# Patient Record
Sex: MALE | Race: WHITE | NOT HISPANIC OR LATINO | Employment: FULL TIME | URBAN - METROPOLITAN AREA
[De-identification: names, ages, dates, MRNs, and addresses within clinical notes are randomized per-mention and may not be internally consistent; named-entity substitution may affect disease eponyms.]

---

## 2017-04-04 ENCOUNTER — OFFICE VISIT (OUTPATIENT)
Dept: RETAIL CLINIC | Facility: CLINIC | Age: 32
End: 2017-04-04

## 2017-04-04 VITALS
HEART RATE: 72 BPM | TEMPERATURE: 100.4 F | DIASTOLIC BLOOD PRESSURE: 76 MMHG | SYSTOLIC BLOOD PRESSURE: 120 MMHG | OXYGEN SATURATION: 97 % | RESPIRATION RATE: 16 BRPM

## 2017-04-04 DIAGNOSIS — J45.901 ASTHMA EXACERBATION: ICD-10-CM

## 2017-04-04 DIAGNOSIS — J01.00 ACUTE MAXILLARY SINUSITIS, RECURRENCE NOT SPECIFIED: Primary | ICD-10-CM

## 2017-04-04 PROBLEM — R05.9 COUGH: Status: ACTIVE | Noted: 2017-04-04

## 2017-04-04 PROBLEM — R06.2 WHEEZING: Status: ACTIVE | Noted: 2017-04-04

## 2017-04-04 PROBLEM — J02.9 SORE THROAT: Status: ACTIVE | Noted: 2017-04-04

## 2017-04-04 PROCEDURE — 99213 OFFICE O/P EST LOW 20 MIN: CPT | Performed by: NURSE PRACTITIONER

## 2017-04-04 RX ORDER — PREDNISONE 20 MG/1
20 TABLET ORAL 2 TIMES DAILY
Qty: 14 TABLET | Refills: 0 | Status: SHIPPED | OUTPATIENT
Start: 2017-04-04 | End: 2017-04-11

## 2017-04-04 RX ORDER — BROMPHENIRAMINE MALEATE, PSEUDOEPHEDRINE HYDROCHLORIDE, AND DEXTROMETHORPHAN HYDROBROMIDE 2; 30; 10 MG/5ML; MG/5ML; MG/5ML
5 SYRUP ORAL 4 TIMES DAILY PRN
Qty: 200 ML | Refills: 0 | Status: SHIPPED | OUTPATIENT
Start: 2017-04-04 | End: 2017-04-11

## 2017-04-04 RX ORDER — CEPHALEXIN 500 MG/1
500 CAPSULE ORAL 2 TIMES DAILY
Qty: 20 CAPSULE | Refills: 0 | Status: SHIPPED | OUTPATIENT
Start: 2017-04-04 | End: 2017-04-14

## 2017-04-04 NOTE — PROGRESS NOTES
Subjective   Ravinder Robles is a 31 y.o. male.     Sinus Problem   This is a new problem. The current episode started in the past 7 days. The problem has been gradually worsening since onset. The maximum temperature recorded prior to his arrival was 100.4 - 100.9 F. The fever has been present for 1 to 2 days. His pain is at a severity of 6/10. The pain is moderate. Associated symptoms include congestion, coughing, ear pain, headaches, sinus pressure, a sore throat and swollen glands. Pertinent negatives include no chills, hoarse voice, neck pain or shortness of breath. Past treatments include acetaminophen. The treatment provided mild relief.   Cough   This is a new problem. The current episode started in the past 7 days. The problem has been gradually worsening. The problem occurs every few minutes. The cough is non-productive. Associated symptoms include ear congestion, ear pain, a fever, headaches, nasal congestion, rhinorrhea, a sore throat and wheezing. Pertinent negatives include no chest pain, chills, postnasal drip, rash, shortness of breath, sweats or weight loss. The symptoms are aggravated by lying down and pollens. He has tried a beta-agonist inhaler, OTC cough suppressant and rest for the symptoms. The treatment provided moderate relief. His past medical history is significant for asthma, bronchitis and environmental allergies.       The following portions of the patient's history were reviewed and updated as appropriate: allergies, current medications, past family history, past medical history, past social history, past surgical history and problem list.    Review of Systems   Constitutional: Positive for activity change, appetite change, fatigue and fever. Negative for chills and weight loss.   HENT: Positive for congestion, ear pain, rhinorrhea, sinus pressure and sore throat. Negative for ear discharge, hoarse voice, postnasal drip, tinnitus and trouble swallowing.    Eyes: Negative for pain,  discharge, itching and visual disturbance.   Respiratory: Positive for cough and wheezing. Negative for shortness of breath. Chest tightness: relates to asthma     Cardiovascular: Negative for chest pain and palpitations.   Gastrointestinal: Negative for abdominal distention, abdominal pain, constipation, diarrhea, nausea and vomiting.   Endocrine: Negative for cold intolerance.   Genitourinary: Negative for difficulty urinating.   Musculoskeletal: Negative for gait problem, neck pain and neck stiffness.   Skin: Negative for color change, pallor and rash.   Allergic/Immunologic: Positive for environmental allergies.   Neurological: Positive for headaches. Negative for dizziness.   Psychiatric/Behavioral: Negative for agitation, behavioral problems and confusion.   All other systems reviewed and are negative.      Objective   Physical Exam   Constitutional: He appears well-developed and well-nourished.   HENT:   Head: Normocephalic.   Right Ear: Hearing, external ear and ear canal normal. There is tenderness. Tympanic membrane is erythematous and retracted.   Left Ear: Hearing, external ear and ear canal normal. There is tenderness. A middle ear effusion is present.   Nose: Rhinorrhea and sinus tenderness present. Right sinus exhibits maxillary sinus tenderness. Left sinus exhibits maxillary sinus tenderness.   Mouth/Throat: Uvula is midline and mucous membranes are normal. Posterior oropharyngeal erythema present. Tonsils are 2+ on the right. Tonsils are 2+ on the left.   Purulent nasal drainage noted   Eyes: Conjunctivae and lids are normal. Pupils are equal, round, and reactive to light.   Neck: Trachea normal and full passive range of motion without pain. Neck supple.   Cardiovascular: Normal rate, regular rhythm and normal heart sounds.    Pulmonary/Chest: Accessory muscle usage present. He has no decreased breath sounds. He has wheezes in the right upper field and the left upper field. He has no rhonchi. He has  no rales.   Mild asthma exacerbation related to illness today.  Patient has asthma plan and is knowledgeable regarding the use of inhaler.  Does not need a refill on the albuterol inhaler.  Patient is pink and perfused on room air, mild accessory muscle use noted, mild scattered wheezing in upper fields.  No respiratory distress.  Will order prednisone and encourage use of inhaler every 4 hours today and then to wean as tolerated (ex. Try every 6 hours, 8 hours, 12 hours, and then once per day).   Abdominal: Soft. Normal appearance and bowel sounds are normal. There is no tenderness.   Musculoskeletal: Normal range of motion.   Lymphadenopathy:        Head (right side): Submental and submandibular adenopathy present.        Head (left side): Submental and submandibular adenopathy present.     He has cervical adenopathy.   Neurological: He is alert. He has normal strength.   Skin: Skin is warm, dry and intact. No rash noted.   Psychiatric: He has a normal mood and affect. His speech is normal and behavior is normal. Judgment and thought content normal. Cognition and memory are normal.       Assessment/Plan   Ravinder was seen today for sinus problem and cough.    Diagnoses and all orders for this visit:    Acute maxillary sinusitis, recurrence not specified  -     cephalexin (KEFLEX) 500 MG capsule; Take 1 capsule by mouth 2 (Two) Times a Day for 10 days.    Asthma exacerbation  -     predniSONE (DELTASONE) 20 MG tablet; Take 1 tablet by mouth 2 (Two) Times a Day for 7 days.  -     brompheniramine-pseudoephedrine-DM 30-2-10 MG/5ML syrup; Take 5 mL by mouth 4 (Four) Times a Day As Needed for Congestion, Cough or Allergies for up to 7 days.       Patient agrees with treatment plan and understands when to return to PCP or seek ER care.

## 2018-10-19 ENCOUNTER — HOSPITAL ENCOUNTER (EMERGENCY)
Facility: HOSPITAL | Age: 33
Discharge: HOME | End: 2018-10-19
Attending: EMERGENCY MEDICINE | Admitting: EMERGENCY MEDICINE

## 2018-10-19 ENCOUNTER — APPOINTMENT (OUTPATIENT)
Dept: GENERAL RADIOLOGY | Facility: HOSPITAL | Age: 33
End: 2018-10-19

## 2018-10-19 VITALS
RESPIRATION RATE: 18 BRPM | HEIGHT: 71 IN | HEART RATE: 99 BPM | SYSTOLIC BLOOD PRESSURE: 144 MMHG | WEIGHT: 291.56 LBS | BODY MASS INDEX: 40.82 KG/M2 | DIASTOLIC BLOOD PRESSURE: 92 MMHG | OXYGEN SATURATION: 92 % | TEMPERATURE: 99.1 F

## 2018-10-19 DIAGNOSIS — J98.01 BRONCHOSPASM: ICD-10-CM

## 2018-10-19 DIAGNOSIS — R55 SYNCOPE, TUSSIVE: Primary | ICD-10-CM

## 2018-10-19 DIAGNOSIS — R05.4 SYNCOPE, TUSSIVE: Primary | ICD-10-CM

## 2018-10-19 LAB
ALBUMIN SERPL-MCNC: 4.1 G/DL (ref 3.5–5.2)
ALBUMIN/GLOB SERPL: 1.2 G/DL
ALP SERPL-CCNC: 70 U/L (ref 40–129)
ALT SERPL W P-5'-P-CCNC: 31 U/L (ref 5–41)
ANION GAP SERPL CALCULATED.3IONS-SCNC: 10.6 MMOL/L
AST SERPL-CCNC: 24 U/L (ref 5–40)
BASOPHILS # BLD AUTO: 0.07 10*3/MM3 (ref 0–0.2)
BASOPHILS NFR BLD AUTO: 0.8 % (ref 0–2)
BILIRUB SERPL-MCNC: 0.4 MG/DL (ref 0.2–1.2)
BUN BLD-MCNC: 14 MG/DL (ref 6–20)
BUN/CREAT SERPL: 15.6 (ref 7–25)
CALCIUM SPEC-SCNC: 9 MG/DL (ref 8.6–10.5)
CHLORIDE SERPL-SCNC: 98 MMOL/L (ref 98–107)
CO2 SERPL-SCNC: 26.4 MMOL/L (ref 22–29)
CREAT BLD-MCNC: 0.9 MG/DL (ref 0.76–1.27)
DEPRECATED RDW RBC AUTO: 43.9 FL (ref 37–54)
EOSINOPHIL # BLD AUTO: 0.41 10*3/MM3 (ref 0.1–0.3)
EOSINOPHIL NFR BLD AUTO: 4.6 % (ref 0–4)
ERYTHROCYTE [DISTWIDTH] IN BLOOD BY AUTOMATED COUNT: 13.4 % (ref 11.5–14.5)
GFR SERPL CREATININE-BSD FRML MDRD: 98 ML/MIN/1.73
GLOBULIN UR ELPH-MCNC: 3.4 GM/DL
GLUCOSE BLD-MCNC: 100 MG/DL (ref 65–99)
GLUCOSE BLDC GLUCOMTR-MCNC: 124 MG/DL (ref 70–130)
HCT VFR BLD AUTO: 46.9 % (ref 42–52)
HGB BLD-MCNC: 15.5 G/DL (ref 14–18)
HOLD SPECIMEN: NORMAL
HOLD SPECIMEN: NORMAL
IMM GRANULOCYTES # BLD: 0.03 10*3/MM3 (ref 0–0.03)
IMM GRANULOCYTES NFR BLD: 0.3 % (ref 0–0.5)
LYMPHOCYTES # BLD AUTO: 2.89 10*3/MM3 (ref 0.6–4.8)
LYMPHOCYTES NFR BLD AUTO: 32.1 % (ref 20–45)
MAGNESIUM SERPL-MCNC: 1.7 MG/DL (ref 1.7–2.5)
MCH RBC QN AUTO: 29.5 PG (ref 27–31)
MCHC RBC AUTO-ENTMCNC: 33 G/DL (ref 31–37)
MCV RBC AUTO: 89.2 FL (ref 80–94)
MONOCYTES # BLD AUTO: 0.63 10*3/MM3 (ref 0–1)
MONOCYTES NFR BLD AUTO: 7 % (ref 3–8)
NEUTROPHILS # BLD AUTO: 4.98 10*3/MM3 (ref 1.5–8.3)
NEUTROPHILS NFR BLD AUTO: 55.2 % (ref 45–70)
NRBC BLD MANUAL-RTO: 0 /100 WBC (ref 0–0)
PLATELET # BLD AUTO: 239 10*3/MM3 (ref 140–500)
PMV BLD AUTO: 10.5 FL (ref 7.4–10.4)
POTASSIUM BLD-SCNC: 3.7 MMOL/L (ref 3.5–5.2)
PROT SERPL-MCNC: 7.5 G/DL (ref 6–8.5)
RBC # BLD AUTO: 5.26 10*6/MM3 (ref 4.7–6.1)
SODIUM BLD-SCNC: 135 MMOL/L (ref 136–145)
TROPONIN T SERPL-MCNC: <0.01 NG/ML (ref 0–0.03)
WBC NRBC COR # BLD: 9.01 10*3/MM3 (ref 4.8–10.8)
WHOLE BLOOD HOLD SPECIMEN: NORMAL
WHOLE BLOOD HOLD SPECIMEN: NORMAL

## 2018-10-19 PROCEDURE — 94640 AIRWAY INHALATION TREATMENT: CPT

## 2018-10-19 PROCEDURE — 99284 EMERGENCY DEPT VISIT MOD MDM: CPT | Performed by: EMERGENCY MEDICINE

## 2018-10-19 PROCEDURE — 93010 ELECTROCARDIOGRAM REPORT: CPT | Performed by: INTERNAL MEDICINE

## 2018-10-19 PROCEDURE — 99285 EMERGENCY DEPT VISIT HI MDM: CPT

## 2018-10-19 PROCEDURE — 85025 COMPLETE CBC W/AUTO DIFF WBC: CPT

## 2018-10-19 PROCEDURE — 80053 COMPREHEN METABOLIC PANEL: CPT | Performed by: EMERGENCY MEDICINE

## 2018-10-19 PROCEDURE — 84484 ASSAY OF TROPONIN QUANT: CPT | Performed by: EMERGENCY MEDICINE

## 2018-10-19 PROCEDURE — 93005 ELECTROCARDIOGRAM TRACING: CPT

## 2018-10-19 PROCEDURE — 83735 ASSAY OF MAGNESIUM: CPT | Performed by: EMERGENCY MEDICINE

## 2018-10-19 PROCEDURE — 93005 ELECTROCARDIOGRAM TRACING: CPT | Performed by: EMERGENCY MEDICINE

## 2018-10-19 PROCEDURE — 82962 GLUCOSE BLOOD TEST: CPT

## 2018-10-19 PROCEDURE — 71046 X-RAY EXAM CHEST 2 VIEWS: CPT

## 2018-10-19 RX ORDER — ESCITALOPRAM OXALATE 10 MG/1
10 TABLET ORAL DAILY
COMMUNITY
End: 2019-07-01 | Stop reason: SDUPTHER

## 2018-10-19 RX ORDER — SODIUM CHLORIDE 0.9 % (FLUSH) 0.9 %
10 SYRINGE (ML) INJECTION AS NEEDED
Status: DISCONTINUED | OUTPATIENT
Start: 2018-10-19 | End: 2018-10-19 | Stop reason: HOSPADM

## 2018-10-19 RX ORDER — LISINOPRIL 20 MG/1
20 TABLET ORAL DAILY
COMMUNITY
End: 2018-10-19

## 2018-10-19 RX ORDER — BUDESONIDE AND FORMOTEROL FUMARATE DIHYDRATE 160; 4.5 UG/1; UG/1
2 AEROSOL RESPIRATORY (INHALATION)
COMMUNITY
End: 2019-10-16 | Stop reason: ALTCHOICE

## 2018-10-19 RX ORDER — AZITHROMYCIN 250 MG/1
250 TABLET, FILM COATED ORAL DAILY
COMMUNITY
End: 2018-10-19

## 2018-10-19 RX ORDER — PREDNISONE 20 MG/1
20 TABLET ORAL 3 TIMES DAILY
Qty: 15 TABLET | Refills: 0 | Status: SHIPPED | OUTPATIENT
Start: 2018-10-19 | End: 2018-10-25

## 2018-10-19 RX ORDER — IPRATROPIUM BROMIDE AND ALBUTEROL SULFATE 2.5; .5 MG/3ML; MG/3ML
3 SOLUTION RESPIRATORY (INHALATION) ONCE
Status: COMPLETED | OUTPATIENT
Start: 2018-10-19 | End: 2018-10-19

## 2018-10-19 RX ORDER — MONTELUKAST SODIUM 10 MG/1
10 TABLET ORAL NIGHTLY
COMMUNITY
End: 2019-10-16 | Stop reason: ALTCHOICE

## 2018-10-19 RX ADMIN — IPRATROPIUM BROMIDE AND ALBUTEROL SULFATE 3 ML: .5; 3 SOLUTION RESPIRATORY (INHALATION) at 15:39

## 2018-10-19 NOTE — ED PROVIDER NOTES
Subjective   History of Present Illness  History of Present Illness    Chief complaint: Syncope    Location: Episode occurred at a local restaurant    Quality/Severity:  Moderate    Timing/Duration: Incident occurred shortly prior to arrival and only lasted a few seconds    Modifying Factors: Patient was in the midst of a coughing spell when the syncope occurred.    Associated Symptoms: Right temporal pain    Narrative: The patient is a 33-year-old white male who presents as noted above.  Again the patient was having a coughing spell when he had a brief episode of syncope.  The patient also relates for at least the past 1 month he has been having shortness of breath, wheezing and a dry cough.  The patient has completed 2 courses of azithromycin and also one course of Augmentin.  The patient was also on prednisone twice.  The patient continues to wheeze and he also continues to smoke.    Review of Systems   Constitutional: Negative for fatigue and fever.   HENT:        Scalp contusion   Respiratory: Positive for cough, shortness of breath and wheezing.    Cardiovascular: Negative for chest pain, palpitations and leg swelling.   Neurological: Positive for syncope. Negative for dizziness, weakness and headaches.   All other systems reviewed and are negative.      Past Medical History:   Diagnosis Date   • Acid reflux    • Allergic    • Anxiety    • Asthma    • Hypertension        Allergies   Allergen Reactions   • Cat Hair Extract    • Penicillins        Past Surgical History:   Procedure Laterality Date   • RESECTION BONE TUMOR KNEE         Family History   Problem Relation Age of Onset   • Diabetes Mother    • Lung disease Mother    • Lung disease Maternal Grandmother    • Cancer Maternal Grandmother        Social History     Social History   • Marital status: Unknown     Social History Main Topics   • Smoking status: Current Every Day Smoker     Packs/day: 1.50     Years: 8.00      Comment: Enrolled in program   •  Alcohol use 0.6 oz/week     1 Standard drinks or equivalent per week   • Drug use: No   • Sexual activity: Yes     Partners: Female     Other Topics Concern   • Not on file           Objective   Physical Exam   Constitutional: He is oriented to person, place, and time. He appears well-developed and well-nourished.   The patient is an obese, 33-year-old, white male in no acute distress.   HENT:   Head: Normocephalic.   Mouth/Throat: Oropharynx is clear and moist.   Minor contusion to the right temporal area   Eyes: Pupils are equal, round, and reactive to light. Conjunctivae and EOM are normal.   Neck: Normal range of motion. Neck supple.   Cardiovascular: Normal rate, regular rhythm and normal heart sounds.    No murmur heard.  Pulmonary/Chest: Effort normal and breath sounds normal. No respiratory distress. He has no wheezes. He has no rales.   Abdominal: Soft. Bowel sounds are normal. He exhibits no distension. There is no tenderness.   Musculoskeletal: Normal range of motion. He exhibits no edema or tenderness.   Neurological: He is alert and oriented to person, place, and time. No cranial nerve deficit.   Skin: Skin is warm and dry. No rash noted.   Psychiatric: He has a normal mood and affect. His behavior is normal.   Nursing note and vitals reviewed.      Final diagnoses:   Syncope, tussive   Bronchospasm       Procedures  Xr Chest 2 View    Result Date: 10/19/2018  Narrative: INDICATION: Syncopal episode this afternoon. Right lateral chest pain.  COMPARISON: 11/5/2011.  FINDINGS: PA and lateral views of the chest. Lungs are clear. Cardiac and mediastinal contours are normal. There is no pneumothorax. There are no displaced rib fractures.      Impression: Normal chest.  This report was finalized on 10/19/2018 3:43 PM by Dr. Milton Sanchez MD.             ED Course  ED Course as of Oct 20 0018   Fri Oct 19, 2018   1513 EKG was reviewed at 1444 hrs. by Dr. Paula and showed a normal sinus rhythm with a rate of  94 bpm.  P waves, QRS complexes, ST segments and T waves all unremarkable.  No ectopy.  Normal ECG.  [ML]   1550 Cough induced syncope discussed with the patient and he was reassured that this is a benign diagnosis.  The patient was informed that he was suffering from either a reactive airway disease problem or possibly asthma.  Treatment plan, expectations and warnings discussed.  Patient strongly encouraged to cease smoking.  A review of the patient's medications showed that he was on lisinopril and this may be the root of his cough and bronchospasm.  This medication will be discontinued.  [ML]      ED Course User Index  [ML] Milton Ferrera MD                  MDM  Number of Diagnoses or Management Options  Bronchospasm: new and requires workup  Syncope, tussive: new and requires workup     Amount and/or Complexity of Data Reviewed  Clinical lab tests: ordered and reviewed  Tests in the radiology section of CPT®: ordered and reviewed  Independent visualization of images, tracings, or specimens: yes    Risk of Complications, Morbidity, and/or Mortality  Presenting problems: high  Diagnostic procedures: high  Management options: moderate    Patient Progress  Patient progress: improved    Labs this visit  Lab Results (last 24 hours)     Procedure Component Value Units Date/Time    CBC & Differential [[id number]] Collected:  10/19/18 1446    Specimen:  Blood Updated:  10/19/18 1452    Narrative:       The following orders were created for panel order CBC & Differential.  Procedure                               Abnormality         Status                     ---------                               -----------         ------                     CBC Auto Differential[[id number]]        Abnormal            Final result                 Please view results for these tests on the individual orders.    Comprehensive Metabolic Panel [[id number]]  (Abnormal) Collected:  10/19/18 1446    Specimen:  Blood Updated:  10/19/18  1509     Glucose 100 (H) mg/dL      BUN 14 mg/dL      Creatinine 0.90 mg/dL      Sodium 135 (L) mmol/L      Potassium 3.7 mmol/L      Chloride 98 mmol/L      CO2 26.4 mmol/L      Calcium 9.0 mg/dL      Total Protein 7.5 g/dL      Albumin 4.10 g/dL      ALT (SGPT) 31 U/L      AST (SGOT) 24 U/L      Alkaline Phosphatase 70 U/L      Total Bilirubin 0.4 mg/dL      eGFR Non African Amer 98 mL/min/1.73      Globulin 3.4 gm/dL      A/G Ratio 1.2 g/dL      BUN/Creatinine Ratio 15.6     Anion Gap 10.6 mmol/L     Magnesium [[id number]]  (Normal) Collected:  10/19/18 1446    Specimen:  Blood Updated:  10/19/18 1509     Magnesium 1.7 mg/dL     Troponin [[id number]]  (Normal) Collected:  10/19/18 1446    Specimen:  Blood Updated:  10/19/18 1514     Troponin T <0.010 ng/mL     Narrative:       Troponin T Reference Ranges:  Less than 0.03 ng/mL:    Negative for AMI  0.03 to 0.09 ng/mL:      Indeterminant for AMI  Greater than 0.09 ng/mL: Positive for AMI    CBC Auto Differential [[id number]]  (Abnormal) Collected:  10/19/18 1446    Specimen:  Blood Updated:  10/19/18 1452     WBC 9.01 10*3/mm3      RBC 5.26 10*6/mm3      Hemoglobin 15.5 g/dL      Hematocrit 46.9 %      MCV 89.2 fL      MCH 29.5 pg      MCHC 33.0 g/dL      RDW 13.4 %      RDW-SD 43.9 fl      MPV 10.5 (H) fL      Platelets 239 10*3/mm3      Neutrophil % 55.2 %      Lymphocyte % 32.1 %      Monocyte % 7.0 %      Eosinophil % 4.6 (H) %      Basophil % 0.8 %      Immature Grans % 0.3 %      Neutrophils, Absolute 4.98 10*3/mm3      Lymphocytes, Absolute 2.89 10*3/mm3      Monocytes, Absolute 0.63 10*3/mm3      Eosinophils, Absolute 0.41 (H) 10*3/mm3      Basophils, Absolute 0.07 10*3/mm3      Immature Grans, Absolute 0.03 10*3/mm3      nRBC 0.0 /100 WBC     POC Glucose Once [[id number]]  (Normal) Collected:  10/19/18 1449    Specimen:  Blood Updated:  10/19/18 1456     Glucose 124 mg/dL     Narrative:       Meter: [id number] : [id number] Monica Moss         Prescribed on discharge             Medication List      New Prescriptions    predniSONE 20 MG tablet  Commonly known as:  DELTASONE  Take 1 tablet by mouth 3 (Three) Times a Day.        Stop    beclomethasone 80 MCG/ACT inhaler  Commonly known as:  QVAR     fluticasone 50 MCG/ACT nasal spray  Commonly known as:  FLONASE     lisinopril 20 MG tablet  Commonly known as:  PRINIVIL,ZESTRIL     ZITHROMAX Z-SARBJIT 250 MG tablet  Generic drug:  azithromycin          All lab results, imaging results and other tests were reviewed by Milton Ferrera MD and unless otherwise specified were found to be unremarkable.        Final diagnoses:   Syncope, tussive   Bronchospasm            Milton Ferrera MD  10/20/18 0019

## 2018-10-25 ENCOUNTER — APPOINTMENT (OUTPATIENT)
Dept: GENERAL RADIOLOGY | Facility: HOSPITAL | Age: 33
End: 2018-10-25

## 2018-10-25 ENCOUNTER — HOSPITAL ENCOUNTER (EMERGENCY)
Facility: HOSPITAL | Age: 33
Discharge: HOME | End: 2018-10-25
Attending: EMERGENCY MEDICINE | Admitting: EMERGENCY MEDICINE

## 2018-10-25 VITALS
TEMPERATURE: 98.1 F | WEIGHT: 290 LBS | BODY MASS INDEX: 40.6 KG/M2 | OXYGEN SATURATION: 93 % | DIASTOLIC BLOOD PRESSURE: 81 MMHG | HEART RATE: 73 BPM | HEIGHT: 71 IN | RESPIRATION RATE: 16 BRPM | SYSTOLIC BLOOD PRESSURE: 129 MMHG

## 2018-10-25 DIAGNOSIS — R09.1 PLEURISY: Primary | ICD-10-CM

## 2018-10-25 DIAGNOSIS — J98.01 BRONCHOSPASM: ICD-10-CM

## 2018-10-25 LAB
ALBUMIN SERPL-MCNC: 3.8 G/DL (ref 3.5–5.2)
ALBUMIN/GLOB SERPL: 1.2 G/DL
ALP SERPL-CCNC: 70 U/L (ref 40–129)
ALT SERPL W P-5'-P-CCNC: 41 U/L (ref 5–41)
ANION GAP SERPL CALCULATED.3IONS-SCNC: 11.5 MMOL/L
AST SERPL-CCNC: 19 U/L (ref 5–40)
BASOPHILS # BLD AUTO: 0.06 10*3/MM3 (ref 0–0.2)
BASOPHILS NFR BLD AUTO: 0.4 % (ref 0–2)
BILIRUB SERPL-MCNC: 0.3 MG/DL (ref 0.2–1.2)
BUN BLD-MCNC: 22 MG/DL (ref 6–20)
BUN/CREAT SERPL: 27.2 (ref 7–25)
CALCIUM SPEC-SCNC: 9 MG/DL (ref 8.6–10.5)
CHLORIDE SERPL-SCNC: 100 MMOL/L (ref 98–107)
CO2 SERPL-SCNC: 25.5 MMOL/L (ref 22–29)
CREAT BLD-MCNC: 0.81 MG/DL (ref 0.76–1.27)
DEPRECATED RDW RBC AUTO: 45.1 FL (ref 37–54)
EOSINOPHIL # BLD AUTO: 0.41 10*3/MM3 (ref 0.1–0.3)
EOSINOPHIL NFR BLD AUTO: 3 % (ref 0–4)
ERYTHROCYTE [DISTWIDTH] IN BLOOD BY AUTOMATED COUNT: 14 % (ref 11.5–14.5)
GFR SERPL CREATININE-BSD FRML MDRD: 110 ML/MIN/1.73
GLOBULIN UR ELPH-MCNC: 3.2 GM/DL
GLUCOSE BLD-MCNC: 103 MG/DL (ref 65–99)
HCT VFR BLD AUTO: 47.1 % (ref 42–52)
HGB BLD-MCNC: 15.7 G/DL (ref 14–18)
IMM GRANULOCYTES # BLD: 0.08 10*3/MM3 (ref 0–0.03)
IMM GRANULOCYTES NFR BLD: 0.6 % (ref 0–0.5)
LYMPHOCYTES # BLD AUTO: 5.78 10*3/MM3 (ref 0.6–4.8)
LYMPHOCYTES NFR BLD AUTO: 41.6 % (ref 20–45)
MCH RBC QN AUTO: 29.5 PG (ref 27–31)
MCHC RBC AUTO-ENTMCNC: 33.3 G/DL (ref 31–37)
MCV RBC AUTO: 88.4 FL (ref 80–94)
MONOCYTES # BLD AUTO: 0.92 10*3/MM3 (ref 0–1)
MONOCYTES NFR BLD AUTO: 6.6 % (ref 3–8)
NEUTROPHILS # BLD AUTO: 6.64 10*3/MM3 (ref 1.5–8.3)
NEUTROPHILS NFR BLD AUTO: 47.8 % (ref 45–70)
NRBC BLD MANUAL-RTO: 0 /100 WBC (ref 0–0)
PLATELET # BLD AUTO: 281 10*3/MM3 (ref 140–500)
PMV BLD AUTO: 10.1 FL (ref 7.4–10.4)
POTASSIUM BLD-SCNC: 4 MMOL/L (ref 3.5–5.2)
PROT SERPL-MCNC: 7 G/DL (ref 6–8.5)
RBC # BLD AUTO: 5.33 10*6/MM3 (ref 4.7–6.1)
SODIUM BLD-SCNC: 137 MMOL/L (ref 136–145)
WBC NRBC COR # BLD: 13.89 10*3/MM3 (ref 4.8–10.8)

## 2018-10-25 PROCEDURE — 80053 COMPREHEN METABOLIC PANEL: CPT | Performed by: EMERGENCY MEDICINE

## 2018-10-25 PROCEDURE — 96374 THER/PROPH/DIAG INJ IV PUSH: CPT

## 2018-10-25 PROCEDURE — 85025 COMPLETE CBC W/AUTO DIFF WBC: CPT | Performed by: EMERGENCY MEDICINE

## 2018-10-25 PROCEDURE — 94640 AIRWAY INHALATION TREATMENT: CPT

## 2018-10-25 PROCEDURE — 25010000002 METHYLPREDNISOLONE PER 125 MG: Performed by: EMERGENCY MEDICINE

## 2018-10-25 PROCEDURE — 96375 TX/PRO/DX INJ NEW DRUG ADDON: CPT

## 2018-10-25 PROCEDURE — 99284 EMERGENCY DEPT VISIT MOD MDM: CPT

## 2018-10-25 PROCEDURE — 71046 X-RAY EXAM CHEST 2 VIEWS: CPT

## 2018-10-25 PROCEDURE — 25010000002 HYDROMORPHONE PER 4 MG: Performed by: EMERGENCY MEDICINE

## 2018-10-25 PROCEDURE — 99284 EMERGENCY DEPT VISIT MOD MDM: CPT | Performed by: EMERGENCY MEDICINE

## 2018-10-25 RX ORDER — HYDROMORPHONE HCL 110MG/55ML
1 PATIENT CONTROLLED ANALGESIA SYRINGE INTRAVENOUS ONCE
Status: COMPLETED | OUTPATIENT
Start: 2018-10-25 | End: 2018-10-25

## 2018-10-25 RX ORDER — PREDNISONE 20 MG/1
TABLET ORAL
Qty: 24 TABLET | Refills: 0 | Status: SHIPPED | OUTPATIENT
Start: 2018-10-25 | End: 2018-12-09

## 2018-10-25 RX ORDER — IPRATROPIUM BROMIDE AND ALBUTEROL SULFATE 2.5; .5 MG/3ML; MG/3ML
3 SOLUTION RESPIRATORY (INHALATION) ONCE
Status: COMPLETED | OUTPATIENT
Start: 2018-10-25 | End: 2018-10-25

## 2018-10-25 RX ORDER — SODIUM CHLORIDE 0.9 % (FLUSH) 0.9 %
10 SYRINGE (ML) INJECTION AS NEEDED
Status: DISCONTINUED | OUTPATIENT
Start: 2018-10-25 | End: 2018-10-25 | Stop reason: HOSPADM

## 2018-10-25 RX ORDER — HYDROCODONE BITARTRATE AND ACETAMINOPHEN 7.5; 325 MG/1; MG/1
1 TABLET ORAL EVERY 4 HOURS PRN
Qty: 15 TABLET | Refills: 0 | Status: SHIPPED | OUTPATIENT
Start: 2018-10-25 | End: 2018-12-09

## 2018-10-25 RX ORDER — METHYLPREDNISOLONE SODIUM SUCCINATE 125 MG/2ML
125 INJECTION, POWDER, LYOPHILIZED, FOR SOLUTION INTRAMUSCULAR; INTRAVENOUS ONCE
Status: COMPLETED | OUTPATIENT
Start: 2018-10-25 | End: 2018-10-25

## 2018-10-25 RX ORDER — HYDROCODONE BITARTRATE AND ACETAMINOPHEN 5; 325 MG/1; MG/1
2 TABLET ORAL ONCE
Status: COMPLETED | OUTPATIENT
Start: 2018-10-25 | End: 2018-10-25

## 2018-10-25 RX ADMIN — METHYLPREDNISOLONE SODIUM SUCCINATE 125 MG: 125 INJECTION, POWDER, FOR SOLUTION INTRAMUSCULAR; INTRAVENOUS at 05:27

## 2018-10-25 RX ADMIN — HYDROMORPHONE HYDROCHLORIDE 1 MG: 2 INJECTION, SOLUTION INTRAMUSCULAR; INTRAVENOUS; SUBCUTANEOUS at 05:26

## 2018-10-25 RX ADMIN — HYDROCODONE BITARTRATE AND ACETAMINOPHEN 2 TABLET: 5; 325 TABLET ORAL at 07:12

## 2018-10-25 RX ADMIN — IPRATROPIUM BROMIDE AND ALBUTEROL SULFATE 3 ML: .5; 3 SOLUTION RESPIRATORY (INHALATION) at 05:34

## 2018-10-25 NOTE — ED PROVIDER NOTES
Subjective   History of Present Illness  History of Present Illness    Chief complaint: Cough productive of scant hemoptysis, right-sided chest pain, shortness of breath and wheezing    Location: Entire right chest    Quality/Severity:  Right-sided chest pain is sharp and severe pain with cough    Timing/Duration: Patient was seen by myself 6 days ago after cough induced syncope and bronchospasm.  Patient was discharged on oral steroids and was doing relatively well until he finished this medication.    Modifying Factors: Cough intensifies pain    Associated Symptoms: Malaise    Narrative: The patient is a 33-year-old white male who presents as noted above.  Please see October 19 chart for further details.  During that visit it was thought that possibly lisinopril was contributing to his symptoms.  Patient's cough was doing better until yesterday when he finished his 5 day course of prednisone.  The patient is now having severe, stabbing, right-sided chest pain with cough.    Review of Systems   Constitutional: Positive for fatigue. Negative for fever.   HENT: Negative for congestion.    Respiratory: Positive for cough, chest tightness, shortness of breath and wheezing.         Scant hemoptysis   Cardiovascular: Positive for chest pain (scribed as sharp and stabbing). Negative for palpitations.   Gastrointestinal: Negative for abdominal pain, diarrhea, nausea and vomiting.   Genitourinary: Negative for dysuria, flank pain, hematuria and urgency.   Musculoskeletal: Negative for back pain.   Skin: Negative for rash.   Neurological: Positive for syncope and light-headedness. Negative for dizziness, weakness and headaches.   Psychiatric/Behavioral: Negative for confusion.   All other systems reviewed and are negative.      Past Medical History:   Diagnosis Date   • Acid reflux    • Allergic    • Anxiety    • Asthma    • Hypertension        Allergies   Allergen Reactions   • Cat Hair Extract    • Penicillins        Past  Surgical History:   Procedure Laterality Date   • RESECTION BONE TUMOR KNEE         Family History   Problem Relation Age of Onset   • Diabetes Mother    • Lung disease Mother    • Lung disease Maternal Grandmother    • Cancer Maternal Grandmother        Social History     Social History   • Marital status: Unknown     Social History Main Topics   • Smoking status: Current Every Day Smoker     Packs/day: 1.50     Years: 8.00      Comment: Enrolled in program   • Alcohol use 0.6 oz/week     1 Standard drinks or equivalent per week   • Drug use: No   • Sexual activity: Yes     Partners: Female     Other Topics Concern   • Not on file           Objective   Physical Exam   Constitutional: He is oriented to person, place, and time.   The patient is an obese, 33-year-old, white male who is noted be sitting uncomfortably on the side of the bed.   HENT:   Head: Normocephalic and atraumatic.   Eyes: Conjunctivae and EOM are normal.   Neck: Normal range of motion. Neck supple. No thyromegaly present.   Cardiovascular: Normal rate, regular rhythm and normal heart sounds.    No murmur heard.  Pulmonary/Chest:   Auscultation of the lungs does reveal marginal exchange of air any prolonged expiratory phase.  Marked wheezes in all lung fields.   Abdominal: Soft. Bowel sounds are normal. He exhibits no distension. There is no tenderness.   Musculoskeletal: Normal range of motion. He exhibits no edema or tenderness.   Lymphadenopathy:     He has no cervical adenopathy.   Neurological: He is alert and oriented to person, place, and time.   Skin: Skin is warm and dry. No rash noted.   Psychiatric: He has a normal mood and affect. His behavior is normal.   Nursing note and vitals reviewed.      Final diagnoses:   Pleurisy   Bronchospasm       Procedures           ED Course  ED Course as of Oct 25 0701   u Oct 25, 2018   0701 All pertinent findings discussed with  and wife.  Patient does relate that he does have a current  appointment soon to see an allergist.  Steroids will be restarted and pain medicine will be provided for discomfort.  Warnings discussed.  [ML]      ED Course User Index  [ML] Milton Ferrera MD                  MDM  Number of Diagnoses or Management Options  Bronchospasm: new and requires workup  Pleurisy: new and requires workup     Amount and/or Complexity of Data Reviewed  Clinical lab tests: ordered and reviewed  Tests in the radiology section of CPT®: ordered and reviewed  Independent visualization of images, tracings, or specimens: yes    Risk of Complications, Morbidity, and/or Mortality  Presenting problems: high  Diagnostic procedures: high  Management options: high    Patient Progress  Patient progress: improved   My differential diagnosis for dyspnea includes but is not limited to:  Asthma, COPD, pneumonia, pulmonary embolus, acute respiratory distress syndrome, pneumothorax, pleural effusion, pulmonary fibrosis, congestive heart failure, myocardial infarction, DKA, uremia, acidosis, sepsis, anemia, drug related, hyperventilation, CNS disease    Labs this visit  Lab Results (last 24 hours)     Procedure Component Value Units Date/Time    CBC & Differential [[id number]] Collected:  10/25/18 0526    Specimen:  Blood Updated:  10/25/18 0533    Narrative:       The following orders were created for panel order CBC & Differential.  Procedure                               Abnormality         Status                     ---------                               -----------         ------                     CBC Auto Differential[[id number]]        Abnormal            Final result                 Please view results for these tests on the individual orders.    Comprehensive Metabolic Panel [[id number]]  (Abnormal) Collected:  10/25/18 0526    Specimen:  Blood Updated:  10/25/18 0553     Glucose 103 (H) mg/dL      BUN 22 (H) mg/dL      Creatinine 0.81 mg/dL      Sodium 137 mmol/L      Potassium 4.0 mmol/L       Chloride 100 mmol/L      CO2 25.5 mmol/L      Calcium 9.0 mg/dL      Total Protein 7.0 g/dL      Albumin 3.80 g/dL      ALT (SGPT) 41 U/L      AST (SGOT) 19 U/L      Alkaline Phosphatase 70 U/L      Total Bilirubin 0.3 mg/dL      eGFR Non African Amer 110 mL/min/1.73      Globulin 3.2 gm/dL      A/G Ratio 1.2 g/dL      BUN/Creatinine Ratio 27.2 (H)     Anion Gap 11.5 mmol/L     CBC Auto Differential [[id number]]  (Abnormal) Collected:  10/25/18 0526    Specimen:  Blood Updated:  10/25/18 0533     WBC 13.89 (H) 10*3/mm3      RBC 5.33 10*6/mm3      Hemoglobin 15.7 g/dL      Hematocrit 47.1 %      MCV 88.4 fL      MCH 29.5 pg      MCHC 33.3 g/dL      RDW 14.0 %      RDW-SD 45.1 fl      MPV 10.1 fL      Platelets 281 10*3/mm3      Neutrophil % 47.8 %      Lymphocyte % 41.6 %      Monocyte % 6.6 %      Eosinophil % 3.0 %      Basophil % 0.4 %      Immature Grans % 0.6 (H) %      Neutrophils, Absolute 6.64 10*3/mm3      Lymphocytes, Absolute 5.78 (H) 10*3/mm3      Monocytes, Absolute 0.92 10*3/mm3      Eosinophils, Absolute 0.41 (H) 10*3/mm3      Basophils, Absolute 0.06 10*3/mm3      Immature Grans, Absolute 0.08 (H) 10*3/mm3      nRBC 0.0 /100 WBC         Prescribed on discharge             Medication List      New Prescriptions    HYDROcodone-acetaminophen 7.5-325 MG per tablet  Commonly known as:  NORCO  Take 1 tablet by mouth Every 4 (Four) Hours As Needed for Moderate Pain .        Changed    predniSONE 20 MG tablet  Commonly known as:  DELTASONE  1 tablet by mouth 3 times a day for 4 days, then 1 tablet twice a day for   4 days and finally 1 tablet every day for 4 days.  What changed:  how much to take  how to take this  when to take this  additional instructions        Stop    methylPREDNISolone 2 MG tablet  Commonly known as:  MEDROL          All lab results, imaging results and other tests were reviewed by Milton Ferrera MD and unless otherwise specified were found to be unremarkable.        Final  diagnoses:   Pleurisy   Bronchospasm            Milton Ferrera MD  10/25/18 0702

## 2018-11-08 ENCOUNTER — HOSPITAL ENCOUNTER (OUTPATIENT)
Dept: CT IMAGING | Facility: HOSPITAL | Age: 33
Discharge: HOME | End: 2018-11-08
Attending: INTERNAL MEDICINE | Admitting: INTERNAL MEDICINE

## 2018-11-08 ENCOUNTER — TRANSCRIBE ORDERS (OUTPATIENT)
Dept: ADMINISTRATIVE | Facility: HOSPITAL | Age: 33
End: 2018-11-08

## 2018-11-08 ENCOUNTER — HOSPITAL ENCOUNTER (OUTPATIENT)
Dept: GENERAL RADIOLOGY | Facility: HOSPITAL | Age: 33
Discharge: HOME | End: 2018-11-08
Attending: INTERNAL MEDICINE

## 2018-11-08 DIAGNOSIS — R07.89 OTHER CHEST PAIN: Primary | ICD-10-CM

## 2018-11-08 DIAGNOSIS — R07.89 OTHER CHEST PAIN: ICD-10-CM

## 2018-11-08 DIAGNOSIS — R52 PAIN: Primary | ICD-10-CM

## 2018-11-08 DIAGNOSIS — R52 PAIN: ICD-10-CM

## 2018-11-08 PROCEDURE — 0 IOPAMIDOL PER 1 ML: Performed by: INTERNAL MEDICINE

## 2018-11-08 PROCEDURE — 71275 CT ANGIOGRAPHY CHEST: CPT

## 2018-11-08 PROCEDURE — 72070 X-RAY EXAM THORAC SPINE 2VWS: CPT

## 2018-11-08 RX ADMIN — IOPAMIDOL 100 ML: 755 INJECTION, SOLUTION INTRAVENOUS at 11:54

## 2018-12-09 ENCOUNTER — OFFICE VISIT (OUTPATIENT)
Dept: RETAIL CLINIC | Facility: CLINIC | Age: 33
End: 2018-12-09

## 2018-12-09 VITALS
RESPIRATION RATE: 18 BRPM | SYSTOLIC BLOOD PRESSURE: 138 MMHG | TEMPERATURE: 98.1 F | HEART RATE: 111 BPM | DIASTOLIC BLOOD PRESSURE: 90 MMHG | OXYGEN SATURATION: 97 %

## 2018-12-09 DIAGNOSIS — J40 BRONCHITIS: Primary | ICD-10-CM

## 2018-12-09 PROCEDURE — 94640 AIRWAY INHALATION TREATMENT: CPT | Performed by: NURSE PRACTITIONER

## 2018-12-09 PROCEDURE — 99213 OFFICE O/P EST LOW 20 MIN: CPT | Performed by: NURSE PRACTITIONER

## 2018-12-09 RX ORDER — IPRATROPIUM BROMIDE AND ALBUTEROL SULFATE 2.5; .5 MG/3ML; MG/3ML
3 SOLUTION RESPIRATORY (INHALATION) EVERY 4 HOURS PRN
Qty: 360 ML | Refills: 0 | Status: SHIPPED | OUTPATIENT
Start: 2018-12-09 | End: 2019-01-08

## 2018-12-09 RX ORDER — PREDNISONE 20 MG/1
20 TABLET ORAL 2 TIMES DAILY
Qty: 10 TABLET | Refills: 0 | Status: SHIPPED | OUTPATIENT
Start: 2018-12-09 | End: 2018-12-14

## 2018-12-09 RX ORDER — AMLODIPINE BESYLATE 10 MG/1
TABLET ORAL
COMMUNITY
Start: 2018-11-25 | End: 2019-10-16 | Stop reason: ALTCHOICE

## 2018-12-09 RX ORDER — IPRATROPIUM BROMIDE AND ALBUTEROL SULFATE 2.5; .5 MG/3ML; MG/3ML
3 SOLUTION RESPIRATORY (INHALATION) ONCE
Status: COMPLETED | OUTPATIENT
Start: 2018-12-09 | End: 2018-12-09

## 2018-12-09 RX ADMIN — IPRATROPIUM BROMIDE AND ALBUTEROL SULFATE 3 ML: 2.5; .5 SOLUTION RESPIRATORY (INHALATION) at 13:30

## 2018-12-09 NOTE — PROGRESS NOTES
Subjective   Ravinder Robles is a 33 y.o. male.     Cough   This is a new problem. Episode onset: 3 days ago. The problem has been gradually worsening. The problem occurs every few minutes. The cough is productive of sputum. Associated symptoms include postnasal drip, rhinorrhea, a sore throat, shortness of breath and wheezing. Pertinent negatives include no chest pain, chills, ear congestion, ear pain, eye redness, fever, headaches, heartburn, hemoptysis, myalgias, nasal congestion or rash. The symptoms are aggravated by exercise and lying down. Risk factors for lung disease include smoking/tobacco exposure. He has tried a beta-agonist inhaler (albuterol nebs Q4 x 2-3 days ) for the symptoms. The treatment provided mild (temporary ) relief. His past medical history is significant for asthma, bronchitis and environmental allergies. There is no history of pneumonia.       The following portions of the patient's history were reviewed and updated as appropriate: allergies, current medications, past medical history, past social history, past surgical history and problem list.    Review of Systems   Constitutional: Positive for fatigue. Negative for appetite change, chills, diaphoresis and fever.   HENT: Positive for postnasal drip, rhinorrhea and sore throat. Negative for congestion, ear discharge, ear pain, mouth sores, nosebleeds, sinus pressure, sneezing, trouble swallowing and voice change.    Eyes: Negative for redness and itching.   Respiratory: Positive for cough, chest tightness, shortness of breath and wheezing. Negative for hemoptysis, choking and stridor.    Cardiovascular: Negative for chest pain and palpitations.   Gastrointestinal: Negative for diarrhea, heartburn, nausea and vomiting.   Musculoskeletal: Negative for myalgias.   Skin: Negative for rash.   Allergic/Immunologic: Positive for environmental allergies. Negative for immunocompromised state.   Neurological: Negative for dizziness, syncope and  "headaches.       Objective   Physical Exam   Constitutional: He is oriented to person, place, and time. He appears well-developed and well-nourished. He is cooperative. He does not appear ill. No distress.   HENT:   Right Ear: Tympanic membrane, external ear and ear canal normal.   Left Ear: Tympanic membrane, external ear and ear canal normal.   Nose: Mucosal edema present. No rhinorrhea. Right sinus exhibits no maxillary sinus tenderness and no frontal sinus tenderness. Left sinus exhibits no maxillary sinus tenderness and no frontal sinus tenderness.   Mouth/Throat: Uvula is midline and mucous membranes are normal. No oral lesions. No uvula swelling. Posterior oropharyngeal erythema present. No oropharyngeal exudate or posterior oropharyngeal edema. Tonsils are 2+ on the right. Tonsils are 2+ on the left. No tonsillar exudate.   Eyes: Conjunctivae and lids are normal.   Cardiovascular: Normal rate, regular rhythm, S1 normal and S2 normal.   Pulmonary/Chest: Effort normal. No stridor. He has no decreased breath sounds. He has wheezes in the right upper field, the right middle field, the right lower field, the left upper field, the left middle field and the left lower field. He has rhonchi in the right upper field, the right middle field, the left upper field and the left middle field. He has no rales.   Coughing with deep breathing exercises     Post neb lung sounds:  Rhonchi in RUL, wheezing slightly improved in upper lobes, unchanged in lower lobes, pt reports \"breathing a little easier\"     Lymphadenopathy:     He has no cervical adenopathy.   Neurological: He is alert and oriented to person, place, and time.   Skin: Skin is warm and dry. He is not diaphoretic.   Vitals reviewed.      Assessment/Plan   Ravinder was seen today for cough.    Diagnoses and all orders for this visit:    Bronchitis  -     predniSONE (DELTASONE) 20 MG tablet; Take 1 tablet by mouth 2 (Two) Times a Day for 5 days.  -     " ipratropium-albuterol (DUO-NEB) 0.5-2.5 mg/3 ml nebulizer; Take 3 mL by nebulization Every 4 (Four) Hours As Needed for Wheezing or Shortness of Air for up to 30 days.  -     ipratropium-albuterol (DUO-NEB) nebulizer solution 3 mL; Take 3 mL by nebulization 1 (One) Time.          -     Duo neb Q4 hours while awake x 3 days then PRN basis-pt aware duoneb contains albuterol and no need for him to continue using his previously prescribed albuterol neb in conjunction with duonebs        -     Smoking cessation- pt will continue with cessation program        -     Follow up with PCP for persistent symptoms or UC/ER for worsening symptoms

## 2019-02-04 ENCOUNTER — OFFICE VISIT (OUTPATIENT)
Dept: RETAIL CLINIC | Facility: CLINIC | Age: 34
End: 2019-02-04

## 2019-02-04 VITALS
RESPIRATION RATE: 18 BRPM | TEMPERATURE: 98.2 F | DIASTOLIC BLOOD PRESSURE: 74 MMHG | HEART RATE: 101 BPM | SYSTOLIC BLOOD PRESSURE: 136 MMHG | OXYGEN SATURATION: 95 %

## 2019-02-04 DIAGNOSIS — H66.001 ACUTE SUPPURATIVE OTITIS MEDIA OF RIGHT EAR WITHOUT SPONTANEOUS RUPTURE OF TYMPANIC MEMBRANE, RECURRENCE NOT SPECIFIED: Primary | ICD-10-CM

## 2019-02-04 DIAGNOSIS — J45.901 EXACERBATION OF ASTHMA, UNSPECIFIED ASTHMA SEVERITY, UNSPECIFIED WHETHER PERSISTENT: ICD-10-CM

## 2019-02-04 DIAGNOSIS — M54.50 ACUTE BILATERAL LOW BACK PAIN WITHOUT SCIATICA: ICD-10-CM

## 2019-02-04 PROCEDURE — 94640 AIRWAY INHALATION TREATMENT: CPT | Performed by: NURSE PRACTITIONER

## 2019-02-04 PROCEDURE — 99214 OFFICE O/P EST MOD 30 MIN: CPT | Performed by: NURSE PRACTITIONER

## 2019-02-04 RX ORDER — PREDNISONE 20 MG/1
20 TABLET ORAL 2 TIMES DAILY
Qty: 10 TABLET | Refills: 0 | Status: SHIPPED | OUTPATIENT
Start: 2019-02-04 | End: 2019-02-09

## 2019-02-04 RX ORDER — IPRATROPIUM BROMIDE AND ALBUTEROL SULFATE 2.5; .5 MG/3ML; MG/3ML
3 SOLUTION RESPIRATORY (INHALATION) ONCE
Status: COMPLETED | OUTPATIENT
Start: 2019-02-04 | End: 2019-02-04

## 2019-02-04 RX ORDER — AMOXICILLIN AND CLAVULANATE POTASSIUM 875; 125 MG/1; MG/1
1 TABLET, FILM COATED ORAL 2 TIMES DAILY
Qty: 20 TABLET | Refills: 0 | Status: SHIPPED | OUTPATIENT
Start: 2019-02-04 | End: 2019-02-14

## 2019-02-04 RX ADMIN — IPRATROPIUM BROMIDE AND ALBUTEROL SULFATE 3 ML: 2.5; .5 SOLUTION RESPIRATORY (INHALATION) at 11:15

## 2019-02-04 NOTE — PROGRESS NOTES
Subjective     Ravinder Robles is a 33 y.o.. male.     Pt says that he was walking his dog and slipped and fell yesterday. Pt stating he fell on his right buttocks and right arm. Pt says his pain level is at a 7. He says when he is straightening his back it hurts the most.    Pt c/o a cough, sob, and wheezing since his 12/09/18. Pt says that he was treated recently 3 weeks ago for the same symptoms. Pt reports his initial steroid pack helped the symptoms, but they came back. Pt says that his PCP gave him Prednisone 20 mg in which his symptoms continue. Pt does state that he quit smoking 27 days ago. Pt says he did a neb treatment one hr ago. Pt has been doing those 2 x's a day for two weeks. He reports since quitting smoking his asthma symptoms are worse.       Back Pain   This is a new problem. The current episode started yesterday. Pertinent negatives include no abdominal pain, fever (last fever was 3 weeks ago) or headaches.   Cough   This is a new problem. Episode onset: 2 months. The problem has been unchanged. Associated symptoms include rhinorrhea. Pertinent negatives include no ear pain, fever (last fever was 3 weeks ago), headaches or sore throat.       The following portions of the patient's history were reviewed and updated as appropriate: allergies, current medications, past medical history, past social history, past surgical history and problem list.    Review of Systems   Constitutional: Negative for fever (last fever was 3 weeks ago).   HENT: Positive for congestion and rhinorrhea. Negative for ear pain and sore throat.    Respiratory: Positive for cough.    Gastrointestinal: Negative for abdominal pain, diarrhea, nausea and vomiting.   Musculoskeletal: Positive for back pain.   Neurological: Negative for headaches.       Objective     Vitals:    02/04/19 1003   BP: 136/74   BP Location: Left arm   Patient Position: Sitting   Cuff Size: Adult   Pulse: 101   Resp: 18   Temp: 98.2 °F (36.8 °C)    TempSrc: Oral   SpO2: 95%       Physical Exam   Constitutional: He is oriented to person, place, and time. He appears well-developed and well-nourished.   HENT:   Head: Normocephalic and atraumatic.   Right Ear: Tympanic membrane is erythematous. A middle ear effusion (yellow) is present.   Left Ear: Tympanic membrane normal. Tympanic membrane is not erythematous.   Nose: Mucosal edema (minor) present. Right sinus exhibits no maxillary sinus tenderness and no frontal sinus tenderness. Left sinus exhibits no maxillary sinus tenderness and no frontal sinus tenderness.   Mouth/Throat: Oropharynx is clear and moist. Oropharyngeal exudate: pnd.   Eyes: Conjunctivae are normal. Pupils are equal, round, and reactive to light.   Cardiovascular: Normal rate and regular rhythm.   No murmur heard.  Pulmonary/Chest: Effort normal. No accessory muscle usage. He has wheezes in the right upper field, the right middle field, the right lower field, the left upper field, the left middle field and the left lower field. He has rhonchi in the right upper field and the left upper field. He has no rales.   Duoneb done while in office; no change in lung assessment post neb treatment; O2 sat staying at 94-95% through out visit   Musculoskeletal:   Spine: low  to palpation midline and juanito. Sides; no bruising noted, no redness noted, no swelling noted; decreased ROM    Lymphadenopathy:     He has no cervical adenopathy.   Neurological: He is alert and oriented to person, place, and time. Gait abnormal.   Pt holding his low back while walking   Skin: Skin is warm and dry.   Vitals reviewed.      Assessment/Plan   Ravinder was seen today for back pain and cough.    Diagnoses and all orders for this visit:    Acute suppurative otitis media of right ear without spontaneous rupture of tympanic membrane, recurrence not specified  -     amoxicillin-clavulanate (AUGMENTIN) 875-125 MG per tablet; Take 1 tablet by mouth 2 (Two) Times a Day for  10 days.    Exacerbation of asthma, unspecified asthma severity, unspecified whether persistent  -     ipratropium-albuterol (DUO-NEB) nebulizer solution 3 mL; Take 3 mL by nebulization 1 (One) Time.  -     predniSONE (DELTASONE) 20 MG tablet; Take 1 tablet by mouth 2 (Two) Times a Day for 5 days.    Acute bilateral low back pain without sciatica    Pt stating he has plenty of duoneb ampules for neb. Machine at home and denies need for script: pt stating he has albuterol inhaler at home and denies need for script.    Pt informed he needed to go to ER for further eval. And treatment of asthma exacerbation, need for further eval of low back pain. Pt verb. Understanding but stated he was not going to go to ER--stating he could not afford it. Provider called and spoke to supervisor Reena and medical director Dr. Dutta regarding pt. Pt informed of going against medical advise, adverse effects of not going to ER--including death, informed of risk to not only his health but others if he drives and has accident. Pt signed refusal to be transported by ambulance form and refusal to use alternative  form to go to ER. Pt given list of local PCP, pt stating while he was here that he wanted to change PCP. Pt given names and numbers to local pulmonologist to set up appt. For continual asthma maintenance care. PT INFORMED TO FOLLOW UP WITH PCP IN ONE WEEK. Pt informed that even though he was getting some treatment today, further treatment needed to improve lung function. Pt informed to continue home medications--especially asthma medications. Pt verb. Understanding of all above.     Pt seen in exam room for over 40 minutes and greater than 50% of that time was counseling.     Patient Instructions   Back Pain, Adult  Back pain is very common. The pain often gets better over time. The cause of back pain is usually not dangerous. Most people can learn to manage their back pain on their own.  Follow these instructions at  home:  Watch your back pain for any changes. The following actions may help to lessen any pain you are feeling:  · Stay active. Start with short walks on flat ground if you can. Try to walk farther each day.  · Exercise regularly as told by your doctor. Exercise helps your back heal faster. It also helps avoid future injury by keeping your muscles strong and flexible.  · Do not sit, drive, or  one place for more than 30 minutes.  · Do not stay in bed. Resting more than 1-2 days can slow down your recovery.  · Be careful when you bend or lift an object. Use good form when lifting:  ? Bend at your knees.  ? Keep the object close to your body.  ? Do not twist.  · Sleep on a firm mattress. Lie on your side, and bend your knees. If you lie on your back, put a pillow under your knees.  · Take medicines only as told by your doctor.  · Put ice on the injured area.  ? Put ice in a plastic bag.  ? Place a towel between your skin and the bag.  ? Leave the ice on for 20 minutes, 2-3 times a day for the first 2-3 days. After that, you can switch between ice and heat packs.  · Avoid feeling anxious or stressed. Find good ways to deal with stress, such as exercise.  · Maintain a healthy weight. Extra weight puts stress on your back.    Contact a doctor if:  · You have pain that does not go away with rest or medicine.  · You have worsening pain that goes down into your legs or buttocks.  · You have pain that does not get better in one week.  · You have pain at night.  · You lose weight.  · You have a fever or chills.  Get help right away if:  · You cannot control when you poop (bowel movement) or pee (urinate).  · Your arms or legs feel weak.  · Your arms or legs lose feeling (numbness).  · You feel sick to your stomach (nauseous) or throw up (vomit).  · You have belly (abdominal) pain.  · You feel like you may pass out (faint).  This information is not intended to replace advice given to you by your health care provider.  Make sure you discuss any questions you have with your health care provider.  Document Released: 06/05/2009 Document Revised: 05/25/2017 Document Reviewed: 04/21/2015  [employer] Interactive Patient Education © 2018 [employer]  Asthma, Adult  Asthma is a recurring condition in which the airways tighten and narrow. Asthma can make it difficult to breathe. It can cause coughing, wheezing, and shortness of breath. Asthma episodes, also called asthma attacks, range from minor to life-threatening. Asthma cannot be cured, but medicines and lifestyle changes can help control it.  What are the causes?  Asthma is believed to be caused by inherited (genetic) and environmental factors, but its exact cause is unknown. Asthma may be triggered by allergens, lung infections, or irritants in the air. Asthma triggers are different for each person. Common triggers include:  · Animal dander.  · Dust mites.  · Cockroaches.  · Pollen from trees or grass.  · Mold.  · Smoke.  · Air pollutants such as dust, household , hair sprays, aerosol sprays, paint fumes, strong chemicals, or strong odors.  · Cold air, weather changes, and winds (which increase molds and pollens in the air).  · Strong emotional expressions such as crying or laughing hard.  · Stress.  · Certain medicines (such as aspirin) or types of drugs (such as beta-blockers).  · Sulfites in foods and drinks. Foods and drinks that may contain sulfites include dried fruit, potato chips, and sparkling grape juice.  · Infections or inflammatory conditions such as the flu, a cold, or an inflammation of the nasal membranes (rhinitis).  · Gastroesophageal reflux disease (GERD).  · Exercise or strenuous activity.    What are the signs or symptoms?  Symptoms may occur immediately after asthma is triggered or many hours later. Symptoms include:  · Wheezing.  · Excessive nighttime or early morning coughing.  · Frequent or severe coughing with a common cold.  · Chest  tightness.  · Shortness of breath.    How is this diagnosed?  The diagnosis of asthma is made by a review of your medical history and a physical exam. Tests may also be performed. These may include:  · Lung function studies. These tests show how much air you breathe in and out.  · Allergy tests.  · Imaging tests such as X-rays.    How is this treated?  Asthma cannot be cured, but it can usually be controlled. Treatment involves identifying and avoiding your asthma triggers. It also involves medicines. There are 2 classes of medicine used for asthma treatment:  · Controller medicines. These prevent asthma symptoms from occurring. They are usually taken every day.  · Reliever or rescue medicines. These quickly relieve asthma symptoms. They are used as needed and provide short-term relief.    Your health care provider will help you create an asthma action plan. An asthma action plan is a written plan for managing and treating your asthma attacks. It includes a list of your asthma triggers and how they may be avoided. It also includes information on when medicines should be taken and when their dosage should be changed. An action plan may also involve the use of a device called a peak flow meter. A peak flow meter measures how well the lungs are working. It helps you monitor your condition.  Follow these instructions at home:  · Take medicines only as directed by your health care provider. Speak with your health care provider if you have questions about how or when to take the medicines.  · Use a peak flow meter as directed by your health care provider. Record and keep track of readings.  · Understand and use the action plan to help minimize or stop an asthma attack without needing to seek medical care.  · Control your home environment in the following ways to help prevent asthma attacks:  ? Do not smoke. Avoid being exposed to secondhand smoke.  ? Change your heating and air conditioning filter regularly.  ? Limit your  use of fireplaces and wood stoves.  ? Get rid of pests (such as roaches and mice) and their droppings.  ? Throw away plants if you see mold on them.  ? Clean your floors and dust regularly. Use unscented cleaning products.  ? Try to have someone else vacuum for you regularly. Stay out of rooms while they are being vacuumed and for a short while afterward. If you vacuum, use a dust mask from a hardware store, a double-layered or microfilter vacuum  bag, or a vacuum  with a HEPA filter.  ? Replace carpet with wood, tile, or vinyl doris. Carpet can trap dander and dust.  ? Use allergy-proof pillows, mattress covers, and box spring covers.  ? Wash bed sheets and blankets every week in hot water and dry them in a dryer.  ? Use blankets that are made of polyester or cotton.  ? Clean bathrooms and antonio with bleach. If possible, have someone repaint the walls in these rooms with mold-resistant paint. Keep out of the rooms that are being cleaned and painted.  ? Wash hands frequently.  Contact a health care provider if:  · You have wheezing, shortness of breath, or a cough even if taking medicine to prevent attacks.  · The colored mucus you cough up (sputum) is thicker than usual.  · Your sputum changes from clear or white to yellow, green, gray, or bloody.  · You have any problems that may be related to the medicines you are taking (such as a rash, itching, swelling, or trouble breathing).  · You are using a reliever medicine more than 2-3 times per week.  · Your peak flow is still at 50-79% of your personal best after following your action plan for 1 hour.  · You have a fever.  Get help right away if:  · You seem to be getting worse and are unresponsive to treatment during an asthma attack.  · You are short of breath even at rest.  · You get short of breath when doing very little physical activity.  · You have difficulty eating, drinking, or talking due to asthma symptoms.  · You develop chest  pain.  · You develop a fast heartbeat.  · You have a bluish color to your lips or fingernails.  · You are light-headed, dizzy, or faint.  · Your peak flow is less than 50% of your personal best.  This information is not intended to replace advice given to you by your health care provider. Make sure you discuss any questions you have with your health care provider.  Document Released: 12/18/2006 Document Revised: 05/31/2017 Document Reviewed: 07/17/2014  [employer] Interactive Patient Education © 2017 [employer]        Return for needs to go to ER asap after leaving clinic.

## 2019-02-04 NOTE — PATIENT INSTRUCTIONS
Back Pain, Adult  Back pain is very common. The pain often gets better over time. The cause of back pain is usually not dangerous. Most people can learn to manage their back pain on their own.  Follow these instructions at home:  Watch your back pain for any changes. The following actions may help to lessen any pain you are feeling:  · Stay active. Start with short walks on flat ground if you can. Try to walk farther each day.  · Exercise regularly as told by your doctor. Exercise helps your back heal faster. It also helps avoid future injury by keeping your muscles strong and flexible.  · Do not sit, drive, or  one place for more than 30 minutes.  · Do not stay in bed. Resting more than 1-2 days can slow down your recovery.  · Be careful when you bend or lift an object. Use good form when lifting:  ? Bend at your knees.  ? Keep the object close to your body.  ? Do not twist.  · Sleep on a firm mattress. Lie on your side, and bend your knees. If you lie on your back, put a pillow under your knees.  · Take medicines only as told by your doctor.  · Put ice on the injured area.  ? Put ice in a plastic bag.  ? Place a towel between your skin and the bag.  ? Leave the ice on for 20 minutes, 2-3 times a day for the first 2-3 days. After that, you can switch between ice and heat packs.  · Avoid feeling anxious or stressed. Find good ways to deal with stress, such as exercise.  · Maintain a healthy weight. Extra weight puts stress on your back.    Contact a doctor if:  · You have pain that does not go away with rest or medicine.  · You have worsening pain that goes down into your legs or buttocks.  · You have pain that does not get better in one week.  · You have pain at night.  · You lose weight.  · You have a fever or chills.  Get help right away if:  · You cannot control when you poop (bowel movement) or pee (urinate).  · Your arms or legs feel weak.  · Your arms or legs lose feeling (numbness).  · You feel sick  to your stomach (nauseous) or throw up (vomit).  · You have belly (abdominal) pain.  · You feel like you may pass out (faint).  This information is not intended to replace advice given to you by your health care provider. Make sure you discuss any questions you have with your health care provider.  Document Released: 06/05/2009 Document Revised: 05/25/2017 Document Reviewed: 04/21/2015  [employer] Interactive Patient Education © 2018 [employer]  Asthma, Adult  Asthma is a recurring condition in which the airways tighten and narrow. Asthma can make it difficult to breathe. It can cause coughing, wheezing, and shortness of breath. Asthma episodes, also called asthma attacks, range from minor to life-threatening. Asthma cannot be cured, but medicines and lifestyle changes can help control it.  What are the causes?  Asthma is believed to be caused by inherited (genetic) and environmental factors, but its exact cause is unknown. Asthma may be triggered by allergens, lung infections, or irritants in the air. Asthma triggers are different for each person. Common triggers include:  · Animal dander.  · Dust mites.  · Cockroaches.  · Pollen from trees or grass.  · Mold.  · Smoke.  · Air pollutants such as dust, household , hair sprays, aerosol sprays, paint fumes, strong chemicals, or strong odors.  · Cold air, weather changes, and winds (which increase molds and pollens in the air).  · Strong emotional expressions such as crying or laughing hard.  · Stress.  · Certain medicines (such as aspirin) or types of drugs (such as beta-blockers).  · Sulfites in foods and drinks. Foods and drinks that may contain sulfites include dried fruit, potato chips, and sparkling grape juice.  · Infections or inflammatory conditions such as the flu, a cold, or an inflammation of the nasal membranes (rhinitis).  · Gastroesophageal reflux disease (GERD).  · Exercise or strenuous activity.    What are the signs or symptoms?  Symptoms may  occur immediately after asthma is triggered or many hours later. Symptoms include:  · Wheezing.  · Excessive nighttime or early morning coughing.  · Frequent or severe coughing with a common cold.  · Chest tightness.  · Shortness of breath.    How is this diagnosed?  The diagnosis of asthma is made by a review of your medical history and a physical exam. Tests may also be performed. These may include:  · Lung function studies. These tests show how much air you breathe in and out.  · Allergy tests.  · Imaging tests such as X-rays.    How is this treated?  Asthma cannot be cured, but it can usually be controlled. Treatment involves identifying and avoiding your asthma triggers. It also involves medicines. There are 2 classes of medicine used for asthma treatment:  · Controller medicines. These prevent asthma symptoms from occurring. They are usually taken every day.  · Reliever or rescue medicines. These quickly relieve asthma symptoms. They are used as needed and provide short-term relief.    Your health care provider will help you create an asthma action plan. An asthma action plan is a written plan for managing and treating your asthma attacks. It includes a list of your asthma triggers and how they may be avoided. It also includes information on when medicines should be taken and when their dosage should be changed. An action plan may also involve the use of a device called a peak flow meter. A peak flow meter measures how well the lungs are working. It helps you monitor your condition.  Follow these instructions at home:  · Take medicines only as directed by your health care provider. Speak with your health care provider if you have questions about how or when to take the medicines.  · Use a peak flow meter as directed by your health care provider. Record and keep track of readings.  · Understand and use the action plan to help minimize or stop an asthma attack without needing to seek medical care.  · Control  your home environment in the following ways to help prevent asthma attacks:  ? Do not smoke. Avoid being exposed to secondhand smoke.  ? Change your heating and air conditioning filter regularly.  ? Limit your use of fireplaces and wood stoves.  ? Get rid of pests (such as roaches and mice) and their droppings.  ? Throw away plants if you see mold on them.  ? Clean your floors and dust regularly. Use unscented cleaning products.  ? Try to have someone else vacuum for you regularly. Stay out of rooms while they are being vacuumed and for a short while afterward. If you vacuum, use a dust mask from a hardware store, a double-layered or microfilter vacuum  bag, or a vacuum  with a HEPA filter.  ? Replace carpet with wood, tile, or vinyl doris. Carpet can trap dander and dust.  ? Use allergy-proof pillows, mattress covers, and box spring covers.  ? Wash bed sheets and blankets every week in hot water and dry them in a dryer.  ? Use blankets that are made of polyester or cotton.  ? Clean bathrooms and antonio with bleach. If possible, have someone repaint the walls in these rooms with mold-resistant paint. Keep out of the rooms that are being cleaned and painted.  ? Wash hands frequently.  Contact a health care provider if:  · You have wheezing, shortness of breath, or a cough even if taking medicine to prevent attacks.  · The colored mucus you cough up (sputum) is thicker than usual.  · Your sputum changes from clear or white to yellow, green, gray, or bloody.  · You have any problems that may be related to the medicines you are taking (such as a rash, itching, swelling, or trouble breathing).  · You are using a reliever medicine more than 2-3 times per week.  · Your peak flow is still at 50-79% of your personal best after following your action plan for 1 hour.  · You have a fever.  Get help right away if:  · You seem to be getting worse and are unresponsive to treatment during an asthma attack.  · You  are short of breath even at rest.  · You get short of breath when doing very little physical activity.  · You have difficulty eating, drinking, or talking due to asthma symptoms.  · You develop chest pain.  · You develop a fast heartbeat.  · You have a bluish color to your lips or fingernails.  · You are light-headed, dizzy, or faint.  · Your peak flow is less than 50% of your personal best.  This information is not intended to replace advice given to you by your health care provider. Make sure you discuss any questions you have with your health care provider.  Document Released: 12/18/2006 Document Revised: 05/31/2017 Document Reviewed: 07/17/2014  [employer] Interactive Patient Education © 2017 [employer]

## 2019-02-13 ENCOUNTER — TRANSCRIBE ORDERS (OUTPATIENT)
Dept: ADMINISTRATIVE | Facility: HOSPITAL | Age: 34
End: 2019-02-13

## 2019-02-13 ENCOUNTER — LAB (OUTPATIENT)
Dept: LAB | Facility: HOSPITAL | Age: 34
End: 2019-02-13

## 2019-02-13 DIAGNOSIS — J45.40 NOT WELL CONTROLLED MODERATE PERSISTENT ASTHMA: Primary | ICD-10-CM

## 2019-02-13 DIAGNOSIS — J45.40 NOT WELL CONTROLLED MODERATE PERSISTENT ASTHMA: ICD-10-CM

## 2019-02-13 PROCEDURE — 86003 ALLG SPEC IGE CRUDE XTRC EA: CPT

## 2019-02-13 PROCEDURE — 36415 COLL VENOUS BLD VENIPUNCTURE: CPT

## 2019-02-13 PROCEDURE — 82103 ALPHA-1-ANTITRYPSIN TOTAL: CPT

## 2019-02-14 ENCOUNTER — TRANSCRIBE ORDERS (OUTPATIENT)
Dept: PULMONOLOGY | Facility: HOSPITAL | Age: 34
End: 2019-02-14

## 2019-02-14 DIAGNOSIS — R06.00 DYSPNEA, UNSPECIFIED TYPE: Primary | ICD-10-CM

## 2019-02-14 LAB — A1AT SERPL-MCNC: 132 MG/DL (ref 90–200)

## 2019-02-16 LAB
A ALTERNATA IGE QN: <0.1 KU/L
A FUMIGATUS IGE QN: 0.14 KU/L
AMER ROACH IGE QN: 0.3 KU/L
BAHIA GRASS IGE QN: <0.1 KU/L
BAYBERRY POLN IGE QN: <0.1 KU/L
BERMUDA GRASS IGE QN: <0.1 KU/L
BOXELDER IGE QN: <0.1 KU/L
C HERBARUM IGE QN: <0.1 KU/L
CAT DANDER IGG QN: 21.5 KU/L
COMMON RAGWEED IGE QN: 1.65 KU/L
CONV CLASS DESCRIPTION: ABNORMAL
D FARINAE IGE QN: 4.56 KU/L
D PTERONYSS IGE QN: 3.64 KU/L
DOG DANDER IGE QN: 7.52 KU/L
DOG FENNEL IGE QN: 0.21 KU/L
ENGL PLANTAIN IGE QN: <0.1 KU/L
GOOSEFOOT IGE QN: <0.1 KU/L
GUM-TREE IGE QN: <0.1 KU/L
ITALIAN CYPRESS IGE QN: <0.1 KU/L
JOHNSON GRASS IGE QN: <0.1 KU/L
M RACEMOSUS IGE QN: <0.1 KU/L
P NOTATUM IGE QN: 0.15 KU/L
PEPPER TREE IGE QN: <0.1 KU/L
PER RYE GRASS IGE QN: 2.11 KU/L
QUEEN PALM IGE QN: <0.1 KU/L
S BOTRYOSUM IGE QN: <0.1 KU/L
SHEEP SORREL IGE QN: <0.1 KU/L
T210-IGE PRIVET, COMMON: <0.1 KU/L
VIRG LIVE OAK IGE QN: <0.1 KU/L
WHITE ELM IGE QN: <0.1 KU/L

## 2019-04-22 RX ORDER — AZITHROMYCIN 250 MG/1
TABLET, FILM COATED ORAL
Qty: 8 TABLET | Refills: 0 | Status: SHIPPED | OUTPATIENT
Start: 2019-04-22 | End: 2019-10-16

## 2019-04-22 RX ORDER — METHYLPREDNISOLONE 4 MG/1
TABLET ORAL
Qty: 1 EACH | Refills: 0 | Status: SHIPPED | OUTPATIENT
Start: 2019-04-22 | End: 2019-10-16

## 2019-05-21 RX ORDER — HYDROCHLOROTHIAZIDE 25 MG/1
TABLET ORAL
Qty: 30 TABLET | Refills: 5 | Status: SHIPPED | OUTPATIENT
Start: 2019-05-21 | End: 2019-11-29 | Stop reason: SDUPTHER

## 2019-05-23 RX ORDER — OMEPRAZOLE 20 MG/1
CAPSULE, DELAYED RELEASE ORAL
Qty: 90 CAPSULE | Refills: 2 | Status: SHIPPED | OUTPATIENT
Start: 2019-05-23 | End: 2020-07-06

## 2019-06-20 RX ORDER — ALBUTEROL SULFATE 90 UG/1
2 AEROSOL, METERED RESPIRATORY (INHALATION) EVERY 4 HOURS PRN
Qty: 1 INHALER | Refills: 2 | Status: SHIPPED | OUTPATIENT
Start: 2019-06-20 | End: 2019-08-19 | Stop reason: SDUPTHER

## 2019-06-26 DIAGNOSIS — I10 ESSENTIAL HYPERTENSION: Primary | ICD-10-CM

## 2019-06-27 RX ORDER — NEBIVOLOL HYDROCHLORIDE 5 MG/1
TABLET ORAL
Qty: 30 TABLET | Refills: 2 | OUTPATIENT
Start: 2019-06-27

## 2019-06-28 RX ORDER — NEBIVOLOL 5 MG/1
5 TABLET ORAL DAILY
Qty: 90 TABLET | Refills: 1 | Status: SHIPPED | OUTPATIENT
Start: 2019-06-28 | End: 2019-10-01

## 2019-06-28 NOTE — TELEPHONE ENCOUNTER
I called the patient and confirmed he is taking Bystolic 5 mg daily.  He states his blood pressure is doing well.  He checks it regularly.  The medication was refilled and sent to the pharmacy.  He is due for an appointment.  If not already done, please call the patient and schedule a routine follow-up for his hypertension and any other medical problems that need to be followed.

## 2019-06-28 NOTE — TELEPHONE ENCOUNTER
The patient states he was on amlodipine but had some swelling in his legs as a side effect of that medication.  He was changed to Bystolic 5 mg once daily and is doing well on that.  He requests a refill.  He states he will make an appointment to be seen in the near future.  Please call him to make an appointment.

## 2019-07-05 RX ORDER — ESCITALOPRAM OXALATE 10 MG/1
TABLET ORAL
Qty: 30 TABLET | Refills: 0 | Status: SHIPPED | OUTPATIENT
Start: 2019-07-05 | End: 2019-08-27 | Stop reason: SDUPTHER

## 2019-08-19 RX ORDER — ALBUTEROL SULFATE 90 UG/1
AEROSOL, METERED RESPIRATORY (INHALATION)
Qty: 1 INHALER | Refills: 0 | Status: SHIPPED | OUTPATIENT
Start: 2019-08-19 | End: 2019-08-27 | Stop reason: SDUPTHER

## 2019-08-27 ENCOUNTER — OFFICE VISIT (OUTPATIENT)
Dept: FAMILY MEDICINE CLINIC | Facility: CLINIC | Age: 34
End: 2019-08-27

## 2019-08-27 VITALS
OXYGEN SATURATION: 94 % | WEIGHT: 315 LBS | RESPIRATION RATE: 14 BRPM | DIASTOLIC BLOOD PRESSURE: 82 MMHG | HEART RATE: 96 BPM | HEIGHT: 72 IN | TEMPERATURE: 97.6 F | BODY MASS INDEX: 42.66 KG/M2 | SYSTOLIC BLOOD PRESSURE: 134 MMHG

## 2019-08-27 DIAGNOSIS — E78.49 OTHER HYPERLIPIDEMIA: ICD-10-CM

## 2019-08-27 DIAGNOSIS — J45.909 MILD ASTHMA WITHOUT COMPLICATION, UNSPECIFIED WHETHER PERSISTENT: ICD-10-CM

## 2019-08-27 DIAGNOSIS — E66.01 MORBID OBESITY (HCC): Primary | ICD-10-CM

## 2019-08-27 DIAGNOSIS — I10 ESSENTIAL HYPERTENSION: ICD-10-CM

## 2019-08-27 PROBLEM — R74.01 ELEVATED AST (SGOT): Status: ACTIVE | Noted: 2019-08-27

## 2019-08-27 LAB
ALBUMIN SERPL-MCNC: 4.4 G/DL (ref 3.5–5.2)
ALBUMIN/GLOB SERPL: 1.5 G/DL
ALP SERPL-CCNC: 73 U/L (ref 39–117)
ALT SERPL-CCNC: 28 U/L (ref 1–41)
AST SERPL-CCNC: 22 U/L (ref 1–40)
BASOPHILS # BLD AUTO: 0.07 10*3/MM3 (ref 0–0.2)
BASOPHILS NFR BLD AUTO: 0.9 % (ref 0–1.5)
BILIRUB SERPL-MCNC: 0.3 MG/DL (ref 0.2–1.2)
BUN SERPL-MCNC: 15 MG/DL (ref 6–20)
BUN/CREAT SERPL: 17.6 (ref 7–25)
CALCIUM SERPL-MCNC: 9.5 MG/DL (ref 8.6–10.5)
CHLORIDE SERPL-SCNC: 102 MMOL/L (ref 98–107)
CHOLEST SERPL-MCNC: 200 MG/DL (ref 0–200)
CHOLEST/HDLC SERPL: 5.13 {RATIO}
CO2 SERPL-SCNC: 27.8 MMOL/L (ref 22–29)
CREAT SERPL-MCNC: 0.85 MG/DL (ref 0.76–1.27)
EOSINOPHIL # BLD AUTO: 0.47 10*3/MM3 (ref 0–0.4)
EOSINOPHIL NFR BLD AUTO: 6.3 % (ref 0.3–6.2)
ERYTHROCYTE [DISTWIDTH] IN BLOOD BY AUTOMATED COUNT: 14.1 % (ref 12.3–15.4)
GLOBULIN SER CALC-MCNC: 3 GM/DL
GLUCOSE SERPL-MCNC: 108 MG/DL (ref 65–99)
HCT VFR BLD AUTO: 48.7 % (ref 37.5–51)
HDLC SERPL-MCNC: 39 MG/DL (ref 40–60)
HGB BLD-MCNC: 15.9 G/DL (ref 13–17.7)
IMM GRANULOCYTES # BLD AUTO: 0.01 10*3/MM3 (ref 0–0.05)
IMM GRANULOCYTES NFR BLD AUTO: 0.1 % (ref 0–0.5)
LDLC SERPL CALC-MCNC: 141 MG/DL (ref 0–100)
LYMPHOCYTES # BLD AUTO: 3.45 10*3/MM3 (ref 0.7–3.1)
LYMPHOCYTES NFR BLD AUTO: 46.4 % (ref 19.6–45.3)
MCH RBC QN AUTO: 29.2 PG (ref 26.6–33)
MCHC RBC AUTO-ENTMCNC: 32.6 G/DL (ref 31.5–35.7)
MCV RBC AUTO: 89.4 FL (ref 79–97)
MONOCYTES # BLD AUTO: 0.52 10*3/MM3 (ref 0.1–0.9)
MONOCYTES NFR BLD AUTO: 7 % (ref 5–12)
NEUTROPHILS # BLD AUTO: 2.92 10*3/MM3 (ref 1.7–7)
NEUTROPHILS NFR BLD AUTO: 39.3 % (ref 42.7–76)
NRBC BLD AUTO-RTO: 0 /100 WBC (ref 0–0.2)
PLATELET # BLD AUTO: 250 10*3/MM3 (ref 140–450)
POTASSIUM SERPL-SCNC: 4.5 MMOL/L (ref 3.5–5.2)
PROT SERPL-MCNC: 7.4 G/DL (ref 6–8.5)
RBC # BLD AUTO: 5.45 10*6/MM3 (ref 4.14–5.8)
SODIUM SERPL-SCNC: 141 MMOL/L (ref 136–145)
TRIGL SERPL-MCNC: 99 MG/DL (ref 0–150)
TSH SERPL DL<=0.005 MIU/L-ACNC: 1.57 MIU/ML (ref 0.27–4.2)
VLDLC SERPL CALC-MCNC: 19.8 MG/DL
WBC # BLD AUTO: 7.44 10*3/MM3 (ref 3.4–10.8)

## 2019-08-27 PROCEDURE — 99214 OFFICE O/P EST MOD 30 MIN: CPT | Performed by: INTERNAL MEDICINE

## 2019-08-27 RX ORDER — BECLOMETHASONE DIPROPIONATE HFA 40 UG/1
2 AEROSOL, METERED RESPIRATORY (INHALATION) 2 TIMES DAILY
Refills: 6 | COMMUNITY
Start: 2019-05-20 | End: 2021-07-15 | Stop reason: SDUPTHER

## 2019-08-27 RX ORDER — INFLUENZA A VIRUS A/MICHIGAN/45/2015 (H1N1) RECOMBINANT HEMAGGLUTININ ANTIGEN, INFLUENZA A VIRUS A/SINGAPORE/INFIMH-16-0019/2016 (H3N2) RECOMBINANT HEMAGGLUTININ ANTIGEN, INFLUENZA B VIRUS B/MARYLAND/15/2016 RECOMBINANT HEMAGGLUTININ ANTIGEN, AND INFLUENZA B VIRUS B/PHUKET/3073/2013 RECOMBINANT HEMAGGLUTININ ANTIGEN 45; 45; 45; 45 UG/.5ML; UG/.5ML; UG/.5ML; UG/.5ML
INJECTION INTRAMUSCULAR
Refills: 0 | COMMUNITY
Start: 2019-08-21 | End: 2019-10-16

## 2019-08-27 RX ORDER — ESCITALOPRAM OXALATE 10 MG/1
10 TABLET ORAL DAILY
Qty: 30 TABLET | Refills: 2 | Status: SHIPPED | OUTPATIENT
Start: 2019-08-27 | End: 2019-11-29 | Stop reason: SDUPTHER

## 2019-08-27 RX ORDER — ALBUTEROL SULFATE 90 UG/1
2 AEROSOL, METERED RESPIRATORY (INHALATION) EVERY 4 HOURS PRN
Qty: 1 INHALER | Refills: 0 | Status: SHIPPED | OUTPATIENT
Start: 2019-08-27 | End: 2019-09-08 | Stop reason: SDUPTHER

## 2019-08-27 NOTE — PROGRESS NOTES
ROSENDA@  Ravinder Robles is a 33 y.o. male.     Chief Complaint   Patient presents with   • Hypertension   • Asthma   • Heartburn       History of Present Illness   Follow-up for hypertension, asthma.  No chest pain or shortness of breath.  He is following with Dr. Alejandro.  Blood pressure under control.  Trying to lose weight.  Reflux symptoms stable.  The following portions of the patient's history were reviewed and updated as appropriate: allergies, current medications, past family history, past medical history, past social history, past surgical history and problem list.    Review of Systems   Constitutional: Negative for activity change, appetite change, fatigue and fever.   Eyes: Negative for blurred vision and double vision.   Respiratory: Negative for shortness of breath.    Cardiovascular: Negative for chest pain, palpitations and leg swelling.   Gastrointestinal: Negative.    Genitourinary: Negative for dysuria and hematuria.   Musculoskeletal: Negative for arthralgias.   Neurological: Negative for dizziness, syncope, light-headedness and headache.   Psychiatric/Behavioral: Negative for agitation, behavioral problems and hallucinations.       Allergies   Allergen Reactions   • Cat Hair Extract Hives and Itching   • Lisinopril Cough   • Methylprednisolone Itching     Pt can take prednisone        Current Outpatient Medications on File Prior to Visit   Medication Sig Dispense Refill   • cetirizine (ZyrTEC) 10 MG tablet Take 10 mg by mouth daily.     • hydrochlorothiazide (HYDRODIURIL) 25 MG tablet TAKE 1 TABLET BY MOUTH ONCE DAILY 30 tablet 5   • INCRUSE ELLIPTA 62.5 MCG/INH aerosol powder  Inhale 1 puff Daily.  0   • nebivolol (BYSTOLIC) 5 MG tablet Take 1 tablet by mouth Daily. 90 tablet 1   • omeprazole (priLOSEC) 20 MG capsule TAKE 1 CAPSULE BY MOUTH ONCE DAILY 90 capsule 2   • QVAR REDIHALER 40 MCG/ACT inhaler Inhale 2 puffs 2 (Two) Times a Day.  6   • [DISCONTINUED] escitalopram (LEXAPRO) 10 MG  tablet TAKE 1 TABLET BY MOUTH ONCE DAILY 30 tablet 0   • [DISCONTINUED] VENTOLIN  (90 Base) MCG/ACT inhaler INHALE 2 PUFFS BY MOUTH EVERY 4 HOURS AS NEEDED FOR WHEEZING 1 inhaler 0   • amLODIPine (NORVASC) 10 MG tablet      • azithromycin (ZITHROMAX Z-SARBJIT) 250 MG tablet Take 2 tablets the first day, then 1 tablet daily for 4 days. 8 tablet 0   • budesonide-formoterol (SYMBICORT) 160-4.5 MCG/ACT inhaler Inhale 2 puffs 2 (Two) Times a Day.     • FLUBLOK QUADRIVALENT 0.5 ML solution prefilled syringe IM suspension PHARMACIST ADMINISTERED IMMUNIZATION ADMINISTERED AT TIME OF DISPENSING  0   • Loratadine (CLARITIN PO) Take  by mouth.     • methylPREDNISolone (MEDROL, SARBJIT,) 4 MG tablet Take as directed on package instructions. 1 each 0   • montelukast (SINGULAIR) 10 MG tablet Take 10 mg by mouth Every Night.     • Nebulizer misc      • omeprazole (PriLOSEC) 40 MG capsule Take 20 mg by mouth Daily.       No current facility-administered medications on file prior to visit.        Family History   Problem Relation Age of Onset   • Diabetes Mother    • Lung disease Mother    • Lung disease Maternal Grandmother    • Cancer Maternal Grandmother         lung   • Emphysema Maternal Grandmother        Past Medical History:   Diagnosis Date   • Acid reflux    • Allergic    • Anxiety    • Asthma    • Bronchitis    • Hypertension    • Pleurisy    • Rib fracture     broke 4 ribs       Past Surgical History:   Procedure Laterality Date   • RESECTION BONE TUMOR KNEE         Social History     Socioeconomic History   • Marital status:      Spouse name: Not on file   • Number of children: Not on file   • Years of education: Not on file   • Highest education level: Not on file   Tobacco Use   • Smoking status: Former Smoker     Packs/day: 1.50     Years: 8.00     Pack years: 12.00     Last attempt to quit: 2018     Years since quittin.6   • Smokeless tobacco: Never Used   • Tobacco comment: nicotine gum     Substance and Sexual Activity   • Alcohol use: Yes     Alcohol/week: 0.6 oz     Types: 1 Standard drinks or equivalent per week     Comment: socially    • Drug use: No   • Sexual activity: Yes     Partners: Female       Patient Active Problem List   Diagnosis   • Airway hyperreactivity   • Morbid obesity (CMS/HCC)   • Folliculitis   • Sore throat   • Cough   • Wheezing   • Elevated AST (SGOT)   • Essential hypertension   • Other hyperlipidemia       Vitals:    08/27/19 0955   BP: 134/82   Pulse: 96   Resp: 14   Temp: 97.6 °F (36.4 °C)   SpO2: 94%       Objective   Physical Exam   Constitutional: He is oriented to person, place, and time. He appears well-developed and well-nourished.   Neck: Normal range of motion. Neck supple. No JVD present. No tracheal deviation present. No thyromegaly present.   Cardiovascular: Normal rate and intact distal pulses. Exam reveals no friction rub.   No murmur heard.  Pulmonary/Chest: Effort normal and breath sounds normal. No stridor. No respiratory distress. He has no wheezes.   Abdominal: Soft. Bowel sounds are normal. He exhibits no distension and no mass. There is no tenderness. There is no guarding.   Musculoskeletal: Normal range of motion.   Lymphadenopathy:     He has no cervical adenopathy.   Neurological: He is alert and oriented to person, place, and time.   Psychiatric: He has a normal mood and affect.   Nursing note and vitals reviewed.        Assessment/Plan   Ravinder was seen today for hypertension, asthma and heartburn.    Diagnoses and all orders for this visit:    Morbid obesity (CMS/HCC)  -     CBC & Differential  -     Comprehensive Metabolic Panel  -     Lipid Panel With / Chol / HDL Ratio  -     TSH    Mild asthma without complication, unspecified whether persistent  -     CBC & Differential  -     Comprehensive Metabolic Panel  -     Lipid Panel With / Chol / HDL Ratio  -     TSH    Essential hypertension  -     CBC & Differential  -     Comprehensive Metabolic  Panel  -     Lipid Panel With / Chol / HDL Ratio  -     TSH    Other hyperlipidemia  -     CBC & Differential  -     Comprehensive Metabolic Panel  -     Lipid Panel With / Chol / HDL Ratio  -     TSH    Continue diet and exercise.  Continue current medication.  Check blood pressure.  Lose weight.  Check peak flows for the asthma.  Return in 3 months time.  Await labs.  All his problems are chronic and stable.

## 2019-09-09 RX ORDER — ALBUTEROL SULFATE 90 UG/1
AEROSOL, METERED RESPIRATORY (INHALATION)
Qty: 1 INHALER | Refills: 5 | Status: SHIPPED | OUTPATIENT
Start: 2019-09-09 | End: 2019-11-04 | Stop reason: SDUPTHER

## 2019-10-01 RX ORDER — METOPROLOL SUCCINATE 25 MG/1
25 TABLET, EXTENDED RELEASE ORAL DAILY
Qty: 30 TABLET | Refills: 2 | Status: SHIPPED | OUTPATIENT
Start: 2019-10-01 | End: 2019-12-31 | Stop reason: SDUPTHER

## 2019-10-16 ENCOUNTER — OFFICE VISIT (OUTPATIENT)
Dept: RETAIL CLINIC | Facility: CLINIC | Age: 34
End: 2019-10-16

## 2019-10-16 VITALS
TEMPERATURE: 98 F | SYSTOLIC BLOOD PRESSURE: 132 MMHG | OXYGEN SATURATION: 97 % | HEART RATE: 98 BPM | RESPIRATION RATE: 20 BRPM | DIASTOLIC BLOOD PRESSURE: 80 MMHG

## 2019-10-16 DIAGNOSIS — J40 BRONCHITIS: Primary | ICD-10-CM

## 2019-10-16 DIAGNOSIS — J01.00 ACUTE NON-RECURRENT MAXILLARY SINUSITIS: ICD-10-CM

## 2019-10-16 PROCEDURE — 99213 OFFICE O/P EST LOW 20 MIN: CPT | Performed by: NURSE PRACTITIONER

## 2019-10-16 PROCEDURE — 94640 AIRWAY INHALATION TREATMENT: CPT | Performed by: NURSE PRACTITIONER

## 2019-10-16 RX ORDER — PREDNISONE 20 MG/1
TABLET ORAL
Qty: 12 TABLET | Refills: 0 | Status: SHIPPED | OUTPATIENT
Start: 2019-10-16 | End: 2019-12-31

## 2019-10-16 RX ORDER — IPRATROPIUM BROMIDE AND ALBUTEROL SULFATE 2.5; .5 MG/3ML; MG/3ML
3 SOLUTION RESPIRATORY (INHALATION) EVERY 4 HOURS PRN
COMMUNITY
End: 2019-10-16 | Stop reason: SDUPTHER

## 2019-10-16 RX ORDER — GUAIFENESIN 600 MG/1
1200 TABLET, EXTENDED RELEASE ORAL 2 TIMES DAILY
Qty: 28 TABLET | Refills: 0
Start: 2019-10-16 | End: 2019-10-23

## 2019-10-16 RX ORDER — IPRATROPIUM BROMIDE AND ALBUTEROL SULFATE 2.5; .5 MG/3ML; MG/3ML
3 SOLUTION RESPIRATORY (INHALATION) EVERY 4 HOURS PRN
Qty: 360 ML | Refills: 0 | Status: SHIPPED | OUTPATIENT
Start: 2019-10-16 | End: 2019-11-15

## 2019-10-16 RX ORDER — DOXYCYCLINE HYCLATE 100 MG
100 TABLET ORAL 2 TIMES DAILY
Qty: 14 TABLET | Refills: 0 | Status: SHIPPED | OUTPATIENT
Start: 2019-10-16 | End: 2019-10-23

## 2019-10-16 RX ORDER — IPRATROPIUM BROMIDE AND ALBUTEROL SULFATE 2.5; .5 MG/3ML; MG/3ML
3 SOLUTION RESPIRATORY (INHALATION) ONCE
Status: COMPLETED | OUTPATIENT
Start: 2019-10-16 | End: 2019-10-16

## 2019-10-16 RX ADMIN — IPRATROPIUM BROMIDE AND ALBUTEROL SULFATE 3 ML: 2.5; .5 SOLUTION RESPIRATORY (INHALATION) at 13:45

## 2019-10-16 NOTE — PROGRESS NOTES
Subjective   Ravinder Robles is a 34 y.o. male.     Cough   This is a new problem. Episode onset: 2 days ago. The problem has been gradually worsening. The problem occurs every few minutes. The cough is productive of sputum. Associated symptoms include chills, ear congestion, nasal congestion, postnasal drip, rhinorrhea, shortness of breath and wheezing. Pertinent negatives include no chest pain, ear pain, eye redness, headaches, heartburn, hemoptysis, myalgias, sore throat or sweats. Fever: not measured  The symptoms are aggravated by lying down and exercise. Risk factors for lung disease include smoking/tobacco exposure (quit smoking 1/2018). He has tried a beta-agonist inhaler (has been duoneb BID x 2 days ) for the symptoms. The treatment provided mild relief. His past medical history is significant for asthma, bronchitis and environmental allergies. There is no history of pneumonia.       The following portions of the patient's history were reviewed and updated as appropriate: allergies, current medications, past medical history, past social history, past surgical history and problem list.    Review of Systems   Constitutional: Positive for chills. Negative for appetite change, diaphoresis and fatigue. Fever: not measured    HENT: Positive for congestion, postnasal drip, rhinorrhea and sinus pressure. Negative for ear discharge, ear pain, mouth sores, nosebleeds, sneezing, sore throat, trouble swallowing and voice change.    Eyes: Negative for redness and itching.   Respiratory: Positive for cough, chest tightness, shortness of breath and wheezing. Negative for hemoptysis, choking and stridor.    Cardiovascular: Negative for chest pain and palpitations.   Gastrointestinal: Negative for diarrhea, heartburn, nausea and vomiting.   Musculoskeletal: Negative for myalgias and neck pain.   Allergic/Immunologic: Positive for environmental allergies.   Neurological: Positive for light-headedness. Negative for syncope  "and headaches.       Objective   Physical Exam   Constitutional: He is oriented to person, place, and time. He appears well-developed and well-nourished. He is active and cooperative. He does not appear ill. No distress.   HENT:   Right Ear: External ear and ear canal normal. Tympanic membrane is retracted. A middle ear effusion is present.   Left Ear: External ear and ear canal normal. A middle ear effusion is present.   Nose: Mucosal edema present. No rhinorrhea. Right sinus exhibits no maxillary sinus tenderness and no frontal sinus tenderness. Left sinus exhibits no maxillary sinus tenderness and no frontal sinus tenderness.   Mouth/Throat: Uvula is midline and mucous membranes are normal. No oral lesions. No uvula swelling. Posterior oropharyngeal erythema present. No oropharyngeal exudate or posterior oropharyngeal edema. Tonsils are 3+ on the right. Tonsils are 3+ on the left. No tonsillar exudate.   Eyes: Conjunctivae and lids are normal.   Cardiovascular: Normal rate, regular rhythm, S1 normal and S2 normal.   Pulmonary/Chest: Effort normal. He has decreased breath sounds in the right lower field and the left lower field. He has wheezes (expiratory phase ) in the right upper field, the right middle field, the right lower field, the left upper field, the left middle field and the left lower field. He has rhonchi (cleared after coughing and neb tx) in the right upper field and the left upper field. He has no rales.   Post neb lung sounds:  Improved air movement in lower lobes, rhonchi resolved, wheezing is slightly improved in all lobes   Pt reports \"breathing easier\"     Lymphadenopathy:     He has no cervical adenopathy.   Neurological: He is alert and oriented to person, place, and time.   Skin: Skin is warm and dry. He is not diaphoretic.   Vitals reviewed.      Assessment/Plan   Ravinder was seen today for cough.    Diagnoses and all orders for this visit:    Bronchitis  -     ipratropium-albuterol (DUO-NEB) " nebulizer solution 3 mL  -     doxycycline (VIBRAMYICN) 100 MG tablet; Take 1 tablet by mouth 2 (Two) Times a Day for 7 days.  -     predniSONE (DELTASONE) 20 MG tablet; Take 3 tablets by mouth daily x 2 days, then 2 tablets daily x 2 days, then 1 tablet daily x 2 days  -     guaiFENesin (MUCINEX) 600 MG 12 hr tablet; Take 2 tablets by mouth 2 (Two) Times a Day for 7 days.  -     ipratropium-albuterol (DUO-NEB) 0.5-2.5 mg/3 ml nebulizer; Take 3 mL by nebulization Every 4 (Four) Hours As Needed for Wheezing or Shortness of Air for up to 30 days.    Acute non-recurrent maxillary sinusitis  -     doxycycline (VIBRAMYICN) 100 MG tablet; Take 1 tablet by mouth 2 (Two) Times a Day for 7 days.  -     guaiFENesin (MUCINEX) 600 MG 12 hr tablet; Take 2 tablets by mouth 2 (Two) Times a Day for 7 days.          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

## 2019-11-04 RX ORDER — ALBUTEROL SULFATE 90 UG/1
2 AEROSOL, METERED RESPIRATORY (INHALATION) EVERY 4 HOURS PRN
Qty: 1 INHALER | Refills: 2 | Status: SHIPPED | OUTPATIENT
Start: 2019-11-04 | End: 2019-12-31 | Stop reason: SDUPTHER

## 2019-12-02 RX ORDER — ESCITALOPRAM OXALATE 10 MG/1
TABLET ORAL
Qty: 90 TABLET | Refills: 0 | Status: SHIPPED | OUTPATIENT
Start: 2019-12-02 | End: 2020-03-12

## 2019-12-02 RX ORDER — HYDROCHLOROTHIAZIDE 25 MG/1
TABLET ORAL
Qty: 90 TABLET | Refills: 0 | Status: SHIPPED | OUTPATIENT
Start: 2019-12-02 | End: 2020-03-12

## 2019-12-05 RX ORDER — ALBUTEROL SULFATE 90 UG/1
AEROSOL, METERED RESPIRATORY (INHALATION)
Refills: 2 | OUTPATIENT
Start: 2019-12-05

## 2019-12-30 RX ORDER — ALBUTEROL SULFATE 90 UG/1
AEROSOL, METERED RESPIRATORY (INHALATION)
Qty: 18 G | Refills: 0 | OUTPATIENT
Start: 2019-12-30

## 2019-12-30 RX ORDER — METOPROLOL SUCCINATE 25 MG/1
TABLET, EXTENDED RELEASE ORAL
Qty: 30 TABLET | Refills: 0 | OUTPATIENT
Start: 2019-12-30

## 2019-12-31 ENCOUNTER — OFFICE VISIT (OUTPATIENT)
Dept: FAMILY MEDICINE CLINIC | Facility: CLINIC | Age: 34
End: 2019-12-31

## 2019-12-31 VITALS
SYSTOLIC BLOOD PRESSURE: 140 MMHG | TEMPERATURE: 97.6 F | BODY MASS INDEX: 42.66 KG/M2 | RESPIRATION RATE: 14 BRPM | DIASTOLIC BLOOD PRESSURE: 84 MMHG | HEIGHT: 72 IN | OXYGEN SATURATION: 94 % | HEART RATE: 90 BPM | WEIGHT: 315 LBS

## 2019-12-31 DIAGNOSIS — I10 ESSENTIAL HYPERTENSION: ICD-10-CM

## 2019-12-31 DIAGNOSIS — J45.40 MODERATE PERSISTENT ASTHMA WITHOUT COMPLICATION: ICD-10-CM

## 2019-12-31 DIAGNOSIS — F41.9 ANXIETY: ICD-10-CM

## 2019-12-31 DIAGNOSIS — F17.200 TOBACCO USE DISORDER: Primary | ICD-10-CM

## 2019-12-31 PROCEDURE — 99214 OFFICE O/P EST MOD 30 MIN: CPT | Performed by: FAMILY MEDICINE

## 2019-12-31 PROCEDURE — 99407 BEHAV CHNG SMOKING > 10 MIN: CPT | Performed by: FAMILY MEDICINE

## 2019-12-31 RX ORDER — METOPROLOL SUCCINATE 25 MG/1
25 TABLET, EXTENDED RELEASE ORAL DAILY
Qty: 30 TABLET | Refills: 0 | Status: SHIPPED | OUTPATIENT
Start: 2019-12-31 | End: 2020-01-16

## 2019-12-31 RX ORDER — ALBUTEROL SULFATE 90 UG/1
2 AEROSOL, METERED RESPIRATORY (INHALATION) EVERY 4 HOURS PRN
Qty: 1 INHALER | Refills: 0 | Status: SHIPPED | OUTPATIENT
Start: 2019-12-31 | End: 2020-01-16

## 2019-12-31 NOTE — PATIENT INSTRUCTIONS
Tobacco Use Disorder  Tobacco use disorder (TUD) occurs when a person craves, seeks, and uses tobacco, regardless of the consequences. This disorder can cause problems with mental and physical health. It can affect your ability to have healthy relationships, and it can keep you from meeting your responsibilities at work, home, or school.  Tobacco may be:  · Smoked as a cigarette or cigar.  · Inhaled using e-cigarettes.  · Smoked in a pipe or hookah.  · Chewed as smokeless tobacco.  · Inhaled into the nostrils as snuff.  Tobacco products contain a dangerous chemical called nicotine, which is very addictive. Nicotine triggers hormones that make the body feel stimulated and works on areas of the brain that make you feel good. These effects can make it hard for people to quit nicotine.  Tobacco contains many other unsafe chemicals that can damage almost every organ in the body. Smoking tobacco also puts others in danger due to fire risk and possible health problems caused by breathing in secondhand smoke.  What are the signs or symptoms?  Symptoms of TUD may include:  · Being unable to slow down or stop your tobacco use.  · Spending an abnormal amount of time getting or using tobacco.  · Craving tobacco. Cravings may last for up to 6 months after quitting.  · Tobacco use that:  ? Interferes with your work, school, or home life.  ? Interferes with your personal and social relationships.  ? Makes you give up activities that you once enjoyed or found important.  · Using tobacco even though you know that it is:  ? Dangerous or bad for your health or someone else's health.  ? Causing problems in your life.  · Needing more and more of the substance to get the same effect (developing tolerance).  · Experiencing unpleasant symptoms if you do not use the substance (withdrawal). Withdrawal symptoms may include:  ? Depressed, anxious, or irritable mood.  ? Difficulty concentrating.  ? Increased appetite.  ? Restlessness or trouble  sleeping.  · Using the substance to avoid withdrawal.  How is this diagnosed?  This condition may be diagnosed based on:  · Your current and past tobacco use. Your health care provider may ask questions about how your tobacco use affects your life.  · A physical exam.  You may be diagnosed with TUD if you have at least two symptoms within a 12-month period.  How is this treated?  This condition is treated by stopping tobacco use. Many people are unable to quit on their own and need help. Treatment may include:  · Nicotine replacement therapy (NRT). NRT provides nicotine without the other harmful chemicals in tobacco. NRT gradually lowers the dosage of nicotine in the body and reduces withdrawal symptoms. NRT is available as:  ? Over-the-counter gums, lozenges, and skin patches.  ? Prescription mouth inhalers and nasal sprays.  · Medicine that acts on the brain to reduce cravings and withdrawal symptoms.  · A type of talk therapy that examines your triggers for tobacco use, how to avoid them, and how to cope with cravings (behavioral therapy).  · Hypnosis. This may help with withdrawal symptoms.  · Joining a support group for others coping with TUD.  The best treatment for TUD is usually a combination of medicine, talk therapy, and support groups. Recovery can be a long process. Many people start using tobacco again after stopping (relapse). If you relapse, it does not mean that treatment will not work.  Follow these instructions at home:    Lifestyle  · Do not use any products that contain nicotine or tobacco, such as cigarettes and e-cigarettes.  · Avoid things that trigger tobacco use as much as you can. Triggers include people and situations that usually cause you to use tobacco.  · Avoid drinks that contain caffeine, including coffee. These may worsen some withdrawal symptoms.  · Find ways to manage stress. Wanting to smoke may cause stress, and stress can make you want to smoke. Relaxation techniques such as  deep breathing, meditation, and yoga may help.  · Attend support groups as needed. These groups are an important part of long-term recovery for many people.  General instructions  · Take over-the-counter and prescription medicines only as told by your health care provider.  · Check with your health care provider before taking any new prescription or over-the-counter medicines.  · Decide on a friend, family member, or smoking quit-line (such as 1-800-QUIT-NOW in the [place] that you can call or text when you feel the urge to smoke or when you need help coping with cravings.  · Keep all follow-up visits as told by your health care provider and therapist. This is important.  Contact a health care provider if:  · You are not able to take your medicines as prescribed.  · Your symptoms get worse, even with treatment.  Summary  · Tobacco use disorder (TUD) occurs when a person craves, seeks, and uses tobacco regardless of the consequences.  · This condition may be diagnosed based on your current and past tobacco use and a physical exam.  · Many people are unable to quit on their own and need help. Recovery can be a long process.  · The most effective treatment for TUD is usually a combination of medicine, talk therapy, and support groups.  This information is not intended to replace advice given to you by your health care provider. Make sure you discuss any questions you have with your health care provider.  Document Released: 08/23/2005 Document Revised: 12/05/2018 Document Reviewed: 12/05/2018  [employer] Patient Education © 2019 [employer]

## 2019-12-31 NOTE — PROGRESS NOTES
Subjective   Ravinder Robles is a 34 y.o. male is here for   Chief Complaint   Patient presents with   • Asthma     acute symptom       History of Present Illness     Pt states he missed appointments in the past and is about to run out of his Metoprolol and Albuterol.  He has not run out of his Incruse or Qvar.  He was following with Dr. Gregory, pulmonology. He states he has had scheduling issues with the pulmonologist.  He states the pulmonologist diagnosed him with asthma.    He has hypertension that is controlled with metoprolol succinate XL 25 mg daily and hydrochlorothiazide 25 mg daily.  No medication side effects.    He smokes and would like to quit now.  He used nicotine gum which helped in the past and he quit for 80 days.  He would like to use the gum again.    He has anxiety that is controlled with Lexapro 10 mg daily.  No medication side effects.    The following portions of the patient's history were reviewed and updated as appropriate: allergies, current medications, past family history, past medical history, past social history, past surgical history and problem list.     reports that he quit smoking about 1 years ago. He has a 12.00 pack-year smoking history. He has never used smokeless tobacco. He reports that he drinks about 1.0 standard drinks of alcohol per week. He reports that he does not use drugs.    Review of Systems   Constitutional: Positive for activity change and unexpected weight change.   HENT: Negative for congestion.    Respiratory: Positive for shortness of breath and wheezing.    Cardiovascular: Negative for chest pain and palpitations.   Gastrointestinal: Negative for abdominal pain, blood in stool and constipation.   Genitourinary: Negative for difficulty urinating and hematuria.   Musculoskeletal: Negative for gait problem.   Skin: Negative for color change and rash.        PHQ-9 Depression Screening  Little interest or pleasure in doing things?     Feeling down, depressed, or  "hopeless?     Trouble falling or staying asleep, or sleeping too much?     Feeling tired or having little energy?     Poor appetite or overeating?     Feeling bad about yourself - or that you are a failure or have let yourself or your family down?     Trouble concentrating on things, such as reading the newspaper or watching television?     Moving or speaking so slowly that other people could have noticed? Or the opposite - being so fidgety or restless that you have been moving around a lot more than usual?     Thoughts that you would be better off dead, or of hurting yourself in some way?     PHQ-9 Total Score     If you checked off any problems, how difficult have these problems made it for you to do your work, take care of things at home, or get along with other people?           Objective   /84 (BP Location: Left arm, Patient Position: Sitting, Cuff Size: Large Adult)   Pulse 90   Temp 97.6 °F (36.4 °C) (Oral)   Resp 14   Ht 182.9 cm (72\")   Wt (!) 154 kg (340 lb)   SpO2 94%   BMI 46.11 kg/m²   Physical Exam   Constitutional: He is oriented to person, place, and time. He appears well-developed and well-nourished. No distress.   HENT:   Head: Normocephalic and atraumatic.   Right Ear: External ear normal.   Left Ear: External ear normal.   Nose: Nose normal.   Mouth/Throat: Oropharynx is clear and moist. No oropharyngeal exudate.   Eyes: Lids are normal. Right eye exhibits no discharge. Left eye exhibits no discharge. No scleral icterus.   Neck: Trachea normal, normal range of motion and full passive range of motion without pain. Neck supple. No tracheal deviation and no edema present. No thyromegaly present.   Cardiovascular: Normal rate, regular rhythm, normal heart sounds and intact distal pulses. Exam reveals no gallop and no friction rub.   No murmur heard.  Pulmonary/Chest: Effort normal. No stridor. No tachypnea and no bradypnea. No respiratory distress. He has no decreased breath sounds. He " has no wheezes. He has no rales. He exhibits no tenderness.   Abdominal: Normal appearance. There is no hepatosplenomegaly.   Musculoskeletal: He exhibits no edema.   Lymphadenopathy:        Head (right side): No submental, no submandibular, no tonsillar, no preauricular, no posterior auricular and no occipital adenopathy present.        Head (left side): No submental, no submandibular, no tonsillar, no preauricular, no posterior auricular and no occipital adenopathy present.     He has no cervical adenopathy.        Right cervical: No superficial cervical, no deep cervical and no posterior cervical adenopathy present.       Left cervical: No superficial cervical, no deep cervical and no posterior cervical adenopathy present.   Neurological: He is alert and oriented to person, place, and time. He has normal strength and normal reflexes. He is not disoriented.   Skin: Skin is warm, dry and intact. Capillary refill takes less than 2 seconds. No rash noted. He is not diaphoretic. No cyanosis or erythema. No pallor. Nails show no clubbing.   Psychiatric: He has a normal mood and affect. His behavior is normal. Cognition and memory are normal.   Nursing note and vitals reviewed.      Procedures    Assessment/Plan   Diagnoses and all orders for this visit:    1. Tobacco use disorder (Primary)  -     nicotine polacrilex (RA NICOTINE GUM) 4 MG gum; Chew 1 each As Needed for Smoking Cessation.  Dispense: 100 each; Refill: 5    2. Essential hypertension  -     metoprolol succinate XL (TOPROL XL) 25 MG 24 hr tablet; Take 1 tablet by mouth Daily.  Dispense: 30 tablet; Refill: 0    3. Moderate persistent asthma without complication  -     VENTOLIN  (90 Base) MCG/ACT inhaler; Inhale 2 puffs Every 4 (Four) Hours As Needed for Wheezing.  Dispense: 1 inhaler; Refill: 0    4. Anxiety  Assessment & Plan:  Controlled with Lexapro. No medication side effects. Continue current treatment.    We discussed smoking cessation for > 10  minutes.  He is ready, motivated, and prepared to quit.  We set a quit date.  We also discussed tools to help him quit.

## 2020-01-15 ENCOUNTER — OFFICE VISIT (OUTPATIENT)
Dept: FAMILY MEDICINE CLINIC | Facility: CLINIC | Age: 35
End: 2020-01-15

## 2020-01-15 VITALS
SYSTOLIC BLOOD PRESSURE: 138 MMHG | BODY MASS INDEX: 42.66 KG/M2 | RESPIRATION RATE: 16 BRPM | TEMPERATURE: 98.4 F | WEIGHT: 315 LBS | OXYGEN SATURATION: 97 % | HEIGHT: 72 IN | HEART RATE: 88 BPM | DIASTOLIC BLOOD PRESSURE: 82 MMHG

## 2020-01-15 DIAGNOSIS — E78.49 OTHER HYPERLIPIDEMIA: ICD-10-CM

## 2020-01-15 DIAGNOSIS — I10 ESSENTIAL HYPERTENSION: Primary | ICD-10-CM

## 2020-01-15 DIAGNOSIS — E66.01 MORBID OBESITY (HCC): ICD-10-CM

## 2020-01-15 PROCEDURE — 99214 OFFICE O/P EST MOD 30 MIN: CPT | Performed by: INTERNAL MEDICINE

## 2020-01-15 NOTE — PROGRESS NOTES
ROSENDA@  Ravinder Robles is a 34 y.o. male.     Chief Complaint   Patient presents with   • Asthma   • Hyperlipidemia   • Hypertension   • Anxiety   • Obesity   • Leg Swelling     left       History of Present Illness   Here follow-up for hypertension, hyperlipidemia, obesity, asthma.  Has no chest pain or shortness of breath.  Taking his Lexapro, this keeping her anxiety under control, metoprolol keeping his blood pressure under control, taking Prilosec occasionally, is taking inhalers occasionally as he has stopped smoking and feeling better.  Reports swelling of the left leg for last couple months.  No pain.  No shortness of breath nausea vomiting.  The following portions of the patient's history were reviewed and updated as appropriate: allergies, current medications, past family history, past medical history, past social history, past surgical history and problem list.    Review of Systems   Constitutional: Negative for activity change, appetite change, fatigue and fever.   Eyes: Negative for blurred vision and double vision.   Respiratory: Negative.  Negative for shortness of breath.    Cardiovascular: Positive for leg swelling. Negative for chest pain and palpitations.   Gastrointestinal: Negative.    Neurological: Negative.  Negative for dizziness, syncope and light-headedness.   Psychiatric/Behavioral: Negative for agitation, behavioral problems, decreased concentration, dysphoric mood and depressed mood.       Allergies   Allergen Reactions   • Cat Hair Extract Hives and Itching   • Lisinopril Cough   • Methylprednisolone Itching     Pt can take prednisone        Current Outpatient Medications on File Prior to Visit   Medication Sig Dispense Refill   • cetirizine (ZyrTEC) 10 MG tablet Take 10 mg by mouth daily.     • escitalopram (LEXAPRO) 10 MG tablet TAKE 1 TABLET BY MOUTH ONCE DAILY 90 tablet 0   • hydroCHLOROthiazide (HYDRODIURIL) 25 MG tablet TAKE 1 TABLET BY MOUTH ONCE DAILY 90 tablet 0   •  INCRUSE ELLIPTA 62.5 MCG/INH aerosol powder  Inhale 1 puff Daily.  0   • metoprolol succinate XL (TOPROL XL) 25 MG 24 hr tablet Take 1 tablet by mouth Daily. 30 tablet 0   • nicotine polacrilex (RA NICOTINE GUM) 4 MG gum Chew 1 each As Needed for Smoking Cessation. 100 each 5   • omeprazole (priLOSEC) 20 MG capsule TAKE 1 CAPSULE BY MOUTH ONCE DAILY 90 capsule 2   • QVAR REDIHALER 40 MCG/ACT inhaler Inhale 2 puffs 2 (Two) Times a Day.  6   • VENTOLIN  (90 Base) MCG/ACT inhaler Inhale 2 puffs Every 4 (Four) Hours As Needed for Wheezing. 1 inhaler 0   • Nebulizer misc        No current facility-administered medications on file prior to visit.        Family History   Problem Relation Age of Onset   • Diabetes Mother    • Lung disease Mother    • Lung disease Maternal Grandmother    • Cancer Maternal Grandmother         lung   • Emphysema Maternal Grandmother        Past Medical History:   Diagnosis Date   • Acid reflux    • Allergic    • Anxiety    • Asthma    • Bronchitis    • Hypertension    • Pleurisy    • Rib fracture     broke 4 ribs       Past Surgical History:   Procedure Laterality Date   • RESECTION BONE TUMOR KNEE         Social History     Socioeconomic History   • Marital status:      Spouse name: Not on file   • Number of children: Not on file   • Years of education: Not on file   • Highest education level: Not on file   Tobacco Use   • Smoking status: Former Smoker     Packs/day: 1.50     Years: 8.00     Pack years: 12.00     Last attempt to quit: 2018     Years since quittin.0   • Smokeless tobacco: Never Used   • Tobacco comment: nicotine gum    Substance and Sexual Activity   • Alcohol use: Yes     Alcohol/week: 1.0 standard drinks     Types: 1 Standard drinks or equivalent per week     Comment: socially    • Drug use: No   • Sexual activity: Yes     Partners: Female       Patient Active Problem List   Diagnosis   • Airway hyperreactivity   • Morbid obesity (CMS/HCC)   •  "Folliculitis   • Sore throat   • Cough   • Wheezing   • Elevated AST (SGOT)   • Essential hypertension   • Other hyperlipidemia   • Tobacco use disorder   • Anxiety       /82 (BP Location: Right arm, Patient Position: Sitting, Cuff Size: Large Adult)   Pulse 88   Temp 98.4 °F (36.9 °C) (Oral)   Resp 16   Ht 182.9 cm (72\")   Wt (!) 152 kg (334 lb)   SpO2 97%   BMI 45.30 kg/m²   Body mass index is 45.3 kg/m².    Objective   Physical Exam   Constitutional: He is oriented to person, place, and time. He appears well-developed.   Eyes: Pupils are equal, round, and reactive to light.   Neck: Normal range of motion. Neck supple. No JVD present. No tracheal deviation present. No thyromegaly present.   Cardiovascular: Normal rate, regular rhythm and normal heart sounds. Exam reveals no gallop and no friction rub.   No murmur heard.  Pulmonary/Chest: Effort normal and breath sounds normal. No respiratory distress. He has no wheezes.   Abdominal: Soft. Bowel sounds are normal. He exhibits no distension and no mass. There is no tenderness. There is no rebound and no guarding. No hernia.   Musculoskeletal: He exhibits edema. He exhibits no tenderness or deformity.   Left leg with trace 1+ edema.   Lymphadenopathy:     He has no cervical adenopathy.   Neurological: He is alert and oriented to person, place, and time. He displays normal reflexes. No cranial nerve deficit or sensory deficit. He exhibits normal muscle tone. Coordination normal.   Skin: No rash noted. No erythema.   Psychiatric: He has a normal mood and affect. His behavior is normal.   Nursing note and vitals reviewed.        Assessment/Plan   Ravinder was seen today for asthma, hyperlipidemia, hypertension, anxiety, obesity and leg swelling.    Diagnoses and all orders for this visit:    Essential hypertension  -     CBC & Differential  -     Comprehensive Metabolic Panel  -     Lipid Panel With / Chol / HDL Ratio  -     TSH    Other hyperlipidemia  -     " CBC & Differential  -     Comprehensive Metabolic Panel  -     Lipid Panel With / Chol / HDL Ratio  -     TSH    Morbid obesity (CMS/HCC)  -     CBC & Differential  -     Comprehensive Metabolic Panel  -     Lipid Panel With / Chol / HDL Ratio  -     TSH    Is already on the water pill will continue taking that.  Have asked him to use compression stockings as he stands on his leg all through the day.  We will check a thyroid.  Continue all current medications reviewed with patient.  Return in 3 months time.  Discussed with patient compliance.  He follows also with the pulmonologist.  His conditions are chronic and stable except for left leg swelling.  Return in 3 months time.

## 2020-01-16 DIAGNOSIS — J45.40 MODERATE PERSISTENT ASTHMA WITHOUT COMPLICATION: ICD-10-CM

## 2020-01-16 DIAGNOSIS — I10 ESSENTIAL HYPERTENSION: ICD-10-CM

## 2020-01-16 LAB
ALBUMIN SERPL-MCNC: 4.4 G/DL (ref 3.5–5.2)
ALBUMIN/GLOB SERPL: 1.6 G/DL
ALP SERPL-CCNC: 68 U/L (ref 39–117)
ALT SERPL-CCNC: 34 U/L (ref 1–41)
AST SERPL-CCNC: 25 U/L (ref 1–40)
BASOPHILS # BLD AUTO: 0.07 10*3/MM3 (ref 0–0.2)
BASOPHILS NFR BLD AUTO: 0.9 % (ref 0–1.5)
BILIRUB SERPL-MCNC: 0.4 MG/DL (ref 0.2–1.2)
BUN SERPL-MCNC: 15 MG/DL (ref 6–20)
BUN/CREAT SERPL: 15.8 (ref 7–25)
CALCIUM SERPL-MCNC: 9.5 MG/DL (ref 8.6–10.5)
CHLORIDE SERPL-SCNC: 99 MMOL/L (ref 98–107)
CHOLEST SERPL-MCNC: 179 MG/DL (ref 0–200)
CHOLEST/HDLC SERPL: 4.37 {RATIO}
CO2 SERPL-SCNC: 28.7 MMOL/L (ref 22–29)
CREAT SERPL-MCNC: 0.95 MG/DL (ref 0.76–1.27)
EOSINOPHIL # BLD AUTO: 0.2 10*3/MM3 (ref 0–0.4)
EOSINOPHIL NFR BLD AUTO: 2.5 % (ref 0.3–6.2)
ERYTHROCYTE [DISTWIDTH] IN BLOOD BY AUTOMATED COUNT: 13.1 % (ref 12.3–15.4)
GLOBULIN SER CALC-MCNC: 2.8 GM/DL
GLUCOSE SERPL-MCNC: 90 MG/DL (ref 65–99)
HCT VFR BLD AUTO: 46.3 % (ref 37.5–51)
HDLC SERPL-MCNC: 41 MG/DL (ref 40–60)
HGB BLD-MCNC: 15.7 G/DL (ref 13–17.7)
IMM GRANULOCYTES # BLD AUTO: 0.01 10*3/MM3 (ref 0–0.05)
IMM GRANULOCYTES NFR BLD AUTO: 0.1 % (ref 0–0.5)
LDLC SERPL CALC-MCNC: 123 MG/DL (ref 0–100)
LYMPHOCYTES # BLD AUTO: 3.69 10*3/MM3 (ref 0.7–3.1)
LYMPHOCYTES NFR BLD AUTO: 46.4 % (ref 19.6–45.3)
MCH RBC QN AUTO: 28.8 PG (ref 26.6–33)
MCHC RBC AUTO-ENTMCNC: 33.9 G/DL (ref 31.5–35.7)
MCV RBC AUTO: 85 FL (ref 79–97)
MONOCYTES # BLD AUTO: 0.59 10*3/MM3 (ref 0.1–0.9)
MONOCYTES NFR BLD AUTO: 7.4 % (ref 5–12)
NEUTROPHILS # BLD AUTO: 3.39 10*3/MM3 (ref 1.7–7)
NEUTROPHILS NFR BLD AUTO: 42.7 % (ref 42.7–76)
NRBC BLD AUTO-RTO: 0 /100 WBC (ref 0–0.2)
PLATELET # BLD AUTO: 264 10*3/MM3 (ref 140–450)
POTASSIUM SERPL-SCNC: 4.6 MMOL/L (ref 3.5–5.2)
PROT SERPL-MCNC: 7.2 G/DL (ref 6–8.5)
RBC # BLD AUTO: 5.45 10*6/MM3 (ref 4.14–5.8)
SODIUM SERPL-SCNC: 140 MMOL/L (ref 136–145)
TRIGL SERPL-MCNC: 73 MG/DL (ref 0–150)
TSH SERPL DL<=0.005 MIU/L-ACNC: 1.51 UIU/ML (ref 0.27–4.2)
VLDLC SERPL CALC-MCNC: 14.6 MG/DL
WBC # BLD AUTO: 7.95 10*3/MM3 (ref 3.4–10.8)

## 2020-01-16 RX ORDER — ALBUTEROL SULFATE 90 UG/1
AEROSOL, METERED RESPIRATORY (INHALATION)
Qty: 18 G | Refills: 5 | Status: SHIPPED | OUTPATIENT
Start: 2020-01-16 | End: 2020-05-13

## 2020-01-16 RX ORDER — METOPROLOL SUCCINATE 25 MG/1
TABLET, EXTENDED RELEASE ORAL
Qty: 30 TABLET | Refills: 5 | Status: SHIPPED | OUTPATIENT
Start: 2020-01-16 | End: 2020-07-22

## 2020-02-03 ENCOUNTER — TELEPHONE (OUTPATIENT)
Dept: FAMILY MEDICINE CLINIC | Facility: CLINIC | Age: 35
End: 2020-02-03

## 2020-02-03 RX ORDER — METHYLPREDNISOLONE 4 MG/1
TABLET ORAL
Qty: 21 TABLET | Refills: 0 | Status: SHIPPED | OUTPATIENT
Start: 2020-02-03 | End: 2020-06-05 | Stop reason: SDUPTHER

## 2020-02-03 NOTE — TELEPHONE ENCOUNTER
At work and unable to take off for appointment, with swelling to back/ pain to back....requesting a prednisone pack

## 2020-03-12 RX ORDER — ESCITALOPRAM OXALATE 10 MG/1
TABLET ORAL
Qty: 90 TABLET | Refills: 2 | Status: SHIPPED | OUTPATIENT
Start: 2020-03-12 | End: 2021-01-02

## 2020-03-12 RX ORDER — HYDROCHLOROTHIAZIDE 25 MG/1
TABLET ORAL
Qty: 90 TABLET | Refills: 2 | Status: SHIPPED | OUTPATIENT
Start: 2020-03-12 | End: 2021-01-02

## 2020-03-13 DIAGNOSIS — F17.200 TOBACCO USE DISORDER: ICD-10-CM

## 2020-04-10 DIAGNOSIS — F17.200 TOBACCO USE DISORDER: ICD-10-CM

## 2020-04-10 RX ORDER — NICOTINE POLACRILEX 2 MG/1
GUM, CHEWING ORAL
Qty: 400 EACH | Refills: 3 | Status: SHIPPED | OUTPATIENT
Start: 2020-04-10 | End: 2021-07-15 | Stop reason: SDUPTHER

## 2020-04-13 ENCOUNTER — E-VISIT (OUTPATIENT)
Dept: FAMILY MEDICINE CLINIC | Facility: TELEHEALTH | Age: 35
End: 2020-04-13

## 2020-04-13 DIAGNOSIS — H10.30 ACUTE BACTERIAL CONJUNCTIVITIS, UNSPECIFIED LATERALITY: Primary | ICD-10-CM

## 2020-04-13 PROCEDURE — 99422 OL DIG E/M SVC 11-20 MIN: CPT | Performed by: NURSE PRACTITIONER

## 2020-04-13 RX ORDER — POLYMYXIN B SULFATE AND TRIMETHOPRIM 1; 10000 MG/ML; [USP'U]/ML
1 SOLUTION OPHTHALMIC 3 TIMES DAILY
Qty: 10 ML | Refills: 0 | Status: SHIPPED | OUTPATIENT
Start: 2020-04-13 | End: 2020-04-20

## 2020-04-13 NOTE — PATIENT INSTRUCTIONS
Allergic Conjunctivitis, Adult    Allergic conjunctivitis is inflammation of the clear membrane that covers the white part of your eye and the inner surface of your eyelid (conjunctiva). The inflammation is caused by allergies. The blood vessels in the conjunctiva become inflamed and this causes the eyes to become red or pink. The eyes often feel itchy. Allergic conjunctivitis cannot be spread from one person to another person (is not contagious).  What are the causes?  This condition is caused by an allergic reaction. Common causes of an allergic reaction (allergens) include:  · Outdoor allergens, such as:  ? Pollen.  ? Grass and weeds.  ? Mold spores.  · Indoor allergens, such as:  ? Dust.  ? Smoke.  ? Mold.  ? Pet dander.  ? Animal hair.  What increases the risk?  You may be more likely to develop this condition if you have a family history of allergies, such as:  · Allergic rhinitis.  · Bronchial asthma.  · Atopic dermatitis.  What are the signs or symptoms?  Symptoms of this condition include eyes that are:  · Itchy.  · Red.  · Watery.  · Puffy.  Your eyes may also:  · Sting or burn.  · Have clear drainage coming from them.  How is this diagnosed?  This condition may be diagnosed by medical history and physical exam. If you have drainage from your eyes, it may be tested to rule out other causes of conjunctivitis. You may also need to see a health care provider who specializes in treating allergies (allergist) or eye conditions (ophthalmologist) for tests to confirm the diagnosis. You may have:  · Skin tests to see which allergens are causing your symptoms. These tests involve pricking the skin with a tiny needle and exposing the skin to small amounts of potential allergens to see if your skin reacts.  · Blood tests.  · Tissue scrapings from your eyelid. These will be examined under a microscope.  How is this treated?  Treatments for this condition may include:  · Cold cloths (compresses) to soothe itching and  swelling.  · Washing the face to remove allergens.  · Eye drops. These may be prescription or over-the-counter. There are several different types. You may need to try different types to see which one works best for you. Your may need:  ? Eye drops that block the allergic reaction (antihistamine).  ? Eye drops that reduce swelling and irritation (anti-inflammatory).  ? Steroid eye drops to lessen a severe reaction (vernal conjunctivitis).  · Oral antihistamine medicines to reduce your allergic reaction. You may need these if eye drops do not help or are difficult to use.  Follow these instructions at home:  · Avoid known allergens whenever possible.  · Take or apply over-the-counter and prescription medicines only as told by your health care provider. These include any eye drops.  · Apply a cool, clean washcloth to your eye for 10-20 minutes, 3-4 times a day.  · Do not touch or rub your eyes.  · Do not wear contact lenses until the inflammation is gone. Wear glasses instead.  · Do not wear eye makeup until the inflammation is gone.  · Keep all follow-up visits as told by your health care provider. This is important.  Contact a health care provider if:  · Your symptoms get worse or do not improve with treatment.  · You have mild eye pain.  · You have sensitivity to light.  · You have spots or blisters on your eyes.  · You have pus draining from your eye.  · You have a fever.  Get help right away if:  · You have redness, swelling, or other symptoms in only one eye.  · Your vision is blurred or you have vision changes.  · You have severe eye pain.  This information is not intended to replace advice given to you by your health care provider. Make sure you discuss any questions you have with your health care provider.  Document Released: 03/09/2004 Document Revised: 08/16/2017 Document Reviewed: 06/30/2017  [employer] Interactive Patient Education © 2020 [employer]    Bacterial Conjunctivitis, Adult  Bacterial  conjunctivitis is an infection of the clear membrane that covers the white part of your eye and the inner surface of your eyelid (conjunctiva). When the blood vessels in your conjunctiva become inflamed, your eye becomes red or pink, and it will probably feel itchy. Bacterial conjunctivitis spreads very easily from person to person (is contagious). It also spreads easily from one eye to the other eye.  What are the causes?  This condition is caused by bacteria. You may get the infection if you come into close contact with:  · A person who is infected with the bacteria.  · Items that are contaminated with the bacteria, such as a face towel, contact lens solution, or eye makeup.  What increases the risk?  You are more likely to develop this condition if you:  · Are exposed to other people who have the infection.  · Wear contact lenses.  · Have a sinus infection.  · Have had a recent eye injury or surgery.  · Have a weak body defense system (immune system).  · Have a medical condition that causes dry eyes.  What are the signs or symptoms?  Symptoms of this condition include:  · Thick, yellowish discharge from the eye. This may turn into a crust on the eyelid overnight and cause your eyelids to stick together.  · Tearing or watery eyes.  · Itchy eyes.  · Burning feeling in your eyes.  · Eye redness.  · Swollen eyelids.  · Blurred vision.  How is this diagnosed?  This condition is diagnosed based on your symptoms and medical history. Your health care provider may also take a sample of discharge from your eye to find the cause of your infection. This is rarely done.  How is this treated?  This condition may be treated with:  · Antibiotic eye drops or ointment to clear the infection more quickly and prevent the spread of infection to others.  · Oral antibiotic medicines to treat infections that do not respond to drops or ointments or that last longer than 10 days.  · Cool, wet cloths (cool compresses) placed on the  eyes.  · Artificial tears applied 2-6 times a day.  Follow these instructions at home:  Medicines  · Take or apply your antibiotic medicine as told by your health care provider. Do not stop taking or applying the antibiotic even if you start to feel better.  · Take or apply over-the-counter and prescription medicines only as told by your health care provider.  · Be very careful to avoid touching the edge of your eyelid with the eye-drop bottle or the ointment tube when you apply medicines to the affected eye. This will keep you from spreading the infection to your other eye or to other people.  Managing discomfort  · Gently wipe away any drainage from your eye with a warm, wet washcloth or a cotton ball.  · Apply a clean, cool compress to your eye for 10-20 minutes, 3-4 times a day.  General instructions  · Do not wear contact lenses until the inflammation is gone and your health care provider says it is safe to wear them again. Ask your health care provider how to sterilize or replace your contact lenses before you use them again. Wear glasses until you can resume wearing contact lenses.  · Avoid wearing eye makeup until the inflammation is gone. Throw away any old eye cosmetics that may be contaminated.  · Change or wash your pillowcase every day.  · Do not share towels or washcloths. This may spread the infection.  · Wash your hands often with soap and water. Use paper towels to dry your hands.  · Avoid touching or rubbing your eyes.  · Do not drive or use heavy machinery if your vision is blurred.  Contact a health care provider if:  · You have a fever.  · Your symptoms do not get better after 10 days.  Get help right away if you have:  · A fever and your symptoms suddenly get worse.  · Severe pain when you move your eye.  · Facial pain, redness, or swelling.  · Sudden loss of vision.  Summary  · Bacterial conjunctivitis is an infection of the clear membrane that covers the white part of your eye and the inner  surface of your eyelid (conjunctiva).  · Bacterial conjunctivitis spreads very easily from person to person (is contagious).  · Wash your hands often with soap and water. Use paper towels to dry your hands.  · Take or apply your antibiotic medicine as told by your health care provider. Do not stop taking or applying the antibiotic even if you start to feel better.  · Contact a health care provider if you have a fever or your symptoms do not get better after 10 days.  This information is not intended to replace advice given to you by your health care provider. Make sure you discuss any questions you have with your health care provider.  Document Released: 12/18/2006 Document Revised: 07/24/2019 Document Reviewed: 07/24/2019  [employer] Interactive Patient Education © 2020 [employer]

## 2020-04-13 NOTE — PROGRESS NOTES
Ravinder IQBAL Przybysz    1985  [id number]    I have reviewed the e-Visit questionnaire and patient's answers, my assessment and plan are as follows:    Questionnaire:   --symptoms: none   --exposure to flu or COVID-19 in past 14 days:  No  --sudden loss of taste or smell:  No  --healthcare worker:  N/A    HPI  A few days history of discomfort, itching, drainage/crusting in right eye; eye is red, irritated, and crusted in the mornings, has thick mucus-like discharge; discomfort in eye throughout the day; also having runny nose/sneezing; currently using an allergy eye drop with no relief; takes zyrte    Review of Systems - History obtained from the patient  Ophthalmic ROS: positive for - eye pain, itchy eyes and discharge with crusting  negative for - blurry vision, decreased vision, double vision, dry eyes, loss of vision or photophobia  ENT ROS: positive for - sneezing and rhinorrhea  negative for - headaches, nasal congestion, nasal discharge, sinus pain or sore throat      Diagnoses and all orders for this visit:    Acute bacterial conjunctivitis, unspecified laterality  -     trimethoprim-polymyxin b (POLYTRIM) 47104-6.1 UNIT/ML-% ophthalmic solution; Administer 1 drop to the right eye 3 (Three) Times a Day for 7 days.  --instill eye drops as prescribed  --may also continue allergy medication/eye drops    **if change in vision, worsening pain--seek immediate care    **if no improvement in 2-3 days, will need to follow-up for further evaluation      Any medications prescribed have been sent electronically to   [id number] - [place], [place] - [address] - [phone]  - [phone]   [address] [facility] [place] [address]  Phone: [phone] Fax: [phone]      Time Documentation  Counseled patient  Counseling topics: diagnosis, treatment options and return instructions  Total encounter time: counseling time more than 50% of visit: 15 minutes        Celia Lobo, APRN  04/13/20  12:56  PM

## 2020-04-23 ENCOUNTER — OFFICE VISIT (OUTPATIENT)
Dept: FAMILY MEDICINE CLINIC | Facility: CLINIC | Age: 35
End: 2020-04-23

## 2020-04-23 DIAGNOSIS — M79.10 MYALGIA: ICD-10-CM

## 2020-04-23 DIAGNOSIS — E66.01 MORBID OBESITY (HCC): ICD-10-CM

## 2020-04-23 DIAGNOSIS — E78.49 OTHER HYPERLIPIDEMIA: ICD-10-CM

## 2020-04-23 DIAGNOSIS — F41.9 ANXIETY: ICD-10-CM

## 2020-04-23 DIAGNOSIS — I10 ESSENTIAL HYPERTENSION: Primary | ICD-10-CM

## 2020-04-23 PROCEDURE — 99441 PR PHYS/QHP TELEPHONE EVALUATION 5-10 MIN: CPT | Performed by: INTERNAL MEDICINE

## 2020-04-23 NOTE — PROGRESS NOTES
Subjective   Ravinder Robles is a 34 y.o. male.     Chief Complaint   Patient presents with   • Asthma   • Hypertension       History of Present Illness   Patient was seen today for phone visit.  Permission was obtained.  Patient has multiple medical problems.  Has asthma reports asthma under control with breathing treatments and Qvar.  Patient also has hypertension he is on metoprolol and water pill.  Reports that he is having some cramping and pain that needs.  Wants potassium recheck.  He is trying to lose weight dieting on his own.  Is exercising.  Denies any chest pain or shortness breath.  Blood pressure he checked today was 130/80.  His anxiety is under control.  Taking Lexapro regularly.  The following portions of the patient's history were reviewed and updated as appropriate: allergies, current medications, past family history, past medical history, past social history, past surgical history and problem list.    Review of Systems   Constitutional: Negative for activity change, appetite change, fatigue and fever.   Eyes: Negative for blurred vision and double vision.   Respiratory: Negative.  Negative for shortness of breath.    Cardiovascular: Negative for chest pain, palpitations and leg swelling.   Gastrointestinal: Negative.    Musculoskeletal: Positive for myalgias.        Joint pains especially knees   Neurological: Negative.  Negative for dizziness, syncope and light-headedness.   Psychiatric/Behavioral: Negative for dysphoric mood and depressed mood.       Allergies   Allergen Reactions   • Cat Hair Extract Hives and Itching   • Lisinopril Cough   • Methylprednisolone Itching     Pt can take prednisone        Current Outpatient Medications on File Prior to Visit   Medication Sig Dispense Refill   • escitalopram (LEXAPRO) 10 MG tablet Take 1 tablet by mouth once daily 90 tablet 2   • hydroCHLOROthiazide (HYDRODIURIL) 25 MG tablet Take 1 tablet by mouth once daily 90 tablet 2   • metoprolol succinate  XL (TOPROL-XL) 25 MG 24 hr tablet TAKE 1 TABLET BY MOUTH ONCE DAILY 30 tablet 5   • Nebulizer misc      • omeprazole (priLOSEC) 20 MG capsule TAKE 1 CAPSULE BY MOUTH ONCE DAILY 90 capsule 2   • QVAR REDIHALER 40 MCG/ACT inhaler Inhale 2 puffs 2 (Two) Times a Day.  6   • VENTOLIN  (90 Base) MCG/ACT inhaler INHALE 2 PUFFS BY MOUTH EVERY 4 HOURS AS NEEDED FOR WHEEZING 18 g 5   • cetirizine (ZyrTEC) 10 MG tablet Take 10 mg by mouth daily.     • EQ NICOTINE POLACRILEX 2 MG gum CHEW ONE PIECE AS NEEDED FOR SMOKING CESSATION FOR UP TO 30 DAYS 400 each 3   • INCRUSE ELLIPTA 62.5 MCG/INH aerosol powder  Inhale 1 puff Daily.  0   • methylPREDNISolone (MEDROL, SARBJIT,) 4 MG tablet Take as directed on package instructions. 21 tablet 0     No current facility-administered medications on file prior to visit.        Family History   Problem Relation Age of Onset   • Diabetes Mother    • Lung disease Mother    • Lung disease Maternal Grandmother    • Cancer Maternal Grandmother         lung   • Emphysema Maternal Grandmother        Past Medical History:   Diagnosis Date   • Acid reflux    • Allergic    • Anxiety    • Asthma    • Bronchitis    • Hypertension    • Pleurisy    • Rib fracture     broke 4 ribs       Past Surgical History:   Procedure Laterality Date   • RESECTION BONE TUMOR KNEE         Social History     Socioeconomic History   • Marital status:      Spouse name: Not on file   • Number of children: Not on file   • Years of education: Not on file   • Highest education level: Not on file   Tobacco Use   • Smoking status: Former Smoker     Packs/day: 1.50     Years: 8.00     Pack years: 12.00     Last attempt to quit: 2018     Years since quittin.2   • Smokeless tobacco: Never Used   • Tobacco comment: nicotine gum    Substance and Sexual Activity   • Alcohol use: Yes     Alcohol/week: 1.0 standard drinks     Types: 1 Standard drinks or equivalent per week     Comment: socially    • Drug use: No   •  Sexual activity: Yes     Partners: Female       Patient Active Problem List   Diagnosis   • Airway hyperreactivity   • Morbid obesity (CMS/HCC)   • Folliculitis   • Sore throat   • Cough   • Wheezing   • Elevated AST (SGOT)   • Essential hypertension   • Other hyperlipidemia   • Tobacco use disorder   • Anxiety       There were no vitals taken for this visit.  There is no height or weight on file to calculate BMI.    Objective   Physical Exam      Assessment/Plan   Ravinder was seen today for asthma and hypertension.    Diagnoses and all orders for this visit:    Essential hypertension  -     Comprehensive Metabolic Panel; Future  -     Lipid Panel With / Chol / HDL Ratio; Future  -     Magnesium; Future    Other hyperlipidemia  -     Comprehensive Metabolic Panel; Future  -     Lipid Panel With / Chol / HDL Ratio; Future  -     Magnesium; Future    Morbid obesity (CMS/HCC)  -     Comprehensive Metabolic Panel; Future  -     Lipid Panel With / Chol / HDL Ratio; Future  -     Magnesium; Future    Anxiety  -     Comprehensive Metabolic Panel; Future  -     Lipid Panel With / Chol / HDL Ratio; Future  -     Magnesium; Future    Myalgia    Continue diet and exercise.  Continue losing weight.  Continue all current medication went to the list of medicines.  Patient to come back in 1 week's time and get fasting labs done.  For his cramping will be checking his potassium and magnesium so I will go ahead and do rest of his labs.  Otherwise I will see him back in 3 months time.  Continue all medications.  Continue diet and exercise.  Check blood pressure.  Lose weight.  Return in 3 months time.  All his problems are chronic and stable except for cramping in the legs.  Total time was 7 minutes

## 2020-04-28 ENCOUNTER — RESULTS ENCOUNTER (OUTPATIENT)
Dept: FAMILY MEDICINE CLINIC | Facility: CLINIC | Age: 35
End: 2020-04-28

## 2020-04-28 DIAGNOSIS — E78.49 OTHER HYPERLIPIDEMIA: ICD-10-CM

## 2020-04-28 DIAGNOSIS — E66.01 MORBID OBESITY (HCC): ICD-10-CM

## 2020-04-28 DIAGNOSIS — I10 ESSENTIAL HYPERTENSION: ICD-10-CM

## 2020-04-28 DIAGNOSIS — F41.9 ANXIETY: ICD-10-CM

## 2020-05-12 DIAGNOSIS — J45.40 MODERATE PERSISTENT ASTHMA WITHOUT COMPLICATION: ICD-10-CM

## 2020-05-13 RX ORDER — ALBUTEROL SULFATE 90 UG/1
AEROSOL, METERED RESPIRATORY (INHALATION)
Qty: 36 G | Refills: 5 | Status: SHIPPED | OUTPATIENT
Start: 2020-05-13 | End: 2020-08-10 | Stop reason: SDUPTHER

## 2020-06-04 ENCOUNTER — TELEPHONE (OUTPATIENT)
Dept: FAMILY MEDICINE CLINIC | Facility: CLINIC | Age: 35
End: 2020-06-04

## 2020-06-04 NOTE — TELEPHONE ENCOUNTER
PATIENT CALLED AND STATED THAT HE HAS A HISTORY OF HAVING A BAD BACK. HE STATES WHEN HE WOKE UP THIS MORNING HE COULD NOT STAND UP STRAIGHT. HE WOULD LIKE TO KNOW IF A PREDNISONE PACK CAN BE CALLED IN FOR HIM. PLEASE ADVISE.     PHARMACY IS [facility] ON [facility]     PATIENT CALL BACK [phone]

## 2020-06-05 DIAGNOSIS — M54.50 ACUTE MIDLINE LOW BACK PAIN, UNSPECIFIED WHETHER SCIATICA PRESENT: Primary | ICD-10-CM

## 2020-06-05 RX ORDER — METHYLPREDNISOLONE 4 MG/1
TABLET ORAL
Qty: 21 TABLET | Refills: 0 | Status: SHIPPED | OUTPATIENT
Start: 2020-06-05 | End: 2021-07-15 | Stop reason: SDUPTHER

## 2020-06-30 ENCOUNTER — TELEPHONE (OUTPATIENT)
Dept: FAMILY MEDICINE CLINIC | Facility: CLINIC | Age: 35
End: 2020-06-30

## 2020-06-30 NOTE — TELEPHONE ENCOUNTER
PT CALLED SAYING THAT HE WOKE UP ON Sunday EVENING 6/28 WITH LOWER BACK PAIN ON THE RIGHT SIDE AND HAS BEEN IN PAIN SINCE. PT CAN NOT REMEMBER DOING ANYTHING THAT WOULD HAVE INJURED HIMSELF. HE SAYS THAT HE HAS BEEN ROTATING HOT AND COLD COMPRESSES AND TAKING IBUPROFEN TO TRYING AND CONTROL HIS SWELLING WITH LITTLE RELIEF. PT SAID THAT THE LAST TIME THIS HAPPEN HE WAS GIVEN methylPREDNISolone (MEDROL, SARBJIT,) 4 MG tablet AND SAYS THAT IT HELPED BUT THINKS HE MAY NEED SOMETHING STRONGER.    CALL BACK #  [phone]      [facility] [address] [place] [address] [phone] [facility] [phone] FX

## 2020-07-06 RX ORDER — OMEPRAZOLE 20 MG/1
CAPSULE, DELAYED RELEASE ORAL
Qty: 90 CAPSULE | Refills: 0 | Status: SHIPPED | OUTPATIENT
Start: 2020-07-06 | End: 2020-08-10

## 2020-07-22 DIAGNOSIS — I10 ESSENTIAL HYPERTENSION: ICD-10-CM

## 2020-07-22 RX ORDER — METOPROLOL SUCCINATE 25 MG/1
TABLET, EXTENDED RELEASE ORAL
Qty: 90 TABLET | Refills: 0 | Status: SHIPPED | OUTPATIENT
Start: 2020-07-22 | End: 2020-11-03

## 2020-08-10 ENCOUNTER — OFFICE VISIT (OUTPATIENT)
Dept: FAMILY MEDICINE CLINIC | Facility: CLINIC | Age: 35
End: 2020-08-10

## 2020-08-10 VITALS
RESPIRATION RATE: 16 BRPM | BODY MASS INDEX: 42.66 KG/M2 | HEIGHT: 72 IN | OXYGEN SATURATION: 97 % | SYSTOLIC BLOOD PRESSURE: 128 MMHG | WEIGHT: 315 LBS | TEMPERATURE: 97.6 F | HEART RATE: 109 BPM | DIASTOLIC BLOOD PRESSURE: 80 MMHG

## 2020-08-10 DIAGNOSIS — J45.40 MODERATE PERSISTENT ASTHMA WITHOUT COMPLICATION: ICD-10-CM

## 2020-08-10 DIAGNOSIS — E66.01 MORBID OBESITY (HCC): ICD-10-CM

## 2020-08-10 DIAGNOSIS — J45.909 MILD ASTHMA WITHOUT COMPLICATION, UNSPECIFIED WHETHER PERSISTENT: ICD-10-CM

## 2020-08-10 DIAGNOSIS — I10 ESSENTIAL HYPERTENSION: Primary | ICD-10-CM

## 2020-08-10 DIAGNOSIS — E78.49 OTHER HYPERLIPIDEMIA: ICD-10-CM

## 2020-08-10 PROBLEM — R05.9 COUGH: Status: RESOLVED | Noted: 2017-04-04 | Resolved: 2020-08-10

## 2020-08-10 PROBLEM — F32.9 REACTIVE DEPRESSION: Status: ACTIVE | Noted: 2020-08-10

## 2020-08-10 PROBLEM — F17.200 TOBACCO USE DISORDER: Status: RESOLVED | Noted: 2019-12-31 | Resolved: 2020-08-10

## 2020-08-10 PROCEDURE — 99214 OFFICE O/P EST MOD 30 MIN: CPT | Performed by: INTERNAL MEDICINE

## 2020-08-10 NOTE — PROGRESS NOTES
ROSENDA@  Ravinder Robles is a 34 y.o. male.     Chief Complaint   Patient presents with   • Hypertension   • Asthma   Morbid obesity, hyperlipidemia, smoker    History of Present Illness   Patient here follow-up for hypertension, asthma, morbid obesity, hyperlipidemia.  Continues to smoke.  I have encouraged him to smoke to stop smoking before.  He wants gone back and he stopped smoking yesterday.  Using inhalers as needed.  Continues to gain weight.  Reports blood pressure has been stable at home.  Currently on metoprolol, Lexapro, Prilosec, hydrochlorothiazide.  Reports Lexapro helping his depression.  Denies any chest pain or shortness of breath.  The following portions of the patient's history were reviewed and updated as appropriate: allergies, current medications, past family history, past medical history, past social history, past surgical history and problem list.    Review of Systems   Constitutional: Negative for activity change, appetite change, fatigue and fever.   Eyes: Negative for blurred vision and double vision.   Respiratory: Negative.  Negative for shortness of breath.    Cardiovascular: Negative for chest pain, palpitations and leg swelling.   Musculoskeletal: Negative.    Skin: Negative for color change.   Neurological: Negative for dizziness, syncope, light-headedness, numbness and headache.   Psychiatric/Behavioral: Negative for agitation, behavioral problems, decreased concentration, sleep disturbance and suicidal ideas.       Allergies   Allergen Reactions   • Cat Hair Extract Hives and Itching   • Lisinopril Cough   • Methylprednisolone Itching     Pt can take prednisone        Current Outpatient Medications on File Prior to Visit   Medication Sig Dispense Refill   • ALBUTEROL SULFATE  (90 Base) MCG/ACT inhaler INHALE 2 PUFFS BY MOUTH EVERY 4 HOURS AS NEEDED 36 g 5   • cetirizine (ZyrTEC) 10 MG tablet Take 10 mg by mouth daily.     • EQ NICOTINE POLACRILEX 2 MG gum CHEW ONE  PIECE AS NEEDED FOR SMOKING CESSATION FOR UP TO 30 DAYS 400 each 3   • escitalopram (LEXAPRO) 10 MG tablet Take 1 tablet by mouth once daily 90 tablet 2   • hydroCHLOROthiazide (HYDRODIURIL) 25 MG tablet Take 1 tablet by mouth once daily 90 tablet 2   • INCRUSE ELLIPTA 62.5 MCG/INH aerosol powder  Inhale 1 puff Daily.  0   • metoprolol succinate XL (TOPROL-XL) 25 MG 24 hr tablet Take 1 tablet by mouth once daily 90 tablet 0   • Nebulizer misc      • omeprazole (priLOSEC) 20 MG capsule Take 1 capsule by mouth once daily 90 capsule 0   • QVAR REDIHALER 40 MCG/ACT inhaler Inhale 2 puffs 2 (Two) Times a Day.  6   • methylPREDNISolone (MEDROL, SARBJIT,) 4 MG tablet Take as directed on package instructions. 21 tablet 0     No current facility-administered medications on file prior to visit.        Family History   Problem Relation Age of Onset   • Diabetes Mother    • Lung disease Mother    • Lung disease Maternal Grandmother    • Cancer Maternal Grandmother         lung   • Emphysema Maternal Grandmother        Past Medical History:   Diagnosis Date   • Acid reflux    • Allergic    • Anxiety    • Asthma    • Bronchitis    • Hypertension    • Pleurisy    • Reactive depression 8/10/2020   • Rib fracture     broke 4 ribs       Past Surgical History:   Procedure Laterality Date   • RESECTION BONE TUMOR KNEE         Social History     Socioeconomic History   • Marital status:      Spouse name: Not on file   • Number of children: Not on file   • Years of education: Not on file   • Highest education level: Not on file   Tobacco Use   • Smoking status: Current Every Day Smoker     Packs/day: 1.50     Years: 8.00     Pack years: 12.00     Last attempt to quit: 2018     Years since quittin.5   • Smokeless tobacco: Never Used   • Tobacco comment: nicotine gum    Substance and Sexual Activity   • Alcohol use: Yes     Alcohol/week: 1.0 standard drinks     Types: 1 Standard drinks or equivalent per week     Comment:  "socially    • Drug use: No   • Sexual activity: Yes     Partners: Female       Patient Active Problem List   Diagnosis   • Airway hyperreactivity   • Morbid obesity (CMS/HCC)   • Folliculitis   • Sore throat   • Wheezing   • Elevated AST (SGOT)   • Essential hypertension   • Other hyperlipidemia   • Anxiety   • Reactive depression       /80 (BP Location: Left arm, Patient Position: Sitting, Cuff Size: Large Adult)   Pulse 109   Temp 97.6 °F (36.4 °C) (Temporal)   Resp 16   Ht 182.9 cm (72\")   Wt (!) 164 kg (361 lb)   SpO2 97%   BMI 48.96 kg/m²   Body mass index is 48.96 kg/m².    Objective   Physical Exam   Constitutional: He is oriented to person, place, and time. He appears well-developed.   Eyes: Pupils are equal, round, and reactive to light. EOM are normal.   Neck: Normal range of motion. Neck supple. No JVD present. No tracheal deviation present. No thyromegaly present.   Cardiovascular: Normal rate, regular rhythm and normal heart sounds. Exam reveals no gallop and no friction rub.   No murmur heard.  Pulmonary/Chest: Effort normal and breath sounds normal. No respiratory distress. He has no wheezes.   Abdominal: Soft. Bowel sounds are normal. He exhibits no distension and no mass. There is no tenderness. There is no rebound and no guarding. No hernia.   Musculoskeletal: Normal range of motion. He exhibits no edema or deformity.   Lymphadenopathy:     He has no cervical adenopathy.   Neurological: He is alert and oriented to person, place, and time. He displays normal reflexes. No cranial nerve deficit or sensory deficit. He exhibits normal muscle tone. Coordination normal.   Skin: Skin is warm and dry.   Psychiatric: He has a normal mood and affect. His behavior is normal.   Nursing note and vitals reviewed.        Assessment/Plan   Ravinder was seen today for hypertension and asthma.    Diagnoses and all orders for this visit:    Essential hypertension  -     Comprehensive metabolic panel  -     " Lipid Panel w/ Chol/HDL Ratio  -     Hepatitis C antibody    Other hyperlipidemia  -     Comprehensive metabolic panel  -     Lipid Panel w/ Chol/HDL Ratio  -     Hepatitis C antibody    Mild asthma without complication, unspecified whether persistent  -     Comprehensive metabolic panel  -     Lipid Panel w/ Chol/HDL Ratio  -     Hepatitis C antibody    Morbid obesity (CMS/HCC)  -     Comprehensive metabolic panel  -     Lipid Panel w/ Chol/HDL Ratio  -     Hepatitis C antibody    Other orders  -     nicotine polacrilex (EQ Nicotine) 4 MG gum; Chew 1 each As Needed for Smoking Cessation. Not more than 10 a day    Continue diet and exercise.  Lose weight.  Check blood pressure at home.  Discontinue smoking.  Take Lexapro try every other day if it keeps his depression symptoms are stable we can cut down to 5-hour stay on every other day.  If not he can continue 10 every day.  Continue take inhalers.  Lose weight.  All his problems are chronic and stable except for gaining weight.  Return in 3 months time.  Encouraged him to use peak flow meter

## 2020-08-11 DIAGNOSIS — R73.09 BLOOD SUGAR INCREASED: Primary | ICD-10-CM

## 2020-08-11 LAB
ALBUMIN SERPL-MCNC: 4.2 G/DL (ref 3.5–5.2)
ALBUMIN/GLOB SERPL: 1.7 G/DL
ALP SERPL-CCNC: 82 U/L (ref 39–117)
ALT SERPL-CCNC: 30 U/L (ref 1–41)
AST SERPL-CCNC: 20 U/L (ref 1–40)
BILIRUB SERPL-MCNC: 0.2 MG/DL (ref 0–1.2)
BUN SERPL-MCNC: 9 MG/DL (ref 6–20)
BUN/CREAT SERPL: 9.5 (ref 7–25)
CALCIUM SERPL-MCNC: 9.1 MG/DL (ref 8.6–10.5)
CHLORIDE SERPL-SCNC: 94 MMOL/L (ref 98–107)
CHOLEST SERPL-MCNC: 178 MG/DL (ref 0–200)
CHOLEST/HDLC SERPL: 4.45 {RATIO}
CO2 SERPL-SCNC: 29.5 MMOL/L (ref 22–29)
CREAT SERPL-MCNC: 0.95 MG/DL (ref 0.76–1.27)
GLOBULIN SER CALC-MCNC: 2.5 GM/DL
GLUCOSE SERPL-MCNC: 252 MG/DL (ref 65–99)
HCV AB S/CO SERPL IA: 0.1 S/CO RATIO (ref 0–0.9)
HDLC SERPL-MCNC: 40 MG/DL (ref 40–60)
LDLC SERPL CALC-MCNC: 109 MG/DL (ref 0–100)
POTASSIUM SERPL-SCNC: 4.6 MMOL/L (ref 3.5–5.2)
PROT SERPL-MCNC: 6.7 G/DL (ref 6–8.5)
SODIUM SERPL-SCNC: 134 MMOL/L (ref 136–145)
TRIGL SERPL-MCNC: 146 MG/DL (ref 0–150)
VLDLC SERPL CALC-MCNC: 29.2 MG/DL

## 2020-08-11 RX ORDER — OMEPRAZOLE 20 MG/1
20 CAPSULE, DELAYED RELEASE ORAL DAILY
Qty: 90 CAPSULE | Refills: 3 | Status: SHIPPED | OUTPATIENT
Start: 2020-08-11 | End: 2021-10-05 | Stop reason: SDUPTHER

## 2020-08-11 RX ORDER — ALBUTEROL SULFATE 90 UG/1
AEROSOL, METERED RESPIRATORY (INHALATION)
Qty: 36 G | Refills: 5 | Status: SHIPPED | OUTPATIENT
Start: 2020-08-11 | End: 2020-12-22

## 2020-08-12 ENCOUNTER — LAB (OUTPATIENT)
Dept: FAMILY MEDICINE CLINIC | Facility: CLINIC | Age: 35
End: 2020-08-12

## 2020-08-12 DIAGNOSIS — R73.09 BLOOD SUGAR INCREASED: ICD-10-CM

## 2020-08-13 LAB — HBA1C MFR BLD: 7.9 % (ref 4.8–5.6)

## 2020-11-02 DIAGNOSIS — I10 ESSENTIAL HYPERTENSION: ICD-10-CM

## 2020-11-03 RX ORDER — METOPROLOL SUCCINATE 25 MG/1
TABLET, EXTENDED RELEASE ORAL
Qty: 90 TABLET | Refills: 0 | Status: SHIPPED | OUTPATIENT
Start: 2020-11-03 | End: 2021-02-08

## 2020-11-09 DIAGNOSIS — E66.01 MORBID OBESITY (HCC): Primary | ICD-10-CM

## 2020-11-09 DIAGNOSIS — R73.09 BLOOD SUGAR INCREASED: ICD-10-CM

## 2020-11-10 LAB
ALBUMIN SERPL-MCNC: 4 G/DL (ref 4–5)
ALBUMIN/GLOB SERPL: 1.3 {RATIO} (ref 1.2–2.2)
ALP SERPL-CCNC: 81 IU/L (ref 39–117)
ALT SERPL-CCNC: 23 IU/L (ref 0–44)
AST SERPL-CCNC: 15 IU/L (ref 0–40)
BILIRUB SERPL-MCNC: 0.4 MG/DL (ref 0–1.2)
BUN SERPL-MCNC: 14 MG/DL (ref 6–20)
BUN/CREAT SERPL: 15 (ref 9–20)
CALCIUM SERPL-MCNC: 9.8 MG/DL (ref 8.7–10.2)
CHLORIDE SERPL-SCNC: 97 MMOL/L (ref 96–106)
CHOLEST SERPL-MCNC: 173 MG/DL (ref 100–199)
CHOLEST/HDLC SERPL: 4.8 RATIO (ref 0–5)
CO2 SERPL-SCNC: 25 MMOL/L (ref 20–29)
CREAT SERPL-MCNC: 0.96 MG/DL (ref 0.76–1.27)
GLOBULIN SER CALC-MCNC: 3.2 G/DL (ref 1.5–4.5)
GLUCOSE SERPL-MCNC: 149 MG/DL (ref 65–99)
HBA1C MFR BLD: 8.3 % (ref 4.8–5.6)
HDLC SERPL-MCNC: 36 MG/DL
LDLC SERPL CALC-MCNC: 117 MG/DL (ref 0–99)
Lab: NORMAL
MAGNESIUM SERPL-MCNC: 1.8 MG/DL (ref 1.6–2.3)
POTASSIUM SERPL-SCNC: 4.7 MMOL/L (ref 3.5–5.2)
PROT SERPL-MCNC: 7.2 G/DL (ref 6–8.5)
SODIUM SERPL-SCNC: 137 MMOL/L (ref 134–144)
TRIGL SERPL-MCNC: 108 MG/DL (ref 0–149)
VLDLC SERPL CALC-MCNC: 20 MG/DL (ref 5–40)

## 2020-11-16 ENCOUNTER — OFFICE VISIT (OUTPATIENT)
Dept: FAMILY MEDICINE CLINIC | Facility: CLINIC | Age: 35
End: 2020-11-16

## 2020-11-16 ENCOUNTER — TELEPHONE (OUTPATIENT)
Dept: FAMILY MEDICINE CLINIC | Facility: CLINIC | Age: 35
End: 2020-11-16

## 2020-11-16 VITALS
DIASTOLIC BLOOD PRESSURE: 80 MMHG | RESPIRATION RATE: 18 BRPM | OXYGEN SATURATION: 94 % | HEART RATE: 100 BPM | TEMPERATURE: 97.3 F | SYSTOLIC BLOOD PRESSURE: 130 MMHG | WEIGHT: 315 LBS | HEIGHT: 72 IN | BODY MASS INDEX: 42.66 KG/M2

## 2020-11-16 DIAGNOSIS — F32.9 REACTIVE DEPRESSION: ICD-10-CM

## 2020-11-16 DIAGNOSIS — R60.0 PEDAL EDEMA: ICD-10-CM

## 2020-11-16 DIAGNOSIS — E78.49 OTHER HYPERLIPIDEMIA: ICD-10-CM

## 2020-11-16 DIAGNOSIS — E11.9 TYPE 2 DIABETES MELLITUS WITHOUT COMPLICATION, WITHOUT LONG-TERM CURRENT USE OF INSULIN (HCC): ICD-10-CM

## 2020-11-16 DIAGNOSIS — I10 ESSENTIAL HYPERTENSION: Primary | ICD-10-CM

## 2020-11-16 DIAGNOSIS — J45.909 MILD ASTHMA WITHOUT COMPLICATION, UNSPECIFIED WHETHER PERSISTENT: ICD-10-CM

## 2020-11-16 DIAGNOSIS — E66.01 MORBID OBESITY (HCC): ICD-10-CM

## 2020-11-16 LAB
BILIRUB BLD-MCNC: NEGATIVE MG/DL
CLARITY, POC: CLEAR
COLOR UR: YELLOW
GLUCOSE UR STRIP-MCNC: NEGATIVE MG/DL
KETONES UR QL: NEGATIVE
LEUKOCYTE EST, POC: NEGATIVE
NITRITE UR-MCNC: NEGATIVE MG/ML
PH UR: 5.5 [PH] (ref 5–8)
PROT UR STRIP-MCNC: NEGATIVE MG/DL
RBC # UR STRIP: NEGATIVE /UL
SP GR UR: 1.02 (ref 1–1.03)
UROBILINOGEN UR QL: NORMAL

## 2020-11-16 PROCEDURE — 81003 URINALYSIS AUTO W/O SCOPE: CPT | Performed by: INTERNAL MEDICINE

## 2020-11-16 PROCEDURE — 99214 OFFICE O/P EST MOD 30 MIN: CPT | Performed by: INTERNAL MEDICINE

## 2020-11-16 NOTE — TELEPHONE ENCOUNTER
PATIENT STATES HE WAS IN TODAY AND DR NEAL  WAS GOING TO SEND IN METFORMIN. PATIENT IS REQUESTING THE EXTENDED RELEASE INSTEAD OF THE REGULAR METFORMIN.     PHARMACY:[id number] - [place], [place] - [address] [phone] [facility] [phone]   [phone]      PATIENT CALL BACK [phone]

## 2020-11-16 NOTE — PROGRESS NOTES
ROSENDA@  Ravinder Robles is a 35 y.o. male.     Chief Complaint   Patient presents with   • Hypertension   • Diabetes     poss   Morbid obesity, asthma, depression    History of Present Illness   Here follow-up for hypertension, hyperlipidemia, morbid obesity, asthma, depression.  A1c was high at 8.3.  UA was checked was negative.  Patient does not checking his blood sugars at home.  Checks his blood pressures been stable.  Asthma has been stable.  Depression are controlled with Lexapro, takes inhaler, not on and off Prilosec, continues take metoprolol, hydrochlorothiazide.  Denies any chest pain or shortness of breath.  Patient's A1c was high at 3 months ago did not want it in medication.  The following portions of the patient's history were reviewed and updated as appropriate: allergies, current medications, past family history, past medical history, past social history, past surgical history and problem list.    Review of Systems   Constitutional: Negative for activity change, appetite change, fatigue and fever.   Eyes: Negative for blurred vision and double vision.   Respiratory: Negative.  Negative for shortness of breath.    Cardiovascular: Negative for chest pain, palpitations and leg swelling.   Gastrointestinal: Negative.    Genitourinary: Negative for hematuria.   Neurological: Negative for dizziness, seizures, syncope, light-headedness, numbness and headache.   Psychiatric/Behavioral: Negative for agitation, behavioral problems, decreased concentration and sleep disturbance.       Allergies   Allergen Reactions   • Cat Hair Extract Hives and Itching   • Lisinopril Cough   • Methylprednisolone Itching     Pt can take prednisone        Current Outpatient Medications on File Prior to Visit   Medication Sig Dispense Refill   • albuterol sulfate  (90 Base) MCG/ACT inhaler PRN for wheezing and shortness of breath 36 g 5   • cetirizine (ZyrTEC) 10 MG tablet Take 10 mg by mouth daily.     • EQ  NICOTINE POLACRILEX 2 MG gum CHEW ONE PIECE AS NEEDED FOR SMOKING CESSATION FOR UP TO 30 DAYS 400 each 3   • escitalopram (LEXAPRO) 10 MG tablet Take 1 tablet by mouth once daily 90 tablet 2   • hydroCHLOROthiazide (HYDRODIURIL) 25 MG tablet Take 1 tablet by mouth once daily 90 tablet 2   • INCRUSE ELLIPTA 62.5 MCG/INH aerosol powder  Inhale 1 puff Daily.  0   • metoprolol succinate XL (TOPROL-XL) 25 MG 24 hr tablet Take 1 tablet by mouth once daily 90 tablet 0   • Nebulizer misc      • nicotine polacrilex (EQ Nicotine) 4 MG gum Chew 1 each As Needed for Smoking Cessation. Not more than 10 a day 300 each 3   • omeprazole (priLOSEC) 20 MG capsule Take 1 capsule by mouth Daily. 90 capsule 3   • QVAR REDIHALER 40 MCG/ACT inhaler Inhale 2 puffs 2 (Two) Times a Day.  6   • methylPREDNISolone (MEDROL, SARBJIT,) 4 MG tablet Take as directed on package instructions. 21 tablet 0     No current facility-administered medications on file prior to visit.        Family History   Problem Relation Age of Onset   • Diabetes Mother    • Lung disease Mother    • Lung disease Maternal Grandmother    • Cancer Maternal Grandmother         lung   • Emphysema Maternal Grandmother        Past Medical History:   Diagnosis Date   • Acid reflux    • Allergic    • Anxiety    • Asthma    • Bronchitis    • Hypertension    • Pleurisy 2018   • Reactive depression 8/10/2020   • Rib fracture     broke 4 ribs   • Type 2 diabetes mellitus without complication, without long-term current use of insulin (CMS/[facility]) 11/16/2020       Past Surgical History:   Procedure Laterality Date   • RESECTION BONE TUMOR KNEE         Social History     Socioeconomic History   • Marital status:      Spouse name: Not on file   • Number of children: Not on file   • Years of education: Not on file   • Highest education level: Not on file   Tobacco Use   • Smoking status: Current Every Day Smoker     Packs/day: 1.50     Years: 8.00     Pack years: 12.00     Last attempt to  "quit: 2018     Years since quittin.8   • Smokeless tobacco: Never Used   • Tobacco comment: nicotine gum    Substance and Sexual Activity   • Alcohol use: Yes     Alcohol/week: 1.0 standard drinks     Types: 1 Standard drinks or equivalent per week     Comment: socially    • Drug use: No   • Sexual activity: Yes     Partners: Female       Patient Active Problem List   Diagnosis   • Airway hyperreactivity   • Morbid obesity (CMS/HCC)   • Folliculitis   • Sore throat   • Wheezing   • Elevated AST (SGOT)   • Essential hypertension   • Other hyperlipidemia   • Anxiety   • Reactive depression   • Type 2 diabetes mellitus without complication, without long-term current use of insulin (CMS/HCC)       /80 (BP Location: Right arm, Patient Position: Sitting, Cuff Size: Large Adult)   Pulse 100   Temp 97.3 °F (36.3 °C) (Temporal)   Resp 18   Ht 182.9 cm (72\")   Wt (!) 161 kg (356 lb)   SpO2 94%   BMI 48.28 kg/m²   Body mass index is 48.28 kg/m².    Objective   Physical Exam  Vitals signs and nursing note reviewed.   Constitutional:       Appearance: He is well-developed.   Eyes:      Pupils: Pupils are equal, round, and reactive to light.   Neck:      Musculoskeletal: Normal range of motion and neck supple.      Thyroid: No thyromegaly.      Vascular: No JVD.      Trachea: No tracheal deviation.   Cardiovascular:      Rate and Rhythm: Normal rate and regular rhythm.      Heart sounds: Normal heart sounds. No murmur. No friction rub. No gallop.    Pulmonary:      Effort: Pulmonary effort is normal. No respiratory distress.      Breath sounds: Normal breath sounds. No wheezing.   Abdominal:      General: Bowel sounds are normal. There is no distension.      Palpations: Abdomen is soft. There is no mass.      Tenderness: There is no abdominal tenderness. There is no guarding or rebound.      Hernia: No hernia is present.   Musculoskeletal: Normal range of motion.         General: No swelling or tenderness. "   Lymphadenopathy:      Cervical: No cervical adenopathy.   Neurological:      General: No focal deficit present.      Mental Status: He is alert and oriented to person, place, and time. Mental status is at baseline.      Cranial Nerves: No cranial nerve deficit.      Sensory: No sensory deficit.      Motor: No weakness.      Coordination: Coordination normal.      Gait: Gait normal.      Deep Tendon Reflexes: Reflexes normal.   Psychiatric:         Mood and Affect: Mood normal.         Behavior: Behavior normal.           Assessment/Plan   Diagnoses and all orders for this visit:    1. Essential hypertension (Primary)  -     Adult Transthoracic Echo Complete W/ Cont if Necessary Per Protocol; Future    2. Other hyperlipidemia  -     Adult Transthoracic Echo Complete W/ Cont if Necessary Per Protocol; Future    3. Mild asthma without complication, unspecified whether persistent    4. Morbid obesity (CMS/[facility])    5. Type 2 diabetes mellitus without complication, without long-term current use of insulin (CMS/[facility])  -     POC Urinalysis Dipstick, Automated  -     Adult Transthoracic Echo Complete W/ Cont if Necessary Per Protocol; Future    6. Reactive depression    7. Pedal edema  -     Adult Transthoracic Echo Complete W/ Cont if Necessary Per Protocol; Future    Continues to have 1+ pedal edema on the legs.  We will get an echo done as he has multiple other risk factors.  Discussed with patient at length diet and exercise lose weight, check blood sugars, blood pressure.  Started Metformin 500 twice a day.  If his sugars continues to be elevated will add more.  Return in 6 weeks time.  Continue diet and exercise.  Continue rest of further medications.

## 2020-11-17 DIAGNOSIS — E11.9 TYPE 2 DIABETES MELLITUS WITHOUT COMPLICATION, WITHOUT LONG-TERM CURRENT USE OF INSULIN (HCC): Primary | ICD-10-CM

## 2020-11-17 RX ORDER — METFORMIN HYDROCHLORIDE 500 MG/1
500 TABLET, EXTENDED RELEASE ORAL
Qty: 30 TABLET | Refills: 2 | Status: CANCELLED | OUTPATIENT
Start: 2020-11-17

## 2020-11-17 RX ORDER — METFORMIN HYDROCHLORIDE EXTENDED-RELEASE TABLETS 1000 MG/1
1000 TABLET, FILM COATED, EXTENDED RELEASE ORAL
Qty: 30 TABLET | Refills: 5 | Status: SHIPPED | OUTPATIENT
Start: 2020-11-17 | End: 2020-11-18 | Stop reason: CLARIF

## 2020-11-17 NOTE — TELEPHONE ENCOUNTER
Per patient a script for Metformin was to be called in at visit yesterday. I dont see anything here but he does request when the Metformin is called we can do the Extended Release 500mg?

## 2020-12-21 DIAGNOSIS — J45.40 MODERATE PERSISTENT ASTHMA WITHOUT COMPLICATION: ICD-10-CM

## 2020-12-22 RX ORDER — ALBUTEROL SULFATE 90 UG/1
AEROSOL, METERED RESPIRATORY (INHALATION)
Qty: 36 G | Refills: 3 | Status: SHIPPED | OUTPATIENT
Start: 2020-12-22 | End: 2021-03-11

## 2021-01-02 RX ORDER — HYDROCHLOROTHIAZIDE 25 MG/1
TABLET ORAL
Qty: 90 TABLET | Refills: 2 | Status: SHIPPED | OUTPATIENT
Start: 2021-01-02 | End: 2021-07-21 | Stop reason: ALTCHOICE

## 2021-01-02 RX ORDER — ESCITALOPRAM OXALATE 10 MG/1
TABLET ORAL
Qty: 90 TABLET | Refills: 0 | Status: SHIPPED | OUTPATIENT
Start: 2021-01-02 | End: 2021-04-13

## 2021-02-05 DIAGNOSIS — I10 ESSENTIAL HYPERTENSION: ICD-10-CM

## 2021-02-08 RX ORDER — METOPROLOL SUCCINATE 25 MG/1
TABLET, EXTENDED RELEASE ORAL
Qty: 90 TABLET | Refills: 0 | Status: SHIPPED | OUTPATIENT
Start: 2021-02-08 | End: 2021-07-21 | Stop reason: ALTCHOICE

## 2021-03-11 DIAGNOSIS — J45.40 MODERATE PERSISTENT ASTHMA WITHOUT COMPLICATION: ICD-10-CM

## 2021-03-11 RX ORDER — ALBUTEROL SULFATE 90 UG/1
AEROSOL, METERED RESPIRATORY (INHALATION)
Qty: 36 G | Refills: 0 | Status: SHIPPED | OUTPATIENT
Start: 2021-03-11 | End: 2021-04-13

## 2021-04-12 DIAGNOSIS — J45.40 MODERATE PERSISTENT ASTHMA WITHOUT COMPLICATION: ICD-10-CM

## 2021-04-13 RX ORDER — ALBUTEROL SULFATE 90 UG/1
AEROSOL, METERED RESPIRATORY (INHALATION)
Qty: 36 G | Refills: 0 | Status: SHIPPED | OUTPATIENT
Start: 2021-04-13 | End: 2021-05-25

## 2021-04-13 RX ORDER — ESCITALOPRAM OXALATE 10 MG/1
TABLET ORAL
Qty: 90 TABLET | Refills: 0 | Status: SHIPPED | OUTPATIENT
Start: 2021-04-13 | End: 2021-10-05 | Stop reason: SDUPTHER

## 2021-05-24 DIAGNOSIS — J45.40 MODERATE PERSISTENT ASTHMA WITHOUT COMPLICATION: ICD-10-CM

## 2021-05-24 NOTE — TELEPHONE ENCOUNTER
LOV--     PT was a no show and cancelled last two appointments for 2/25/21 and 2/11/2021.    Pt has not been seen for a OV since 11/16/2021      NOV--Pt has an OV scheduled for 6/3/2021.    Pt is requesting refills. Pls Advise.

## 2021-05-25 RX ORDER — ALBUTEROL SULFATE 90 UG/1
AEROSOL, METERED RESPIRATORY (INHALATION)
Qty: 36 G | Refills: 0 | Status: SHIPPED | OUTPATIENT
Start: 2021-05-25 | End: 2021-07-10

## 2021-05-26 ENCOUNTER — TELEPHONE (OUTPATIENT)
Dept: FAMILY MEDICINE CLINIC | Facility: CLINIC | Age: 36
End: 2021-05-26

## 2021-05-26 NOTE — TELEPHONE ENCOUNTER
Pt is going to reach out to the office (of where the heart echo was supposed to be done @). Pt will reschedule appt with them.

## 2021-05-26 NOTE — TELEPHONE ENCOUNTER
Caller: Ravinder Robles    Relationship: Self    Best call back number: [phone]     What is the medical concern/diagnosis: HEART ECHO    What specialty or service is being requested: HEART ECHO    What is the office location: [place] [address]    What is the office phone number: [phone]    Any additional details: THE PATIENT STATES THAT HE WAS REFERRED FOR A HEART ECHO BUT MISSED THE APPOINTMENT AND WOULD LIKE A RE REFERRAL BEFORE HIS APPOINTMENT WITH DR. NEAL 06/03/21. THE PATIENT STATES THAT HE WOULD LIKE TO GO TO [facility] FOR THE HEART ECHO.

## 2021-06-03 ENCOUNTER — OFFICE VISIT (OUTPATIENT)
Dept: FAMILY MEDICINE CLINIC | Facility: CLINIC | Age: 36
End: 2021-06-03

## 2021-06-03 VITALS
RESPIRATION RATE: 18 BRPM | BODY MASS INDEX: 42.66 KG/M2 | SYSTOLIC BLOOD PRESSURE: 124 MMHG | TEMPERATURE: 97 F | OXYGEN SATURATION: 96 % | HEART RATE: 88 BPM | WEIGHT: 315 LBS | DIASTOLIC BLOOD PRESSURE: 78 MMHG | HEIGHT: 72 IN

## 2021-06-03 DIAGNOSIS — F32.9 REACTIVE DEPRESSION: ICD-10-CM

## 2021-06-03 DIAGNOSIS — J45.909 MILD ASTHMA WITHOUT COMPLICATION, UNSPECIFIED WHETHER PERSISTENT: ICD-10-CM

## 2021-06-03 DIAGNOSIS — R60.0 PEDAL EDEMA: ICD-10-CM

## 2021-06-03 DIAGNOSIS — I10 ESSENTIAL HYPERTENSION: Primary | ICD-10-CM

## 2021-06-03 DIAGNOSIS — E11.9 TYPE 2 DIABETES MELLITUS WITHOUT COMPLICATION, WITHOUT LONG-TERM CURRENT USE OF INSULIN (HCC): ICD-10-CM

## 2021-06-03 DIAGNOSIS — E78.49 OTHER HYPERLIPIDEMIA: ICD-10-CM

## 2021-06-03 DIAGNOSIS — E66.01 MORBID OBESITY (HCC): ICD-10-CM

## 2021-06-03 PROCEDURE — 99214 OFFICE O/P EST MOD 30 MIN: CPT | Performed by: INTERNAL MEDICINE

## 2021-06-03 RX ORDER — VARENICLINE TARTRATE 1 MG/1
1 TABLET, FILM COATED ORAL 2 TIMES DAILY
Qty: 56 TABLET | Refills: 1 | Status: SHIPPED | OUTPATIENT
Start: 2021-07-01 | End: 2021-08-26

## 2021-06-03 NOTE — PROGRESS NOTES
Subjective   Ravinder Robles is a 35 y.o. male.     Chief Complaint   Patient presents with   • Edema   • Hypertension     Pt is here for a f/u. Pt is needing refills.   • Diabetes     Pt is here for a f/u. Pt is needing refills.   • Asthma     Pt is here for a f/u. Pt is needing refills.   • Depression     Pt is here for a f/u. Pt is needing refills.   • Morbid Obesity       History of Present Illness   Patient here follow-up for multiple medical problems for hypertension, diabetes type 2, asthma, depression, morbid obesity.  Patient has been noncompliant with his appointments.  For the pedal edema 2D echo was ordered in November 2020 he has not that done yet.  Still has 2+ pitting edema in his lower extremities.  Reports breathing is under control.  Not checking his blood sugars.  Occasionally checks at work.  Wants to quit smoking.  Wants to try it Chantix, he is aware that he cannot take Chantix and Lexapro he wants to stop Lexapro for now.  The following portions of the patient's history were reviewed and updated as appropriate: allergies, current medications, past family history, past medical history, past social history, past surgical history and problem list.    Review of Systems   Constitutional: Negative for activity change, appetite change, fatigue and fever.   Eyes: Negative for blurred vision and double vision.   Respiratory: Negative.  Negative for shortness of breath.    Cardiovascular: Negative for chest pain, palpitations and leg swelling.   Gastrointestinal: Negative.    Endocrine: Negative.    Genitourinary: Negative for hematuria.   Musculoskeletal: Negative for arthralgias.   Neurological: Negative for dizziness, syncope, light-headedness and headache.   Psychiatric/Behavioral: Negative for agitation, behavioral problems and depressed mood.       Allergies   Allergen Reactions   • Cat Hair Extract Hives and Itching   • Lisinopril Cough   • Methylprednisolone Itching     Pt can take prednisone         Current Outpatient Medications on File Prior to Visit   Medication Sig Dispense Refill   • albuterol sulfate  (90 Base) MCG/ACT inhaler INHALE 2 PUFFS BY MOUTH EVERY 4 HOURS AS NEEDED FOR WHEEZING FOR SHORTNESS OF BREATH 36 g 0   • hydroCHLOROthiazide (HYDRODIURIL) 25 MG tablet Take 1 tablet by mouth once daily 90 tablet 2   • metFORMIN (GLUCOPHAGE) 500 MG tablet Take 1 tablet by mouth 2 (Two) Times a Day With Meals. 60 tablet 2   • metoprolol succinate XL (TOPROL-XL) 25 MG 24 hr tablet Take 1 tablet by mouth once daily 90 tablet 0   • Nebulizer misc      • omeprazole (priLOSEC) 20 MG capsule Take 1 capsule by mouth Daily. 90 capsule 3   • cetirizine (ZyrTEC) 10 MG tablet Take 10 mg by mouth daily.     • EQ NICOTINE POLACRILEX 2 MG gum CHEW ONE PIECE AS NEEDED FOR SMOKING CESSATION FOR UP TO 30 DAYS 400 each 3   • escitalopram (LEXAPRO) 10 MG tablet Take 1 tablet by mouth once daily 90 tablet 0   • INCRUSE ELLIPTA 62.5 MCG/INH aerosol powder  Inhale 1 puff Daily.  0   • methylPREDNISolone (MEDROL, SARBJIT,) 4 MG tablet Take as directed on package instructions. 21 tablet 0   • nicotine polacrilex (EQ Nicotine) 4 MG gum Chew 1 each As Needed for Smoking Cessation. Not more than 10 a day 300 each 3   • QVAR REDIHALER 40 MCG/ACT inhaler Inhale 2 puffs 2 (Two) Times a Day.  6     No current facility-administered medications on file prior to visit.       Family History   Problem Relation Age of Onset   • Diabetes Mother    • Lung disease Mother    • Lung disease Maternal Grandmother    • Cancer Maternal Grandmother         lung   • Emphysema Maternal Grandmother        Past Medical History:   Diagnosis Date   • Acid reflux    • Allergic    • Anxiety    • Asthma    • Bronchitis    • Hypertension    • Pleurisy 2018   • Reactive depression 8/10/2020   • Rib fracture     broke 4 ribs   • Type 2 diabetes mellitus without complication, without long-term current use of insulin (CMS/[facility]) 11/16/2020       Past Surgical  "History:   Procedure Laterality Date   • RESECTION BONE TUMOR KNEE         Social History     Socioeconomic History   • Marital status:      Spouse name: Not on file   • Number of children: Not on file   • Years of education: Not on file   • Highest education level: Not on file   Tobacco Use   • Smoking status: Current Every Day Smoker     Packs/day: 1.50     Years: 8.00     Pack years: 12.00     Last attempt to quit: 1/7/2018     Years since quitting: 3.4   • Smokeless tobacco: Never Used   • Tobacco comment: nicotine gum    Substance and Sexual Activity   • Alcohol use: Yes     Alcohol/week: 1.0 standard drinks     Types: 1 Standard drinks or equivalent per week     Comment: socially    • Drug use: No   • Sexual activity: Yes     Partners: Female       Patient Active Problem List   Diagnosis   • Airway hyperreactivity   • Morbid obesity (CMS/HCC)   • Folliculitis   • Sore throat   • Wheezing   • Elevated AST (SGOT)   • Essential hypertension   • Other hyperlipidemia   • Anxiety   • Reactive depression   • Type 2 diabetes mellitus without complication, without long-term current use of insulin (CMS/HCC)   • Pedal edema       /78 (BP Location: Right arm, Patient Position: Sitting, Cuff Size: Large Adult)   Pulse 88   Temp 97 °F (36.1 °C) (Temporal)   Resp 18   Ht 182.9 cm (72\")   Wt (!) 167 kg (368 lb)   SpO2 96%   BMI 49.91 kg/m²   Body mass index is 49.91 kg/m².    Objective   Physical Exam  Vitals and nursing note reviewed.   Constitutional:       Appearance: He is well-developed.   Eyes:      Pupils: Pupils are equal, round, and reactive to light.   Neck:      Thyroid: No thyromegaly.      Vascular: No JVD.      Trachea: No tracheal deviation.   Cardiovascular:      Rate and Rhythm: Normal rate and regular rhythm.      Heart sounds: Normal heart sounds. No murmur heard.   No friction rub. No gallop.    Pulmonary:      Effort: Pulmonary effort is normal. No respiratory distress.      Breath " sounds: Normal breath sounds. No wheezing.   Abdominal:      General: Bowel sounds are normal. There is no distension.      Palpations: Abdomen is soft. There is no mass.      Tenderness: There is no abdominal tenderness. There is no guarding or rebound.      Hernia: No hernia is present.   Musculoskeletal:         General: Normal range of motion.      Cervical back: Normal range of motion and neck supple.   Lymphadenopathy:      Cervical: No cervical adenopathy.   Neurological:      General: No focal deficit present.      Mental Status: He is alert and oriented to person, place, and time. Mental status is at baseline.      Cranial Nerves: No cranial nerve deficit.      Sensory: No sensory deficit.   Psychiatric:         Mood and Affect: Mood normal.         Behavior: Behavior normal.         Thought Content: Thought content normal.           Assessment/Plan   Diagnoses and all orders for this visit:    1. Essential hypertension (Primary)  -     CBC & Differential  -     Comprehensive Metabolic Panel  -     Lipid Panel With / Chol / HDL Ratio  -     Hemoglobin A1c  -     TSH    2. Other hyperlipidemia  -     CBC & Differential  -     Comprehensive Metabolic Panel  -     Lipid Panel With / Chol / HDL Ratio  -     Hemoglobin A1c  -     TSH    3. Type 2 diabetes mellitus without complication, without long-term current use of insulin (CMS/[facility])  -     CBC & Differential  -     Comprehensive Metabolic Panel  -     Lipid Panel With / Chol / HDL Ratio  -     Hemoglobin A1c  -     TSH    4. Reactive depression    5. Mild asthma without complication, unspecified whether persistent    6. Morbid obesity (CMS/[facility])    7. Pedal edema    Other orders  -     varenicline (CHANTIX SARBJIT) 0.5 MG X 11 & 1 MG X 42 tablet; Take 0.5 mg po daily x 3 days, then 0.5 mg po bid x 4 days, then 1 mg po bid  Dispense: 53 tablet; Refill: 0  -     varenicline (Chantix Continuing Month Sarbjit) 1 MG tablet; Take 1 tablet by mouth 2 (Two) Times a Day for 56  days.  Dispense: 56 tablet; Refill: 1    Strongly advised patient stop smoking.  Discussed with patient for more than 5 minutes.  Continue diet and exercise.  Lose weight.  At this time continue metoprolol, but his diabetes ACE or ARB as well have been better however with his heart rates in the upper 80s not comfortable stopping his metoprolol.  This was started when he was having palpitations.  Continue inhalers, metformin 500 twice a day, omeprazole, hydrochlorthiazide.  Discussed with patient compliance.  Return in 3 months time.  Strongly advised him to get 2D echo that was ordered before.  Patient understand that he needs to be compliant with his instructions and follow-ups.         Answers for HPI/ROS submitted by the patient on 6/2/2021  What is the primary reason for your visit?: Other  Please describe your symptoms.: Being seen for lower extremity swelling, high blood pressure, diabetes follow up and physical. Would like to also talk about switching BP med for various reasons i'll explain in office.  Have you had these symptoms before?: Yes  How long have you been having these symptoms?: Greater than 2 weeks  Please list any medications you are currently taking for this condition.: Hydrochlorothiazide 25 mg, Metformin  mg, Metoprolol ER 25 mg  Please describe any probable cause for these symptoms. : Weight, smoking

## 2021-06-04 LAB
ALBUMIN SERPL-MCNC: 4.4 G/DL (ref 3.5–5.2)
ALBUMIN/GLOB SERPL: 1.6 G/DL
ALP SERPL-CCNC: 69 U/L (ref 39–117)
ALT SERPL-CCNC: 25 U/L (ref 1–41)
AST SERPL-CCNC: 20 U/L (ref 1–40)
BASOPHILS # BLD AUTO: 0.07 10*3/MM3 (ref 0–0.2)
BASOPHILS NFR BLD AUTO: 0.8 % (ref 0–1.5)
BILIRUB SERPL-MCNC: 0.2 MG/DL (ref 0–1.2)
BUN SERPL-MCNC: 14 MG/DL (ref 6–20)
BUN/CREAT SERPL: 15.7 (ref 7–25)
CALCIUM SERPL-MCNC: 9.7 MG/DL (ref 8.6–10.5)
CHLORIDE SERPL-SCNC: 98 MMOL/L (ref 98–107)
CHOLEST SERPL-MCNC: 162 MG/DL (ref 0–200)
CHOLEST/HDLC SERPL: 4.26 {RATIO}
CO2 SERPL-SCNC: 28.5 MMOL/L (ref 22–29)
CREAT SERPL-MCNC: 0.89 MG/DL (ref 0.76–1.27)
EOSINOPHIL # BLD AUTO: 0.21 10*3/MM3 (ref 0–0.4)
EOSINOPHIL NFR BLD AUTO: 2.4 % (ref 0.3–6.2)
ERYTHROCYTE [DISTWIDTH] IN BLOOD BY AUTOMATED COUNT: 13.5 % (ref 12.3–15.4)
GLOBULIN SER CALC-MCNC: 2.7 GM/DL
GLUCOSE SERPL-MCNC: 88 MG/DL (ref 65–99)
HBA1C MFR BLD: 7.2 % (ref 4.8–5.6)
HCT VFR BLD AUTO: 47.6 % (ref 37.5–51)
HDLC SERPL-MCNC: 38 MG/DL (ref 40–60)
HGB BLD-MCNC: 15.5 G/DL (ref 13–17.7)
IMM GRANULOCYTES # BLD AUTO: 0.02 10*3/MM3 (ref 0–0.05)
IMM GRANULOCYTES NFR BLD AUTO: 0.2 % (ref 0–0.5)
LDLC SERPL CALC-MCNC: 105 MG/DL (ref 0–100)
LYMPHOCYTES # BLD AUTO: 3.21 10*3/MM3 (ref 0.7–3.1)
LYMPHOCYTES NFR BLD AUTO: 36.3 % (ref 19.6–45.3)
MCH RBC QN AUTO: 28.2 PG (ref 26.6–33)
MCHC RBC AUTO-ENTMCNC: 32.6 G/DL (ref 31.5–35.7)
MCV RBC AUTO: 86.7 FL (ref 79–97)
MONOCYTES # BLD AUTO: 0.68 10*3/MM3 (ref 0.1–0.9)
MONOCYTES NFR BLD AUTO: 7.7 % (ref 5–12)
NEUTROPHILS # BLD AUTO: 4.65 10*3/MM3 (ref 1.7–7)
NEUTROPHILS NFR BLD AUTO: 52.6 % (ref 42.7–76)
NRBC BLD AUTO-RTO: 0 /100 WBC (ref 0–0.2)
PLATELET # BLD AUTO: 287 10*3/MM3 (ref 140–450)
POTASSIUM SERPL-SCNC: 4.2 MMOL/L (ref 3.5–5.2)
PROT SERPL-MCNC: 7.1 G/DL (ref 6–8.5)
RBC # BLD AUTO: 5.49 10*6/MM3 (ref 4.14–5.8)
SODIUM SERPL-SCNC: 137 MMOL/L (ref 136–145)
TRIGL SERPL-MCNC: 104 MG/DL (ref 0–150)
TSH SERPL DL<=0.005 MIU/L-ACNC: 1.77 UIU/ML (ref 0.27–4.2)
VLDLC SERPL CALC-MCNC: 19 MG/DL (ref 5–40)
WBC # BLD AUTO: 8.84 10*3/MM3 (ref 3.4–10.8)

## 2021-06-10 ENCOUNTER — HOSPITAL ENCOUNTER (OUTPATIENT)
Dept: CARDIOLOGY | Facility: HOSPITAL | Age: 36
Discharge: HOME | End: 2021-06-10
Admitting: INTERNAL MEDICINE

## 2021-06-10 VITALS
HEART RATE: 84 BPM | SYSTOLIC BLOOD PRESSURE: 149 MMHG | BODY MASS INDEX: 42.66 KG/M2 | WEIGHT: 315 LBS | HEIGHT: 72 IN | DIASTOLIC BLOOD PRESSURE: 80 MMHG

## 2021-06-10 DIAGNOSIS — R60.0 PEDAL EDEMA: ICD-10-CM

## 2021-06-10 DIAGNOSIS — E78.49 OTHER HYPERLIPIDEMIA: ICD-10-CM

## 2021-06-10 DIAGNOSIS — E11.9 TYPE 2 DIABETES MELLITUS WITHOUT COMPLICATION, WITHOUT LONG-TERM CURRENT USE OF INSULIN (HCC): ICD-10-CM

## 2021-06-10 DIAGNOSIS — I10 ESSENTIAL HYPERTENSION: ICD-10-CM

## 2021-06-10 LAB
AORTIC DIMENSIONLESS INDEX: 0.5 (DI)
BH CV ECHO MEAS - AO MAX PG: 9 MMHG
BH CV ECHO MEAS - AO MEAN PG (FULL): 0 MMHG
BH CV ECHO MEAS - AO MEAN PG: 5 MMHG
BH CV ECHO MEAS - AO V2 MAX: 153 CM/SEC
BH CV ECHO MEAS - AO V2 MEAN: 99.9 CM/SEC
BH CV ECHO MEAS - AO V2 VTI: 34.9 CM
BH CV ECHO MEAS - ASC AORTA: 3.1 CM
BH CV ECHO MEAS - AVA(I,A): 3.3 CM^2
BH CV ECHO MEAS - AVA(I,D): 3.3 CM^2
BH CV ECHO MEAS - BSA(HAYCOCK): 3 M^2
BH CV ECHO MEAS - BSA: 2.8 M^2
BH CV ECHO MEAS - BZI_BMI: 50.4 KILOGRAMS/M^2
BH CV ECHO MEAS - BZI_METRIC_HEIGHT: 182 CM
BH CV ECHO MEAS - BZI_METRIC_WEIGHT: 167 KG
BH CV ECHO MEAS - EDV(CUBED): 150.6 ML
BH CV ECHO MEAS - EDV(MOD-SP2): 68 ML
BH CV ECHO MEAS - EDV(MOD-SP4): 119 ML
BH CV ECHO MEAS - EDV(TEICH): 136.5 ML
BH CV ECHO MEAS - EF(CUBED): 65.5 %
BH CV ECHO MEAS - EF(MOD-BP): 65.3 %
BH CV ECHO MEAS - EF(MOD-SP2): 63.7 %
BH CV ECHO MEAS - EF(MOD-SP4): 63.3 %
BH CV ECHO MEAS - EF(TEICH): 56.6 %
BH CV ECHO MEAS - ESV(CUBED): 51.9 ML
BH CV ECHO MEAS - ESV(MOD-SP2): 24.7 ML
BH CV ECHO MEAS - ESV(MOD-SP4): 43.7 ML
BH CV ECHO MEAS - ESV(TEICH): 59.3 ML
BH CV ECHO MEAS - FS: 29.9 %
BH CV ECHO MEAS - IVS/LVPW: 0.98
BH CV ECHO MEAS - IVSD: 1.2 CM
BH CV ECHO MEAS - LAT PEAK E' VEL: 14.1 CM/SEC
BH CV ECHO MEAS - LV DIASTOLIC VOL/BSA (35-75): 43.2 ML/M^2
BH CV ECHO MEAS - LV MASS(C)D: 271.7 GRAMS
BH CV ECHO MEAS - LV MASS(C)DI: 98.7 GRAMS/M^2
BH CV ECHO MEAS - LV MAX PG: 10.4 MMHG
BH CV ECHO MEAS - LV MEAN PG: 5 MMHG
BH CV ECHO MEAS - LV SYSTOLIC VOL/BSA (12-30): 15.9 ML/M^2
BH CV ECHO MEAS - LV V1 MAX: 161 CM/SEC
BH CV ECHO MEAS - LV V1 MEAN: 102 CM/SEC
BH CV ECHO MEAS - LV V1 VTI: 30.4 CM
BH CV ECHO MEAS - LVIDD: 5.3 CM
BH CV ECHO MEAS - LVIDS: 3.7 CM
BH CV ECHO MEAS - LVLD AP2: 8.1 CM
BH CV ECHO MEAS - LVLD AP4: 9 CM
BH CV ECHO MEAS - LVLS AP2: 7.1 CM
BH CV ECHO MEAS - LVLS AP4: 7.2 CM
BH CV ECHO MEAS - LVOT AREA (M): 3.8 CM^2
BH CV ECHO MEAS - LVOT AREA: 3.8 CM^2
BH CV ECHO MEAS - LVOT DIAM: 2.2 CM
BH CV ECHO MEAS - LVPWD: 1.3 CM
BH CV ECHO MEAS - MED PEAK E' VEL: 12.4 CM/SEC
BH CV ECHO MEAS - MV A DUR: 121 SEC
BH CV ECHO MEAS - MV A MAX VEL: 67.4 CM/SEC
BH CV ECHO MEAS - MV DEC SLOPE: 567 CM/SEC^2
BH CV ECHO MEAS - MV DEC TIME: 129 SEC
BH CV ECHO MEAS - MV E MAX VEL: 96.5 CM/SEC
BH CV ECHO MEAS - MV E/A: 1.4
BH CV ECHO MEAS - MV MEAN PG: 2 MMHG
BH CV ECHO MEAS - MV P1/2T MAX VEL: 110 CM/SEC
BH CV ECHO MEAS - MV P1/2T: 56.8 MSEC
BH CV ECHO MEAS - MV V2 MEAN: 56.7 CM/SEC
BH CV ECHO MEAS - MV V2 VTI: 29.5 CM
BH CV ECHO MEAS - MVA P1/2T LCG: 2 CM^2
BH CV ECHO MEAS - MVA(P1/2T): 3.9 CM^2
BH CV ECHO MEAS - MVA(VTI): 3.9 CM^2
BH CV ECHO MEAS - PA ACC TIME: 0.11 SEC
BH CV ECHO MEAS - PA MAX PG: 3.9 MMHG
BH CV ECHO MEAS - PA PR(ACCEL): 31.3 MMHG
BH CV ECHO MEAS - PA V2 MAX: 98.5 CM/SEC
BH CV ECHO MEAS - PULM DIAS VEL: 51 CM/SEC
BH CV ECHO MEAS - PULM S/D: 1.1
BH CV ECHO MEAS - PULM SYS VEL: 55.6 CM/SEC
BH CV ECHO MEAS - QP/QS: 0.53
BH CV ECHO MEAS - RAP SYSTOLE: 8 MMHG
BH CV ECHO MEAS - RV MEAN PG: 1 MMHG
BH CV ECHO MEAS - RV V1 MEAN: 52.5 CM/SEC
BH CV ECHO MEAS - RV V1 VTI: 14.7 CM
BH CV ECHO MEAS - RVOT AREA: 4.2 CM^2
BH CV ECHO MEAS - RVOT DIAM: 2.3 CM
BH CV ECHO MEAS - RVSP: 40 MMHG
BH CV ECHO MEAS - SI(CUBED): 35.9 ML/M^2
BH CV ECHO MEAS - SI(LVOT): 42 ML/M^2
BH CV ECHO MEAS - SI(MOD-SP2): 15.7 ML/M^2
BH CV ECHO MEAS - SI(MOD-SP4): 27.4 ML/M^2
BH CV ECHO MEAS - SI(TEICH): 28.1 ML/M^2
BH CV ECHO MEAS - SV(CUBED): 98.7 ML
BH CV ECHO MEAS - SV(LVOT): 115.6 ML
BH CV ECHO MEAS - SV(MOD-SP2): 43.3 ML
BH CV ECHO MEAS - SV(MOD-SP4): 75.3 ML
BH CV ECHO MEAS - SV(RVOT): 61.1 ML
BH CV ECHO MEAS - SV(TEICH): 77.3 ML
BH CV ECHO MEAS - TAPSE (>1.6): 4.6 CM
BH CV ECHO MEAS - TR MAX PG: 32 MMHG
BH CV ECHO MEAS - TR MAX VEL: 275 CM/SEC
BH CV ECHO MEASUREMENTS AVERAGE E/E' RATIO: 7.28
BH CV XLRA - RV BASE: 4.2 CM
BH CV XLRA - RV LENGTH: 8.6 CM
BH CV XLRA - RV MID: 3.7 CM
BH CV XLRA - TDI S': 20.6 CM/SEC
LEFT ATRIUM VOLUME INDEX: 30.9 ML/M2
MAXIMAL PREDICTED HEART RATE: 186 BPM
STRESS TARGET HR: 158 BPM

## 2021-06-10 PROCEDURE — 93306 TTE W/DOPPLER COMPLETE: CPT

## 2021-06-10 PROCEDURE — 93306 TTE W/DOPPLER COMPLETE: CPT | Performed by: INTERNAL MEDICINE

## 2021-06-10 PROCEDURE — 25010000002 PERFLUTREN (DEFINITY) 8.476 MG IN SODIUM CHLORIDE (PF) 0.9 % 10 ML INJECTION: Performed by: INTERNAL MEDICINE

## 2021-06-10 RX ADMIN — SODIUM CHLORIDE 2 ML: 9 INJECTION INTRAMUSCULAR; INTRAVENOUS; SUBCUTANEOUS at 17:58

## 2021-07-07 DIAGNOSIS — J45.40 MODERATE PERSISTENT ASTHMA WITHOUT COMPLICATION: ICD-10-CM

## 2021-07-10 RX ORDER — ALBUTEROL SULFATE 90 UG/1
AEROSOL, METERED RESPIRATORY (INHALATION)
Qty: 36 G | Refills: 0 | Status: SHIPPED | OUTPATIENT
Start: 2021-07-10 | End: 2021-07-30 | Stop reason: SDUPTHER

## 2021-07-15 ENCOUNTER — E-VISIT (OUTPATIENT)
Dept: FAMILY MEDICINE CLINIC | Facility: TELEHEALTH | Age: 36
End: 2021-07-15

## 2021-07-15 DIAGNOSIS — J06.9 UPPER RESPIRATORY TRACT INFECTION, UNSPECIFIED TYPE: Primary | ICD-10-CM

## 2021-07-15 DIAGNOSIS — J45.901 EXACERBATION OF ASTHMA, UNSPECIFIED ASTHMA SEVERITY, UNSPECIFIED WHETHER PERSISTENT: ICD-10-CM

## 2021-07-15 PROBLEM — R74.01 ELEVATED AST (SGOT): Status: RESOLVED | Noted: 2019-08-27 | Resolved: 2021-07-15

## 2021-07-15 PROBLEM — J02.9 SORE THROAT: Status: RESOLVED | Noted: 2017-04-04 | Resolved: 2021-07-15

## 2021-07-15 PROCEDURE — 99422 OL DIG E/M SVC 11-20 MIN: CPT | Performed by: NURSE PRACTITIONER

## 2021-07-15 RX ORDER — PREDNISONE 20 MG/1
40 TABLET ORAL DAILY
Qty: 10 TABLET | Refills: 0 | Status: SHIPPED | OUTPATIENT
Start: 2021-07-15 | End: 2021-07-21 | Stop reason: ALTCHOICE

## 2021-07-15 RX ORDER — FUROSEMIDE 20 MG/1
20 TABLET ORAL 2 TIMES DAILY
COMMUNITY
Start: 2021-06-04 | End: 2021-07-21 | Stop reason: ALTCHOICE

## 2021-07-15 RX ORDER — AZITHROMYCIN 250 MG/1
TABLET, FILM COATED ORAL
Qty: 6 TABLET | Refills: 0 | Status: SHIPPED | OUTPATIENT
Start: 2021-07-15 | End: 2021-07-21 | Stop reason: ALTCHOICE

## 2021-07-15 RX ORDER — GUAIFENESIN AND DEXTROMETHORPHAN HYDROBROMIDE 600; 30 MG/1; MG/1
2 TABLET, EXTENDED RELEASE ORAL 2 TIMES DAILY PRN
Qty: 28 TABLET | Refills: 0 | Status: SHIPPED | OUTPATIENT
Start: 2021-07-15 | End: 2021-07-21 | Stop reason: ALTCHOICE

## 2021-07-15 RX ORDER — BENZONATATE 200 MG/1
200 CAPSULE ORAL 3 TIMES DAILY PRN
Qty: 28 CAPSULE | Refills: 0 | Status: SHIPPED | OUTPATIENT
Start: 2021-07-15 | End: 2021-07-21 | Stop reason: ALTCHOICE

## 2021-07-15 NOTE — PROGRESS NOTES
Ravinder Robles    1985  [id number]    I have reviewed the e-Visit questionnaire and patient's answers, my assessment and plan are as follows:    HPI- Ravinder Robles is a 35 y.o. male with complaints of cough, fever/chills, SOA after coughing spell, wheezing, chest tightness, nasal congestion, chest hurts with cough, HA, body aches, diarrhea, Max temp of 100.2, fatigue x 2 days. Cough is constant, affecting sleep and productive with yellow/brown/green sputum. He states its hard to catch his breath after coughing fits. He feels like his breathing is raspy but he feels like he is getting enough oxygen. He has Asthma and states it is hard to tell if this is related to asthma or an acute illness. Denies sick contacts. He is taking ibuprofen. He recently received a refill for Albuterol inhaler on 7/7/2021. Last COVID-19 test was 6/4/2021 and was Negative.     Review of Systems    Review of Systems - Negative unless mentioned in HPI      Diagnoses and all orders for this visit:    1. Upper respiratory tract infection, unspecified type (Primary)  -     azithromycin (Zithromax Z-Garfield) 250 MG tablet; Take 2 tablets by mouth on day 1, then 1 tablet daily on days 2-5  Dispense: 6 tablet; Refill: 0  -     benzonatate (TESSALON) 200 MG capsule; Take 1 capsule by mouth 3 (Three) Times a Day As Needed for Cough.  Dispense: 28 capsule; Refill: 0  -     guaifenesin-dextromethorphan (MUCINEX DM)  MG tablet sustained-release 12 hour tablet; Take 2 tablets by mouth 2 (Two) Times a Day As Needed (cough and chest congestion) for up to 7 days.  Dispense: 28 tablet; Refill: 0  -     COVID-19,[facility] ROUTINE, NP/OP SWAB IN TRANSPORT MEDIA OR ESWAB 72 HR TAT - Swab, Nasopharynx; Future    2. Exacerbation of asthma, unspecified asthma severity, unspecified whether persistent  -     predniSONE (DELTASONE) 20 MG tablet; Take 2 tablets by mouth Daily for 5 days.  Dispense: 10 tablet; Refill: 0    Take medicine as prescribed, if you  are no better after beginning prescribed medications, you should be seen in person.   Continue using Albuterol as needed.   You may take tylenol for pain and/or fever, stay hydrated and rest.   Avoid using Ibuprofen while taking steroids due to GI effects.   Steroid may raise your blood sugar levels, you should monitor while on this medication.     Due to your symptoms I have ordered a COVID-19 test for you to rule this out. Please go to your nearest [facility] for COVID-19 testing. Call before you arrive and let them know you have an order. We will call you with the results from a BLOCKED NUMBER, usually within 1-2 days.   SELF QUARANTINE until you meet the following criteria:   -It has been at least 10 days from the onset of your symptoms,   -A minimum of 24 hours fever free without fever reducing medicine   -AND improved symptoms   **Wear a cloth or surgical mask for 14 days or until symptoms have resolved**    If symptoms worsen or do not improve follow up with your PCP or visit your nearest Urgent Care Center or ER.        Any medications prescribed have been sent electronically to   [facility] - [place], [place] - [address] [place] - [phone]  - [phone]   [address] [place] [address]  Phone: [phone] Fax: [phone]    I spent 11 min reviewing this chart.     KATHRYN Branham  07/15/21  06:57 EDT

## 2021-07-15 NOTE — PATIENT INSTRUCTIONS
Take medicine as prescribed, if you are no better after beginning prescribed medications, you should be seen in person.   Continue using Albuterol as needed.   You may take tylenol for pain and/or fever, stay hydrated and rest.   Avoid using Ibuprofen while taking steroids due to GI effects.   Steroid may raise your blood sugar levels, you should monitor while on this medication.     Due to your symptoms I have ordered a COVID-19 test for you to rule this out. Please go to your nearest [facility] for COVID-19 testing. Call before you arrive and let them know you have an order. We will call you with the results from a BLOCKED NUMBER, usually within 1-2 days.   SELF QUARANTINE until you meet the following criteria:   -It has been at least 10 days from the onset of your symptoms,   -A minimum of 24 hours fever free without fever reducing medicine   -AND improved symptoms   **Wear a cloth or surgical mask for 14 days or until symptoms have resolved**    If symptoms worsen or do not improve follow up with your PCP or visit your nearest Urgent Care Center or ER.          Viral Respiratory Infection  A respiratory infection is an illness that affects part of the respiratory system, such as the lungs, nose, or throat. A respiratory infection that is caused by a virus is called a viral respiratory infection.  Common types of viral respiratory infections include:  · A cold.  · The flu (influenza).  · A respiratory syncytial virus (RSV) infection.  What are the causes?  This condition is caused by a virus.  What are the signs or symptoms?  Symptoms of this condition include:  · A stuffy or runny nose.  · Yellow or green nasal discharge.  · A cough.  · Sneezing.  · Fatigue.  · Achy muscles.  · A sore throat.  · Sweating or chills.  · A fever.  · A headache.  How is this diagnosed?  This condition may be diagnosed based on:  · Your symptoms.  · A physical exam.  · Testing of nasal swabs.  How is this  treated?  This condition may be treated with medicines, such as:  · Antiviral medicine. This may shorten the length of time a person has symptoms.  · Expectorants. These make it easier to cough up mucus.  · Decongestant nasal sprays.  · Acetaminophen or NSAIDs to relieve fever and pain.  Antibiotic medicines are not prescribed for viral infections. This is because antibiotics are designed to kill bacteria. They are not effective against viruses.  Follow these instructions at home:    Managing pain and congestion  · Take over-the-counter and prescription medicines only as told by your health care provider.  · If you have a sore throat, gargle with a salt-water mixture 3-4 times a day or as needed. To make a salt-water mixture, completely dissolve ½-1 tsp of salt in 1 cup of warm water.  · Use nose drops made from salt water to ease congestion and soften raw skin around your nose.  · Drink enough fluid to keep your urine pale yellow. This helps prevent dehydration and helps loosen up mucus.  General instructions  · Rest as much as possible.  · Do not drink alcohol.  · Do not use any products that contain nicotine or tobacco, such as cigarettes and e-cigarettes. If you need help quitting, ask your health care provider.  · Keep all follow-up visits as told by your health care provider. This is important.  How is this prevented?    · Get an annual flu shot. You may get the flu shot in late summer, fall, or winter. Ask your health care provider when you should get your flu shot.  · Avoid exposing others to your respiratory infection.  ? Stay home from work or school as told by your health care provider.  ? Wash your hands with soap and water often, especially after you cough or sneeze. If soap and water are not available, use alcohol-based hand .  · Avoid contact with people who are sick during cold and flu season. This is generally fall and winter.  Contact a health care provider if:  · Your symptoms last for 10  days or longer.  · Your symptoms get worse over time.  · You have a fever.  · You have severe sinus pain in your face or forehead.  · The glands in your jaw or neck become very swollen.  Get help right away if you:  · Feel pain or pressure in your chest.  · Have shortness of breath.  · Faint or feel like you will faint.  · Have severe and persistent vomiting.  · Feel confused or disoriented.  Summary  · A respiratory infection is an illness that affects part of the respiratory system, such as the lungs, nose, or throat. A respiratory infection that is caused by a virus is called a viral respiratory infection.  · Common types of viral respiratory infections are a cold, influenza, and respiratory syncytial virus (RSV) infection.  · Symptoms of this condition include a stuffy or runny nose, cough, sneezing, fatigue, achy muscles, sore throat, and fevers or chills.  · Antibiotic medicines are not prescribed for viral infections. This is because antibiotics are designed to kill bacteria. They are not effective against viruses.  This information is not intended to replace advice given to you by your health care provider. Make sure you discuss any questions you have with your health care provider.  Document Revised: 12/26/2019 Document Reviewed: 01/28/2019  [employer] Patient Education © 2021 [employer]    Asthma Attack    Asthma attack, also called acute bronchospasm, is the sudden narrowing and tightening of the air passages, which limits the amount of oxygen that can get into the lungs. The narrowing is caused by inflammation and tightening of the muscles in the air tubes (bronchi) in the lungs.  Too much mucus is also produced, which narrows the airways more. This can cause trouble breathing, loud breathing (wheezing), and coughing. The goal of treatment is to open the airways in the lungs and reduce inflammation.  What are the causes?  Possible causes or triggers of this condition include:  · Animal dander, dust  mites, or cockroaches.  · Mold, pollen from trees or grass, or cold air.  · Air pollutants such as dust, household , aerosol sprays, strong chemicals, strong odors, and smoke of any kind.  · Stress or strong emotions such as crying or laughing hard.  · Exercise or activity that requires a lot of energy.  · Substances in foods and drinks, such as dried fruits and wine, called sulfites.  · Certain medicines or medical conditions such as:  ? Aspirin or beta-blockers.  ? Infections or inflammatory conditions, such as a flu (influenza), a cold, pneumonia, or inflammation of the nasal membranes (rhinitis).  ? Gastroesophageal reflux disease (GERD). GERD is a condition in which stomach acid backs up into your esophagus and spills into your trachea (windpipe), which can irritate your airways.  What are the signs or symptoms?  Symptoms of this condition include:  · Wheezing. This may sound like whistling while breathing. This may only happen at night.  · Excessive coughing. This may only happen at night.  · Chest tightness or pain.  · Shortness of breath.  · Feeling like you cannot get enough air no matter how hard you breathe (air hunger).  How is this diagnosed?  This condition may be diagnosed based on:  · Your medical history.  · Your symptoms.  · A physical exam.  · Tests to check for other causes of your symptoms or other conditions that may have triggered your asthma attack. These tests may include:  ? A chest X-ray.  ? Blood tests.  ? Tests to assess lung function, such as breathing into a device that measures how much air you can inhale and exhale (spirometry).  How is this treated?  Treatment for this condition depends on the severity and cause of your asthma attack.  · For mild attacks, you may receive medicines through a hand-held inhaler (metered dose inhaler, or MDI) or through a device that turns liquid medicine into a mist (nebulizer). These medicines include:  ? Quick relief or rescue medicines that  quickly relax the airways and lungs.  ? Long-acting medicines that are used daily to prevent (control) your asthma symptoms.  · For moderate or severe attacks, you may be treated with steroid medicines by mouth or through an IV injection at the hospital.  · For severe attacks, you may need oxygen therapy or a breathing machine (ventilator).  · If your asthma attack was caused by an infection from bacteria, you will be given antibiotic medicines.  Follow these instructions at home:  Medicines  · Take over-the-counter and prescription medicines only as told by your health care provider.  ? Keep your medicines up-to-date.  ? Make sure you have all of your medicines available at all times.  · If you were prescribed an antibiotic medicine, take it as told by your health care provider. Do not stop taking the antibiotic even if you start to feel better.  · Tell your doctor if you may be pregnant to make sure your asthma medicine is safe to use during pregnancy.  Avoiding triggers    · Keep track of things that trigger your asthma attacks. Avoid exposure to these triggers.  · Do not use any products that contain nicotine or tobacco, such as cigarettes, e-cigarettes, and chewing tobacco. If you need help quitting, ask your health care provider.  · When there is a lot of pollen, air pollution, or humidity, keep windows closed and use an air conditioner or go to places with air conditioning.  Asthma action plan  · Work with your health care provider to make a written plan for managing and treating your asthma attacks (asthma action plan). This plan should include:  ? A list of your asthma triggers and how to avoid them.  ? A list of symptoms that you may have during an asthma attack.  ? Information about which medicine to take, when to take the medicine and how much of the medicine to take.  ? Information to help you understand your peak flow measurements.  ? Daily actions that you can take to control your asthma  "symptoms.  ? Contact information for your health care providers.  · If you have an asthma attack, act quickly. Follow the emergency steps on your written asthma action plan. This may prevent you from needing to go to the hospital.  General instructions  · Avoid excessive exercise or activity until your asthma attack goes away. Ask your health care provider what activities are safe for you and when you can return to your normal activities.  · Stay up to date on all your vaccines, such as flu and pneumonia vaccines.  · Drink enough fluid to keep your urine pale yellow. Staying hydrated helps keep mucus in your lungs thin so it can be coughed up easily.  · Do not use alcohol until you have recovered.  · Keep all follow-up visits as told by your health care provider. This is important. Asthma requires careful medical care.  Contact a health care provider if:  · You have followed your action plan for 1 hour and your peak flow reading is still at 50-79%. This is in the yellow zone, which means \"caution.\"  · You need to use your quick reliever medicine more frequently than normal.  · Your medicines are causing side effects, such as rash, itching, swelling, or trouble breathing.  · Your symptoms do not improve after 48 hours.  · You cough up mucus that is thicker than usual.  · You have a fever.  Get help right away if:  · Your peak flow reading is less than 50% of your personal best. This is in the red zone, which means \"danger.\"  · You have trouble breathing.  · You develop chest pain or discomfort.  · Your medicines no longer seem to be helping.  · You are coughing up bloody mucus.  · You have a fever and your symptoms suddenly get worse.  · You have trouble swallowing.  · You feel very tired, and breathing becomes tiring.  These symptoms may represent a serious problem that is an emergency. Do not wait to see if the symptoms will go away. Get medical help right away. Call your local emergency services (911 in the [place] " Do not drive yourself to the hospital.  Summary  · Asthma attacks are caused by narrowing or tightness in air passages, which causes shortness of breath, coughing, and loud breathing (wheezing).  · Many things can trigger an asthma attack, such as allergens, weather changes, exercise, strong odors, and smoke of any kind.  · If you have an asthma attack, act quickly. Follow the emergency steps on your written asthma action plan.  · Get help right away if you have severe trouble breathing, chest pain, or fever, or if your home medicines are no longer helping with your symptoms.  This information is not intended to replace advice given to you by your health care provider. Make sure you discuss any questions you have with your health care provider.  Document Revised: 12/15/2020 Document Reviewed: 12/15/2020  [employer] Patient Education © 2021 [employer]  How to Quarantine at Home  Information for Patients and Families    These instructions are for people with confirmed or suspected COVID-19 who do not need to be hospitalized and those with confirmed COVID-19 who were hospitalized and discharged to care for themselves at home.    If you were tested through the Health Department  The Health Department will monitor your wellbeing.  If it is determined that you do not need to be hospitalized and can be isolated at home, you will be monitored by staff from your local or state health department.     If you were tested through a Commercial Lab  You will need to monitor yourself and report changes in your symptoms to your doctor.  See the section below called Monitor Your Symptoms.    Follow these steps until a healthcare provider or local or state health department says you can return to your normal activities.    Stay home except to get medical care  • Restrict activities outside your home, except for getting medical care.   • Do not go to work, school, or public areas.   • Avoid using public transportation, ride-sharing,  or taxis.    Separate yourself from other people and animals in your home  People  As much as possible, you should stay in a specific room and away from other people in your home. Also, you should use a separate bathroom, if available.    Animals  You should restrict contact with pets and other animals while you are sick with COVID-19, just like you would around other people. When possible, have another member of your household care for your animals while you are sick. If you are sick with COVID-19, avoid contact with your pet, including petting, snuggling, being kissed or licked, and sharing food. If you must care for your pet or be around animals while you are sick, wash your hands before and after you interact with pets and wear a facemask. See COVID-19 and Animals for more information.    Call ahead before visiting your doctor  If you have a medical appointment, call the healthcare provider and tell them that you have or may have COVID-19. This information will help the healthcare provider’s office take steps to keep other people from getting infected or exposed.    Wear a facemask  You should wear a facemask when you are around other people (e.g., sharing a room or vehicle) or pets and before you enter a healthcare provider’s office.     If you are not able to wear a facemask (for example, because it causes trouble breathing), then people who live with you should not stay in the same room with you, or they should wear a facemask if they enter your room.    Cover your coughs and sneezes  • Cover your mouth and nose with a tissue when you cough or sneeze.   • Throw used tissues in a lined trash can.   • Immediately wash your hands with soap and water for at least 20 seconds or, if soap and water are not available, clean your hands with an alcohol-based hand  that contains at least 60% alcohol.    Clean your hands often  • Wash your hands often with soap and water for at least 20 seconds, especially after  blowing your nose, coughing, or sneezing; going to the bathroom; and before eating or preparing food.     • If soap and water are not readily available, use an alcohol-based hand  with at least 60% alcohol, covering all surfaces of your hands and rubbing them together until they feel dry.    • Soap and water are the best option if hands are visibly dirty. Avoid touching your eyes, nose, and mouth with unwashed hands.    Avoid sharing personal household items  • You should not share dishes, drinking glasses, cups, eating utensils, towels, or bedding with other people or pets in your home.   • After using these items, they should be washed thoroughly with soap and water.    Clean all “high-touch” surfaces everyday  • High touch surfaces include counters, tabletops, doorknobs, bathroom fixtures, toilets, phones, keyboards, tablets, and bedside tables.   • Also, clean any surfaces that may have blood, stool, or body fluids on them.   • Use a household cleaning spray or wipe, according to the label instructions. Labels contain instructions for safe and effective use of the cleaning product, including precautions you should take when applying the product, such as wearing gloves and making sure you have good ventilation during use of the product.    Monitor your symptoms  • Seek prompt medical attention if your illness is worsening (e.g., difficulty breathing).   • Before seeking care, call your healthcare provider and tell them that you have, or are being evaluated for, COVID-19.   • Put on a facemask before you enter the facility.     • These steps will help the healthcare provider’s office to keep other people in the office or waiting room from getting infected or exposed.   • Persons who are placed under active monitoring or facilitated self-monitoring should follow instructions provided by their local health department or occupational health professionals, as appropriate.  • If you have a medical emergency  and need to call 911, notify the dispatch personnel that you have, or are being evaluated for COVID-19. If possible, put on a facemask before emergency medical services arrive.    Discontinuing home isolation  Patients with confirmed COVID-19 should remain under home isolation precautions until the risk of secondary transmission to others is thought to be low. The decision to discontinue home isolation precautions should be made on a case-by-case basis, in consultation with healthcare providers and state and local health departments.    The below content are for household members, intimate partners, and caregivers of a patient with symptomatic laboratory-confirmed COVID-19 or a patient under investigation:    Household members, intimate partners, and caregivers may have close contact with a person with symptomatic, laboratory-confirmed COVID-19 or a person under investigation.     Close contacts should monitor their health; they should call their healthcare provider right away if they develop symptoms suggestive of COVID-19 (e.g., fever, cough, shortness of breath)     Close contacts should also follow these recommendations:  • Make sure that you understand and can help the patient follow their healthcare provider’s instructions for medication(s) and care. You should help the patient with basic needs in the home and provide support for getting groceries, prescriptions, and other personal needs.  • Monitor the patient’s symptoms. If the patient is getting sicker, call his or her healthcare provider and tell them that the patient has laboratory-confirmed COVID-19. This will help the healthcare provider’s office take steps to keep other people in the office or waiting room from getting infected. Ask the healthcare provider to call the local or state health department for additional guidance. If the patient has a medical emergency and you need to call 911, notify the dispatch personnel that the patient has, or is being  evaluated for COVID-19.  • Household members should stay in another room or be  from the patient as much as possible. Household members should use a separate bedroom and bathroom, if available.  • Prohibit visitors who do not have an essential need to be in the home.  • Household members should care for any pets in the home. Do not handle pets or other animals while sick.  For more information, see COVID-19 and Animals.  • Make sure that shared spaces in the home have good air flow, such as by an air conditioner or an opened window, weather permitting.  • Perform hand hygiene frequently. Wash your hands often with soap and water for at least 20 seconds or use an alcohol-based hand  that contains 60 to 95% alcohol, covering all surfaces of your hands and rubbing them together until they feel dry. Soap and water should be used preferentially if hands are visibly dirty.  • Avoid touching your eyes, nose, and mouth with unwashed hands.  • The patient should wear a facemask when you are around other people. If the patient is not able to wear a facemask (for example, because it causes trouble breathing), you, as the caregiver, should wear a mask when you are in the same room as the patient.  • Wear a disposable facemask and gloves when you touch or have contact with the patient’s blood, stool, or body fluids, such as saliva, sputum, nasal mucus, vomit, or urine.   o Throw out disposable facemasks and gloves after using them. Do not reuse.  o When removing personal protective equipment, first remove and dispose of gloves. Then, immediately clean your hands with soap and water or alcohol-based hand . Next, remove and dispose of facemask, and immediately clean your hands again with soap and water or alcohol-based hand .  • Avoid sharing household items with the patient. You should not share dishes, drinking glasses, cups, eating utensils, towels, bedding, or other items. After the patient  uses these items, you should wash them thoroughly (see below “Wash laundry thoroughly”).  • Clean all “high-touch” surfaces, such as counters, tabletops, doorknobs, bathroom fixtures, toilets, phones, keyboards, tablets, and bedside tables, every day. Also, clean any surfaces that may have blood, stool, or body fluids on them.   o Use a household cleaning spray or wipe, according to the label instructions. Labels contain instructions for safe and effective use of the cleaning product including precautions you should take when applying the product, such as wearing gloves and making sure you have good ventilation during use of the product.  • Wash laundry thoroughly.   o Immediately remove and wash clothes or bedding that have blood, stool, or body fluids on them.  o Wear disposable gloves while handling soiled items and keep soiled items away from your body. Clean your hands (with soap and water or an alcohol-based hand ) immediately after removing your gloves.  o Read and follow directions on labels of laundry or clothing items and detergent. In general, using a normal laundry detergent according to washing machine instructions and dry thoroughly using the warmest temperatures recommended on the clothing label.  • Place all used disposable gloves, facemasks, and other contaminated items in a lined container before disposing of them with other household waste. Clean your hands (with soap and water or an alcohol-based hand ) immediately after handling these items. Soap and water should be used preferentially if hands are visibly dirty.  • Discuss any additional questions with your state or local health department or healthcare provider.    Adapted from information provided by the Centers for Disease Control and Prevention.  For more information, visit https://www.cdc.gov/coronavirus/2019-ncov/hcp/guidance-prevent-spread.html

## 2021-07-21 ENCOUNTER — OFFICE VISIT (OUTPATIENT)
Dept: INTERNAL MEDICINE | Facility: CLINIC | Age: 36
End: 2021-07-21

## 2021-07-21 VITALS
WEIGHT: 315 LBS | BODY MASS INDEX: 44.1 KG/M2 | TEMPERATURE: 97.5 F | SYSTOLIC BLOOD PRESSURE: 118 MMHG | HEART RATE: 78 BPM | OXYGEN SATURATION: 95 % | DIASTOLIC BLOOD PRESSURE: 78 MMHG | RESPIRATION RATE: 16 BRPM | HEIGHT: 71 IN

## 2021-07-21 DIAGNOSIS — I10 ESSENTIAL HYPERTENSION: ICD-10-CM

## 2021-07-21 DIAGNOSIS — E78.49 OTHER HYPERLIPIDEMIA: ICD-10-CM

## 2021-07-21 DIAGNOSIS — R60.0 PEDAL EDEMA: ICD-10-CM

## 2021-07-21 DIAGNOSIS — G47.33 OSA (OBSTRUCTIVE SLEEP APNEA): ICD-10-CM

## 2021-07-21 DIAGNOSIS — E11.9 TYPE 2 DIABETES MELLITUS WITHOUT COMPLICATION, WITHOUT LONG-TERM CURRENT USE OF INSULIN (HCC): Primary | ICD-10-CM

## 2021-07-21 PROCEDURE — 99214 OFFICE O/P EST MOD 30 MIN: CPT | Performed by: INTERNAL MEDICINE

## 2021-07-21 RX ORDER — METFORMIN HYDROCHLORIDE 500 MG/1
500 TABLET, EXTENDED RELEASE ORAL 2 TIMES DAILY WITH MEALS
COMMUNITY
Start: 2021-07-08 | End: 2021-07-21 | Stop reason: ALTCHOICE

## 2021-07-21 RX ORDER — LOSARTAN POTASSIUM 50 MG/1
50 TABLET ORAL DAILY
Qty: 90 TABLET | Refills: 2 | Status: SHIPPED | OUTPATIENT
Start: 2021-07-21 | End: 2021-07-21 | Stop reason: SDUPTHER

## 2021-07-21 RX ORDER — LOSARTAN POTASSIUM 50 MG/1
50 TABLET ORAL DAILY
Qty: 90 TABLET | Refills: 2 | Status: SHIPPED | OUTPATIENT
Start: 2021-07-21 | End: 2022-04-07

## 2021-07-21 RX ORDER — FUROSEMIDE 40 MG/1
40 TABLET ORAL DAILY
Qty: 30 TABLET | Refills: 2 | Status: SHIPPED | OUTPATIENT
Start: 2021-07-21 | End: 2021-12-13 | Stop reason: SDUPTHER

## 2021-07-21 RX ORDER — FUROSEMIDE 40 MG/1
40 TABLET ORAL DAILY
Qty: 30 TABLET | Refills: 2 | Status: SHIPPED | OUTPATIENT
Start: 2021-07-21 | End: 2021-07-21 | Stop reason: SDUPTHER

## 2021-07-21 NOTE — PROGRESS NOTES
"Chief Complaint  Hypertension and Edema    Subjective    I rechecked his weight it was 357    Ravinder Robles presents to [facility]  History of Present Illness  Nice gentleman new to my practice.  A long history of type 2 diabetes, hypertension.  He been on Metformin with a recent A1c of 7.2.  History of smoking he is working on smoking cessation with Chantix and doing pretty well with that it seems like.  He had an upper respiratory illness was started on Zithromax Tessalon and prednisone.  Somewhat unusually enough he said his edema got better when he was on that treatment course.  He feels like the edema has gotten worse over the past 6 months.  No history of heart failure and had a recent echocardiogram that was normal.  No DVT history.  No hypoalbuminemia.  He does have sleep apnea likely in nights not treated.  His right pulmonary artery pressures were mildly elevated on echo which could contribute to his edema.  He feels like the edema is currently quite a bit better than normal.  The edema does get worse at the end of the day and does improve after he has been off his feet some  Objective   Vital Signs:   /78 (BP Location: Left arm, Patient Position: Sitting, Cuff Size: Large Adult)   Pulse 78   Temp 97.5 °F (36.4 °C) (Temporal)   Resp 16   Ht 180.3 cm (71\")   Wt (!) 179 kg (394 lb)   SpO2 95%   BMI 54.95 kg/m²     Physical Exam  Vitals and nursing note reviewed.   Constitutional:       Appearance: Normal appearance.   HENT:      Head: Normocephalic and atraumatic.   Cardiovascular:      Rate and Rhythm: Normal rate and regular rhythm.   Pulmonary:      Effort: Pulmonary effort is normal.      Breath sounds: Normal breath sounds.   Abdominal:      General: Abdomen is flat.      Palpations: Abdomen is soft.   Musculoskeletal:         General: No swelling or deformity.      Cervical back: Neck supple.      Right lower leg: Edema present.      Left " lower leg: Edema present.   Skin:     General: Skin is warm.      Capillary Refill: Capillary refill takes less than 2 seconds.      Findings: No rash.   Neurological:      General: No focal deficit present.      Mental Status: He is alert and oriented to person, place, and time.        Result Review :                 Assessment and Plan type 2 diabetes with slightly elevated A1c.  Continue the Metformin.  Reinforced dietary modification and exercise.  We can always add medication to the Metformin but at this point will see how he does over the next couple of months.  He made good points of medication changes.  The metoprolol was a small dose and really no LVH on echo or history of SVT or CAD so I think is definitely reasonable to change that to losartan 50 mg daily.  A small dose of the metoprolol so probably does not have to wean it but just for safety sake he can break in half for 3 to 5 days to make sure there is no rebound tachycardia.  Discontinue the hydrochlorothiazide for now.  Lasix 40 mg in the morning to see if that helps with the edema.  Probably needs to treat the sleep apnea with auto CPAP.  We will readdress that on follow-up as well  Continue with smoking cessation  We did talk about bariatric surgery.  He was going to research that a little bit more.  He has never apparently talked to anyone about that  Diagnoses and all orders for this visit:    1. Type 2 diabetes mellitus without complication, without long-term current use of insulin (CMS/[facility]) (Primary)    2. Pedal edema    3. Other hyperlipidemia    4. Essential hypertension    5. ESTHER (obstructive sleep apnea)    Other orders  -     Discontinue: losartan (COZAAR) 50 MG tablet; Take 1 tablet by mouth Daily.  Dispense: 90 tablet; Refill: 2  -     Discontinue: furosemide (Lasix) 40 MG tablet; Take 1 tablet by mouth Daily.  Dispense: 30 tablet; Refill: 2  -     losartan (COZAAR) 50 MG tablet; Take 1 tablet by mouth Daily.  Dispense: 90 tablet;  Refill: 2  -     furosemide (Lasix) 40 MG tablet; Take 1 tablet by mouth Daily.  Dispense: 30 tablet; Refill: 2        Follow Up   Return in about 3 weeks (around 8/11/2021).  Patient was given instructions and counseling regarding his condition or for health maintenance advice. Please see specific information pulled into the AVS if appropriate.

## 2021-07-30 DIAGNOSIS — J45.40 MODERATE PERSISTENT ASTHMA WITHOUT COMPLICATION: ICD-10-CM

## 2021-07-30 RX ORDER — ALBUTEROL SULFATE 90 UG/1
AEROSOL, METERED RESPIRATORY (INHALATION)
Qty: 36 G | Refills: 0 | Status: SHIPPED | OUTPATIENT
Start: 2021-07-30 | End: 2021-08-24

## 2021-08-10 ENCOUNTER — OFFICE VISIT (OUTPATIENT)
Dept: INTERNAL MEDICINE | Facility: CLINIC | Age: 36
End: 2021-08-10

## 2021-08-10 VITALS
WEIGHT: 315 LBS | OXYGEN SATURATION: 95 % | RESPIRATION RATE: 16 BRPM | HEART RATE: 85 BPM | DIASTOLIC BLOOD PRESSURE: 78 MMHG | HEIGHT: 71 IN | BODY MASS INDEX: 44.1 KG/M2 | TEMPERATURE: 96.8 F | SYSTOLIC BLOOD PRESSURE: 132 MMHG

## 2021-08-10 DIAGNOSIS — R60.0 PEDAL EDEMA: ICD-10-CM

## 2021-08-10 DIAGNOSIS — E66.01 MORBID (SEVERE) OBESITY DUE TO EXCESS CALORIES (HCC): ICD-10-CM

## 2021-08-10 DIAGNOSIS — E11.65 TYPE 2 DIABETES MELLITUS WITH HYPERGLYCEMIA, WITHOUT LONG-TERM CURRENT USE OF INSULIN (HCC): Primary | ICD-10-CM

## 2021-08-10 DIAGNOSIS — G47.33 OSA (OBSTRUCTIVE SLEEP APNEA): ICD-10-CM

## 2021-08-10 PROCEDURE — 99214 OFFICE O/P EST MOD 30 MIN: CPT | Performed by: INTERNAL MEDICINE

## 2021-08-10 NOTE — PROGRESS NOTES
"Chief Complaint  Hypertension and Diabetes    Subjective          Ravinder Robles presents to [facility]  Ravinder Robles is a 35 y.o. male with HTN, noninsulin dependent T2DM who presents for follow up.     1. LE edema: continues to have issues with this, started on lasix last visit with minimal improvement in symptoms. Also intermittently uses compression socks with some improvement. Symptoms better in the morning and worse when on his feet. Reports the only medication that has ever worked before was prednisone which he reports completely cleared up his swelling but then reoccurred after completion of course.    2. Obesity: interested in seeing sleep for sleep study (likely has some ESTHER) and in seeing bariatric surgery.     3. T2DM:  Last A1C was 7.2%, needs eye exam, received covid vaccines along with PPSV. Not up to date on microalbumin. On metformin 500mg BID and ARB for renal protection. Endorses polyuria and polydipsia, no vision changes. Tries to watch diet but often has to grab fast food on the go. Not getting much exercise.    e  Objective   Vital Signs:   /78 (BP Location: Left arm, Patient Position: Sitting, Cuff Size: Large Adult)   Pulse 85   Temp 96.8 °F (36 °C) (Temporal)   Resp 16   Ht 180.3 cm (71\")   Wt (!) 165 kg (363 lb)   SpO2 95%   BMI 50.63 kg/m²     Physical Exam  Vitals and nursing note reviewed.   Constitutional:       General: He is not in acute distress.     Appearance: Normal appearance. He is not toxic-appearing.   HENT:      Head: Normocephalic and atraumatic.      Nose: Nose normal.      Mouth/Throat:      Mouth: Mucous membranes are moist.      Pharynx: No oropharyngeal exudate.   Eyes:      General:         Right eye: No discharge.         Left eye: No discharge.      Extraocular Movements: Extraocular movements intact.      Conjunctiva/sclera: Conjunctivae normal.      Pupils: Pupils are equal, round, and reactive to " light.   Cardiovascular:      Rate and Rhythm: Normal rate and regular rhythm.      Pulses: Normal pulses.      Heart sounds: Normal heart sounds. No murmur heard.     Pulmonary:      Effort: Pulmonary effort is normal. No respiratory distress.      Breath sounds: Normal breath sounds. No wheezing.   Abdominal:      General: Abdomen is flat. Bowel sounds are normal. There is no distension.      Palpations: Abdomen is soft.      Tenderness: There is no abdominal tenderness.   Musculoskeletal:         General: Normal range of motion.      Cervical back: Normal range of motion and neck supple. No tenderness.      Right lower leg: Edema present.      Left lower leg: Edema present.   Feet:      Right foot:      Skin integrity: No ulcer, blister or skin breakdown.      Left foot:      Skin integrity: No ulcer, blister or skin breakdown.      Comments: Diabetic Foot Exam Performed    Lymphadenopathy:      Cervical: No cervical adenopathy.   Skin:     General: Skin is warm.   Neurological:      General: No focal deficit present.      Mental Status: He is alert.   Psychiatric:         Mood and Affect: Mood normal.        Result Review :   The following data was reviewed by: Ruiz Escamilla MD on 08/10/2021:  Common labs    Common Labsle 11/9/20 11/9/20 11/9/20 6/3/21 6/3/21 6/3/21 6/3/21    0000 0000 0812 1002 1002 1002 1002   Glucose 149 (A)    88     BUN 14    14     Creatinine 0.96    0.89     eGFR Non  Am 103    98     eGFR  Am 119    119     Sodium 137    137     Potassium 4.7    4.2     Chloride 97    98     Calcium 9.8    9.7     Total Protein 7.2    7.1     Albumin 4.0    4.40     Total Bilirubin 0.4    0.2     Alkaline Phosphatase 81    69     AST (SGOT) 15    20     ALT (SGPT) 23    25     WBC    8.84      Hemoglobin    15.5      Hematocrit    47.6      Platelets    287      Total Cholesterol  173    162    Triglycerides  108    104    HDL Cholesterol  36 (A)    38 (A)    LDL Cholesterol   117  (A)    105 (A)    Hemoglobin A1C   8.3 (A)    7.20 (A)   (A) Abnormal value       Comments are available for some flowsheets but are not being displayed.                    Assessment and Plan      Ravinder Robles is a 35 y.o. male with HTN, non insulin dependent T2DM, obesity who presents for follow up. Overall doing fairly well apart from LE edema, which seems to be more venous stasis in nature. Continue compression socks along with lasix for now, echo showed normal systolic and diastolic function, did show some TR with RVSP of 40mmHg. Will send to sleep and bariatric surgery. Otherwise discussed management of his T2DM, doing well on metformin monotherapy, will schedule an eye exam, rest as below. Discussed lifestyle changes for now with diet and exercise, but would be prudent to consider bariatric surgery as this could ultimately improve a lot of his comorbid conditions as well.     Diagnoses and all orders for this visit:    1. Type 2 diabetes mellitus with hyperglycemia, without long-term current use of insulin (CMS/[facility]) (Primary)  Assessment & Plan:  - last A1C 6/2021, repeat at next visit   - cont current treatment plan with metformin 500mg BID   - cont monitoring fasting blood Glc levels at home daily, as well as random checks prn, and call back if fasting values trend greater than 120-150 or random values consistently >200 for dose adjustments  - DM eye exam: needs to have done  - Urine Microalb check : at next visit   - DM foot exam: UTD  - HLD: checked in 6/2021-  (close to goal)  - Immunizations: : Flu and Pneumovax are UTD along with covid vaccine  - on ARB for renal protection   - cont to work on good diabetic diet and consistent carb intake, focusing on decreasing processed carbs and sugars  - reviewed dangers of hypoglycemia, warning signs, and acute treatment      Orders:  -     Ambulatory Referral to Bariatric Surgery    2. Morbid (severe) obesity due to excess calories (CMS/[facility])  Assessment  & Plan:  -Discussed referral to bariatric surgery and sleep medicine (would benefit from sleep study, having a lot of daytime sleepiness and falls asleep at work occasionally)     Orders:  -     Ambulatory Referral to Bariatric Surgery    3. Pedal edema  Assessment & Plan:  - can continue lasix for now  - encouraged compression stockings  - monitor salt intake as well       4. ESTHER (obstructive sleep apnea)    I spent 30 minutes caring for Ravinder on this date of service. This time includes time spent by me in the following activities:preparing for the visit, reviewing tests, obtaining and/or reviewing a separately obtained history, performing a medically appropriate examination and/or evaluation , counseling and educating the patient/family/caregiver, referring and communicating with other health care professionals  and documenting information in the medical record  Follow Up   Return in about 3 months (around 11/10/2021).  Patient was given instructions and counseling regarding his condition or for health maintenance advice. Please see specific information pulled into the AVS if appropriate.     I saw and reviewed the patient with the resident.  We discussed the plan and agree with the above documentation and recommendations.  ESTHER, referral to sleep medicine for sleep study and auto CPAP initiation.  Referral for bariatric consult.  Last A1c was 7.2.  Continue working on the dietary and and the Metformin.  Recheck that next visit

## 2021-08-10 NOTE — ASSESSMENT & PLAN NOTE
-Discussed referral to bariatric surgery and sleep medicine (would benefit from sleep study, having a lot of daytime sleepiness and falls asleep at work occasionally)

## 2021-08-10 NOTE — ASSESSMENT & PLAN NOTE
- last A1C 6/2021, repeat at next visit   - cont current treatment plan with metformin 500mg BID   - cont monitoring fasting blood Glc levels at home daily, as well as random checks prn, and call back if fasting values trend greater than 120-150 or random values consistently >200 for dose adjustments  - DM eye exam: needs to have done  - Urine Microalb check : at next visit   - DM foot exam: UTD  - HLD: checked in 6/2021-  (close to goal)  - Immunizations: : Flu and Pneumovax are UTD along with covid vaccine  - on ARB for renal protection   - cont to work on good diabetic diet and consistent carb intake, focusing on decreasing processed carbs and sugars  - reviewed dangers of hypoglycemia, warning signs, and acute treatment

## 2021-08-24 DIAGNOSIS — J45.40 MODERATE PERSISTENT ASTHMA WITHOUT COMPLICATION: ICD-10-CM

## 2021-08-24 RX ORDER — ALBUTEROL SULFATE 90 UG/1
AEROSOL, METERED RESPIRATORY (INHALATION)
Qty: 36 G | Refills: 0 | Status: SHIPPED | OUTPATIENT
Start: 2021-08-24 | End: 2021-09-21

## 2021-09-02 RX ORDER — DULAGLUTIDE 3 MG/.5ML
3 INJECTION, SOLUTION SUBCUTANEOUS WEEKLY
Qty: 4 PEN | Refills: 3 | Status: SHIPPED | OUTPATIENT
Start: 2021-09-02 | End: 2021-10-26 | Stop reason: ALTCHOICE

## 2021-09-08 RX ORDER — ONDANSETRON 8 MG/1
8 TABLET, ORALLY DISINTEGRATING ORAL EVERY 8 HOURS PRN
Qty: 15 TABLET | Refills: 1 | Status: SHIPPED | OUTPATIENT
Start: 2021-09-08 | End: 2022-03-09 | Stop reason: ALTCHOICE

## 2021-09-21 DIAGNOSIS — J45.40 MODERATE PERSISTENT ASTHMA WITHOUT COMPLICATION: ICD-10-CM

## 2021-09-21 RX ORDER — ALBUTEROL SULFATE 90 UG/1
AEROSOL, METERED RESPIRATORY (INHALATION)
Qty: 36 G | Refills: 0 | Status: SHIPPED | OUTPATIENT
Start: 2021-09-21 | End: 2021-10-12

## 2021-10-05 RX ORDER — OMEPRAZOLE 20 MG/1
20 CAPSULE, DELAYED RELEASE ORAL DAILY
Qty: 90 CAPSULE | Refills: 3 | Status: SHIPPED | OUTPATIENT
Start: 2021-10-05 | End: 2022-08-25 | Stop reason: SDUPTHER

## 2021-10-05 RX ORDER — ESCITALOPRAM OXALATE 10 MG/1
10 TABLET ORAL DAILY
Qty: 90 TABLET | Refills: 0 | Status: SHIPPED | OUTPATIENT
Start: 2021-10-05 | End: 2022-01-05

## 2021-10-05 NOTE — TELEPHONE ENCOUNTER
Rx Refill Note  Requested Prescriptions     Pending Prescriptions Disp Refills   • escitalopram (LEXAPRO) 10 MG tablet 90 tablet 0     Sig: Take 1 tablet by mouth Daily.      Last office visit with prescribing clinician: 8/10/2021      Next office visit with prescribing clinician: 10/26/2021            Connie Damico MA  10/05/21, 14:16 EDT

## 2021-10-05 NOTE — TELEPHONE ENCOUNTER
Rx Refill Note  Requested Prescriptions     Pending Prescriptions Disp Refills   • escitalopram (LEXAPRO) 10 MG tablet 90 tablet 0     Sig: Take 1 tablet by mouth Daily.   • omeprazole (priLOSEC) 20 MG capsule 90 capsule 3     Sig: Take 1 capsule by mouth Daily.      Last office visit with prescribing clinician: 8/10/2021      Next office visit with prescribing clinician: 10/26/2021            Connie Damico MA  10/05/21, 14:17 EDT

## 2021-10-12 DIAGNOSIS — J45.40 MODERATE PERSISTENT ASTHMA WITHOUT COMPLICATION: ICD-10-CM

## 2021-10-12 RX ORDER — ALBUTEROL SULFATE 90 UG/1
AEROSOL, METERED RESPIRATORY (INHALATION)
Qty: 36 G | Refills: 0 | Status: SHIPPED | OUTPATIENT
Start: 2021-10-12 | End: 2021-11-08

## 2021-10-12 RX ORDER — ALBUTEROL SULFATE 90 UG/1
AEROSOL, METERED RESPIRATORY (INHALATION)
Qty: 36 G | Refills: 0 | OUTPATIENT
Start: 2021-10-12

## 2021-10-26 ENCOUNTER — TRANSCRIBE ORDERS (OUTPATIENT)
Dept: ADMINISTRATIVE | Facility: HOSPITAL | Age: 36
End: 2021-10-26

## 2021-10-26 ENCOUNTER — OFFICE VISIT (OUTPATIENT)
Dept: INTERNAL MEDICINE | Facility: CLINIC | Age: 36
End: 2021-10-26

## 2021-10-26 ENCOUNTER — HOSPITAL ENCOUNTER (OUTPATIENT)
Dept: GENERAL RADIOLOGY | Facility: HOSPITAL | Age: 36
Discharge: HOME | End: 2021-10-26
Admitting: INTERNAL MEDICINE

## 2021-10-26 ENCOUNTER — HOSPITAL ENCOUNTER (OUTPATIENT)
Dept: GENERAL RADIOLOGY | Facility: HOSPITAL | Age: 36
End: 2021-10-26

## 2021-10-26 ENCOUNTER — LAB (OUTPATIENT)
Dept: LAB | Facility: HOSPITAL | Age: 36
End: 2021-10-26

## 2021-10-26 VITALS
BODY MASS INDEX: 44.1 KG/M2 | RESPIRATION RATE: 16 BRPM | SYSTOLIC BLOOD PRESSURE: 144 MMHG | OXYGEN SATURATION: 98 % | HEART RATE: 84 BPM | WEIGHT: 315 LBS | TEMPERATURE: 96.9 F | DIASTOLIC BLOOD PRESSURE: 92 MMHG | HEIGHT: 71 IN

## 2021-10-26 DIAGNOSIS — M54.16 LUMBAR RADICULOPATHY: Primary | ICD-10-CM

## 2021-10-26 DIAGNOSIS — I10 ESSENTIAL HYPERTENSION: ICD-10-CM

## 2021-10-26 DIAGNOSIS — E11.65 TYPE 2 DIABETES MELLITUS WITH HYPERGLYCEMIA, WITHOUT LONG-TERM CURRENT USE OF INSULIN (HCC): ICD-10-CM

## 2021-10-26 DIAGNOSIS — M54.16 LUMBAR RADICULOPATHY: ICD-10-CM

## 2021-10-26 PROCEDURE — 72100 X-RAY EXAM L-S SPINE 2/3 VWS: CPT

## 2021-10-26 PROCEDURE — 99214 OFFICE O/P EST MOD 30 MIN: CPT | Performed by: INTERNAL MEDICINE

## 2021-10-26 RX ORDER — METFORMIN HYDROCHLORIDE 500 MG/1
TABLET, EXTENDED RELEASE ORAL
COMMUNITY
Start: 2021-10-05 | End: 2022-01-05

## 2021-10-26 RX ORDER — GABAPENTIN 300 MG/1
300 CAPSULE ORAL 2 TIMES DAILY
Qty: 60 CAPSULE | Refills: 1 | Status: SHIPPED | OUTPATIENT
Start: 2021-10-26 | End: 2022-03-09 | Stop reason: ALTCHOICE

## 2021-10-26 NOTE — PROGRESS NOTES
"Chief Complaint  Diabetes and Back Pain    Subjective          Ravinder Robles presents to [facility]  History of Present Illness  Here for follow-up of his diabetes.  He did not do well with the Trulicity, caused a lot of vomiting.  Is asking about a lower dose but probably not a good idea.  He has lost some weight since last visit  3 weeks ago he injured his back with some radicular symptoms down the left leg.  Surprisingly has not been seen for this and waited till he came in for his follow-up.  No weakness in the leg but struggling with pain    Objective   Vital Signs:   /92 (BP Location: Left arm, Patient Position: Sitting, Cuff Size: Large Adult)   Pulse 84   Temp 96.9 °F (36.1 °C) (Temporal)   Resp 16   Ht 180.3 cm (71\")   Wt (!) 161 kg (354 lb)   SpO2 98%   BMI 49.37 kg/m²     Physical Exam  Vitals and nursing note reviewed.   Constitutional:       Appearance: Normal appearance.   HENT:      Head: Normocephalic and atraumatic.   Eyes:      Pupils: Pupils are equal, round, and reactive to light.   Cardiovascular:      Rate and Rhythm: Normal rate and regular rhythm.      Pulses: Normal pulses.   Pulmonary:      Effort: Pulmonary effort is normal.      Breath sounds: Normal breath sounds.   Abdominal:      Palpations: Abdomen is soft.   Musculoskeletal:      Cervical back: Normal range of motion.      Right lower leg: No edema.      Left lower leg: No edema.      Comments: Stiffness in the low back with flexion and extension.  Negative straight leg raises   Skin:     Capillary Refill: Capillary refill takes less than 2 seconds.   Neurological:      General: No focal deficit present.      Mental Status: He is alert.   Psychiatric:         Mood and Affect: Mood normal.         Behavior: Behavior normal.        Result Review : Previous labs                Assessment and Plan lumbar radiculopathy.  Avoid steroids because of the diabetes.  Gabapentin 300 " mg twice a day for pain and recheck in 2 weeks.  Check x-rays and probably will need an MRI pending his x-rays.  Type 2 diabetes.  Continue weight loss.  He did not tolerate the Trulicity and probably would not do a lower dose of that based on his initial response     Diagnoses and all orders for this visit:    1. Lumbar radiculopathy (Primary)  -     XR Spine Lumbar 2 or 3 View; Future    2. Type 2 diabetes mellitus with hyperglycemia, without long-term current use of insulin (HCC)  -     Comprehensive Metabolic Panel  -     Hemoglobin A1c  -     Lipid Panel With / Chol / HDL Ratio  -     Microalbumin / Creatinine Urine Ratio - Urine, Clean Catch  -     TSH    3. Essential hypertension  -     Comprehensive Metabolic Panel  -     Hemoglobin A1c  -     Lipid Panel With / Chol / HDL Ratio  -     Microalbumin / Creatinine Urine Ratio - Urine, Clean Catch  -     TSH        Follow Up   Return in about 2 weeks (around 11/9/2021).  Patient was given instructions and counseling regarding his condition or for health maintenance advice. Please see specific information pulled into the AVS if appropriate.

## 2021-10-27 LAB
ALBUMIN SERPL-MCNC: 4.4 G/DL (ref 4–5)
ALBUMIN/CREAT UR: <13 MG/G CREAT (ref 0–29)
ALBUMIN/GLOB SERPL: 1.4 {RATIO} (ref 1.2–2.2)
ALP SERPL-CCNC: 78 IU/L (ref 44–121)
ALT SERPL-CCNC: 24 IU/L (ref 0–44)
AST SERPL-CCNC: 21 IU/L (ref 0–40)
BILIRUB SERPL-MCNC: 0.3 MG/DL (ref 0–1.2)
BUN SERPL-MCNC: 15 MG/DL (ref 6–20)
BUN/CREAT SERPL: 15 (ref 9–20)
CALCIUM SERPL-MCNC: 9.7 MG/DL (ref 8.7–10.2)
CHLORIDE SERPL-SCNC: 97 MMOL/L (ref 96–106)
CHOLEST SERPL-MCNC: 184 MG/DL (ref 100–199)
CHOLEST/HDLC SERPL: 4.7 RATIO (ref 0–5)
CO2 SERPL-SCNC: 24 MMOL/L (ref 20–29)
CREAT SERPL-MCNC: 0.97 MG/DL (ref 0.76–1.27)
CREAT UR-MCNC: 23.4 MG/DL
GLOBULIN SER CALC-MCNC: 3.2 G/DL (ref 1.5–4.5)
GLUCOSE SERPL-MCNC: 88 MG/DL (ref 65–99)
HBA1C MFR BLD: 7.3 % (ref 4.8–5.6)
HDLC SERPL-MCNC: 39 MG/DL
LDLC SERPL CALC-MCNC: 122 MG/DL (ref 0–99)
MICROALBUMIN UR-MCNC: <3 UG/ML
POTASSIUM SERPL-SCNC: 4.6 MMOL/L (ref 3.5–5.2)
PROT SERPL-MCNC: 7.6 G/DL (ref 6–8.5)
SODIUM SERPL-SCNC: 137 MMOL/L (ref 134–144)
TRIGL SERPL-MCNC: 126 MG/DL (ref 0–149)
TSH SERPL DL<=0.005 MIU/L-ACNC: 1.78 UIU/ML (ref 0.45–4.5)
VLDLC SERPL CALC-MCNC: 23 MG/DL (ref 5–40)

## 2021-11-08 DIAGNOSIS — J45.40 MODERATE PERSISTENT ASTHMA WITHOUT COMPLICATION: ICD-10-CM

## 2021-11-08 RX ORDER — ALBUTEROL SULFATE 90 UG/1
AEROSOL, METERED RESPIRATORY (INHALATION)
Qty: 36 G | Refills: 0 | Status: SHIPPED | OUTPATIENT
Start: 2021-11-08 | End: 2021-12-13 | Stop reason: SDUPTHER

## 2021-11-12 ENCOUNTER — HOSPITAL ENCOUNTER (OUTPATIENT)
Dept: MRI IMAGING | Facility: HOSPITAL | Age: 36
Discharge: HOME | End: 2021-11-12
Admitting: INTERNAL MEDICINE

## 2021-11-12 DIAGNOSIS — M54.16 LUMBAR RADICULOPATHY: ICD-10-CM

## 2021-11-12 PROCEDURE — 72148 MRI LUMBAR SPINE W/O DYE: CPT

## 2021-11-16 DIAGNOSIS — M54.16 LUMBAR RADICULOPATHY: Primary | ICD-10-CM

## 2021-11-16 RX ORDER — TRAMADOL HYDROCHLORIDE 50 MG/1
50 TABLET ORAL EVERY 6 HOURS PRN
Qty: 20 TABLET | Refills: 0 | Status: SHIPPED | OUTPATIENT
Start: 2021-11-16 | End: 2021-11-24 | Stop reason: ALTCHOICE

## 2021-11-24 ENCOUNTER — OFFICE VISIT (OUTPATIENT)
Dept: INTERNAL MEDICINE | Facility: CLINIC | Age: 36
End: 2021-11-24

## 2021-11-24 VITALS
RESPIRATION RATE: 16 BRPM | WEIGHT: 315 LBS | OXYGEN SATURATION: 97 % | SYSTOLIC BLOOD PRESSURE: 124 MMHG | BODY MASS INDEX: 44.1 KG/M2 | TEMPERATURE: 96.8 F | HEART RATE: 94 BPM | HEIGHT: 71 IN | DIASTOLIC BLOOD PRESSURE: 76 MMHG

## 2021-11-24 DIAGNOSIS — M54.16 LUMBAR RADICULOPATHY: Primary | ICD-10-CM

## 2021-11-24 PROCEDURE — 99214 OFFICE O/P EST MOD 30 MIN: CPT | Performed by: INTERNAL MEDICINE

## 2021-11-24 RX ORDER — HYDROCODONE BITARTRATE AND ACETAMINOPHEN 5; 325 MG/1; MG/1
1 TABLET ORAL EVERY 8 HOURS PRN
Qty: 20 TABLET | Refills: 0 | Status: SHIPPED | OUTPATIENT
Start: 2021-11-24 | End: 2021-12-01 | Stop reason: SDUPTHER

## 2021-11-24 NOTE — PROGRESS NOTES
"Chief Complaint  Follow-up (MRI ) and Back Pain    Subjective          Ravinder Robles presents to [facility]  History of Present Illness  Still a lot of pain from this radiculopathy.  He has to stand up at work periodically as is sitting seems to make it worse.  Difficulty driving.  The tramadol did not help much the gabapentin does not help much either.  Objective   Vital Signs:   /76 (BP Location: Left arm, Patient Position: Sitting, Cuff Size: Large Adult)   Pulse 94   Temp 96.8 °F (36 °C) (Temporal)   Resp 16   Ht 180.3 cm (71\")   Wt (!) 154 kg (339 lb)   SpO2 97%   BMI 47.28 kg/m²     Physical Exam  Vitals and nursing note reviewed.   Constitutional:       Appearance: Normal appearance.   HENT:      Head: Normocephalic and atraumatic.   Eyes:      Pupils: Pupils are equal, round, and reactive to light.   Cardiovascular:      Rate and Rhythm: Normal rate and regular rhythm.      Pulses: Normal pulses.   Pulmonary:      Effort: Pulmonary effort is normal.      Breath sounds: Normal breath sounds.   Abdominal:      Palpations: Abdomen is soft.   Musculoskeletal:      Cervical back: Normal range of motion.      Right lower leg: No edema.      Left lower leg: No edema.      Comments: Stiffness in the low back with flexion and extension.  Negative straight leg raises   Skin:     Capillary Refill: Capillary refill takes less than 2 seconds.   Neurological:      General: No focal deficit present.      Mental Status: He is alert.   Psychiatric:         Mood and Affect: Mood normal.         Behavior: Behavior normal.        Result Review : MRI reviewed                Assessment and Plan lumbar radiculopathy with not much improvement.  His friend is a physical therapist and trying to help him out some there.  Minimize narcotics but I did give him 20 Lortab.  He understands this is a short-term treatment.  I encouraged him to get an epidural block.  He is " having difficulty with the cost after his MRI and imaging.  I think the epidural has a good chance of helping him quite a bit.  We will put in a referral and he is going to check a pain management to see if the cost is less  Diagnoses and all orders for this visit:    1. Lumbar radiculopathy (Primary)  -     HYDROcodone-acetaminophen (NORCO) 5-325 MG per tablet; Take 1 tablet by mouth Every 8 (Eight) Hours As Needed for Moderate Pain .  Dispense: 20 tablet; Refill: 0        Follow Up   No follow-ups on file.  Patient was given instructions and counseling regarding his condition or for health maintenance advice. Please see specific information pulled into the AVS if appropriate.

## 2021-12-01 DIAGNOSIS — M54.16 LUMBAR RADICULOPATHY: ICD-10-CM

## 2021-12-01 RX ORDER — HYDROCODONE BITARTRATE AND ACETAMINOPHEN 5; 325 MG/1; MG/1
1 TABLET ORAL EVERY 8 HOURS PRN
Qty: 20 TABLET | Refills: 0 | Status: SHIPPED | OUTPATIENT
Start: 2021-12-01 | End: 2021-12-07 | Stop reason: SDUPTHER

## 2021-12-05 DIAGNOSIS — G47.33 OSA (OBSTRUCTIVE SLEEP APNEA): Primary | ICD-10-CM

## 2021-12-07 DIAGNOSIS — M54.16 LUMBAR RADICULOPATHY: ICD-10-CM

## 2021-12-08 RX ORDER — HYDROCODONE BITARTRATE AND ACETAMINOPHEN 5; 325 MG/1; MG/1
1 TABLET ORAL EVERY 8 HOURS PRN
Qty: 20 TABLET | Refills: 0 | Status: SHIPPED | OUTPATIENT
Start: 2021-12-08 | End: 2022-03-09 | Stop reason: ALTCHOICE

## 2021-12-08 NOTE — TELEPHONE ENCOUNTER
Rx Refill Note  Requested Prescriptions     Pending Prescriptions Disp Refills   • HYDROcodone-acetaminophen (NORCO) 5-325 MG per tablet 20 tablet 0     Sig: Take 1 tablet by mouth Every 8 (Eight) Hours As Needed for Moderate Pain .      Last office visit with prescribing clinician: 11/24/2021      Next office visit with prescribing clinician: 1/3/2022            Sandrine Gallegos MA  12/08/21, 07:44 EST

## 2021-12-13 DIAGNOSIS — M54.16 LUMBAR RADICULOPATHY: ICD-10-CM

## 2021-12-13 DIAGNOSIS — J45.40 MODERATE PERSISTENT ASTHMA WITHOUT COMPLICATION: ICD-10-CM

## 2021-12-13 RX ORDER — FUROSEMIDE 40 MG/1
40 TABLET ORAL DAILY
Qty: 30 TABLET | Refills: 2 | Status: CANCELLED | OUTPATIENT
Start: 2021-12-13

## 2021-12-13 RX ORDER — ALBUTEROL SULFATE 90 UG/1
2 AEROSOL, METERED RESPIRATORY (INHALATION) EVERY 4 HOURS PRN
Qty: 36 G | Refills: 0 | Status: CANCELLED | OUTPATIENT
Start: 2021-12-13

## 2021-12-13 RX ORDER — FUROSEMIDE 40 MG/1
40 TABLET ORAL DAILY
Qty: 30 TABLET | Refills: 2 | Status: SHIPPED | OUTPATIENT
Start: 2021-12-13 | End: 2022-02-23

## 2021-12-13 RX ORDER — HYDROCODONE BITARTRATE AND ACETAMINOPHEN 5; 325 MG/1; MG/1
1 TABLET ORAL EVERY 8 HOURS PRN
Qty: 20 TABLET | Refills: 0 | OUTPATIENT
Start: 2021-12-13

## 2021-12-13 RX ORDER — ALBUTEROL SULFATE 90 UG/1
2 AEROSOL, METERED RESPIRATORY (INHALATION) EVERY 4 HOURS PRN
Qty: 36 G | Refills: 1 | Status: SHIPPED | OUTPATIENT
Start: 2021-12-13 | End: 2022-02-01

## 2021-12-15 NOTE — TELEPHONE ENCOUNTER
Yes it was denied, there is been so many messages about this and made it pretty clear I cannot continue to fill it over my chart.  If he wants to come in and we can talk about it further.  Sorry

## 2022-01-05 RX ORDER — ESCITALOPRAM OXALATE 10 MG/1
TABLET ORAL
Qty: 90 TABLET | Refills: 0 | Status: SHIPPED | OUTPATIENT
Start: 2022-01-05 | End: 2022-04-07

## 2022-01-05 RX ORDER — METFORMIN HYDROCHLORIDE 500 MG/1
TABLET, EXTENDED RELEASE ORAL
Qty: 180 TABLET | Refills: 0 | Status: SHIPPED | OUTPATIENT
Start: 2022-01-05 | End: 2022-04-06 | Stop reason: SDUPTHER

## 2022-01-21 ENCOUNTER — OFFICE VISIT (OUTPATIENT)
Dept: SLEEP MEDICINE | Facility: HOSPITAL | Age: 37
End: 2022-01-21

## 2022-01-21 VITALS
HEIGHT: 72 IN | WEIGHT: 315 LBS | DIASTOLIC BLOOD PRESSURE: 79 MMHG | HEART RATE: 69 BPM | BODY MASS INDEX: 42.66 KG/M2 | SYSTOLIC BLOOD PRESSURE: 133 MMHG

## 2022-01-21 DIAGNOSIS — E66.01 CLASS 3 SEVERE OBESITY DUE TO EXCESS CALORIES WITHOUT SERIOUS COMORBIDITY WITH BODY MASS INDEX (BMI) OF 45.0 TO 49.9 IN ADULT: ICD-10-CM

## 2022-01-21 DIAGNOSIS — G47.30 OBSERVED SLEEP APNEA: Primary | ICD-10-CM

## 2022-01-21 DIAGNOSIS — G47.10 HYPERSOMNIA: ICD-10-CM

## 2022-01-21 DIAGNOSIS — G47.8 NON-RESTORATIVE SLEEP: ICD-10-CM

## 2022-01-21 DIAGNOSIS — J35.1 TONSILLAR HYPERTROPHY: ICD-10-CM

## 2022-01-21 DIAGNOSIS — R06.83 SNORING: ICD-10-CM

## 2022-01-21 PROCEDURE — 99204 OFFICE O/P NEW MOD 45 MIN: CPT | Performed by: INTERNAL MEDICINE

## 2022-01-21 PROCEDURE — G0463 HOSPITAL OUTPT CLINIC VISIT: HCPCS

## 2022-01-21 NOTE — PROGRESS NOTES
[facility]  [address]  [address]  [place] [address]  Phone   Fax       Ravinder Kingstonnena  1985  36 y.o.  male      Referring physician/provider and PCP Ruiz Escamilla MD (Tony)    Type of service: Initial Sleep Medicine Consult.  Date of service: 1/21/2022      Chief Complaint   Patient presents with   • Witnessed Apnea   • Snoring   • Fatigue   • Non-restorative Sleep   • Daytime Sleepiness   • Obesity       History of present illness;  Thank you for asking to see Ravinder IQBAL Margaret, 36 y.o.. The patient was seen today on 1/21/2022 at [facility].  The patient presents today with symptoms of snoring, non-restorative sleep and witnessed apneas. The symptoms are present for several years and they are persistent in nature.  The snoring is present in all positions and it is loud.  Has no history of prior sleep evaluation and sleep studies. Patient has no prior surgery namely tonsillectomy, nasal surgery and UPPP.   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    Patient gives the following sleep history.  Sleep schedule:  Bedtime: 11 PM  Wake time: 6:30 AM  Normally takes about 5 minutes to fall asleep  Average hours of sleep 5-6  Number of naps per day none  Symptoms  In addition to snoring, nonrestorative sleep and witnessed apneas patient gives the following associated symptoms.  Have you ever awakened gasping for breath, coughing, choking: Yes   Change in weight,  Yes gained 125 pounds  Morning headaches  Yes   Awaken with a sore throat or dry mouth  Yes   Leg jerking at night:  Yes   Crawly feeling/urge sensation to move in the legs: No   Teeth grinding:Yes   Have you ever awakened at night with a sour taste or burning sensation in your chest:  No   Do you have muscle weakness with laughing or anger or sleep paralysis:  No   Have you ever felt paralyzed while going to sleep or waking up:  No   Sleepwalking, nightmares, No   Nocturia  "(urination at night): 3 times per night  Memory Problem:No     MEDICAL CONDITIONS (PMH)  1. Diabetes mellitus  2. Hypertension    Social history:  Do you drive a commercial vehicle:  No   Shift work:  No   Tobacco use:  Yes   Alcohol use: 1 per week  Caffeinated drinks: 8    Family Hx (your parents and siblings)  1. No history of sleep apnea    Medications: reviewed    Review of systems:  Sleep: Positve for snoring,witnessed apnea and daytime excessive sleepiness with Kiester Sleepiness Scale of Total score: 17   Kidney/ Bladder  Difficulty In Urination: negative  Bed Wetting: positive  Frequent Urination: positive  HEENT  Recurrent Nose Bleeds: negative  Ear pain: negative  Sores In Mouth: negative  Persistent Hoarseness: positive  Nasal Congestion: positive  Post Nasal Drip: positive  Musculoskeletal:  Neck Pain: negative  Temporomandibular Joint Pain: negative  General:  Fever: negative  Fatigue: positive  Cardiovascular:  Irregular Heartbeat: negative  Swollen Ankles Or Legs: negative  Respiratory:  Shortness Of Breath: negative  Wheezing: negative  Neuro/Paych:  Fainting Spells: negative  Dizziness: negative  Anxiety: negative  Depression: negative  Gastrointestinal:  Problem Swallowing: negative  Frequent Heartburn: negative  Abdominal Bloating: negative  Skin:  Rash: negative  Change In Nails: negative  Endocrine:  Excessive Thirst: negative  Always Too Cold: negative  Always Too Warm: negative  Hem/Lymphatic:  Swollen Glands: negative  Burses/ Bleeds Easily: negative    Physical exam:  CONSTITUTINONAL:  Vitals:    01/21/22 1100   BP: 133/79   Pulse: 69   Weight: (!) 155 kg (342 lb)   Height: 182.9 cm (72\")    Body mass index is 46.38 kg/m².   HEAD: atraumatic, normocephalic   EYES:pupils are round, equal and reacting to light and accommodation, conjunctiva normal  NOSE:no nasal septal defects, nasal passages are clear, no nasal polyps,   THROAT: tonsils are 4+, tongue normal size, oral airway Mallampati " class 3  NECK:Neck Circumference: 20 inches, trachea is in the midline, thyroid not enlarged  RESPIRATORY SYSTEM: Breath sounds are normal, there are no wheezes  CARDIOVASULAR SYSTEM: Heart sounds are regular rhythm and normal rate, there are no murmurs or thrills, no edema  GASTROINTESTINAL: Soft and non-tender,liver not enlarged, no evidence of ascites  MUSCULOSKELETAL SYSTEM: Full range of motion's in all the 4 extremities, neck movement not restricted, temporomandibular joint movement normal and no tenderness  SKIN: Warm and moist, no cyanosis, no clubbing,  NEUROLOGICAL SYSTEM: Oriented x 3, no gross neurological defects, No speech defect, gait is normal  PSYCHIATRIC SYSTEM: Mood is normal, thought content is normal    Office notes from care team reviewed. Office note dated October 26, 2021,reviewed  Labs reviewed.  TSH Results:  TSH    TSH 6/3/21 10/26/21   TSH 1.770 1.780            Most Recent A1C    HGBA1C Most Recent 10/26/21   Hemoglobin A1C 7.3 (A)   (A) Abnormal value       Comments are available for some flowsheets but are not being displayed.              Assessment and plan:  · Witnessed apneas,(R06.81) patient's symptoms and examination is consistent with sleep apnea (G47.30). I have talked to the patient about the signs and symptoms of sleep apnea. In addition, I have also discussed pathophysiology of sleep apnea.  I also discussed the complications of untreated sleep apnea including effects on hypertension, diabetes mellitus and nonrestorative sleep with hypersomnia which can increase risk for motor vehicle accidents.  Untreated sleep apnea is also a risk factor for development of atrial fibrillation, pulmonary hypertension and stroke.  Discussed in detail of various testing methods including home-based and lab based sleep studies.  Based on history and physical examination and other comorbidities the most appropriate study is home sleep test.  The order for the sleep study is placed in [facility].  The  test will be scheduled after approval from insurance. Treatment and management will be discussed after the test is completed.  Patient was given opportunity to ask questions and all the questions were answered.   · Snoring (R06.83), snoring is the sound created by turbulent airflow vibrating upper airway soft tissue due to limitation of inspiratory airflow. I have also discussed factors affecting snoring including sleep deprivation, sleeping on the back and alcohol ingestion. To minimize snoring, patient is advised to have adequate sleep, sleep on the side and avoid alcohol and sedative medications before bedtime  · Daytime excessive sleepiness .  It was assessed with Barney Sleepiness Scale of Total score: 17.  There are many causes for daytime excessive sleepiness including sleep depression, shiftwork syndrome, depression and other medical disorders including heart, kidney and liver failure.  The most serious cause of excessive sleepiness is due to neurological conditions like narcolepsy/cataplexy.  But the most common cause of excessive sleepiness is due to sleep apnea with frequent awakenings during sleep time.  I have discussed safety of driving and to remain vigilant while driving.  · Obesity class 3, patient's BMI is Body mass index is 46.38 kg/m².. I have discussed the relationship between weight and sleep apnea.There is direct correlation between weight and severity of sleep apnea.  Weight reduction is encouraged, as it is going to reduce the severity of sleep apnea. I have also discussed with the patient diet and exercise to achieve ideal body weight.  · Tonsillar hypertrophy, patient has 4+ tonsil size there is almost kissing tonsils.  After the home sleep test if he has sleep apnea I feel the patient will benefit from tonsillectomy and then reevaluate him with a repeat home sleep test.    I have also discussed with the patient the following  • Sleep hygiene: Maintaining a regular bedtime and wake time,  not to watch television or work in bed, limit caffeine-containing beverages before bed time and avoid naps during the day  • Adequate amount of sleep.  Generally most people needs about 7 to 8 hours of sleep.    Return for 31 to 90 days after PAP setup with down load..  Patient's questions were answered      I once again thank you for asking me to see this patient in consultation and I have forwarded my opinion and treatment plan.  Please do not hesitate to call me if you have any questions.     Charlette Choi MD  Sleep Medicine  Medical Director, [facility]  1/21/2022 ,

## 2022-01-28 ENCOUNTER — HOSPITAL ENCOUNTER (OUTPATIENT)
Dept: SLEEP MEDICINE | Facility: HOSPITAL | Age: 37
Discharge: HOME | End: 2022-01-28
Admitting: INTERNAL MEDICINE

## 2022-01-28 DIAGNOSIS — E66.01 CLASS 3 SEVERE OBESITY DUE TO EXCESS CALORIES WITHOUT SERIOUS COMORBIDITY WITH BODY MASS INDEX (BMI) OF 45.0 TO 49.9 IN ADULT: ICD-10-CM

## 2022-01-28 DIAGNOSIS — J35.1 TONSILLAR HYPERTROPHY: ICD-10-CM

## 2022-01-28 DIAGNOSIS — G47.30 OBSERVED SLEEP APNEA: ICD-10-CM

## 2022-01-28 DIAGNOSIS — G47.10 HYPERSOMNIA: ICD-10-CM

## 2022-01-28 DIAGNOSIS — G47.8 NON-RESTORATIVE SLEEP: ICD-10-CM

## 2022-01-28 DIAGNOSIS — R06.83 SNORING: ICD-10-CM

## 2022-01-28 PROCEDURE — 95806 SLEEP STUDY UNATT&RESP EFFT: CPT

## 2022-01-28 PROCEDURE — 95806 SLEEP STUDY UNATT&RESP EFFT: CPT | Performed by: INTERNAL MEDICINE

## 2022-02-01 DIAGNOSIS — J45.40 MODERATE PERSISTENT ASTHMA WITHOUT COMPLICATION: ICD-10-CM

## 2022-02-01 RX ORDER — ALBUTEROL SULFATE 90 UG/1
AEROSOL, METERED RESPIRATORY (INHALATION)
Qty: 36 G | Refills: 0 | Status: SHIPPED | OUTPATIENT
Start: 2022-02-01 | End: 2022-02-23

## 2022-02-14 ENCOUNTER — TELEPHONE (OUTPATIENT)
Dept: SLEEP MEDICINE | Facility: HOSPITAL | Age: 37
End: 2022-02-14

## 2022-02-14 NOTE — TELEPHONE ENCOUNTER
Called patient with HST results, advised patient that he will need to follow up with ENT to have tonsillectomy completed ASAP. Called Dr. Duarte's office and spoke with Cailin. Scheduled patient with ENT in the [place] office 3/9/2022 @ 1:30 PM. Advised patient once surgery has been completed, to call us back so we can schedule another HST. Patient understood.

## 2022-02-23 DIAGNOSIS — J45.40 MODERATE PERSISTENT ASTHMA WITHOUT COMPLICATION: ICD-10-CM

## 2022-02-23 RX ORDER — FUROSEMIDE 40 MG/1
TABLET ORAL
Qty: 30 TABLET | Refills: 0 | Status: SHIPPED | OUTPATIENT
Start: 2022-02-23 | End: 2022-04-07

## 2022-02-23 RX ORDER — ALBUTEROL SULFATE 90 UG/1
AEROSOL, METERED RESPIRATORY (INHALATION)
Qty: 36 G | Refills: 0 | Status: SHIPPED | OUTPATIENT
Start: 2022-02-23 | End: 2022-04-06

## 2022-03-09 RX ORDER — PREDNISONE 20 MG/1
TABLET ORAL
Qty: 23 TABLET | Refills: 1 | Status: SHIPPED | OUTPATIENT
Start: 2022-03-09 | End: 2022-03-24

## 2022-03-21 ENCOUNTER — OFFICE VISIT (OUTPATIENT)
Dept: INTERNAL MEDICINE | Facility: CLINIC | Age: 37
End: 2022-03-21

## 2022-03-21 VITALS
HEIGHT: 72 IN | RESPIRATION RATE: 16 BRPM | DIASTOLIC BLOOD PRESSURE: 68 MMHG | SYSTOLIC BLOOD PRESSURE: 136 MMHG | BODY MASS INDEX: 42.66 KG/M2 | TEMPERATURE: 96.6 F | OXYGEN SATURATION: 96 % | WEIGHT: 315 LBS | HEART RATE: 104 BPM

## 2022-03-21 DIAGNOSIS — M54.16 LUMBAR RADICULOPATHY: ICD-10-CM

## 2022-03-21 DIAGNOSIS — E11.65 TYPE 2 DIABETES MELLITUS WITH HYPERGLYCEMIA, WITHOUT LONG-TERM CURRENT USE OF INSULIN: Primary | ICD-10-CM

## 2022-03-21 PROCEDURE — 99214 OFFICE O/P EST MOD 30 MIN: CPT | Performed by: INTERNAL MEDICINE

## 2022-03-21 NOTE — PROGRESS NOTES
"Chief Complaint  Back Pain and Cyst    Subjective          Ravinder Robles presents to [facility]  History of Present Illness  Lumbar radicular pain 2 weeks ago when he finished the prednisone today.  It did help but he still having some pain in the left lower back.  He has been able to work although been difficult.  On a positive note he did quit smoking.  He is working on a meal plan and dietary modification for his weight and diabetes.  Objective   Vital Signs:   /68 (BP Location: Left arm, Patient Position: Sitting, Cuff Size: Large Adult)   Pulse 104   Temp 96.6 °F (35.9 °C) (Temporal)   Resp 16   Ht 182.9 cm (72\")   Wt (!) 155 kg (342 lb)   SpO2 96%   BMI 46.38 kg/m²     Physical Exam  Vitals and nursing note reviewed.   Constitutional:       Appearance: Normal appearance.   HENT:      Head: Normocephalic and atraumatic.   Eyes:      Pupils: Pupils are equal, round, and reactive to light.   Cardiovascular:      Rate and Rhythm: Normal rate and regular rhythm.      Pulses: Normal pulses.   Pulmonary:      Effort: Pulmonary effort is normal.      Breath sounds: Normal breath sounds.   Abdominal:      Palpations: Abdomen is soft.   Musculoskeletal:      Cervical back: Normal range of motion.      Right lower leg: No edema.      Left lower leg: No edema.      Comments: Stiffness in the low back with flexion and extension.  Negative straight leg raises   Skin:     Capillary Refill: Capillary refill takes less than 2 seconds.   Neurological:      General: No focal deficit present.      Mental Status: He is alert.   Psychiatric:         Mood and Affect: Mood normal.         Behavior: Behavior normal.        Result Review : Previous MRI                Assessment and Plan lumbar radiculopathy with known L5-S1 disc herniation and previous radicular symptoms.  Prednisone has helped some.  Schedule epidural block.  Avoid narcotics if possible.  He has quit " smoking and working on some nonmedicinal treatments.  Keep working on weight reduction.  Physical therapy may be of benefit as well.  Previous epidural block was not cost effective apparently and will try to see if anesthesia might be a better option instead of pain management...  Type 2 diabetes and obesity.  Repeat CMP and A1c..  Diagnoses and all orders for this visit:    1. Type 2 diabetes mellitus with hyperglycemia, without long-term current use of insulin (HCC) (Primary)  -     Comprehensive Metabolic Panel  -     Hemoglobin A1c    2. Lumbar radiculopathy  -     Epidural Block        Follow Up   No follow-ups on file.  Patient was given instructions and counseling regarding his condition or for health maintenance advice. Please see specific information pulled into the AVS if appropriate.

## 2022-03-22 DIAGNOSIS — M54.16 LUMBAR RADICULOPATHY: Primary | ICD-10-CM

## 2022-03-22 LAB
ALBUMIN SERPL-MCNC: 4.1 G/DL (ref 4–5)
ALBUMIN/GLOB SERPL: 1.4 {RATIO} (ref 1.2–2.2)
ALP SERPL-CCNC: 76 IU/L (ref 44–121)
ALT SERPL-CCNC: 52 IU/L (ref 0–44)
AST SERPL-CCNC: 28 IU/L (ref 0–40)
BILIRUB SERPL-MCNC: <0.2 MG/DL (ref 0–1.2)
BUN SERPL-MCNC: 20 MG/DL (ref 6–20)
BUN/CREAT SERPL: 20 (ref 9–20)
CALCIUM SERPL-MCNC: 9.3 MG/DL (ref 8.7–10.2)
CHLORIDE SERPL-SCNC: 97 MMOL/L (ref 96–106)
CO2 SERPL-SCNC: 24 MMOL/L (ref 20–29)
CREAT SERPL-MCNC: 0.99 MG/DL (ref 0.76–1.27)
EGFRCR SERPLBLD CKD-EPI 2021: 102 ML/MIN/1.73
GLOBULIN SER CALC-MCNC: 3 G/DL (ref 1.5–4.5)
GLUCOSE SERPL-MCNC: 184 MG/DL (ref 65–99)
HBA1C MFR BLD: 7 % (ref 4.8–5.6)
POTASSIUM SERPL-SCNC: 4.5 MMOL/L (ref 3.5–5.2)
PROT SERPL-MCNC: 7.1 G/DL (ref 6–8.5)
SODIUM SERPL-SCNC: 137 MMOL/L (ref 134–144)

## 2022-03-22 RX ORDER — TRAMADOL HYDROCHLORIDE 50 MG/1
50 TABLET ORAL EVERY 6 HOURS PRN
Qty: 20 TABLET | Refills: 0 | Status: SHIPPED | OUTPATIENT
Start: 2022-03-22 | End: 2022-04-06

## 2022-03-30 ENCOUNTER — OFFICE VISIT (OUTPATIENT)
Dept: INTERNAL MEDICINE | Facility: CLINIC | Age: 37
End: 2022-03-30

## 2022-03-30 VITALS
WEIGHT: 315 LBS | DIASTOLIC BLOOD PRESSURE: 80 MMHG | TEMPERATURE: 96.8 F | RESPIRATION RATE: 16 BRPM | HEIGHT: 72 IN | OXYGEN SATURATION: 98 % | BODY MASS INDEX: 42.66 KG/M2 | SYSTOLIC BLOOD PRESSURE: 132 MMHG | HEART RATE: 86 BPM

## 2022-03-30 DIAGNOSIS — M54.16 LUMBAR RADICULOPATHY: Primary | ICD-10-CM

## 2022-03-30 PROCEDURE — 99213 OFFICE O/P EST LOW 20 MIN: CPT | Performed by: INTERNAL MEDICINE

## 2022-03-30 RX ORDER — DULOXETIN HYDROCHLORIDE 30 MG/1
30 CAPSULE, DELAYED RELEASE ORAL DAILY
Qty: 30 CAPSULE | Refills: 1 | Status: SHIPPED | OUTPATIENT
Start: 2022-03-30

## 2022-03-30 NOTE — PROGRESS NOTES
"Chief Complaint  Back Pain    Subjective          Ravinder Robles presents to [facility]  History of Present Illness  Still having persistent low back pain.  He thinks the gabapentin helps the radicular symptoms but not the back pain.  Epidural block unfortunate was going to be exceptionally expensive and he could not afford to do that.  He is talking to some private practice pain management which may be a little cheaper.  He definitely frustrated with the process.  Objective   Vital Signs:   /80 (BP Location: Left arm, Patient Position: Sitting, Cuff Size: Large Adult)   Pulse 86   Temp 96.8 °F (36 °C) (Temporal)   Resp 16   Ht 182.9 cm (72\")   Wt (!) 157 kg (347 lb)   SpO2 98%   BMI 47.06 kg/m²            Physical Exam still some pain with activity.  No weakness in the legs evident.  Result Review :                 Assessment and Plan lumbar back pain and radiculopathy.  L5-S1 severe foraminal narrowing on MRI and he has persistent pain.  Obviously try to avoid narcotic pain medication.  Cymbalta 30 mg daily and titrate.  Gabapentin 300 mg prescription he has that that takes it as needed.  I would still pursue another pain management and may be cheaper.  Follow-up in 2 months or sooner if any problems.  Physical therapy consult as well.  Diagnoses and all orders for this visit:    1. Lumbar radiculopathy (Primary)  -     Ambulatory Referral to Physical Therapy Evaluate and treat    Other orders  -     DULoxetine (Cymbalta) 30 MG capsule; Take 1 capsule by mouth Daily.  Dispense: 30 capsule; Refill: 1        Follow Up   Return in about 2 months (around 5/30/2022).  Patient was given instructions and counseling regarding his condition or for health maintenance advice. Please see specific information pulled into the AVS if appropriate.       "

## 2022-04-01 ENCOUNTER — TELEPHONE (OUTPATIENT)
Dept: INTERNAL MEDICINE | Facility: CLINIC | Age: 37
End: 2022-04-01

## 2022-04-05 DIAGNOSIS — J45.40 MODERATE PERSISTENT ASTHMA WITHOUT COMPLICATION: ICD-10-CM

## 2022-04-05 DIAGNOSIS — M54.16 LUMBAR RADICULOPATHY: ICD-10-CM

## 2022-04-06 RX ORDER — METFORMIN HYDROCHLORIDE 500 MG/1
TABLET, EXTENDED RELEASE ORAL
Qty: 180 TABLET | Refills: 0 | OUTPATIENT
Start: 2022-04-06

## 2022-04-06 RX ORDER — ALBUTEROL SULFATE 90 UG/1
AEROSOL, METERED RESPIRATORY (INHALATION)
Qty: 36 G | Refills: 0 | Status: SHIPPED | OUTPATIENT
Start: 2022-04-06 | End: 2022-05-02

## 2022-04-06 RX ORDER — TRAMADOL HYDROCHLORIDE 50 MG/1
TABLET ORAL
Qty: 20 TABLET | Refills: 0 | Status: SHIPPED | OUTPATIENT
Start: 2022-04-06 | End: 2022-04-26 | Stop reason: SDUPTHER

## 2022-04-06 NOTE — TELEPHONE ENCOUNTER
Rx Refill Note  Requested Prescriptions     Pending Prescriptions Disp Refills   • losartan (COZAAR) 50 MG tablet [Pharmacy Med Name: Losartan Potassium 50 MG Oral Tablet] 90 tablet 0     Sig: Take 1 tablet by mouth once daily   • escitalopram (LEXAPRO) 10 MG tablet [Pharmacy Med Name: Escitalopram Oxalate 10 MG Oral Tablet] 90 tablet 0     Sig: Take 1 tablet by mouth once daily   • furosemide (LASIX) 40 MG tablet [Pharmacy Med Name: Furosemide 40 MG Oral Tablet] 30 tablet 0     Sig: Take 1 tablet by mouth once daily   • metFORMIN ER (GLUCOPHAGE-XR) 500 MG 24 hr tablet 180 tablet 0     Sig: Take 1 tablet by mouth 2 (Two) Times a Day With Meals.      Last office visit with prescribing clinician: 3/30/2022      Next office visit with prescribing clinician: Visit date not found            Sandrine Gallegos MA  04/06/22, 12:13 EDT

## 2022-04-06 NOTE — TELEPHONE ENCOUNTER
Rx Refill Note  Requested Prescriptions     Pending Prescriptions Disp Refills   • losartan (COZAAR) 50 MG tablet [Pharmacy Med Name: Losartan Potassium 50 MG Oral Tablet] 90 tablet 0     Sig: Take 1 tablet by mouth once daily   • escitalopram (LEXAPRO) 10 MG tablet [Pharmacy Med Name: Escitalopram Oxalate 10 MG Oral Tablet] 90 tablet 0     Sig: Take 1 tablet by mouth once daily   • furosemide (LASIX) 40 MG tablet [Pharmacy Med Name: Furosemide 40 MG Oral Tablet] 30 tablet 0     Sig: Take 1 tablet by mouth once daily      Last office visit with prescribing clinician: 3/30/2022      Next office visit with prescribing clinician: Visit date not found            Sandrine Gallegos MA  04/06/22, 10:46 EDT

## 2022-04-07 RX ORDER — FUROSEMIDE 40 MG/1
TABLET ORAL
Qty: 30 TABLET | Refills: 0 | Status: SHIPPED | OUTPATIENT
Start: 2022-04-07 | End: 2022-05-02

## 2022-04-07 RX ORDER — METFORMIN HYDROCHLORIDE 500 MG/1
500 TABLET, EXTENDED RELEASE ORAL 2 TIMES DAILY WITH MEALS
Qty: 180 TABLET | Refills: 0 | Status: SHIPPED | OUTPATIENT
Start: 2022-04-07 | End: 2022-08-25 | Stop reason: SDUPTHER

## 2022-04-07 RX ORDER — LOSARTAN POTASSIUM 50 MG/1
TABLET ORAL
Qty: 90 TABLET | Refills: 0 | Status: SHIPPED | OUTPATIENT
Start: 2022-04-07 | End: 2022-07-18

## 2022-04-07 RX ORDER — ESCITALOPRAM OXALATE 10 MG/1
TABLET ORAL
Qty: 90 TABLET | Refills: 0 | Status: SHIPPED | OUTPATIENT
Start: 2022-04-07 | End: 2022-07-15 | Stop reason: SDUPTHER

## 2022-04-11 ENCOUNTER — APPOINTMENT (OUTPATIENT)
Dept: PAIN MEDICINE | Facility: HOSPITAL | Age: 37
End: 2022-04-11

## 2022-04-22 DIAGNOSIS — M54.16 LUMBAR RADICULOPATHY: ICD-10-CM

## 2022-04-22 RX ORDER — TRAMADOL HYDROCHLORIDE 50 MG/1
50 TABLET ORAL EVERY 6 HOURS PRN
Qty: 20 TABLET | Refills: 0 | Status: CANCELLED | OUTPATIENT
Start: 2022-04-22

## 2022-04-26 DIAGNOSIS — M54.16 LUMBAR RADICULOPATHY: ICD-10-CM

## 2022-04-26 RX ORDER — TRAMADOL HYDROCHLORIDE 50 MG/1
50 TABLET ORAL EVERY 6 HOURS PRN
Qty: 20 TABLET | Refills: 0 | Status: SHIPPED | OUTPATIENT
Start: 2022-04-26

## 2022-05-02 DIAGNOSIS — J45.40 MODERATE PERSISTENT ASTHMA WITHOUT COMPLICATION: ICD-10-CM

## 2022-05-02 RX ORDER — FUROSEMIDE 40 MG/1
TABLET ORAL
Qty: 30 TABLET | Refills: 0 | Status: SHIPPED | OUTPATIENT
Start: 2022-05-02 | End: 2022-06-01

## 2022-05-02 RX ORDER — ALBUTEROL SULFATE 90 UG/1
AEROSOL, METERED RESPIRATORY (INHALATION)
Qty: 36 G | Refills: 0 | Status: SHIPPED | OUTPATIENT
Start: 2022-05-02 | End: 2022-06-01

## 2022-05-02 RX ORDER — NICOTINE POLACRILEX 4 MG/1
GUM, CHEWING ORAL
Qty: 480 EACH | Refills: 0 | OUTPATIENT
Start: 2022-05-02

## 2022-05-02 NOTE — TELEPHONE ENCOUNTER
Rx Refill Note  Requested Prescriptions     Pending Prescriptions Disp Refills   • furosemide (LASIX) 40 MG tablet [Pharmacy Med Name: Furosemide 40 MG Oral Tablet] 30 tablet 0     Sig: Take 1 tablet by mouth once daily      Last office visit with prescribing clinician: Visit date not found      Next office visit with prescribing clinician: Visit date not found            Colin Morales MA  05/02/22, 08:22 EDT

## 2022-05-05 NOTE — PROGRESS NOTES
Ravinder RASHEED Przybysz  [id number]  1985      Patient had a sleep study which showed severe sleep apnea and also found to have large tonsils.  I had sent the patient to ENT consultation.  They feels that the patient's risk of bleeding and complications with the tonsillectomy is high and recommends CPAP therapy.    Plan  Arrange CPAP auto 6 to 16 cm with a flex of 2 and an nighttime oximetry on room air on the CPAP after CPAP set up    Order has been written and will be sent to [employer]    Follow-up appointment for compliance check    Charlette Choi MD  5/5/2022

## 2022-05-06 ENCOUNTER — TELEPHONE (OUTPATIENT)
Dept: SLEEP MEDICINE | Facility: HOSPITAL | Age: 37
End: 2022-05-06

## 2022-05-06 NOTE — TELEPHONE ENCOUNTER
Received CPAP order. Faxed to [employer]. Called patient to advise. Left message. Advised patient once unit is received he will need to call and schedule a compliance visit.

## 2022-06-01 DIAGNOSIS — J45.40 MODERATE PERSISTENT ASTHMA WITHOUT COMPLICATION: ICD-10-CM

## 2022-06-01 RX ORDER — FUROSEMIDE 40 MG/1
TABLET ORAL
Qty: 30 TABLET | Refills: 0 | Status: SHIPPED | OUTPATIENT
Start: 2022-06-01 | End: 2022-07-15 | Stop reason: SDUPTHER

## 2022-06-01 RX ORDER — ALBUTEROL SULFATE 90 UG/1
AEROSOL, METERED RESPIRATORY (INHALATION)
Qty: 36 G | Refills: 0 | Status: SHIPPED | OUTPATIENT
Start: 2022-06-01 | End: 2022-07-01

## 2022-06-03 ENCOUNTER — APPOINTMENT (OUTPATIENT)
Dept: SLEEP MEDICINE | Facility: HOSPITAL | Age: 37
End: 2022-06-03

## 2022-07-01 DIAGNOSIS — J45.40 MODERATE PERSISTENT ASTHMA WITHOUT COMPLICATION: ICD-10-CM

## 2022-07-01 RX ORDER — ALBUTEROL SULFATE 90 UG/1
AEROSOL, METERED RESPIRATORY (INHALATION)
Qty: 36 G | Refills: 0 | Status: SHIPPED | OUTPATIENT
Start: 2022-07-01 | End: 2022-09-08

## 2022-07-06 ENCOUNTER — TELEMEDICINE (OUTPATIENT)
Dept: INTERNAL MEDICINE | Facility: CLINIC | Age: 37
End: 2022-07-06

## 2022-07-06 DIAGNOSIS — U07.1 COVID-19 VIRUS DETECTED: Primary | ICD-10-CM

## 2022-07-06 PROCEDURE — 99213 OFFICE O/P EST LOW 20 MIN: CPT | Performed by: INTERNAL MEDICINE

## 2022-07-06 NOTE — PROGRESS NOTES
"Chief Complaint  Covid-19 Home Monitoring Video Visit    Subjective        Ravinder Robles presents to [facility]  History of Present Illness  Lost taste and smell and Sinus pressure 2 days.  No significant lower respiratory tract symptoms.  No fever.  His wife and daughter have COVID as well.  Has risk factors including BMI and diabetes.You have chosen to receive care through a telehealth visit.  Do you consent to use a video/audio connection for your medical care today? Yes  Objective   Vital Signs:  There were no vitals taken for this visit.  Estimated body mass index is 47.06 kg/m² as calculated from the following:    Height as of 3/30/22: 182.9 cm (72\").    Weight as of 3/30/22: 157 kg (347 lb).          Physical Exam pleasant gentleman appears in no distress.  Conversive and comfortable  Result Review :                Assessment and Plan   Diagnoses and all orders for this visit:    1. COVID-19 virus detected (Primary)    Mild COVID illness in a vaccinated and booster gentleman with increased BMI and diabetes as a risk factor.Paxlovid prescription sent in and cautioned on side effects.         Follow Up   No follow-ups on file.  Patient was given instructions and counseling regarding his condition or for health maintenance advice. Please see specific information pulled into the AVS if appropriate.       "

## 2022-07-07 ENCOUNTER — CLINICAL SUPPORT (OUTPATIENT)
Dept: INTERNAL MEDICINE | Facility: CLINIC | Age: 37
End: 2022-07-07

## 2022-07-07 DIAGNOSIS — R09.81 NASAL CONGESTION: Primary | ICD-10-CM

## 2022-07-07 DIAGNOSIS — Z20.822 EXPOSURE TO COVID-19 VIRUS: ICD-10-CM

## 2022-07-08 LAB
LABCORP SARS-COV-2, NAA 2 DAY TAT: NORMAL
SARS-COV-2 RNA RESP QL NAA+PROBE: DETECTED

## 2022-07-15 RX ORDER — FUROSEMIDE 40 MG/1
40 TABLET ORAL DAILY
Qty: 90 TABLET | Refills: 0 | Status: SHIPPED | OUTPATIENT
Start: 2022-07-15 | End: 2022-07-31

## 2022-07-15 RX ORDER — ESCITALOPRAM OXALATE 10 MG/1
10 TABLET ORAL DAILY
Qty: 90 TABLET | Refills: 0 | Status: SHIPPED | OUTPATIENT
Start: 2022-07-15 | End: 2022-10-17

## 2022-07-18 RX ORDER — LOSARTAN POTASSIUM 50 MG/1
TABLET ORAL
Qty: 90 TABLET | Refills: 0 | Status: SHIPPED | OUTPATIENT
Start: 2022-07-18 | End: 2022-10-17

## 2022-07-18 NOTE — TELEPHONE ENCOUNTER
Rx Refill Note  Requested Prescriptions     Pending Prescriptions Disp Refills   • losartan (COZAAR) 50 MG tablet [Pharmacy Med Name: Losartan Potassium 50 MG Oral Tablet] 90 tablet 0     Sig: Take 1 tablet by mouth once daily      Last office visit with prescribing clinician: 3/30/2022      Next office visit with prescribing clinician: Visit date not found            Connie Damico MA  07/18/22, 11:42 EDT

## 2022-07-18 NOTE — TELEPHONE ENCOUNTER
Rx Refill Note  Requested Prescriptions     Pending Prescriptions Disp Refills   • losartan (COZAAR) 50 MG tablet [Pharmacy Med Name: Losartan Potassium 50 MG Oral Tablet] 90 tablet 0     Sig: Take 1 tablet by mouth once daily      Last office visit with prescribing clinician: 3/30/2022      Next office visit with prescribing clinician: Visit date not found            Connie Damico MA  07/18/22, 11:45 EDT

## 2022-07-31 RX ORDER — FUROSEMIDE 40 MG/1
TABLET ORAL
Qty: 30 TABLET | Refills: 0 | Status: SHIPPED | OUTPATIENT
Start: 2022-07-31 | End: 2022-10-17

## 2022-08-25 RX ORDER — METFORMIN HYDROCHLORIDE 500 MG/1
500 TABLET, EXTENDED RELEASE ORAL 2 TIMES DAILY WITH MEALS
Qty: 180 TABLET | Refills: 0 | Status: SHIPPED | OUTPATIENT
Start: 2022-08-25 | End: 2022-12-20

## 2022-08-25 RX ORDER — OMEPRAZOLE 20 MG/1
20 CAPSULE, DELAYED RELEASE ORAL DAILY
Qty: 90 CAPSULE | Refills: 3 | Status: SHIPPED | OUTPATIENT
Start: 2022-08-25

## 2022-09-08 DIAGNOSIS — J45.40 MODERATE PERSISTENT ASTHMA WITHOUT COMPLICATION: ICD-10-CM

## 2022-09-08 RX ORDER — ALBUTEROL SULFATE 90 UG/1
AEROSOL, METERED RESPIRATORY (INHALATION)
Qty: 36 G | Refills: 0 | Status: SHIPPED | OUTPATIENT
Start: 2022-09-08 | End: 2022-10-05

## 2022-09-08 NOTE — TELEPHONE ENCOUNTER
Rx Refill Note  Requested Prescriptions     Pending Prescriptions Disp Refills   • albuterol sulfate  (90 Base) MCG/ACT inhaler [Pharmacy Med Name: Albuterol Sulfate  (90 Base) MCG/ACT Inhalation Aerosol Solution] 36 g 0     Sig: INHALE 2 PUFFS BY MOUTH EVERY 4 HOURS AS NEEDED FOR WHEEZING      Last office visit with prescribing clinician: 3/30/2022      Next office visit with prescribing clinician: Visit date not found            Connie Damico MA  09/08/22, 14:40 EDT

## 2022-09-14 ENCOUNTER — OFFICE VISIT (OUTPATIENT)
Dept: INTERNAL MEDICINE | Facility: CLINIC | Age: 37
End: 2022-09-14

## 2022-09-14 VITALS — BODY MASS INDEX: 42.66 KG/M2 | HEIGHT: 72 IN | WEIGHT: 315 LBS | TEMPERATURE: 96 F | RESPIRATION RATE: 16 BRPM

## 2022-09-14 DIAGNOSIS — R21 RASH: Primary | ICD-10-CM

## 2022-09-14 PROCEDURE — 99213 OFFICE O/P EST LOW 20 MIN: CPT | Performed by: INTERNAL MEDICINE

## 2022-09-14 RX ORDER — NALOXONE HYDROCHLORIDE 4 MG/.1ML
SPRAY NASAL
COMMUNITY
Start: 2022-07-25

## 2022-09-14 NOTE — PROGRESS NOTES
"Chief Complaint  Rash (On hands )    Subjective        Ravinder Robles presents to [facility]  History of Present Illness  Has had some congestion runny nose.  Developed a rash on his hands and 1 lesion on his foot.  The lesions are not painful they are not itchy and somewhat vesicular in nature.  His daughter had a urticarial rash but no obvious hand-foot-and-mouth illness diagnosed.  No known monkey box exposure but appropriately concerned  Objective   Vital Signs:  Temp 96 °F (35.6 °C) (Temporal)   Resp 16   Ht 182.9 cm (72\")   Wt (!) 157 kg (347 lb)   BMI 47.06 kg/m²   Estimated body mass index is 47.06 kg/m² as calculated from the following:    Height as of this encounter: 182.9 cm (72\").    Weight as of this encounter: 157 kg (347 lb).          Physical Exam the palm of the right hand has 2 vesicular lesions 2 to 3 mm in diameter and smaller lesions on the left hand 2 in total  Result Review :                Assessment and Plan   Diagnoses and all orders for this visit:    1. Rash (Primary)  -     Monkeypox (Orthopoxvirus), DNA, PCR - Swab, Hand, Left; Future  -     Monkeypox (Orthopoxvirus), DNA, PCR - Swab, Hand, Left    I think this is more likely hand-foot-and-mouth disease.  His daughter was ill although did not have the classic hand-foot-and-mouth rash that would make sense.  No obvious monkey box exposure but agree I think it makes sense to screen for that.  Caution until we get the PCR testing back         Follow Up   No follow-ups on file.  Patient was given instructions and counseling regarding his condition or for health maintenance advice. Please see specific information pulled into the AVS if appropriate.       "

## 2022-09-17 LAB — NONVAR ORTHPX DNA SPEC QL NAA+PROBE: NORMAL

## 2022-09-19 ENCOUNTER — TELEPHONE (OUTPATIENT)
Dept: INTERNAL MEDICINE | Facility: CLINIC | Age: 37
End: 2022-09-19

## 2022-09-19 NOTE — TELEPHONE ENCOUNTER
Left patient message to follow-up if these lesions have not subsided.  The monkey pox test was inconclusive.  Clinically lesions more consistent with hand-foot-and-mouth disease but if not resolved we need to repeat testing

## 2022-10-05 DIAGNOSIS — J45.40 MODERATE PERSISTENT ASTHMA WITHOUT COMPLICATION: ICD-10-CM

## 2022-10-05 RX ORDER — ALBUTEROL SULFATE 90 UG/1
AEROSOL, METERED RESPIRATORY (INHALATION)
Qty: 36 G | Refills: 0 | Status: SHIPPED | OUTPATIENT
Start: 2022-10-05 | End: 2022-11-05

## 2022-10-17 RX ORDER — FUROSEMIDE 40 MG/1
TABLET ORAL
Qty: 90 TABLET | Refills: 0 | Status: SHIPPED | OUTPATIENT
Start: 2022-10-17 | End: 2023-01-03

## 2022-10-17 RX ORDER — LOSARTAN POTASSIUM 50 MG/1
TABLET ORAL
Qty: 90 TABLET | Refills: 0 | Status: SHIPPED | OUTPATIENT
Start: 2022-10-17 | End: 2023-01-03

## 2022-10-17 RX ORDER — ESCITALOPRAM OXALATE 10 MG/1
TABLET ORAL
Qty: 90 TABLET | Refills: 0 | Status: SHIPPED | OUTPATIENT
Start: 2022-10-17 | End: 2023-01-03

## 2022-11-04 DIAGNOSIS — J45.40 MODERATE PERSISTENT ASTHMA WITHOUT COMPLICATION: ICD-10-CM

## 2022-11-05 RX ORDER — ALBUTEROL SULFATE 90 UG/1
AEROSOL, METERED RESPIRATORY (INHALATION)
Qty: 36 G | Refills: 0 | Status: SHIPPED | OUTPATIENT
Start: 2022-11-05 | End: 2023-01-01

## 2022-12-20 RX ORDER — METFORMIN HYDROCHLORIDE 500 MG/1
TABLET, EXTENDED RELEASE ORAL
Qty: 180 TABLET | Refills: 0 | Status: SHIPPED | OUTPATIENT
Start: 2022-12-20 | End: 2023-03-23

## 2022-12-20 NOTE — TELEPHONE ENCOUNTER
Rx Refill Note  Requested Prescriptions     Pending Prescriptions Disp Refills   • metFORMIN ER (GLUCOPHAGE-XR) 500 MG 24 hr tablet [Pharmacy Med Name: metFORMIN HCl  MG Oral Tablet Extended Release 24 Hour] 180 tablet 0     Sig: TAKE 1 TABLET BY MOUTH TWICE DAILY WITH MEALS      Last office visit with prescribing clinician: Visit date not found   Last telemedicine visit with prescribing clinician: Visit date not found   Next office visit with prescribing clinician: Visit date not found                         Would you like a call back once the refill request has been completed: [] Yes [] No    If the office needs to give you a call back, can they leave a voicemail: [] Yes [] No    Colin Morales MA  12/20/22, 09:57 EST

## 2022-12-23 RX ORDER — PREDNISONE 20 MG/1
TABLET ORAL
Qty: 23 TABLET | Refills: 0 | Status: SHIPPED | OUTPATIENT
Start: 2022-12-23

## 2022-12-23 NOTE — TELEPHONE ENCOUNTER
Rx Refill Note  Requested Prescriptions     Pending Prescriptions Disp Refills   • predniSONE (DELTASONE) 20 MG tablet [Pharmacy Med Name: predniSONE 20 MG Oral Tablet] 23 tablet 0     Sig: TAKE 1 TABLET BY MOUTH TWICE DAILY FOR 5 DAYS, THEN 1.5 TABLETS ONCE DAILY FOR 5 DAYS, THEN 1 TABLET ONCE DAILY FOR 5 DAYS      Last office visit with prescribing clinician: 9/14/2022   Last telemedicine visit with prescribing clinician: Visit date not found   Next office visit with prescribing clinician: Visit date not found                         Would you like a call back once the refill request has been completed: [] Yes [] No    If the office needs to give you a call back, can they leave a voicemail: [] Yes [] No    Sandrine Gallegos MA  12/23/22, 14:50 EST

## 2023-01-01 DIAGNOSIS — J45.40 MODERATE PERSISTENT ASTHMA WITHOUT COMPLICATION: ICD-10-CM

## 2023-01-01 RX ORDER — ALBUTEROL SULFATE 90 UG/1
AEROSOL, METERED RESPIRATORY (INHALATION)
Qty: 36 G | Refills: 0 | Status: SHIPPED | OUTPATIENT
Start: 2023-01-01 | End: 2023-01-31

## 2023-01-03 RX ORDER — ESCITALOPRAM OXALATE 10 MG/1
TABLET ORAL
Qty: 90 TABLET | Refills: 0 | Status: SHIPPED | OUTPATIENT
Start: 2023-01-03

## 2023-01-03 RX ORDER — FUROSEMIDE 40 MG/1
TABLET ORAL
Qty: 90 TABLET | Refills: 0 | Status: SHIPPED | OUTPATIENT
Start: 2023-01-03

## 2023-01-03 RX ORDER — LOSARTAN POTASSIUM 50 MG/1
TABLET ORAL
Qty: 90 TABLET | Refills: 0 | Status: SHIPPED | OUTPATIENT
Start: 2023-01-03 | End: 2023-01-18

## 2023-01-04 ENCOUNTER — TELEPHONE (OUTPATIENT)
Dept: INTERNAL MEDICINE | Facility: CLINIC | Age: 38
End: 2023-01-04
Payer: COMMERCIAL

## 2023-01-04 NOTE — TELEPHONE ENCOUNTER
HUB CAN READ:    LVMTCB. Pt needs to make an appointment for medication refill per  request. Pt can make it the appointment for sometime this month at any time.

## 2023-01-18 RX ORDER — LOSARTAN POTASSIUM 50 MG/1
TABLET ORAL
Qty: 90 TABLET | Refills: 0 | Status: SHIPPED | OUTPATIENT
Start: 2023-01-18

## 2023-01-31 DIAGNOSIS — J45.40 MODERATE PERSISTENT ASTHMA WITHOUT COMPLICATION: ICD-10-CM

## 2023-01-31 RX ORDER — ALBUTEROL SULFATE 90 UG/1
AEROSOL, METERED RESPIRATORY (INHALATION)
Qty: 36 G | Refills: 0 | Status: SHIPPED | OUTPATIENT
Start: 2023-01-31

## 2023-02-28 RX ORDER — PREDNISONE 20 MG/1
TABLET ORAL
Qty: 23 TABLET | Refills: 0 | OUTPATIENT
Start: 2023-02-28

## 2023-03-01 RX ORDER — PREDNISONE 20 MG/1
TABLET ORAL
Qty: 23 TABLET | Refills: 0 | OUTPATIENT
Start: 2023-03-01

## 2023-03-23 RX ORDER — METFORMIN HYDROCHLORIDE 500 MG/1
TABLET, EXTENDED RELEASE ORAL
Qty: 180 TABLET | Refills: 0 | Status: SHIPPED | OUTPATIENT
Start: 2023-03-23

## 2023-03-23 NOTE — TELEPHONE ENCOUNTER
Rx Refill Note  Requested Prescriptions     Pending Prescriptions Disp Refills   • metFORMIN ER (GLUCOPHAGE-XR) 500 MG 24 hr tablet [Pharmacy Med Name: metFORMIN HCl  MG Oral Tablet Extended Release 24 Hour] 180 tablet 0     Sig: TAKE 1 TABLET BY MOUTH TWICE DAILY WITH MEALS      Last office visit with prescribing clinician: Visit date not found   Last telemedicine visit with prescribing clinician: Visit date not found   Next office visit with prescribing clinician: Visit date not found                         Would you like a call back once the refill request has been completed: [] Yes [] No    If the office needs to give you a call back, can they leave a voicemail: [] Yes [] No    Colin Morales MA  03/23/23, 15:55 EDT

## 2023-04-17 RX ORDER — FUROSEMIDE 40 MG/1
TABLET ORAL
Qty: 90 TABLET | Refills: 0 | OUTPATIENT
Start: 2023-04-17

## 2023-04-17 RX ORDER — ESCITALOPRAM OXALATE 10 MG/1
TABLET ORAL
Qty: 90 TABLET | Refills: 0 | OUTPATIENT
Start: 2023-04-17

## 2023-04-25 RX ORDER — ESCITALOPRAM OXALATE 10 MG/1
TABLET ORAL
Qty: 90 TABLET | Refills: 0 | OUTPATIENT
Start: 2023-04-25

## 2023-04-25 RX ORDER — FUROSEMIDE 40 MG/1
TABLET ORAL
Qty: 90 TABLET | Refills: 0 | OUTPATIENT
Start: 2023-04-25

## 2023-05-09 ENCOUNTER — OFFICE VISIT (OUTPATIENT)
Dept: INTERNAL MEDICINE | Facility: CLINIC | Age: 38
End: 2023-05-09
Payer: COMMERCIAL

## 2023-05-09 VITALS
TEMPERATURE: 96.2 F | OXYGEN SATURATION: 97 % | BODY MASS INDEX: 42.66 KG/M2 | HEIGHT: 72 IN | HEART RATE: 80 BPM | DIASTOLIC BLOOD PRESSURE: 92 MMHG | WEIGHT: 315 LBS | RESPIRATION RATE: 16 BRPM | SYSTOLIC BLOOD PRESSURE: 158 MMHG

## 2023-05-09 DIAGNOSIS — E78.49 OTHER HYPERLIPIDEMIA: ICD-10-CM

## 2023-05-09 DIAGNOSIS — E66.01 CLASS 3 SEVERE OBESITY DUE TO EXCESS CALORIES WITHOUT SERIOUS COMORBIDITY WITH BODY MASS INDEX (BMI) OF 45.0 TO 49.9 IN ADULT: ICD-10-CM

## 2023-05-09 DIAGNOSIS — Z00.00 ROUTINE GENERAL MEDICAL EXAMINATION AT A HEALTH CARE FACILITY: Primary | ICD-10-CM

## 2023-05-09 DIAGNOSIS — E11.65 TYPE 2 DIABETES MELLITUS WITH HYPERGLYCEMIA, WITHOUT LONG-TERM CURRENT USE OF INSULIN: ICD-10-CM

## 2023-05-09 DIAGNOSIS — I10 ESSENTIAL HYPERTENSION: ICD-10-CM

## 2023-05-09 PROCEDURE — 99395 PREV VISIT EST AGE 18-39: CPT | Performed by: INTERNAL MEDICINE

## 2023-05-09 RX ORDER — TIRZEPATIDE 2.5 MG/.5ML
INJECTION, SOLUTION SUBCUTANEOUS
Qty: 6 ML | Refills: 3 | Status: SHIPPED | OUTPATIENT
Start: 2023-05-09 | End: 2023-05-09

## 2023-05-09 RX ORDER — TERBINAFINE HYDROCHLORIDE 250 MG/1
250 TABLET ORAL DAILY
Qty: 14 TABLET | Refills: 1 | Status: SHIPPED | OUTPATIENT
Start: 2023-05-09

## 2023-05-09 RX ORDER — TIRZEPATIDE 2.5 MG/.5ML
2.5 INJECTION, SOLUTION SUBCUTANEOUS WEEKLY
Qty: 2 ML | Refills: 3 | Status: SHIPPED | OUTPATIENT
Start: 2023-05-09 | End: 2023-06-06

## 2023-05-09 RX ORDER — KETOCONAZOLE 20 MG/G
1 CREAM TOPICAL EVERY 12 HOURS SCHEDULED
Qty: 45 G | Refills: 2 | Status: SHIPPED | OUTPATIENT
Start: 2023-05-09

## 2023-05-09 NOTE — PROGRESS NOTES
"Chief Complaint   Patient presents with   • Annual Exam     Rash/jock itch        Subjective   Ravinder Robles is a 37 y.o. male here for   Chief Complaint   Patient presents with   • Annual Exam     Rash/jock itch    .    History of Present Illness     The following portions of the patient's history were reviewed and updated as appropriate: allergies, current medications, past family history, past medical history, past social history, past surgical history, and problem list.  Here for the physical today.  Overall doing very well.  No specific concerns.  We talked about diet and exercise habits.  Discussed prevention recommendations.   Review of Systems   Constitutional: Negative.    HENT: Negative for congestion, drooling, facial swelling, hearing loss, mouth sores, rhinorrhea, sinus pressure, sore throat, swollen glands, tinnitus and trouble swallowing.    Eyes: Negative.    Respiratory: Negative.    Cardiovascular: Negative.    Gastrointestinal: Negative.    Endocrine: Negative.    Genitourinary: Negative for difficulty urinating, dysuria, flank pain, frequency, hematuria and urgency.   Musculoskeletal: Positive for arthralgias and myalgias.   Skin: Negative for color change, dry skin, rash and wound.   Neurological: Negative for dizziness, tremors, weakness, headache, memory problem and confusion.   Hematological: Negative for adenopathy. Does not bruise/bleed easily.   Psychiatric/Behavioral: Negative for agitation, behavioral problems, depressed mood and stress. The patient is not nervous/anxious.        Body mass index is 44.62 kg/m².   Vitals:    05/09/23 0841   BP: 158/92   BP Location: Left arm   Patient Position: Sitting   Cuff Size: Large Adult   Pulse: 80   Resp: 16   Temp: 96.2 °F (35.7 °C)   TempSrc: Temporal   SpO2: 97%   Weight: (!) 149 kg (329 lb)   Height: 182.9 cm (72\")        Objective   Physical Exam  Vitals and nursing note reviewed.   Constitutional:       General: He is not in acute " distress.     Appearance: Normal appearance. He is not ill-appearing, toxic-appearing or diaphoretic.   HENT:      Head: Normocephalic and atraumatic.      Nose: Nose normal.      Mouth/Throat:      Mouth: Mucous membranes are moist.      Pharynx: Oropharynx is clear.   Eyes:      Conjunctiva/sclera: Conjunctivae normal.      Pupils: Pupils are equal, round, and reactive to light.   Cardiovascular:      Rate and Rhythm: Normal rate and regular rhythm.      Pulses: Normal pulses.      Heart sounds: Normal heart sounds. No murmur heard.    No friction rub. No gallop.   Pulmonary:      Effort: Pulmonary effort is normal.      Breath sounds: Normal breath sounds. No stridor. No wheezing, rhonchi or rales.   Abdominal:      General: Abdomen is flat. Bowel sounds are normal.      Palpations: Abdomen is soft. There is no mass.      Tenderness: There is no abdominal tenderness. There is no guarding or rebound.   Musculoskeletal:      Cervical back: Neck supple.   Skin:     General: Skin is warm and dry.      Capillary Refill: Capillary refill takes less than 2 seconds.      Comments: Tinea cruris rash  Diabetic Foot Exam Performed   Neurological:      General: No focal deficit present.      Mental Status: He is alert and oriented to person, place, and time.   Psychiatric:         Mood and Affect: Mood normal.         Thought Content: Thought content normal.         Lab Results   Component Value Date    HGBA1C 7.0 (H) 03/21/2022    TSH 1.780 10/26/2021        Health Maintenance   Topic Date Due   • ANNUAL PHYSICAL  Never done   • DIABETIC EYE EXAM  Never done   • Pneumococcal Vaccine 0-64 (2 - PCV) 07/01/2017   • DIABETIC FOOT EXAM  08/10/2022   • HEMOGLOBIN A1C  09/21/2022   • LIPID PANEL  10/26/2022   • URINE MICROALBUMIN  10/26/2022   • INFLUENZA VACCINE  08/01/2023   • TDAP/TD VACCINES (4 - Td or Tdap) 06/14/2032   • HEPATITIS C SCREENING  Completed   • Hepatitis B  Completed   • COVID-19 Vaccine  Completed         Assessment & Plan   Problems Addressed this Visit        Cardiac and Vasculature    Essential hypertension    Relevant Orders    Urinalysis With Microscopic - Urine, Clean Catch    CBC & Differential    Comprehensive Metabolic Panel    Hemoglobin A1c    TSH    Testosterone    Lipid Panel With / Chol / HDL Ratio    MicroAlbumin, Urine, Random - Urine, Clean Catch    Other hyperlipidemia       Endocrine and Metabolic    Class 3 severe obesity due to excess calories without serious comorbidity with body mass index (BMI) of 45.0 to 49.9 in adult ([facility])    Type 2 diabetes mellitus with hyperglycemia, without long-term current use of insulin    Relevant Orders    Urinalysis With Microscopic - Urine, Clean Catch    CBC & Differential    Comprehensive Metabolic Panel    Hemoglobin A1c    TSH    Testosterone    Lipid Panel With / Chol / HDL Ratio    MicroAlbumin, Urine, Random - Urine, Clean Catch   Other Visit Diagnoses     Routine general medical examination at a health care facility    -  Primary    Relevant Orders    Urinalysis With Microscopic - Urine, Clean Catch    CBC & Differential    Comprehensive Metabolic Panel    Hemoglobin A1c    TSH    Testosterone    Lipid Panel With / Chol / HDL Ratio    MicroAlbumin, Urine, Random - Urine, Clean Catch      Diagnoses       Codes Comments    Routine general medical examination at a health care facility    -  Primary ICD-10-CM: Z00.00  ICD-9-CM: V70.0     Type 2 diabetes mellitus with hyperglycemia, without long-term current use of insulin     ICD-10-CM: E11.65  ICD-9-CM: 250.00, 790.29     Essential hypertension     ICD-10-CM: I10  ICD-9-CM: 401.9     Other hyperlipidemia     ICD-10-CM: E78.49  ICD-9-CM: 272.4     Class 3 severe obesity due to excess calories without serious comorbidity with body mass index (BMI) of 45.0 to 49.9 in adult ([facility])     ICD-10-CM: E66.01, Z68.42  ICD-9-CM: 278.01, V85.42           No orders of the defined types were placed in this encounter.      He is doing better overall.  Blood pressure is little elevated today although he was chewing some nicotine gum.  He did quit smoking a year ago!  We will continue to monitor blood pressure.  He has lost some weight and trying to exercise a little more.  Start mounjaro 2.5 mg weekly, he is aware of the side effects.  Terbinafine and topical ketoconazole for tinea cruris and recheck if no improvement.  Otherwise follow-up on the mounjaro in 3 months and pending lab results  Depression screen reviewed with patient and negative.  Advised behavioral modifications including dietary changes and increased exercise with goal of healthy weight and lifestyle.   Will make follow up plans based on lab results as above.          Return in about 3 months (around 8/9/2023).

## 2023-05-10 LAB
ALBUMIN SERPL-MCNC: 4.3 G/DL (ref 3.5–5.2)
ALBUMIN/GLOB SERPL: 1.5 G/DL
ALP SERPL-CCNC: 81 U/L (ref 39–117)
ALT SERPL-CCNC: 17 U/L (ref 1–41)
APPEARANCE UR: CLEAR
AST SERPL-CCNC: 15 U/L (ref 1–40)
BACTERIA #/AREA URNS HPF: NORMAL /HPF
BASOPHILS # BLD AUTO: 0.06 10*3/MM3 (ref 0–0.2)
BASOPHILS NFR BLD AUTO: 0.8 % (ref 0–1.5)
BILIRUB SERPL-MCNC: 0.3 MG/DL (ref 0–1.2)
BILIRUB UR QL STRIP: NEGATIVE
BUN SERPL-MCNC: 12 MG/DL (ref 6–20)
BUN/CREAT SERPL: 14.8 (ref 7–25)
CALCIUM SERPL-MCNC: 9.6 MG/DL (ref 8.6–10.5)
CASTS URNS MICRO: NORMAL
CHLORIDE SERPL-SCNC: 102 MMOL/L (ref 98–107)
CHOLEST SERPL-MCNC: 165 MG/DL (ref 0–200)
CHOLEST/HDLC SERPL: 4.34 {RATIO}
CO2 SERPL-SCNC: 25.1 MMOL/L (ref 22–29)
COLOR UR: YELLOW
CREAT SERPL-MCNC: 0.81 MG/DL (ref 0.76–1.27)
EGFRCR SERPLBLD CKD-EPI 2021: 117.2 ML/MIN/1.73
EOSINOPHIL # BLD AUTO: 0.18 10*3/MM3 (ref 0–0.4)
EOSINOPHIL NFR BLD AUTO: 2.5 % (ref 0.3–6.2)
EPI CELLS #/AREA URNS HPF: NORMAL /HPF
ERYTHROCYTE [DISTWIDTH] IN BLOOD BY AUTOMATED COUNT: 12.5 % (ref 12.3–15.4)
GLOBULIN SER CALC-MCNC: 2.8 GM/DL
GLUCOSE SERPL-MCNC: 207 MG/DL (ref 65–99)
GLUCOSE UR QL STRIP: ABNORMAL
HBA1C MFR BLD: 10.9 % (ref 4.8–5.6)
HCT VFR BLD AUTO: 46.4 % (ref 37.5–51)
HDLC SERPL-MCNC: 38 MG/DL (ref 40–60)
HGB BLD-MCNC: 14.6 G/DL (ref 13–17.7)
HGB UR QL STRIP: NEGATIVE
IMM GRANULOCYTES # BLD AUTO: 0.01 10*3/MM3 (ref 0–0.05)
IMM GRANULOCYTES NFR BLD AUTO: 0.1 % (ref 0–0.5)
KETONES UR QL STRIP: ABNORMAL
LDLC SERPL CALC-MCNC: 113 MG/DL (ref 0–100)
LEUKOCYTE ESTERASE UR QL STRIP: NEGATIVE
LYMPHOCYTES # BLD AUTO: 3.18 10*3/MM3 (ref 0.7–3.1)
LYMPHOCYTES NFR BLD AUTO: 44.7 % (ref 19.6–45.3)
MCH RBC QN AUTO: 27.3 PG (ref 26.6–33)
MCHC RBC AUTO-ENTMCNC: 31.5 G/DL (ref 31.5–35.7)
MCV RBC AUTO: 86.9 FL (ref 79–97)
MICROALBUMIN UR-MCNC: 19.4 UG/ML
MONOCYTES # BLD AUTO: 0.45 10*3/MM3 (ref 0.1–0.9)
MONOCYTES NFR BLD AUTO: 6.3 % (ref 5–12)
NEUTROPHILS # BLD AUTO: 3.24 10*3/MM3 (ref 1.7–7)
NEUTROPHILS NFR BLD AUTO: 45.6 % (ref 42.7–76)
NITRITE UR QL STRIP: NEGATIVE
NRBC BLD AUTO-RTO: 0 /100 WBC (ref 0–0.2)
PH UR STRIP: 6.5 [PH] (ref 5–8)
PLATELET # BLD AUTO: 243 10*3/MM3 (ref 140–450)
POTASSIUM SERPL-SCNC: 4.4 MMOL/L (ref 3.5–5.2)
PROT SERPL-MCNC: 7.1 G/DL (ref 6–8.5)
PROT UR QL STRIP: ABNORMAL
RBC # BLD AUTO: 5.34 10*6/MM3 (ref 4.14–5.8)
RBC #/AREA URNS HPF: NORMAL /HPF
SODIUM SERPL-SCNC: 138 MMOL/L (ref 136–145)
SP GR UR STRIP: 1.03 (ref 1–1.03)
TESTOST SERPL-MCNC: 182 NG/DL (ref 264–916)
TRIGL SERPL-MCNC: 75 MG/DL (ref 0–150)
TSH SERPL DL<=0.005 MIU/L-ACNC: 1.35 UIU/ML (ref 0.27–4.2)
UROBILINOGEN UR STRIP-MCNC: ABNORMAL MG/DL
VLDLC SERPL CALC-MCNC: 14 MG/DL (ref 5–40)
WBC # BLD AUTO: 7.12 10*3/MM3 (ref 3.4–10.8)
WBC #/AREA URNS HPF: NORMAL /HPF

## 2023-05-11 ENCOUNTER — TELEPHONE (OUTPATIENT)
Dept: INTERNAL MEDICINE | Facility: CLINIC | Age: 38
End: 2023-05-11
Payer: COMMERCIAL

## 2023-05-11 NOTE — TELEPHONE ENCOUNTER
Ravinder returning Damaris's call regarding test results. Please call back when available to go over results with him

## 2023-05-12 DIAGNOSIS — J45.40 MODERATE PERSISTENT ASTHMA WITHOUT COMPLICATION: ICD-10-CM

## 2023-05-12 RX ORDER — FUROSEMIDE 40 MG/1
TABLET ORAL
Qty: 90 TABLET | Refills: 0 | Status: SHIPPED | OUTPATIENT
Start: 2023-05-12

## 2023-05-12 RX ORDER — ALBUTEROL SULFATE 90 UG/1
AEROSOL, METERED RESPIRATORY (INHALATION)
Qty: 36 G | Refills: 0 | Status: SHIPPED | OUTPATIENT
Start: 2023-05-12

## 2023-05-12 RX ORDER — ESCITALOPRAM OXALATE 10 MG/1
TABLET ORAL
Qty: 90 TABLET | Refills: 0 | Status: SHIPPED | OUTPATIENT
Start: 2023-05-12

## 2023-06-13 NOTE — TELEPHONE ENCOUNTER
Rx Refill Note  Requested Prescriptions     Pending Prescriptions Disp Refills    metFORMIN ER (GLUCOPHAGE-XR) 500 MG 24 hr tablet [Pharmacy Med Name: metFORMIN HCl  MG Oral Tablet Extended Release 24 Hour] 180 tablet 0     Sig: TAKE 1 TABLET BY MOUTH TWICE DAILY WITH MEALS      Last office visit with prescribing clinician: Visit date not found   Last telemedicine visit with prescribing clinician: Visit date not found   Next office visit with prescribing clinician: Visit date not found                         Would you like a call back once the refill request has been completed: [] Yes [] No    If the office needs to give you a call back, can they leave a voicemail: [] Yes [] No    Colin Morales MA  06/13/23, 10:37 EDT

## 2023-06-19 RX ORDER — METFORMIN HYDROCHLORIDE 500 MG/1
TABLET, EXTENDED RELEASE ORAL
Qty: 180 TABLET | Refills: 0 | Status: SHIPPED | OUTPATIENT
Start: 2023-06-19

## 2023-07-31 ENCOUNTER — E-VISIT (OUTPATIENT)
Dept: FAMILY MEDICINE CLINIC | Facility: TELEHEALTH | Age: 38
End: 2023-07-31
Payer: COMMERCIAL

## 2023-08-01 ENCOUNTER — TELEMEDICINE (OUTPATIENT)
Dept: INTERNAL MEDICINE | Facility: CLINIC | Age: 38
End: 2023-08-01
Payer: COMMERCIAL

## 2023-08-01 DIAGNOSIS — E11.65 TYPE 2 DIABETES MELLITUS WITH HYPERGLYCEMIA, WITHOUT LONG-TERM CURRENT USE OF INSULIN: ICD-10-CM

## 2023-08-01 DIAGNOSIS — M54.50 LUMBAR BACK PAIN: Primary | ICD-10-CM

## 2023-08-01 PROCEDURE — 99214 OFFICE O/P EST MOD 30 MIN: CPT | Performed by: INTERNAL MEDICINE

## 2023-08-01 RX ORDER — PREDNISONE 20 MG/1
TABLET ORAL
Qty: 23 TABLET | Refills: 1 | Status: SHIPPED | OUTPATIENT
Start: 2023-08-01 | End: 2023-08-16

## 2023-08-01 RX ORDER — CYCLOBENZAPRINE HCL 10 MG
10 TABLET ORAL 3 TIMES DAILY PRN
Qty: 30 TABLET | Refills: 1 | Status: SHIPPED | OUTPATIENT
Start: 2023-08-01

## 2023-08-18 RX ORDER — ESCITALOPRAM OXALATE 10 MG/1
TABLET ORAL
Qty: 90 TABLET | Refills: 0 | Status: SHIPPED | OUTPATIENT
Start: 2023-08-18

## 2023-08-18 RX ORDER — LOSARTAN POTASSIUM 50 MG/1
TABLET ORAL
Qty: 90 TABLET | Refills: 0 | Status: SHIPPED | OUTPATIENT
Start: 2023-08-18

## 2023-08-18 RX ORDER — FUROSEMIDE 40 MG/1
TABLET ORAL
Qty: 90 TABLET | Refills: 0 | Status: SHIPPED | OUTPATIENT
Start: 2023-08-18

## 2023-08-18 NOTE — TELEPHONE ENCOUNTER
Rx Refill Note  Requested Prescriptions     Pending Prescriptions Disp Refills    losartan (COZAAR) 50 MG tablet [Pharmacy Med Name: Losartan Potassium 50 MG Oral Tablet] 90 tablet 0     Sig: Take 1 tablet by mouth once daily    escitalopram (LEXAPRO) 10 MG tablet [Pharmacy Med Name: Escitalopram Oxalate 10 MG Oral Tablet] 90 tablet 0     Sig: Take 1 tablet by mouth once daily    furosemide (LASIX) 40 MG tablet [Pharmacy Med Name: Furosemide 40 MG Oral Tablet] 90 tablet 0     Sig: Take 1 tablet by mouth once daily      Last office visit with prescribing clinician: 5/9/2023   Last telemedicine visit with prescribing clinician: 8/1/2023   Next office visit with prescribing clinician: Visit date not found                         Would you like a call back once the refill request has been completed: [] Yes [] No    If the office needs to give you a call back, can they leave a voicemail: [] Yes [] No    Sandrine Gallegos MA  08/18/23, 11:16 EDT

## 2023-08-29 ENCOUNTER — TELEPHONE (OUTPATIENT)
Dept: INTERNAL MEDICINE | Facility: CLINIC | Age: 38
End: 2023-08-29
Payer: COMMERCIAL

## 2023-08-29 NOTE — TELEPHONE ENCOUNTER
[facility] called requesting a medication change. They asked that since the pt is having issues paying for the Mounjaro 5mg that it be switched the trulicity 0.75mg . Please give them a call back at [phone]  Thanks

## 2023-08-30 RX ORDER — DULAGLUTIDE 0.75 MG/.5ML
0.75 INJECTION, SOLUTION SUBCUTANEOUS WEEKLY
Qty: 2 ML | Refills: 3 | Status: SHIPPED | OUTPATIENT
Start: 2023-08-30

## 2023-09-17 RX ORDER — OMEPRAZOLE 20 MG/1
20 CAPSULE, DELAYED RELEASE ORAL DAILY
Qty: 90 CAPSULE | Refills: 4 | Status: SHIPPED | OUTPATIENT
Start: 2023-09-17

## 2023-09-18 ENCOUNTER — OFFICE VISIT (OUTPATIENT)
Dept: INTERNAL MEDICINE | Facility: CLINIC | Age: 38
End: 2023-09-18
Payer: COMMERCIAL

## 2023-09-18 VITALS
TEMPERATURE: 96.9 F | HEART RATE: 76 BPM | HEIGHT: 72 IN | SYSTOLIC BLOOD PRESSURE: 132 MMHG | OXYGEN SATURATION: 97 % | BODY MASS INDEX: 42.66 KG/M2 | DIASTOLIC BLOOD PRESSURE: 82 MMHG | RESPIRATION RATE: 16 BRPM | WEIGHT: 315 LBS

## 2023-09-18 DIAGNOSIS — E11.65 TYPE 2 DIABETES MELLITUS WITH HYPERGLYCEMIA, WITHOUT LONG-TERM CURRENT USE OF INSULIN: Primary | ICD-10-CM

## 2023-09-18 PROCEDURE — 99214 OFFICE O/P EST MOD 30 MIN: CPT | Performed by: INTERNAL MEDICINE

## 2023-09-18 NOTE — PROGRESS NOTES
"Chief Complaint  Diabetes    Subjective        Ravinder Robles presents to [facility]  Diabetes    Blood sugar has reportedly been pretty good.  No polyuria or polydipsia or visual changes.  No chest pains or shortness of breath.  Taking medication as prescribed.  No side effects reported.  He had mentioned the testosterone level being low previously.  Objective   Vital Signs:  /82 (BP Location: Left arm, Patient Position: Sitting, Cuff Size: Large Adult)   Pulse 76   Temp 96.9 °F (36.1 °C) (Temporal)   Resp 16   Ht 182.9 cm (72\")   Wt (!) 155 kg (342 lb)   SpO2 97%   BMI 46.38 kg/m²   Estimated body mass index is 46.38 kg/m² as calculated from the following:    Height as of this encounter: 182.9 cm (72\").    Weight as of this encounter: 155 kg (342 lb).             Physical Exam  Vitals and nursing note reviewed.   Constitutional:       Appearance: Normal appearance.   HENT:      Head: Normocephalic and atraumatic.   Cardiovascular:      Rate and Rhythm: Normal rate and regular rhythm.   Pulmonary:      Effort: Pulmonary effort is normal.      Breath sounds: Normal breath sounds.   Abdominal:      General: Abdomen is flat.      Palpations: Abdomen is soft.   Musculoskeletal:         General: No swelling or deformity.      Cervical back: Neck supple.      Right lower leg: No edema.      Left lower leg: No edema.   Skin:     General: Skin is warm.      Capillary Refill: Capillary refill takes less than 2 seconds.      Findings: No rash.   Neurological:      General: No focal deficit present.      Mental Status: He is alert and oriented to person, place, and time.      Result Review :        Previous notes reviewed           Assessment and Plan   Diagnoses and all orders for this visit:    1. Type 2 diabetes mellitus with hyperglycemia, without long-term current use of insulin (Primary)  -     Comprehensive Metabolic Panel  -     Hemoglobin A1c  -     Lipid " Panel With / Chol / HDL Ratio  -     TSH    Type 2 diabetes with suboptimal control.  Sounds like it may be doing better now.  GLP-1 agonist too expensive.  I did give him a coupon for mounjaro.  We will send in a prescription and see if that is covered.  Testosterone level is low, we talked about the pros and cons of testosterone replacement.  At this point working on the blood sugar and weight reduction as opposed to testosterone replacement would probably be more optimal.  Recheck in 3 months         Follow Up   Return in about 3 months (around 12/18/2023).  Patient was given instructions and counseling regarding his condition or for health maintenance advice. Please see specific information pulled into the AVS if appropriate.

## 2023-09-19 LAB
ALBUMIN SERPL-MCNC: 4.7 G/DL (ref 3.5–5.2)
ALBUMIN/GLOB SERPL: 1.5 G/DL
ALP SERPL-CCNC: 74 U/L (ref 39–117)
ALT SERPL-CCNC: 27 U/L (ref 1–41)
AST SERPL-CCNC: 22 U/L (ref 1–40)
BILIRUB SERPL-MCNC: 0.3 MG/DL (ref 0–1.2)
BUN SERPL-MCNC: 12 MG/DL (ref 6–20)
BUN/CREAT SERPL: 12.8 (ref 7–25)
CALCIUM SERPL-MCNC: 10 MG/DL (ref 8.6–10.5)
CHLORIDE SERPL-SCNC: 99 MMOL/L (ref 98–107)
CHOLEST SERPL-MCNC: 192 MG/DL (ref 0–200)
CHOLEST/HDLC SERPL: 4.68 {RATIO}
CO2 SERPL-SCNC: 26.8 MMOL/L (ref 22–29)
CREAT SERPL-MCNC: 0.94 MG/DL (ref 0.76–1.27)
EGFRCR SERPLBLD CKD-EPI 2021: 107.7 ML/MIN/1.73
GLOBULIN SER CALC-MCNC: 3.1 GM/DL
GLUCOSE SERPL-MCNC: 86 MG/DL (ref 65–99)
HBA1C MFR BLD: 6.8 % (ref 4.8–5.6)
HDLC SERPL-MCNC: 41 MG/DL (ref 40–60)
LDLC SERPL CALC-MCNC: 126 MG/DL (ref 0–100)
POTASSIUM SERPL-SCNC: 4.9 MMOL/L (ref 3.5–5.2)
PROT SERPL-MCNC: 7.8 G/DL (ref 6–8.5)
SODIUM SERPL-SCNC: 139 MMOL/L (ref 136–145)
TRIGL SERPL-MCNC: 141 MG/DL (ref 0–150)
TSH SERPL DL<=0.005 MIU/L-ACNC: 2.01 UIU/ML (ref 0.27–4.2)
VLDLC SERPL CALC-MCNC: 25 MG/DL (ref 5–40)

## 2023-09-22 RX ORDER — PANTOPRAZOLE SODIUM 40 MG/1
40 TABLET, DELAYED RELEASE ORAL DAILY
Qty: 90 TABLET | Refills: 4 | Status: SHIPPED | OUTPATIENT
Start: 2023-09-22

## 2023-10-05 NOTE — TELEPHONE ENCOUNTER
Rx Refill Note  Requested Prescriptions     Pending Prescriptions Disp Refills    metFORMIN ER (GLUCOPHAGE-XR) 500 MG 24 hr tablet [Pharmacy Med Name: metFORMIN HCl  MG Oral Tablet Extended Release 24 Hour] 180 tablet 0     Sig: TAKE 1 TABLET BY MOUTH TWICE DAILY WITH MEALS      Last office visit with prescribing clinician: Visit date not found   Last telemedicine visit with prescribing clinician: 8/1/2023   Next office visit with prescribing clinician: Visit date not found                         Would you like a call back once the refill request has been completed: [] Yes [] No    If the office needs to give you a call back, can they leave a voicemail: [] Yes [] No    Sandrine Gallegos MA  10/05/23, 15:09 EDT

## 2023-10-06 RX ORDER — METFORMIN HYDROCHLORIDE 500 MG/1
TABLET, EXTENDED RELEASE ORAL
Qty: 180 TABLET | Refills: 0 | Status: SHIPPED | OUTPATIENT
Start: 2023-10-06

## 2023-11-08 DIAGNOSIS — J45.40 MODERATE PERSISTENT ASTHMA WITHOUT COMPLICATION: ICD-10-CM

## 2023-11-08 RX ORDER — ALBUTEROL SULFATE 90 UG/1
AEROSOL, METERED RESPIRATORY (INHALATION)
Qty: 36 G | Refills: 0 | Status: SHIPPED | OUTPATIENT
Start: 2023-11-08

## 2023-11-16 RX ORDER — FUROSEMIDE 40 MG/1
TABLET ORAL
Qty: 90 TABLET | Refills: 0 | Status: SHIPPED | OUTPATIENT
Start: 2023-11-16

## 2023-11-16 RX ORDER — LOSARTAN POTASSIUM 50 MG/1
TABLET ORAL
Qty: 90 TABLET | Refills: 0 | Status: SHIPPED | OUTPATIENT
Start: 2023-11-16

## 2023-11-16 RX ORDER — ESCITALOPRAM OXALATE 10 MG/1
TABLET ORAL
Qty: 90 TABLET | Refills: 0 | Status: SHIPPED | OUTPATIENT
Start: 2023-11-16

## 2023-11-16 NOTE — TELEPHONE ENCOUNTER
Rx Refill Note  Requested Prescriptions     Pending Prescriptions Disp Refills    escitalopram (LEXAPRO) 10 MG tablet [Pharmacy Med Name: Escitalopram Oxalate 10 MG Oral Tablet] 90 tablet 0     Sig: Take 1 tablet by mouth once daily    furosemide (LASIX) 40 MG tablet [Pharmacy Med Name: Furosemide 40 MG Oral Tablet] 90 tablet 0     Sig: Take 1 tablet by mouth once daily    losartan (COZAAR) 50 MG tablet [Pharmacy Med Name: Losartan Potassium 50 MG Oral Tablet] 90 tablet 0     Sig: Take 1 tablet by mouth once daily      Last office visit with prescribing clinician: 9/18/2023   Last telemedicine visit with prescribing clinician: 8/1/2023   Next office visit with prescribing clinician: Visit date not found                         Would you like a call back once the refill request has been completed: [] Yes [] No    If the office needs to give you a call back, can they leave a voicemail: [] Yes [] No    Connie Damico MA  11/16/23, 07:21 EST

## 2023-12-18 DIAGNOSIS — J45.40 MODERATE PERSISTENT ASTHMA WITHOUT COMPLICATION: ICD-10-CM

## 2023-12-19 RX ORDER — ALBUTEROL SULFATE 90 UG/1
AEROSOL, METERED RESPIRATORY (INHALATION)
Qty: 36 G | Refills: 0 | Status: SHIPPED | OUTPATIENT
Start: 2023-12-19

## 2024-01-08 RX ORDER — METFORMIN HYDROCHLORIDE 500 MG/1
TABLET, EXTENDED RELEASE ORAL
Qty: 180 TABLET | Refills: 0 | Status: SHIPPED | OUTPATIENT
Start: 2024-01-08

## 2024-02-01 DIAGNOSIS — J45.40 MODERATE PERSISTENT ASTHMA WITHOUT COMPLICATION: ICD-10-CM

## 2024-02-02 RX ORDER — ALBUTEROL SULFATE 90 UG/1
AEROSOL, METERED RESPIRATORY (INHALATION)
Qty: 36 G | Refills: 0 | Status: SHIPPED | OUTPATIENT
Start: 2024-02-02

## 2024-02-22 DIAGNOSIS — J45.40 MODERATE PERSISTENT ASTHMA WITHOUT COMPLICATION: ICD-10-CM

## 2024-02-22 NOTE — TELEPHONE ENCOUNTER
Rx Refill Note  Requested Prescriptions     Pending Prescriptions Disp Refills    albuterol sulfate  (90 Base) MCG/ACT inhaler [Pharmacy Med Name: Albuterol Sulfate  (90 Base) MCG/ACT Inhalation Aerosol Solution] 36 g 0     Sig: INHALE 2 PUFFS BY MOUTH EVERY 4 HOURS AS NEEDED FOR WHEEZING      Last office visit with prescribing clinician: 9/18/2023   Last telemedicine visit with prescribing clinician: Visit date not found   Next office visit with prescribing clinician: Visit date not found                         Would you like a call back once the refill request has been completed: [] Yes [] No    If the office needs to give you a call back, can they leave a voicemail: [] Yes [] No    Sandrine Gallegos MA  02/22/24, 16:06 EST

## 2024-02-23 RX ORDER — ALBUTEROL SULFATE 90 UG/1
AEROSOL, METERED RESPIRATORY (INHALATION)
Qty: 36 G | Refills: 0 | OUTPATIENT
Start: 2024-02-23

## 2024-02-23 NOTE — TELEPHONE ENCOUNTER
See message from Dr. Escamilla, please.  He will need an appt prior to getting any refills.    Thank you

## 2024-02-26 DIAGNOSIS — J45.40 MODERATE PERSISTENT ASTHMA WITHOUT COMPLICATION: ICD-10-CM

## 2024-02-26 RX ORDER — FUROSEMIDE 40 MG/1
TABLET ORAL
Qty: 90 TABLET | Refills: 0 | Status: SHIPPED | OUTPATIENT
Start: 2024-02-26

## 2024-02-26 RX ORDER — LOSARTAN POTASSIUM 50 MG/1
TABLET ORAL
Qty: 90 TABLET | Refills: 0 | Status: SHIPPED | OUTPATIENT
Start: 2024-02-26

## 2024-02-26 RX ORDER — ESCITALOPRAM OXALATE 10 MG/1
TABLET ORAL
Qty: 90 TABLET | Refills: 0 | Status: SHIPPED | OUTPATIENT
Start: 2024-02-26

## 2024-02-26 RX ORDER — ALBUTEROL SULFATE 90 UG/1
AEROSOL, METERED RESPIRATORY (INHALATION)
Qty: 36 G | Refills: 0 | Status: SHIPPED | OUTPATIENT
Start: 2024-02-26

## 2024-02-26 NOTE — TELEPHONE ENCOUNTER
Rx Refill Note  Requested Prescriptions     Pending Prescriptions Disp Refills    albuterol sulfate  (90 Base) MCG/ACT inhaler [Pharmacy Med Name: Albuterol Sulfate  (90 Base) MCG/ACT Inhalation Aerosol Solution] 36 g 0     Sig: INHALE 2 PUFFS BY MOUTH EVERY 4 HOURS AS NEEDED FOR WHEEZING      Last office visit with prescribing clinician: 9/18/2023   Last telemedicine visit with prescribing clinician: Visit date not found   Next office visit with prescribing clinician: Visit date not found                         Would you like a call back once the refill request has been completed: [] Yes [] No    If the office needs to give you a call back, can they leave a voicemail: [] Yes [] No    Sandrine Gallegos MA  02/26/24, 13:14 EST

## 2024-04-09 RX ORDER — METFORMIN HYDROCHLORIDE 500 MG/1
TABLET, EXTENDED RELEASE ORAL
Qty: 180 TABLET | Refills: 0 | OUTPATIENT
Start: 2024-04-09

## 2024-04-16 RX ORDER — METFORMIN HYDROCHLORIDE 500 MG/1
500 TABLET, EXTENDED RELEASE ORAL 2 TIMES DAILY WITH MEALS
Qty: 180 TABLET | Refills: 0 | Status: SHIPPED | OUTPATIENT
Start: 2024-04-16

## 2024-04-16 NOTE — TELEPHONE ENCOUNTER
Caller: Ravinder Robles    Relationship: Self    Best call back number: [phone]    Requested Prescriptions:   Requested Prescriptions     Pending Prescriptions Disp Refills    metFORMIN ER (GLUCOPHAGE-XR) 500 MG 24 hr tablet 180 tablet 0     Sig: Take 1 tablet by mouth 2 (Two) Times a Day With Meals.        Pharmacy where request should be sent: [facility] [address] [place], [place] - [address] [facility] [phone] [facility] [phone]      Last office visit with prescribing clinician: 9/18/2023   Last telemedicine visit with prescribing clinician: Visit date not found   Next office visit with prescribing clinician: 4/22/2024     Additional details provided by patient: STATED THAT THEY ARE OUT OF THE MEDICATION. STATED THAT THEY NEED TO AT LEAST HAVE ENOUGH TO DO UNTIL THEIR VISIT ON 4/22/24.     Does the patient have less than a 3 day supply:  [x] Yes  [] No    Would you like a call back once the refill request has been completed: [] Yes [x] No    If the office needs to give you a call back, can they leave a voicemail: [] Yes [x] No    Alexandra Johnson Rep   04/16/24 15:24 EDT

## 2024-04-22 ENCOUNTER — OFFICE VISIT (OUTPATIENT)
Dept: INTERNAL MEDICINE | Facility: CLINIC | Age: 39
End: 2024-04-22
Payer: COMMERCIAL

## 2024-04-22 VITALS
SYSTOLIC BLOOD PRESSURE: 126 MMHG | HEART RATE: 92 BPM | HEIGHT: 72 IN | RESPIRATION RATE: 16 BRPM | DIASTOLIC BLOOD PRESSURE: 80 MMHG | OXYGEN SATURATION: 98 % | WEIGHT: 315 LBS | BODY MASS INDEX: 42.66 KG/M2 | TEMPERATURE: 96.5 F

## 2024-04-22 DIAGNOSIS — J45.40 MODERATE PERSISTENT ASTHMA WITHOUT COMPLICATION: ICD-10-CM

## 2024-04-22 DIAGNOSIS — E11.65 TYPE 2 DIABETES MELLITUS WITH HYPERGLYCEMIA, WITHOUT LONG-TERM CURRENT USE OF INSULIN: Primary | ICD-10-CM

## 2024-04-22 DIAGNOSIS — E66.01 CLASS 3 SEVERE OBESITY DUE TO EXCESS CALORIES WITHOUT SERIOUS COMORBIDITY WITH BODY MASS INDEX (BMI) OF 45.0 TO 49.9 IN ADULT: ICD-10-CM

## 2024-04-22 PROCEDURE — 99214 OFFICE O/P EST MOD 30 MIN: CPT | Performed by: INTERNAL MEDICINE

## 2024-04-22 RX ORDER — ALBUTEROL SULFATE 90 UG/1
AEROSOL, METERED RESPIRATORY (INHALATION)
Qty: 36 G | Refills: 0 | Status: SHIPPED | OUTPATIENT
Start: 2024-04-22

## 2024-04-22 RX ORDER — TIRZEPATIDE 5 MG/.5ML
INJECTION, SOLUTION SUBCUTANEOUS
COMMUNITY
Start: 2024-03-26 | End: 2024-04-22 | Stop reason: ALTCHOICE

## 2024-04-22 NOTE — PROGRESS NOTES
"Chief Complaint  Hypertension and Diabetes    Subjective        Ravinder Robles presents to [facility]  Hypertension    Diabetes    Presents for med refill and routine follow up of chronic conditions. Overall doing well no huge issues or new symptoms.  Interested in titrating up mounjaro dose. Has had minimal adverse symptoms. One episode of back pain which resolved with BM and mild constipation but responsive to stool softeners.   Discussed mood which is stable  LLE edema stable on current therapies and lifestyle modifications. No claudication. Only notable symptom is hyperpigmentation to dorsal foot.     Objective   Vital Signs:  /80 (BP Location: Left arm, Patient Position: Sitting, Cuff Size: Large Adult)   Pulse 92   Temp 96.5 °F (35.8 °C) (Temporal)   Resp 16   Ht 182.9 cm (72\")   Wt (!) 148 kg (326 lb)   SpO2 98%   BMI 44.21 kg/m²   Estimated body mass index is 44.21 kg/m² as calculated from the following:    Height as of this encounter: 182.9 cm (72\").    Weight as of this encounter: 148 kg (326 lb).             Physical Exam  Vitals reviewed.   Constitutional:       Appearance: Normal appearance. He is obese.   HENT:      Head: Normocephalic and atraumatic.      Nose: Nose normal.      Mouth/Throat:      Mouth: Mucous membranes are moist.      Pharynx: Oropharynx is clear.   Eyes:      Extraocular Movements: Extraocular movements intact.      Conjunctiva/sclera: Conjunctivae normal.      Pupils: Pupils are equal, round, and reactive to light.   Cardiovascular:      Rate and Rhythm: Normal rate and regular rhythm.      Pulses: Normal pulses.      Heart sounds: Normal heart sounds.   Pulmonary:      Effort: Pulmonary effort is normal.      Breath sounds: Normal breath sounds.   Abdominal:      General: Abdomen is flat. Bowel sounds are normal. There is no distension.      Palpations: Abdomen is soft.      Tenderness: There is no abdominal " tenderness.   Musculoskeletal:         General: No tenderness. Normal range of motion.      Cervical back: Normal range of motion.      Left lower leg: No edema.   Skin:     General: Skin is warm and dry.      Capillary Refill: Capillary refill takes less than 2 seconds.      Coloration: Jaundice: LLE dorsal foot hyperpigmentation pinpoint areas nontender.   Neurological:      General: No focal deficit present.      Mental Status: He is alert and oriented to person, place, and time. Mental status is at baseline.        Result Review :          Previous notes reviewed           Assessment and Plan     Diagnoses and all orders for this visit:    1. Type 2 diabetes mellitus with hyperglycemia, without long-term current use of insulin (Primary)  -     Comprehensive Metabolic Panel  -     Hemoglobin A1c  -     Lipid Panel With / Chol / HDL Ratio  -     TSH  -     Lipase    2. Class 3 severe obesity due to excess calories without serious comorbidity with body mass index (BMI) of 45.0 to 49.9 in adult  -     Comprehensive Metabolic Panel  -     Hemoglobin A1c  -     Lipid Panel With / Chol / HDL Ratio  -     TSH  -     Lipase    Other orders  -     Tirzepatide (MOUNJARO) 10 MG/0.5ML solution pen-injector pen; Inject 0.5 mL under the skin into the appropriate area as directed 1 (One) Time Per Week.  Dispense: 2 mL; Refill: 2    Will send increased mounjaro 10mg, provided anticipatory guidance on things to watch for with increased dose.   Lab work today add on lipase as on mounjaro.    Return to clinic 3mos wt check and dose adjust as needed         Follow Up     Return in about 3 months (around 7/22/2024) for Recheck.  Patient was given instructions and counseling regarding his condition or for health maintenance advice. Please see specific information pulled into the AVS if appropriate.     I saw and reviewed the patient with the resident.  We discussed the plan and agree with the above documentation and recommendations.   Nice gentleman here for follow-up of diabetes and obesity.  He has done pretty well on the mounjaro.  Mild nausea after the initial dosing..  Titrate to 10 mg on the mounjaro and cautioned on side effects.  Recheck lab work.  Follow-up 3 months or pending response to the mounjaro.  He has some mild varicosities on the left lower extremity and dorsum of the foot.  I do not feel strongly he needs any venous Doppler.  Recheck periodically

## 2024-04-23 LAB
ALBUMIN SERPL-MCNC: 4.2 G/DL (ref 3.5–5.2)
ALBUMIN/GLOB SERPL: 1.4 G/DL
ALP SERPL-CCNC: 77 U/L (ref 39–117)
ALT SERPL-CCNC: 23 U/L (ref 1–41)
AST SERPL-CCNC: 19 U/L (ref 1–40)
BILIRUB SERPL-MCNC: 0.3 MG/DL (ref 0–1.2)
BUN SERPL-MCNC: 11 MG/DL (ref 6–20)
BUN/CREAT SERPL: 10.9 (ref 7–25)
CALCIUM SERPL-MCNC: 9.2 MG/DL (ref 8.6–10.5)
CHLORIDE SERPL-SCNC: 100 MMOL/L (ref 98–107)
CHOLEST SERPL-MCNC: 161 MG/DL (ref 0–200)
CHOLEST/HDLC SERPL: 4.35 {RATIO}
CO2 SERPL-SCNC: 27.5 MMOL/L (ref 22–29)
CREAT SERPL-MCNC: 1.01 MG/DL (ref 0.76–1.27)
EGFRCR SERPLBLD CKD-EPI 2021: 98.2 ML/MIN/1.73
GLOBULIN SER CALC-MCNC: 3 GM/DL
GLUCOSE SERPL-MCNC: 78 MG/DL (ref 65–99)
HBA1C MFR BLD: 6 % (ref 4.8–5.6)
HDLC SERPL-MCNC: 37 MG/DL (ref 40–60)
LDLC SERPL CALC-MCNC: 110 MG/DL (ref 0–100)
LIPASE SERPL-CCNC: 21 U/L (ref 13–60)
POTASSIUM SERPL-SCNC: 4.1 MMOL/L (ref 3.5–5.2)
PROT SERPL-MCNC: 7.2 G/DL (ref 6–8.5)
SODIUM SERPL-SCNC: 139 MMOL/L (ref 136–145)
TRIGL SERPL-MCNC: 74 MG/DL (ref 0–150)
TSH SERPL DL<=0.005 MIU/L-ACNC: 1.69 UIU/ML (ref 0.27–4.2)
VLDLC SERPL CALC-MCNC: 14 MG/DL (ref 5–40)

## 2024-04-24 RX ORDER — FUROSEMIDE 40 MG/1
TABLET ORAL
Qty: 90 TABLET | Refills: 0 | Status: SHIPPED | OUTPATIENT
Start: 2024-04-24

## 2024-04-24 RX ORDER — ESCITALOPRAM OXALATE 10 MG/1
TABLET ORAL
Qty: 90 TABLET | Refills: 0 | Status: SHIPPED | OUTPATIENT
Start: 2024-04-24

## 2024-04-24 RX ORDER — LOSARTAN POTASSIUM 50 MG/1
TABLET ORAL
Qty: 90 TABLET | Refills: 0 | Status: SHIPPED | OUTPATIENT
Start: 2024-04-24

## 2024-05-16 ENCOUNTER — TELEPHONE (OUTPATIENT)
Dept: INTERNAL MEDICINE | Facility: CLINIC | Age: 39
End: 2024-05-16

## 2024-05-16 NOTE — TELEPHONE ENCOUNTER
"I've scheduled him for Monday with Dr. Escamilla and advised that he go to urgent care should his symptoms worsen, he asked if I could place a request for \"DUONEB\" be sent to his regular pharmacy to help him until his appointment. Please advise  "

## 2024-05-16 NOTE — TELEPHONE ENCOUNTER
Caller: Ravinder Robles    Relationship to patient: Self    Best call back number:     [phone]       Chief complaint: PNEUMONIA FOLLOW UP    Type of visit: OFFICE VISIT    Requested date: 5/16/24 OR 5/17/24    If rescheduling, when is the original appointment:     Additional notes: PATIENT WAS SEEN AT AN URGENT CARE ON 5/11/24 AND WAS DIAGNOSED WITH PNEUMONIA. HE WAS GIVEN A SHOT AND AN SENT HOME WITH A 10 DAY DOSE OF AUGMENTIN AND A Z PACK. HE IS STILL [id number].

## 2024-05-27 ENCOUNTER — PATIENT MESSAGE (OUTPATIENT)
Dept: INTERNAL MEDICINE | Facility: CLINIC | Age: 39
End: 2024-05-27
Payer: COMMERCIAL

## 2024-05-27 RX ORDER — CYCLOBENZAPRINE HCL 10 MG
10 TABLET ORAL 3 TIMES DAILY PRN
Qty: 30 TABLET | Refills: 0 | Status: SHIPPED | OUTPATIENT
Start: 2024-05-27

## 2024-05-27 RX ORDER — PREDNISONE 20 MG/1
TABLET ORAL
Qty: 15 TABLET | Refills: 0 | Status: SHIPPED | OUTPATIENT
Start: 2024-05-27 | End: 2024-06-06

## 2024-05-30 DIAGNOSIS — J45.40 MODERATE PERSISTENT ASTHMA WITHOUT COMPLICATION: ICD-10-CM

## 2024-05-30 RX ORDER — ALBUTEROL SULFATE 90 UG/1
AEROSOL, METERED RESPIRATORY (INHALATION)
Qty: 36 G | Refills: 0 | Status: SHIPPED | OUTPATIENT
Start: 2024-05-30

## 2024-05-30 NOTE — TELEPHONE ENCOUNTER
From: Ravinder Robles  Sent: 5/30/2024 3:08 PM EDT  [facility]  Subject: Swollen Back    I swear im not trying to be a pest but that Mounjaro 7.5 is time sensitive (dont want them to run out). I know this is not ideal for you guys but I appreciate it!

## 2024-06-03 ENCOUNTER — HOSPITAL ENCOUNTER (EMERGENCY)
Facility: HOSPITAL | Age: 39
Discharge: HOME | End: 2024-06-03
Attending: EMERGENCY MEDICINE | Admitting: EMERGENCY MEDICINE
Payer: COMMERCIAL

## 2024-06-03 VITALS
RESPIRATION RATE: 16 BRPM | HEART RATE: 81 BPM | SYSTOLIC BLOOD PRESSURE: 125 MMHG | TEMPERATURE: 99.3 F | DIASTOLIC BLOOD PRESSURE: 78 MMHG | OXYGEN SATURATION: 93 % | HEIGHT: 72 IN | WEIGHT: 300 LBS | BODY MASS INDEX: 40.63 KG/M2

## 2024-06-03 DIAGNOSIS — J18.9 PNEUMONIA OF LEFT LOWER LOBE DUE TO INFECTIOUS ORGANISM: Primary | ICD-10-CM

## 2024-06-03 LAB
ALBUMIN SERPL-MCNC: 3.9 G/DL (ref 3.5–5.2)
ALBUMIN/GLOB SERPL: 1 G/DL
ALP SERPL-CCNC: 74 U/L (ref 39–117)
ALT SERPL W P-5'-P-CCNC: 19 U/L (ref 1–41)
ANION GAP SERPL CALCULATED.3IONS-SCNC: 11.3 MMOL/L (ref 5–15)
AST SERPL-CCNC: 12 U/L (ref 1–40)
B PARAPERT DNA SPEC QL NAA+PROBE: NOT DETECTED
B PERT DNA SPEC QL NAA+PROBE: NOT DETECTED
BASOPHILS # BLD AUTO: 0.02 10*3/MM3 (ref 0–0.2)
BASOPHILS NFR BLD AUTO: 0.1 % (ref 0–1.5)
BILIRUB SERPL-MCNC: 0.4 MG/DL (ref 0–1.2)
BUN SERPL-MCNC: 19 MG/DL (ref 6–20)
BUN/CREAT SERPL: 19.2 (ref 7–25)
C PNEUM DNA NPH QL NAA+NON-PROBE: NOT DETECTED
CALCIUM SPEC-SCNC: 9.6 MG/DL (ref 8.6–10.5)
CHLORIDE SERPL-SCNC: 100 MMOL/L (ref 98–107)
CO2 SERPL-SCNC: 24.7 MMOL/L (ref 22–29)
CREAT SERPL-MCNC: 0.99 MG/DL (ref 0.76–1.27)
D-LACTATE SERPL-SCNC: 1.3 MMOL/L (ref 0.5–2)
DEPRECATED RDW RBC AUTO: 44.6 FL (ref 37–54)
EGFRCR SERPLBLD CKD-EPI 2021: 100.6 ML/MIN/1.73
EOSINOPHIL # BLD AUTO: 0.03 10*3/MM3 (ref 0–0.4)
EOSINOPHIL NFR BLD AUTO: 0.2 % (ref 0.3–6.2)
ERYTHROCYTE [DISTWIDTH] IN BLOOD BY AUTOMATED COUNT: 13.7 % (ref 12.3–15.4)
FLUAV SUBTYP SPEC NAA+PROBE: NOT DETECTED
FLUBV RNA ISLT QL NAA+PROBE: NOT DETECTED
GLOBULIN UR ELPH-MCNC: 4 GM/DL
GLUCOSE SERPL-MCNC: 116 MG/DL (ref 65–99)
HADV DNA SPEC NAA+PROBE: NOT DETECTED
HCOV 229E RNA SPEC QL NAA+PROBE: NOT DETECTED
HCOV HKU1 RNA SPEC QL NAA+PROBE: NOT DETECTED
HCOV NL63 RNA SPEC QL NAA+PROBE: NOT DETECTED
HCOV OC43 RNA SPEC QL NAA+PROBE: NOT DETECTED
HCT VFR BLD AUTO: 45.8 % (ref 37.5–51)
HGB BLD-MCNC: 14.4 G/DL (ref 13–17.7)
HMPV RNA NPH QL NAA+NON-PROBE: NOT DETECTED
HPIV1 RNA ISLT QL NAA+PROBE: NOT DETECTED
HPIV2 RNA SPEC QL NAA+PROBE: NOT DETECTED
HPIV3 RNA NPH QL NAA+PROBE: NOT DETECTED
HPIV4 P GENE NPH QL NAA+PROBE: NOT DETECTED
IMM GRANULOCYTES # BLD AUTO: 0.01 10*3/MM3 (ref 0–0.05)
IMM GRANULOCYTES NFR BLD AUTO: 0.1 % (ref 0–0.5)
LYMPHOCYTES # BLD AUTO: 2.29 10*3/MM3 (ref 0.7–3.1)
LYMPHOCYTES NFR BLD AUTO: 16.4 % (ref 19.6–45.3)
M PNEUMO IGG SER IA-ACNC: NOT DETECTED
MCH RBC QN AUTO: 27.6 PG (ref 26.6–33)
MCHC RBC AUTO-ENTMCNC: 31.4 G/DL (ref 31.5–35.7)
MCV RBC AUTO: 87.7 FL (ref 79–97)
MONOCYTES # BLD AUTO: 0.87 10*3/MM3 (ref 0.1–0.9)
MONOCYTES NFR BLD AUTO: 6.2 % (ref 5–12)
NEUTROPHILS NFR BLD AUTO: 10.72 10*3/MM3 (ref 1.7–7)
NEUTROPHILS NFR BLD AUTO: 77 % (ref 42.7–76)
PLATELET # BLD AUTO: 404 10*3/MM3 (ref 140–450)
PMV BLD AUTO: 9.9 FL (ref 6–12)
POTASSIUM SERPL-SCNC: 4.2 MMOL/L (ref 3.5–5.2)
PROCALCITONIN SERPL-MCNC: 0.03 NG/ML (ref 0–0.25)
PROT SERPL-MCNC: 7.9 G/DL (ref 6–8.5)
RBC # BLD AUTO: 5.22 10*6/MM3 (ref 4.14–5.8)
RHINOVIRUS RNA SPEC NAA+PROBE: NOT DETECTED
RSV RNA NPH QL NAA+NON-PROBE: NOT DETECTED
SARS-COV-2 RNA NPH QL NAA+NON-PROBE: NOT DETECTED
SODIUM SERPL-SCNC: 136 MMOL/L (ref 136–145)
WBC NRBC COR # BLD AUTO: 13.94 10*3/MM3 (ref 3.4–10.8)

## 2024-06-03 PROCEDURE — 80053 COMPREHEN METABOLIC PANEL: CPT | Performed by: EMERGENCY MEDICINE

## 2024-06-03 PROCEDURE — 83605 ASSAY OF LACTIC ACID: CPT | Performed by: EMERGENCY MEDICINE

## 2024-06-03 PROCEDURE — 87040 BLOOD CULTURE FOR BACTERIA: CPT | Performed by: EMERGENCY MEDICINE

## 2024-06-03 PROCEDURE — 93005 ELECTROCARDIOGRAM TRACING: CPT | Performed by: EMERGENCY MEDICINE

## 2024-06-03 PROCEDURE — 36415 COLL VENOUS BLD VENIPUNCTURE: CPT

## 2024-06-03 PROCEDURE — 85025 COMPLETE CBC W/AUTO DIFF WBC: CPT | Performed by: EMERGENCY MEDICINE

## 2024-06-03 PROCEDURE — 99283 EMERGENCY DEPT VISIT LOW MDM: CPT

## 2024-06-03 PROCEDURE — 84145 PROCALCITONIN (PCT): CPT | Performed by: EMERGENCY MEDICINE

## 2024-06-03 PROCEDURE — 0202U NFCT DS 22 TRGT SARS-COV-2: CPT | Performed by: EMERGENCY MEDICINE

## 2024-06-03 RX ORDER — DOXYCYCLINE 100 MG/1
100 CAPSULE ORAL ONCE
Status: COMPLETED | OUTPATIENT
Start: 2024-06-03 | End: 2024-06-03

## 2024-06-03 RX ORDER — CEFDINIR 300 MG/1
300 CAPSULE ORAL ONCE
Status: COMPLETED | OUTPATIENT
Start: 2024-06-03 | End: 2024-06-03

## 2024-06-03 RX ORDER — DOXYCYCLINE 100 MG/1
100 CAPSULE ORAL 2 TIMES DAILY
Qty: 14 CAPSULE | Refills: 0 | Status: SHIPPED | OUTPATIENT
Start: 2024-06-03 | End: 2024-06-10

## 2024-06-03 RX ORDER — CEFDINIR 300 MG/1
300 CAPSULE ORAL 2 TIMES DAILY
Qty: 14 CAPSULE | Refills: 0 | Status: SHIPPED | OUTPATIENT
Start: 2024-06-03 | End: 2024-06-10

## 2024-06-03 RX ORDER — SODIUM CHLORIDE 0.9 % (FLUSH) 0.9 %
10 SYRINGE (ML) INJECTION AS NEEDED
Status: DISCONTINUED | OUTPATIENT
Start: 2024-06-03 | End: 2024-06-03 | Stop reason: HOSPADM

## 2024-06-03 RX ORDER — PREDNISONE 20 MG/1
60 TABLET ORAL ONCE
Status: DISCONTINUED | OUTPATIENT
Start: 2024-06-03 | End: 2024-06-03

## 2024-06-03 RX ADMIN — CEFDINIR 300 MG: 300 CAPSULE ORAL at 17:39

## 2024-06-03 RX ADMIN — DOXYCYCLINE 100 MG: 100 CAPSULE ORAL at 17:39

## 2024-06-03 NOTE — ED PROVIDER NOTES
"Subjective     History provided by:  Patient and medical records    History of Present Illness    Chief complaint: Cough and shortness of breath and chest pain    Location: Lateral left chest pain with inspiration.    Quality/Severity: The patient has a nonproductive cough.  He is short of breath.  He developed sharp pain in his lateral left chest when he breathes.    Timing/Onset: Symptoms started 3 days ago with the cough and shortness of breath.  The pain in the lateral left chest started today.    Modifying Factors: Breathing causes the lateral left chest pain.    Associated symptoms: Reports a temperature of 99.8.  States his throat feels a little scratchy.  Denies a runny nose.    Narrative: The patient is a 38-year-old white male who on May 11 was diagnosed with left lower lobe pneumonia at urgent care.  He was treated with Augmentin and Zithromax and got clinically better.  3 days ago he redeveloped similar symptoms he had before as described above.  The patient's past medical history significant for asthma and hypertension.  He quit smoking 2 years ago.  He is a pharmacy tech at [employer].    Review of Systems  Past Medical History:   Diagnosis Date    Acid reflux     Allergic     Anxiety     Asthma     Bronchitis     Hypertension     Pleurisy 2018    Reactive depression 8/10/2020    Rib fracture     broke 4 ribs    Type 2 diabetes mellitus without complication, without long-term current use of insulin 11/16/2020     /83 (BP Location: Left arm, Patient Position: Lying)   Pulse 83   Temp 99.3 °F (37.4 °C) (Oral)   Resp 16   Ht 182.9 cm (72\")   Wt 136 kg (300 lb)   SpO2 95%   BMI 40.69 kg/m²     Past Medical History:   Diagnosis Date    Acid reflux     Allergic     Anxiety     Asthma     Bronchitis     Hypertension     Pleurisy 2018    Reactive depression 8/10/2020    Rib fracture     broke 4 ribs    Type 2 diabetes mellitus without complication, without long-term current use of insulin 11/16/2020 "       Allergies   Allergen Reactions    Cat Hair Extract Hives and Itching    Lisinopril Cough    Methylprednisolone Itching     Pt can take prednisone        Past Surgical History:   Procedure Laterality Date    RESECTION BONE TUMOR KNEE         Family History   Problem Relation Age of Onset    Diabetes Mother     Lung disease Mother     Lung disease Maternal Grandmother     Cancer Maternal Grandmother         lung    Emphysema Maternal Grandmother        Social History     Socioeconomic History    Marital status:    Tobacco Use    Smoking status: Former     Current packs/day: 0.00     Average packs/day: 1.5 packs/day for 8.0 years (12.0 ttl pk-yrs)     Types: Cigarettes     Start date: 2010     Quit date: 2018     Years since quittin.4    Smokeless tobacco: Never    Tobacco comments:     nicotine gum    Vaping Use    Vaping status: Never Used   Substance and Sexual Activity    Alcohol use: Yes     Alcohol/week: 1.0 standard drink of alcohol     Types: 1 Standard drinks or equivalent per week     Comment: socially     Drug use: No    Sexual activity: Yes     Partners: Female           Objective   Physical Exam  Vitals and nursing note reviewed.   Constitutional:       General: He is not in acute distress.     Appearance: He is well-developed. He is obese. He is not ill-appearing, toxic-appearing or diaphoretic.      Comments: The patient appears healthy in no acute distress.  Review of his vital signs: He is afebrile with a temperature of 99.3, respirations normal 16 with a normal room air oxygen saturation of 97%, heart rate slightly elevated 96, blood pressure 155/83.   HENT:      Head: Normocephalic and atraumatic.      Nose: Nose normal.      Mouth/Throat:      Mouth: Mucous membranes are moist.      Pharynx: Oropharynx is clear.   Eyes:      General: No scleral icterus.        Right eye: No discharge.         Left eye: No discharge.      Pupils: Pupils are equal, round, and reactive to  light.   Neck:      Thyroid: No thyromegaly.      Vascular: No JVD.   Cardiovascular:      Rate and Rhythm: Normal rate and regular rhythm.      Heart sounds: Normal heart sounds. No murmur heard.  Pulmonary:      Effort: Pulmonary effort is normal.      Breath sounds: Normal breath sounds. No decreased breath sounds, wheezing, rhonchi or rales.   Chest:      Chest wall: No tenderness.   Abdominal:      General: Bowel sounds are normal. There is no distension.      Palpations: Abdomen is soft.      Tenderness: There is no abdominal tenderness.   Musculoskeletal:         General: No tenderness or deformity. Normal range of motion.      Cervical back: Normal range of motion and neck supple.   Lymphadenopathy:      Cervical: No cervical adenopathy.   Skin:     General: Skin is warm and dry.      Capillary Refill: Capillary refill takes less than 2 seconds.      Findings: No rash.   Neurological:      General: No focal deficit present.      Mental Status: He is alert and oriented to person, place, and time.      Cranial Nerves: No cranial nerve deficit.      Coordination: Coordination normal.      Comments: No focal motor sensory deficit   Psychiatric:         Mood and Affect: Mood normal.         Behavior: Behavior normal.         Thought Content: Thought content normal.         Judgment: Judgment normal.         Procedures           ED Course  ED Course as of 06/03/24 1732   Mon Jun 03, 2024   1534 My interpretation of the patient's EKG tracing performed at 15: 28 is normal sinus rhythm with a rate of 91, normal axis, normal conduction, no acute ST segment elevation or depression consistent with ischemia, no ectopy, normal R wave transition, normal OH and QT intervals.  Normal EKG. [TP]   1700 Review of the patient's laboratory studies: The patient's CBC has an elevated white count of 13.94 with a left shift.  Hemoglobin, hematocrit and platelets within normal limits.  CMP has normal electrolytes, normal renal and  liver function test.  Lactic acid and procalcitonin are within normal limits.  2 sets of blood cultures are pending.  Respiratory panel is pending. [TP]   1704 The patient is redeveloped symptoms with this left lower lobe infiltrate for 3 days.  I replaced the patient on antibiotics with cefdinir, doxycycline.  The patient is currently taking prednisone for his back, so he was instructed to continue.  The patient is encouraged to follow-up with his PCP Dr. Selby in a week. [TP]   1731 Patient's chest x-ray from earlier today shows a left lower lobe infiltrate and effusion that looks slightly improved in comparison to chest x-ray 5/11/2024. [TP]      ED Course User Index  [TP] Agusto Pond MD                                             Medical Decision Making  My differential diagnosis for dyspnea includes but is not limited to:  Asthma, COPD, pneumonia, pulmonary embolus, acute respiratory distress syndrome, pneumothorax, pleural effusion, pulmonary fibrosis, congestive heart failure, myocardial infarction, DKA, uremia, acidosis, sepsis, anemia, drug related, hyperventilation, CNS disease     Problems Addressed:  Pneumonia of left lower lobe due to infectious organism: complicated acute illness or injury    Amount and/or Complexity of Data Reviewed  Labs: ordered. Decision-making details documented in ED Course.  Radiology:  Decision-making details documented in ED Course.  ECG/medicine tests: ordered and independent interpretation performed. Decision-making details documented in ED Course.    Risk  Prescription drug management.        Final diagnoses:   Pneumonia of left lower lobe due to infectious organism       ED Disposition  ED Disposition       ED Disposition   Discharge    Condition   Stable    Comment   --               Ruiz Escamilla MD (Tony)  [address]  [facility] [address]  [phone]    Schedule an appointment as soon as possible for a visit in 1 week           Medication List     "    New Prescriptions      cefdinir 300 MG capsule  Commonly known as: OMNICEF  Take 1 capsule by mouth 2 (Two) Times a Day for 7 days.     doxycycline 100 MG capsule  Commonly known as: MONODOX  Take 1 capsule by mouth 2 (Two) Times a Day for 7 days.               Where to Get Your Medications        These medications were sent to [id number] - [place], [place] - [address] - [phone]  - [phone]  [address], [place] [address]      Phone: [phone]   cefdinir 300 MG capsule  doxycycline 100 MG capsule           Labs Reviewed   COMPREHENSIVE METABOLIC PANEL - Abnormal; Notable for the following components:       Result Value    Glucose 116 (*)     All other components within normal limits    Narrative:     GFR Normal >60  Chronic Kidney Disease <60  Kidney Failure <15     CBC WITH AUTO DIFFERENTIAL - Abnormal; Notable for the following components:    WBC 13.94 (*)     MCHC 31.4 (*)     Neutrophil % 77.0 (*)     Lymphocyte % 16.4 (*)     Eosinophil % 0.2 (*)     Neutrophils, Absolute 10.72 (*)     All other components within normal limits   LACTIC ACID, PLASMA - Normal   PROCALCITONIN - Normal    Narrative:     As a Marker for Sepsis (Non-Neonates):    1. <0.5 ng/mL represents a low risk of severe sepsis and/or septic shock.  2. >2 ng/mL represents a high risk of severe sepsis and/or septic shock.    As a Marker for Lower Respiratory Tract Infections that require antibiotic therapy:    PCT on Admission    Antibiotic Therapy       6-12 Hrs later    >0.5                Strongly Recommended  >0.25 - <0.5        Recommended   0.1 - 0.25          Discouraged              Remeasure/reassess PCT  <0.1                Strongly Discouraged     Remeasure/reassess PCT    As 28 day mortality risk marker: \"Change in Procalcitonin Result\" (>80% or <=80%) if Day 0 (or Day 1) and Day 4 values are available. Refer to http://www.[employer]-pct-calculator.com    Change in PCT <=80%  A decrease of " PCT levels below or equal to 80% defines a positive change in PCT test result representing a higher risk for 28-day all-cause mortality of patients diagnosed with severe sepsis for septic shock.    Change in PCT >80%  A decrease of PCT levels of more than 80% defines a negative change in PCT result representing a lower risk for 28-day all-cause mortality of patients diagnosed with severe sepsis or septic shock.      BLOOD CULTURE   BLOOD CULTURE   RESPIRATORY PANEL PCR W/ COVID-19 (SARS-COV-2), NP SWAB IN UTM/VTP, 2 HR TAT   CBC AND DIFFERENTIAL    Narrative:     The following orders were created for panel order CBC & Differential.  Procedure                               Abnormality         Status                     ---------                               -----------         ------                     CBC Auto Differential[[id number]]        Abnormal            Final result                 Please view results for these tests on the individual orders.     No orders to display          Medication List        New Prescriptions      cefdinir 300 MG capsule  Commonly known as: OMNICEF  Take 1 capsule by mouth 2 (Two) Times a Day for 7 days.     doxycycline 100 MG capsule  Commonly known as: MONODOX  Take 1 capsule by mouth 2 (Two) Times a Day for 7 days.               Where to Get Your Medications        These medications were sent to [facility] - [place] - [address] - [phone]  - [phone]   [address] [place] [address]      Phone: [phone]   cefdinir 300 MG capsule  doxycycline 100 MG capsule              Agusto Pond MD  06/03/24 6574

## 2024-06-04 LAB
QT INTERVAL: 345 MS
QTC INTERVAL: 426 MS

## 2024-06-08 LAB
BACTERIA SPEC AEROBE CULT: NORMAL
BACTERIA SPEC AEROBE CULT: NORMAL

## 2024-06-11 ENCOUNTER — HOSPITAL ENCOUNTER (OUTPATIENT)
Dept: GENERAL RADIOLOGY | Facility: HOSPITAL | Age: 39
Discharge: HOME | End: 2024-06-11
Admitting: INTERNAL MEDICINE
Payer: COMMERCIAL

## 2024-06-11 ENCOUNTER — OFFICE VISIT (OUTPATIENT)
Dept: INTERNAL MEDICINE | Facility: CLINIC | Age: 39
End: 2024-06-11
Payer: COMMERCIAL

## 2024-06-11 VITALS
DIASTOLIC BLOOD PRESSURE: 82 MMHG | HEART RATE: 94 BPM | BODY MASS INDEX: 40.9 KG/M2 | RESPIRATION RATE: 16 BRPM | HEIGHT: 72 IN | TEMPERATURE: 97.5 F | OXYGEN SATURATION: 97 % | WEIGHT: 302 LBS | SYSTOLIC BLOOD PRESSURE: 122 MMHG

## 2024-06-11 DIAGNOSIS — J90 PLEURAL EFFUSION ON LEFT: ICD-10-CM

## 2024-06-11 DIAGNOSIS — J90 PLEURAL EFFUSION ON LEFT: Primary | ICD-10-CM

## 2024-06-11 PROCEDURE — 99214 OFFICE O/P EST MOD 30 MIN: CPT | Performed by: INTERNAL MEDICINE

## 2024-06-11 PROCEDURE — 71046 X-RAY EXAM CHEST 2 VIEWS: CPT

## 2024-06-11 RX ORDER — PREDNISONE 20 MG/1
TABLET ORAL
COMMUNITY
Start: 2024-06-08

## 2024-06-11 RX ORDER — LEVOFLOXACIN 500 MG/1
500 TABLET, FILM COATED ORAL DAILY
Qty: 10 TABLET | Refills: 0 | Status: SHIPPED | OUTPATIENT
Start: 2024-06-11

## 2024-06-11 NOTE — PROGRESS NOTES
"Chief Complaint  Hospital Follow Up Visit (Pneumonia )    Subjective        Ravinder Robles presents to [facility]  History of Present Illness  Left lower lobe pneumonia with parapneumonic effusion.  He was seen in the emergency department on May 11 with left lower lobe pneumonia and parapneumonic effusion and again on Digna 3 with similar appearance on the x-ray.  He was put on Augmentin initially and then changed over to doxycycline and cefdinir on 3 Digna.  He still having some pain on the left side of the chest wall.  Denies any significant shortness of breath although has some generalized malaise.  On the third his white count was 13.9 with a slight left shift.  No productive sputum production.      Objective   Vital Signs:  /82 (BP Location: Left arm, Patient Position: Sitting, Cuff Size: Large Adult)   Pulse 94   Temp 97.5 °F (36.4 °C) (Temporal)   Resp 16   Ht 182.9 cm (72\")   Wt (!) 137 kg (302 lb)   SpO2 97%   BMI 40.96 kg/m²   Estimated body mass index is 40.96 kg/m² as calculated from the following:    Height as of this encounter: 182.9 cm (72\").    Weight as of this encounter: 137 kg (302 lb).             Physical Exam  Vitals and nursing note reviewed.   Constitutional:       Appearance: Normal appearance.   HENT:      Head: Normocephalic and atraumatic.   Cardiovascular:      Rate and Rhythm: Normal rate and regular rhythm.   Pulmonary:      Effort: Pulmonary effort is normal. No respiratory distress.      Breath sounds: No stridor. Rales present. No wheezing or rhonchi.      Comments: Significantly diminished breath sounds in the left lower lung fields.  E to A changes  Abdominal:      General: Abdomen is flat.      Palpations: Abdomen is soft.   Musculoskeletal:         General: No swelling or deformity.      Cervical back: Neck supple.      Right lower leg: No edema.      Left lower leg: No edema.   Skin:     General: Skin is warm.      " Capillary Refill: Capillary refill takes less than 2 seconds.      Findings: No rash.   Neurological:      General: No focal deficit present.      Mental Status: He is alert and oriented to person, place, and time.        Result Review :          Previous hospital records reviewed           Assessment and Plan     Diagnoses and all orders for this visit:    1. Pleural effusion on left (Primary)  -     XR Chest PA & Lateral; Future    Other orders  -     levoFLOXacin (Levaquin) 500 MG tablet; Take 1 tablet by mouth Daily.  Dispense: 10 tablet; Refill: 0    Persistent left pleural effusion and clinically it does not seem to have changed significantly.  Repeat a chest x-ray now and if no change will need a CT of the chest and thoracentesis at a minimum.  Levaquin prescription given.  Concerned he may need additional workup and potentially thoracoscopy.  He is going to get the chest x-ray tonight and will proceed with workup pending results         Follow Up     No follow-ups on file.  Patient was given instructions and counseling regarding his condition or for health maintenance advice. Please see specific information pulled into the AVS if appropriate.

## 2024-06-12 ENCOUNTER — TELEPHONE (OUTPATIENT)
Dept: INTERNAL MEDICINE | Facility: CLINIC | Age: 39
End: 2024-06-12
Payer: COMMERCIAL

## 2024-06-12 ENCOUNTER — TRANSCRIBE ORDERS (OUTPATIENT)
Dept: INTERNAL MEDICINE | Facility: CLINIC | Age: 39
End: 2024-06-12
Payer: COMMERCIAL

## 2024-06-12 ENCOUNTER — HOSPITAL ENCOUNTER (OUTPATIENT)
Dept: CT IMAGING | Facility: HOSPITAL | Age: 39
Discharge: HOME | End: 2024-06-12
Admitting: INTERNAL MEDICINE
Payer: COMMERCIAL

## 2024-06-12 DIAGNOSIS — J90 PLEURAL EFFUSION ON LEFT: Primary | ICD-10-CM

## 2024-06-12 DIAGNOSIS — J90 PLEURAL EFFUSION, LEFT: Primary | ICD-10-CM

## 2024-06-12 DIAGNOSIS — J90 PLEURAL EFFUSION, LEFT: ICD-10-CM

## 2024-06-12 PROCEDURE — 25510000001 IOPAMIDOL PER 1 ML: Performed by: INTERNAL MEDICINE

## 2024-06-12 PROCEDURE — 71260 CT THORAX DX C+: CPT

## 2024-06-12 RX ADMIN — IOPAMIDOL 100 ML: 755 INJECTION, SOLUTION INTRAVENOUS at 11:39

## 2024-06-12 NOTE — TELEPHONE ENCOUNTER
Okay for hub to read*  I called and left a message for Ravinder. Dr. Escamilla has changed his CT order to STAT. Please try to get Halina

## 2024-06-12 NOTE — TELEPHONE ENCOUNTER
I called and talked to the patient.  Talked with Dr. Mcgraw's office and they are going to see him tomorrow at 145.  CT scan reviewed with patient.

## 2024-06-13 ENCOUNTER — OFFICE VISIT (OUTPATIENT)
Dept: SURGERY | Facility: CLINIC | Age: 39
End: 2024-06-13
Payer: COMMERCIAL

## 2024-06-13 VITALS
OXYGEN SATURATION: 95 % | WEIGHT: 304 LBS | DIASTOLIC BLOOD PRESSURE: 78 MMHG | BODY MASS INDEX: 41.23 KG/M2 | SYSTOLIC BLOOD PRESSURE: 114 MMHG | HEART RATE: 96 BPM

## 2024-06-13 DIAGNOSIS — J90 PLEURAL EFFUSION: Primary | ICD-10-CM

## 2024-06-13 RX ORDER — GABAPENTIN 300 MG/1
300 CAPSULE ORAL 3 TIMES DAILY
Qty: 90 CAPSULE | Refills: 0 | Status: SHIPPED | OUTPATIENT
Start: 2024-06-13 | End: 2024-07-13

## 2024-06-13 RX ORDER — IBUPROFEN 800 MG/1
800 TABLET ORAL EVERY 6 HOURS PRN
Qty: 60 TABLET | Refills: 0 | Status: SHIPPED | OUTPATIENT
Start: 2024-06-13 | End: 2024-07-13

## 2024-06-13 NOTE — PROGRESS NOTES
"Chief Complaint  New patient, left pleural effusion    Subjective        Ravinder Robles presents to [facility]  History of Present Illness    Mr. De La Fuente is a very pleasant 38-year-old gentleman who is seen today in consultation at the request of his PCP, Dr. Escamilla for a left-sided pleural effusion seen on recent CT chest.  Patient was noted to have pneumonia beginning in May 2024 and was seen at urgent care on 5/11/2024 and given antibiotics.  He continued to feel poorly and developed left-sided chest wall pain and went back to urgent care on 6/3/2024 where he was then directed to the ER for further evaluation.  He has been on 5 antibiotics since that time with residual left-sided chest wall pain.  He has some dyspnea with exertion.  Denies fevers night sweats or chills.  No voice change or unintentional weight loss.  His appetite is stable.  His most recent WBC from 6/3/2024 was 13.9.  He is scheduled to get follow-up labs with his PCP either today or tomorrow.    He remains somewhat active in his daily life.  Fatigue and pleuritic/left-sided chest wall pain are his biggest complaints today.  He has never really had a productive or dry cough.  He works as a pharmacy tech.  He presents today with his wife eager to establish a plan.      Objective   Vital Signs:  /78 (BP Location: Left arm, Patient Position: Sitting, Cuff Size: Adult)   Pulse 96   Wt (!) 138 kg (304 lb)   SpO2 95%   BMI 41.23 kg/m²   Estimated body mass index is 41.23 kg/m² as calculated from the following:    Height as of 6/11/24: 182.9 cm (72\").    Weight as of this encounter: 138 kg (304 lb).             Physical Exam  Constitutional:       General: He is not in acute distress.     Appearance: Normal appearance. He is not ill-appearing.      Comments: Nontoxic and well-appearing, but appears fatigued   HENT:      Head: Normocephalic and atraumatic.   Pulmonary:      Effort: Pulmonary effort is " normal. No respiratory distress.   Chest:      Chest wall: Tenderness present.          Comments: Some petechiae below the left breast  Musculoskeletal:         General: Normal range of motion.      Cervical back: Normal range of motion and neck supple.   Skin:     General: Skin is warm and dry.   Neurological:      General: No focal deficit present.      Mental Status: He is alert.      Motor: No weakness.   Psychiatric:         Mood and Affect: Mood normal.         Thought Content: Thought content normal.        Result Review :            I independently reviewed the CT chest with contrast performed on 6/12/2024 which demonstrates a small to moderate area of loculated effusion to the left lower lobe.  No mediastinal or hilar lymphadenopathy.  No acute findings on the right.         Assessment and Plan     Diagnoses and all orders for this visit:    1. Pleural effusion (Primary)  -     CT Chest Without Contrast; Future  -     gabapentin (NEURONTIN) 300 MG capsule; Take 1 capsule by mouth 3 (Three) Times a Day for 30 days.  Dispense: 90 capsule; Refill: 0    Other orders  -     ibuprofen (ADVIL,MOTRIN) 800 MG tablet; Take 1 tablet by mouth Every 6 (Six) Hours As Needed for Mild Pain for up to 30 days.  Dispense: 60 tablet; Refill: 0    Patient's most recent CT chest demonstrates a density within the left chest likely consistent with a loculated pleural effusion given his recent bout of pneumonia.  I reviewed the serial chest x-rays since 5/11/2024 and there is some improvement to this left effusion.  Discussed the case with Dr. Man who recommends continued surveillance.  He is advised to continue steroids and antibiotics as prescribed.  We will order CT chest in 6 weeks for short interval follow-up and have him see us in the office to discuss the results.  Imaging reviewed with patient and his wife today and they are in agreement with this plan.    For his pleuritic pain, I recommend scheduled ibuprofen and a  30-day course of gabapentin as this might help with some intercostal neuralgia.  Patient is also welcome to try topical Voltaren gel and ice/heat as needed for comfort.  Suspect there is a component of pleurisy due to his recent illness.       I spent 61 minutes caring for Ravinder on this date of service. This time includes time spent by me in the following activities:preparing for the visit, reviewing tests, obtaining and/or reviewing a separately obtained history, performing a medically appropriate examination and/or evaluation , counseling and educating the patient/family/caregiver, ordering medications, tests, or procedures, referring and communicating with other health care professionals , documenting information in the medical record, independently interpreting results and communicating that information with the patient/family/caregiver, and care coordination  Follow Up     Return in about 6 weeks (around 7/25/2024) for Next scheduled follow up.  Patient was given instructions and counseling regarding his condition or for health maintenance advice. Please see specific information pulled into the AVS if appropriate.

## 2024-06-14 ENCOUNTER — PATIENT ROUNDING (BHMG ONLY) (OUTPATIENT)
Dept: SURGERY | Facility: CLINIC | Age: 39
End: 2024-06-14
Payer: COMMERCIAL

## 2024-06-15 LAB
BASOPHILS # BLD AUTO: 0.06 10*3/MM3 (ref 0–0.2)
BASOPHILS NFR BLD AUTO: 0.4 % (ref 0–1.5)
CRP SERPL-MCNC: 3.11 MG/DL (ref 0–0.5)
EOSINOPHIL # BLD AUTO: 0.1 10*3/MM3 (ref 0–0.4)
EOSINOPHIL NFR BLD AUTO: 0.6 % (ref 0.3–6.2)
ERYTHROCYTE [DISTWIDTH] IN BLOOD BY AUTOMATED COUNT: 13.3 % (ref 12.3–15.4)
HCT VFR BLD AUTO: 44.3 % (ref 37.5–51)
HGB BLD-MCNC: 14.3 G/DL (ref 13–17.7)
IMM GRANULOCYTES # BLD AUTO: 0.08 10*3/MM3 (ref 0–0.05)
IMM GRANULOCYTES NFR BLD AUTO: 0.5 % (ref 0–0.5)
LYMPHOCYTES # BLD AUTO: 2.08 10*3/MM3 (ref 0.7–3.1)
LYMPHOCYTES NFR BLD AUTO: 12.7 % (ref 19.6–45.3)
MCH RBC QN AUTO: 27.9 PG (ref 26.6–33)
MCHC RBC AUTO-ENTMCNC: 32.3 G/DL (ref 31.5–35.7)
MCV RBC AUTO: 86.4 FL (ref 79–97)
MONOCYTES # BLD AUTO: 1.08 10*3/MM3 (ref 0.1–0.9)
MONOCYTES NFR BLD AUTO: 6.6 % (ref 5–12)
NEUTROPHILS # BLD AUTO: 12.93 10*3/MM3 (ref 1.7–7)
NEUTROPHILS NFR BLD AUTO: 79.2 % (ref 42.7–76)
NRBC BLD AUTO-RTO: 0 /100 WBC (ref 0–0.2)
PLATELET # BLD AUTO: 297 10*3/MM3 (ref 140–450)
RBC # BLD AUTO: 5.13 10*6/MM3 (ref 4.14–5.8)
WBC # BLD AUTO: 16.33 10*3/MM3 (ref 3.4–10.8)

## 2024-06-17 DIAGNOSIS — J90 PLEURAL EFFUSION ON LEFT: Primary | ICD-10-CM

## 2024-06-18 ENCOUNTER — APPOINTMENT (OUTPATIENT)
Dept: GENERAL RADIOLOGY | Facility: HOSPITAL | Age: 39
DRG: 193 | End: 2024-06-18
Payer: COMMERCIAL

## 2024-06-18 ENCOUNTER — HOSPITAL ENCOUNTER (INPATIENT)
Facility: HOSPITAL | Age: 39
LOS: 2 days | Discharge: HOME | DRG: 193 | End: 2024-06-21
Attending: STUDENT IN AN ORGANIZED HEALTH CARE EDUCATION/TRAINING PROGRAM | Admitting: HOSPITALIST
Payer: COMMERCIAL

## 2024-06-18 ENCOUNTER — APPOINTMENT (OUTPATIENT)
Dept: CT IMAGING | Facility: HOSPITAL | Age: 39
DRG: 193 | End: 2024-06-18
Payer: COMMERCIAL

## 2024-06-18 DIAGNOSIS — J90 PLEURAL EFFUSION: ICD-10-CM

## 2024-06-18 DIAGNOSIS — J18.9 PNEUMONIA OF LEFT LOWER LOBE DUE TO INFECTIOUS ORGANISM: Primary | ICD-10-CM

## 2024-06-18 LAB
ALBUMIN SERPL-MCNC: 3.3 G/DL (ref 3.5–5.2)
ALBUMIN/GLOB SERPL: 0.8 G/DL
ALP SERPL-CCNC: 68 U/L (ref 39–117)
ALT SERPL W P-5'-P-CCNC: 19 U/L (ref 1–41)
ANION GAP SERPL CALCULATED.3IONS-SCNC: 12 MMOL/L (ref 5–15)
AST SERPL-CCNC: 8 U/L (ref 1–40)
BASOPHILS # BLD AUTO: 0.06 10*3/MM3 (ref 0–0.2)
BASOPHILS NFR BLD AUTO: 0.3 % (ref 0–1.5)
BILIRUB SERPL-MCNC: 0.5 MG/DL (ref 0–1.2)
BUN SERPL-MCNC: 12 MG/DL (ref 6–20)
BUN/CREAT SERPL: 13.3 (ref 7–25)
CALCIUM SPEC-SCNC: 9.2 MG/DL (ref 8.6–10.5)
CHLORIDE SERPL-SCNC: 98 MMOL/L (ref 98–107)
CO2 SERPL-SCNC: 25 MMOL/L (ref 22–29)
CREAT SERPL-MCNC: 0.9 MG/DL (ref 0.76–1.27)
D-LACTATE SERPL-SCNC: 1.6 MMOL/L (ref 0.5–2)
DEPRECATED RDW RBC AUTO: 40.7 FL (ref 37–54)
EGFRCR SERPLBLD CKD-EPI 2021: 112.8 ML/MIN/1.73
EOSINOPHIL # BLD AUTO: 0.12 10*3/MM3 (ref 0–0.4)
EOSINOPHIL NFR BLD AUTO: 0.7 % (ref 0.3–6.2)
ERYTHROCYTE [DISTWIDTH] IN BLOOD BY AUTOMATED COUNT: 13.1 % (ref 12.3–15.4)
GLOBULIN UR ELPH-MCNC: 4 GM/DL
GLUCOSE BLDC GLUCOMTR-MCNC: 107 MG/DL (ref 70–130)
GLUCOSE SERPL-MCNC: 110 MG/DL (ref 65–99)
HCT VFR BLD AUTO: 44.5 % (ref 37.5–51)
HGB BLD-MCNC: 14.5 G/DL (ref 13–17.7)
HOLD SPECIMEN: NORMAL
HOLD SPECIMEN: NORMAL
IMM GRANULOCYTES # BLD AUTO: 0.06 10*3/MM3 (ref 0–0.05)
IMM GRANULOCYTES NFR BLD AUTO: 0.3 % (ref 0–0.5)
LYMPHOCYTES # BLD AUTO: 4.21 10*3/MM3 (ref 0.7–3.1)
LYMPHOCYTES NFR BLD AUTO: 24.3 % (ref 19.6–45.3)
MCH RBC QN AUTO: 27.6 PG (ref 26.6–33)
MCHC RBC AUTO-ENTMCNC: 32.6 G/DL (ref 31.5–35.7)
MCV RBC AUTO: 84.8 FL (ref 79–97)
MONOCYTES # BLD AUTO: 1.39 10*3/MM3 (ref 0.1–0.9)
MONOCYTES NFR BLD AUTO: 8 % (ref 5–12)
NEUTROPHILS NFR BLD AUTO: 11.49 10*3/MM3 (ref 1.7–7)
NEUTROPHILS NFR BLD AUTO: 66.4 % (ref 42.7–76)
NRBC BLD AUTO-RTO: 0 /100 WBC (ref 0–0.2)
NT-PROBNP SERPL-MCNC: 119 PG/ML (ref 0–450)
PLATELET # BLD AUTO: 309 10*3/MM3 (ref 140–450)
PMV BLD AUTO: 9.6 FL (ref 6–12)
POTASSIUM SERPL-SCNC: 3.7 MMOL/L (ref 3.5–5.2)
PROCALCITONIN SERPL-MCNC: 0.15 NG/ML (ref 0–0.25)
PROT SERPL-MCNC: 7.3 G/DL (ref 6–8.5)
QT INTERVAL: 299 MS
QTC INTERVAL: 392 MS
RBC # BLD AUTO: 5.25 10*6/MM3 (ref 4.14–5.8)
SODIUM SERPL-SCNC: 135 MMOL/L (ref 136–145)
TROPONIN T SERPL HS-MCNC: 9 NG/L
WBC NRBC COR # BLD AUTO: 17.33 10*3/MM3 (ref 3.4–10.8)
WHOLE BLOOD HOLD COAG: NORMAL
WHOLE BLOOD HOLD SPECIMEN: NORMAL

## 2024-06-18 PROCEDURE — 85025 COMPLETE CBC W/AUTO DIFF WBC: CPT

## 2024-06-18 PROCEDURE — 25010000002 PIPERACILLIN SOD-TAZOBACTAM PER 1 G: Performed by: INTERNAL MEDICINE

## 2024-06-18 PROCEDURE — 87040 BLOOD CULTURE FOR BACTERIA: CPT

## 2024-06-18 PROCEDURE — 99285 EMERGENCY DEPT VISIT HI MDM: CPT

## 2024-06-18 PROCEDURE — 84145 PROCALCITONIN (PCT): CPT

## 2024-06-18 PROCEDURE — 25010000002 PIPERACILLIN SOD-TAZOBACTAM PER 1 G: Performed by: HOSPITALIST

## 2024-06-18 PROCEDURE — G0378 HOSPITAL OBSERVATION PER HR: HCPCS

## 2024-06-18 PROCEDURE — 25010000002 PIPERACILLIN SOD-TAZOBACTAM PER 1 G: Performed by: EMERGENCY MEDICINE

## 2024-06-18 PROCEDURE — 36415 COLL VENOUS BLD VENIPUNCTURE: CPT

## 2024-06-18 PROCEDURE — 25810000003 SODIUM CHLORIDE 0.9 % SOLUTION: Performed by: EMERGENCY MEDICINE

## 2024-06-18 PROCEDURE — 80053 COMPREHEN METABOLIC PANEL: CPT

## 2024-06-18 PROCEDURE — 84484 ASSAY OF TROPONIN QUANT: CPT

## 2024-06-18 PROCEDURE — 82948 REAGENT STRIP/BLOOD GLUCOSE: CPT

## 2024-06-18 PROCEDURE — 71250 CT THORAX DX C-: CPT

## 2024-06-18 PROCEDURE — 25810000003 SODIUM CHLORIDE 0.9 % SOLUTION: Performed by: HOSPITALIST

## 2024-06-18 PROCEDURE — 71045 X-RAY EXAM CHEST 1 VIEW: CPT

## 2024-06-18 PROCEDURE — 83880 ASSAY OF NATRIURETIC PEPTIDE: CPT

## 2024-06-18 PROCEDURE — 93010 ELECTROCARDIOGRAM REPORT: CPT | Performed by: INTERNAL MEDICINE

## 2024-06-18 PROCEDURE — 25010000002 VANCOMYCIN 10 G RECONSTITUTED SOLUTION: Performed by: EMERGENCY MEDICINE

## 2024-06-18 PROCEDURE — 93005 ELECTROCARDIOGRAM TRACING: CPT

## 2024-06-18 PROCEDURE — 83605 ASSAY OF LACTIC ACID: CPT

## 2024-06-18 RX ORDER — IBUPROFEN 600 MG/1
1 TABLET ORAL
Status: DISCONTINUED | OUTPATIENT
Start: 2024-06-18 | End: 2024-06-21

## 2024-06-18 RX ORDER — ACETAMINOPHEN 500 MG
1000 TABLET ORAL ONCE
Status: DISCONTINUED | OUTPATIENT
Start: 2024-06-18 | End: 2024-06-18

## 2024-06-18 RX ORDER — HYDROCODONE BITARTRATE AND ACETAMINOPHEN 5; 325 MG/1; MG/1
1 TABLET ORAL EVERY 4 HOURS PRN
Status: DISCONTINUED | OUTPATIENT
Start: 2024-06-18 | End: 2024-06-21

## 2024-06-18 RX ORDER — ESCITALOPRAM OXALATE 10 MG/1
10 TABLET ORAL DAILY
Status: DISCONTINUED | OUTPATIENT
Start: 2024-06-18 | End: 2024-06-21 | Stop reason: HOSPADM

## 2024-06-18 RX ORDER — GABAPENTIN 300 MG/1
300 CAPSULE ORAL 3 TIMES DAILY
Status: DISCONTINUED | OUTPATIENT
Start: 2024-06-18 | End: 2024-06-21 | Stop reason: HOSPADM

## 2024-06-18 RX ORDER — ACETAMINOPHEN 325 MG/1
650 TABLET ORAL EVERY 6 HOURS PRN
Status: DISCONTINUED | OUTPATIENT
Start: 2024-06-18 | End: 2024-06-21

## 2024-06-18 RX ORDER — ALBUTEROL SULFATE 90 UG/1
2 AEROSOL, METERED RESPIRATORY (INHALATION) EVERY 4 HOURS PRN
Status: DISCONTINUED | OUTPATIENT
Start: 2024-06-18 | End: 2024-06-21

## 2024-06-18 RX ORDER — DEXTROSE MONOHYDRATE 25 G/50ML
25 INJECTION, SOLUTION INTRAVENOUS
Status: DISCONTINUED | OUTPATIENT
Start: 2024-06-18 | End: 2024-06-21

## 2024-06-18 RX ORDER — HYDROCODONE BITARTRATE AND ACETAMINOPHEN 5; 325 MG/1; MG/1
1 TABLET ORAL EVERY 6 HOURS PRN
Status: DISCONTINUED | OUTPATIENT
Start: 2024-06-18 | End: 2024-06-18

## 2024-06-18 RX ORDER — SODIUM CHLORIDE 9 MG/ML
50 INJECTION, SOLUTION INTRAVENOUS CONTINUOUS
Status: DISCONTINUED | OUTPATIENT
Start: 2024-06-18 | End: 2024-06-20

## 2024-06-18 RX ORDER — CETIRIZINE HYDROCHLORIDE 10 MG/1
10 TABLET ORAL DAILY
Status: DISCONTINUED | OUTPATIENT
Start: 2024-06-18 | End: 2024-06-21 | Stop reason: HOSPADM

## 2024-06-18 RX ORDER — ACETAMINOPHEN 500 MG
1000 TABLET ORAL ONCE
Status: COMPLETED | OUTPATIENT
Start: 2024-06-18 | End: 2024-06-18

## 2024-06-18 RX ORDER — SODIUM CHLORIDE 0.9 % (FLUSH) 0.9 %
10 SYRINGE (ML) INJECTION AS NEEDED
Status: DISCONTINUED | OUTPATIENT
Start: 2024-06-18 | End: 2024-06-21

## 2024-06-18 RX ORDER — FAMOTIDINE 20 MG/1
20 TABLET, FILM COATED ORAL 2 TIMES DAILY
Status: DISCONTINUED | OUTPATIENT
Start: 2024-06-18 | End: 2024-06-21 | Stop reason: HOSPADM

## 2024-06-18 RX ORDER — INSULIN LISPRO 100 [IU]/ML
2-7 INJECTION, SOLUTION INTRAVENOUS; SUBCUTANEOUS
Status: DISCONTINUED | OUTPATIENT
Start: 2024-06-18 | End: 2024-06-21 | Stop reason: HOSPADM

## 2024-06-18 RX ORDER — LOSARTAN POTASSIUM 50 MG/1
50 TABLET ORAL DAILY
Status: DISCONTINUED | OUTPATIENT
Start: 2024-06-18 | End: 2024-06-18

## 2024-06-18 RX ORDER — PANTOPRAZOLE SODIUM 40 MG/1
40 TABLET, DELAYED RELEASE ORAL
Status: DISCONTINUED | OUTPATIENT
Start: 2024-06-19 | End: 2024-06-18

## 2024-06-18 RX ORDER — GUAIFENESIN 600 MG/1
600 TABLET, EXTENDED RELEASE ORAL EVERY 12 HOURS SCHEDULED
Status: DISCONTINUED | OUTPATIENT
Start: 2024-06-18 | End: 2024-06-19

## 2024-06-18 RX ORDER — NICOTINE POLACRILEX 4 MG
15 LOZENGE BUCCAL
Status: DISCONTINUED | OUTPATIENT
Start: 2024-06-18 | End: 2024-06-21

## 2024-06-18 RX ADMIN — PIPERACILLIN AND TAZOBACTAM 4.5 G: 4; .5 INJECTION, POWDER, FOR SOLUTION INTRAVENOUS at 22:37

## 2024-06-18 RX ADMIN — CETIRIZINE HYDROCHLORIDE 10 MG: 10 TABLET ORAL at 22:36

## 2024-06-18 RX ADMIN — GUAIFENESIN 600 MG: 600 TABLET, EXTENDED RELEASE ORAL at 22:37

## 2024-06-18 RX ADMIN — FAMOTIDINE 20 MG: 20 TABLET, FILM COATED ORAL at 22:38

## 2024-06-18 RX ADMIN — SODIUM CHLORIDE 75 ML/HR: 9 INJECTION, SOLUTION INTRAVENOUS at 20:35

## 2024-06-18 RX ADMIN — VANCOMYCIN HYDROCHLORIDE 2750 MG: 10 INJECTION, POWDER, LYOPHILIZED, FOR SOLUTION INTRAVENOUS at 16:57

## 2024-06-18 RX ADMIN — ESCITALOPRAM OXALATE 10 MG: 10 TABLET, FILM COATED ORAL at 22:36

## 2024-06-18 RX ADMIN — GABAPENTIN 300 MG: 300 CAPSULE ORAL at 22:37

## 2024-06-18 RX ADMIN — HYDROCODONE BITARTRATE AND ACETAMINOPHEN 1 TABLET: 5; 325 TABLET ORAL at 22:37

## 2024-06-18 RX ADMIN — HYDROCODONE BITARTRATE AND ACETAMINOPHEN 1 TABLET: 5; 325 TABLET ORAL at 18:40

## 2024-06-18 RX ADMIN — PIPERACILLIN AND TAZOBACTAM 4.5 G: 4; .5 INJECTION, POWDER, LYOPHILIZED, FOR SOLUTION INTRAVENOUS; PARENTERAL at 16:22

## 2024-06-18 RX ADMIN — SODIUM CHLORIDE, SODIUM LACTATE, POTASSIUM CHLORIDE, AND CALCIUM CHLORIDE 2328 ML: 600; 310; 30; 20 INJECTION, SOLUTION INTRAVENOUS at 14:45

## 2024-06-18 RX ADMIN — ACETAMINOPHEN 1000 MG: 500 TABLET ORAL at 14:45

## 2024-06-18 NOTE — PROGRESS NOTES
[facility] Clinical Pharmacy Services: Piperacillin-Tazobactam Consult    Pt Name: Ravinder Robles   : 1985    Indication: Pneumonia    Relevant clinical data and objective history reviewed:    Past Medical History:   Diagnosis Date    Acid reflux     Allergic     Anxiety     Asthma     Bronchitis     Hypertension     Pleurisy 2018    Reactive depression 8/10/2020    Rib fracture     broke 4 ribs    Type 2 diabetes mellitus without complication, without long-term current use of insulin 2020     Creatinine   Date Value Ref Range Status   2024 0.90 0.76 - 1.27 mg/dL Final   2024 0.99 0.76 - 1.27 mg/dL Final   2024 1.01 0.76 - 1.27 mg/dL Final   2023 0.94 0.76 - 1.27 mg/dL Final   2023 0.81 0.76 - 1.27 mg/dL Final   10/25/2018 0.81 0.76 - 1.27 mg/dL Final     BUN   Date Value Ref Range Status   2024 12 6 - 20 mg/dL Final     Estimated Creatinine Clearance: 158 mL/min (by C-G formula based on SCr of 0.9 mg/dL).    Lab Results   Component Value Date    WBC 17.33 (H) 2024     Temp Readings from Last 3 Encounters:   24 (!) 100.6 °F (38.1 °C) (Tympanic)   24 97.5 °F (36.4 °C) (Temporal)   24 99.3 °F (37.4 °C) (Oral)      Assessment/Plan  Estimated CrCl >20 mL/min at this time; BMI 39.59 kg/m2  Will start piperacillin-tazobactam 4.5 g IV every 8 hours     Pharmacy will continue to follow daily while on piperacillin-tazobactam and adjust as needed. Thank you for this consult.    Dawn Del Valle [facility]  Clinical Pharmacist

## 2024-06-18 NOTE — ED PROVIDER NOTES
EMERGENCY DEPARTMENT ENCOUNTER    Room Number:  N443/1  PCP: Ruiz Escamilla MD (Tony)  Historian: Patient      HPI:  Chief Complaint: Pneumonia  A complete HPI/ROS/PMH/PSH/SH/FH are unobtainable due to: None  Context: Ravinder Robles is a 38 y.o. male who presents to the ED c/o pneumonia.  Patient presents for evaluation of left-sided pneumonia.  Patient states he has been having symptoms for months.  Patient has been on Augmentin, azithromycin, doxycycline, cefdinir.  Patient currently on Levaquin.  Patient has followed up with thoracic surgery and they would like to continue antibiotics.  Patient states symptoms are persisting.  Patient has fever.  Patient has pleuritic chest pain.  Has had cough.  Is currently on an antibiotics.  Has had no vomiting or diarrhea.            PAST MEDICAL HISTORY  Active Ambulatory Problems     Diagnosis Date Noted    Airway hyperreactivity 08/24/2016    Class 3 severe obesity due to excess calories without serious comorbidity with body mass index (BMI) of 45.0 to 49.9 in adult 08/24/2016    Folliculitis 08/24/2016    Wheezing 04/04/2017    Essential hypertension 08/27/2019    Other hyperlipidemia 08/27/2019    Anxiety 12/31/2019    Reactive depression 08/10/2020    Type 2 diabetes mellitus with hyperglycemia, without long-term current use of insulin 11/16/2020    Pedal edema 06/03/2021    Observed sleep apnea 01/21/2022    Snoring 01/21/2022    Hypersomnia 01/21/2022    Non-restorative sleep 01/21/2022    Tonsillar hypertrophy 01/21/2022     Resolved Ambulatory Problems     Diagnosis Date Noted    Sore throat 04/04/2017    Cough 04/04/2017    Elevated AST (SGOT) 08/27/2019    Tobacco use disorder 12/31/2019     Past Medical History:   Diagnosis Date    Acid reflux     Allergic     Asthma     Bronchitis     Hypertension     Pleurisy 2018    Rib fracture     Type 2 diabetes mellitus without complication, without long-term current use of insulin 11/16/2020         PAST  SURGICAL HISTORY  Past Surgical History:   Procedure Laterality Date    RESECTION BONE TUMOR KNEE           FAMILY HISTORY  Family History   Problem Relation Age of Onset    Diabetes Mother     Lung disease Mother     Lung disease Maternal Grandmother     Cancer Maternal Grandmother         lung    Emphysema Maternal Grandmother          SOCIAL HISTORY  Social History     Socioeconomic History    Marital status:    Tobacco Use    Smoking status: Former     Current packs/day: 0.00     Average packs/day: 1.5 packs/day for 8.0 years (12.0 ttl pk-yrs)     Types: Cigarettes     Start date: 2010     Quit date: 2018     Years since quittin.4    Smokeless tobacco: Never    Tobacco comments:     nicotine gum    Vaping Use    Vaping status: Never Used   Substance and Sexual Activity    Alcohol use: Yes     Alcohol/week: 1.0 standard drink of alcohol     Types: 1 Standard drinks or equivalent per week     Comment: socially     Drug use: No    Sexual activity: Yes     Partners: Female         ALLERGIES  Cat hair extract, Lisinopril, and Methylprednisolone        REVIEW OF SYSTEMS  Review of Systems   Fever cough pleuritic chest pain      PHYSICAL EXAM  ED Triage Vitals   Temp Heart Rate Resp BP SpO2   24 1419 24 1419 24 1419 24 1421 24 1419   (!) 100.6 °F (38.1 °C) (!) 122 16 127/77 97 %      Temp src Heart Rate Source Patient Position BP Location FiO2 (%)   24 1419 24 1419 24 1421 24 1421 --   Tympanic Monitor Sitting Left arm        Physical Exam      GENERAL: no acute distress  HENT: nares patent  EYES: no scleral icterus  CV: regular rhythm, normal rate  RESPIRATORY: normal effort  ABDOMEN: soft  MUSCULOSKELETAL: no deformity  NEURO: alert, moves all extremities, follows commands  PSYCH:  calm, cooperative  SKIN: warm, dry    Vital signs and nursing notes reviewed.          LAB RESULTS  Recent Results (from the past 24 hour(s))   Comprehensive  Metabolic Panel    Collection Time: 06/18/24  2:40 PM    Specimen: Blood   Result Value Ref Range    Glucose 110 (H) 65 - 99 mg/dL    BUN 12 6 - 20 mg/dL    Creatinine 0.90 0.76 - 1.27 mg/dL    Sodium 135 (L) 136 - 145 mmol/L    Potassium 3.7 3.5 - 5.2 mmol/L    Chloride 98 98 - 107 mmol/L    CO2 25.0 22.0 - 29.0 mmol/L    Calcium 9.2 8.6 - 10.5 mg/dL    Total Protein 7.3 6.0 - 8.5 g/dL    Albumin 3.3 (L) 3.5 - 5.2 g/dL    ALT (SGPT) 19 1 - 41 U/L    AST (SGOT) 8 1 - 40 U/L    Alkaline Phosphatase 68 39 - 117 U/L    Total Bilirubin 0.5 0.0 - 1.2 mg/dL    Globulin 4.0 gm/dL    A/G Ratio 0.8 g/dL    BUN/Creatinine Ratio 13.3 7.0 - 25.0    Anion Gap 12.0 5.0 - 15.0 mmol/L    eGFR 112.8 >60.0 mL/min/1.73   BNP    Collection Time: 06/18/24  2:40 PM    Specimen: Blood   Result Value Ref Range    proBNP 119.0 0.0 - 450.0 pg/mL   Single High Sensitivity Troponin T    Collection Time: 06/18/24  2:40 PM    Specimen: Blood   Result Value Ref Range    HS Troponin T 9 <22 ng/L   Lactic Acid, Plasma    Collection Time: 06/18/24  2:40 PM    Specimen: Blood   Result Value Ref Range    Lactate 1.6 0.5 - 2.0 mmol/L   Procalcitonin    Collection Time: 06/18/24  2:40 PM    Specimen: Blood   Result Value Ref Range    Procalcitonin 0.15 0.00 - 0.25 ng/mL   Green Top (Gel)    Collection Time: 06/18/24  2:40 PM   Result Value Ref Range    Extra Tube Hold for add-ons.    Lavender Top    Collection Time: 06/18/24  2:40 PM   Result Value Ref Range    Extra Tube hold for add-on    Gold Top - SST    Collection Time: 06/18/24  2:40 PM   Result Value Ref Range    Extra Tube Hold for add-ons.    Light Blue Top    Collection Time: 06/18/24  2:40 PM   Result Value Ref Range    Extra Tube Hold for add-ons.    CBC Auto Differential    Collection Time: 06/18/24  2:40 PM    Specimen: Blood   Result Value Ref Range    WBC 17.33 (H) 3.40 - 10.80 10*3/mm3    RBC 5.25 4.14 - 5.80 10*6/mm3    Hemoglobin 14.5 13.0 - 17.7 g/dL    Hematocrit 44.5 37.5 - 51.0 %     MCV 84.8 79.0 - 97.0 fL    MCH 27.6 26.6 - 33.0 pg    MCHC 32.6 31.5 - 35.7 g/dL    RDW 13.1 12.3 - 15.4 %    RDW-SD 40.7 37.0 - 54.0 fl    MPV 9.6 6.0 - 12.0 fL    Platelets 309 140 - 450 10*3/mm3    Neutrophil % 66.4 42.7 - 76.0 %    Lymphocyte % 24.3 19.6 - 45.3 %    Monocyte % 8.0 5.0 - 12.0 %    Eosinophil % 0.7 0.3 - 6.2 %    Basophil % 0.3 0.0 - 1.5 %    Immature Grans % 0.3 0.0 - 0.5 %    Neutrophils, Absolute 11.49 (H) 1.70 - 7.00 10*3/mm3    Lymphocytes, Absolute 4.21 (H) 0.70 - 3.10 10*3/mm3    Monocytes, Absolute 1.39 (H) 0.10 - 0.90 10*3/mm3    Eosinophils, Absolute 0.12 0.00 - 0.40 10*3/mm3    Basophils, Absolute 0.06 0.00 - 0.20 10*3/mm3    Immature Grans, Absolute 0.06 (H) 0.00 - 0.05 10*3/mm3    nRBC 0.0 0.0 - 0.2 /100 WBC   ECG 12 Lead ED Triage Standing Order; SOA    Collection Time: 06/18/24  2:48 PM   Result Value Ref Range    QT Interval 299 ms    QTC Interval 392 ms       Ordered the above labs and reviewed the results.        RADIOLOGY  XR Chest 1 View    Result Date: 6/18/2024  XR CHEST 1 VW-  HISTORY: Male who is 38 years-old, short of breath  TECHNIQUE: Frontal view of the chest  COMPARISON: 6/11/2024  FINDINGS: The heart size is borderline. Pulmonary vasculature is unremarkable. Persistent opacity at the left mid to lower hemithorax, corresponding to loculated fluid and atelectasis on the CT from 6/12/2024, appears mildly increased, continued follow-up advised. No pneumothorax. No acute osseous process.      As described.  This report was finalized on 6/18/2024 3:09 PM by Dr. Hank Alexander M.D on Workstation: [id number]       Ordered the above noted radiological studies.  Chest x-ray independently interpreted by me and does show pneumonia with pleural effusion left side          PROCEDURES  Procedures          MEDICATIONS GIVEN IN ER  Medications   sodium chloride 0.9 % flush 10 mL (has no administration in time range)   vancomycin 2750 mg/500 mL 0.9% NS IVPB ([facility]) (2,750 mg  Intravenous New Bag 6/18/24 1657)   Pharmacy to Dose Zosyn (has no administration in time range)   Pharmacy to dose vancomycin (has no administration in time range)   sodium chloride 0.9 % infusion (has no administration in time range)   albuterol sulfate HFA (PROVENTIL HFA;VENTOLIN HFA;PROAIR HFA) inhaler 2 puff (has no administration in time range)   escitalopram (LEXAPRO) tablet 10 mg (has no administration in time range)   gabapentin (NEURONTIN) capsule 300 mg (has no administration in time range)   famotidine (PEPCID) tablet 20 mg (has no administration in time range)   cetirizine (zyrTEC) tablet 10 mg (has no administration in time range)   guaiFENesin (MUCINEX) 12 hr tablet 600 mg (has no administration in time range)   dextrose (GLUTOSE) oral gel 15 g (has no administration in time range)   dextrose (D50W) (25 g/50 mL) IV injection 25 g (has no administration in time range)   glucagon (GLUCAGEN) injection 1 mg (has no administration in time range)   insulin lispro (HUMALOG/ADMELOG) injection 2-7 Units ( Subcutaneous Not Given 6/18/24 1841)   acetaminophen (TYLENOL) tablet 650 mg (has no administration in time range)   HYDROcodone-acetaminophen (NORCO) 5-325 MG per tablet 1 tablet (1 tablet Oral Given 6/18/24 1840)   piperacillin-tazobactam (ZOSYN) 4.5 g IVPB in 100 mL NS MBP (CD) (has no administration in time range)   acetaminophen (TYLENOL) tablet 1,000 mg (1,000 mg Oral Given 6/18/24 1445)   sepsis fluid LR bolus 2,328 mL (2,328 mL Intravenous New Bag 6/18/24 1445)   piperacillin-tazobactam (ZOSYN) 4.5 g IVPB in 100 mL NS MBP (CD) (0 g Intravenous Stopped 6/18/24 1657)                   MEDICAL DECISION MAKING, PROGRESS, and CONSULTS    All labs have been independently reviewed by me.  All radiology studies have been reviewed by me and I have also reviewed the radiology report.   EKG's independently viewed and interpreted by me.  Discussion below represents my analysis of pertinent findings related to  patient's condition, differential diagnosis, treatment plan and final disposition.      Additional sources:  - Discussed/ obtained information from independent historians: None    - External (non-ED) record review: Epic reviewed patient seen by thoracic surgery 6/13/2024    - Chronic or social conditions impacting care: None     - Shared decision making: None      Orders placed during this visit:  Orders Placed This Encounter   Procedures    Blood Culture - Blood,    Blood Culture - Blood,    MRSA Screen, PCR (Inpatient) - Swab, Nares    XR Chest 1 View    CT Chest Without Contrast Diagnostic    [place] Draw    Comprehensive Metabolic Panel    BNP    Single High Sensitivity Troponin T    Lactic Acid, Plasma    Procalcitonin    CBC Auto Differential    Comprehensive Metabolic Panel    BNP    TSH    Lipid Panel    Hemoglobin A1c    Diet: Regular/House; Fluid Consistency: Thin (IDDSI 0)    Undress & Gown    Continuous Pulse Oximetry    Vital Signs    Verify & Document Antipyretic Administration    Reassess & Document Temperature 30-60 Minutes After Antipyretic Administration    Place Sequential Compression Device    Maintain Sequential Compression Device    Activity As Tolerated    Code Status and Medical Interventions:    Inpatient Pulmonology Consult    Inpatient Thoracic Surgery Consult    Inpatient Infectious Diseases Consult    Oxygen Therapy- Nasal Cannula; Titrate 1-6 LPM Per SpO2; 90 - 95%    POC Glucose 4x Daily Before Meals & at Bedtime    ECG 12 Lead ED Triage Standing Order; SOA    Telemetry Scan    Insert Peripheral IV    Initiate Observation Status    CBC & Differential    Green Top (Gel)    Lavender Top    Gold Top - SST    Light Blue Top    CBC & Differential         Additional orders considered but not ordered:  None        Differential diagnosis includes but is not limited to:    Pneumonia versus pleural effusion versus empyema      Independent interpretation of labs, radiology studies, and  discussions with consultants:  ED Course as of 06/18/24 1842   [facility]   8541 First look:    38-year-old male with history of diabetes and hypertension presenting for evaluation of fever and cough.  Patient first diagnosed with pneumonia 1 month ago and has been on 5 separate rounds of outpatient antibiotics without resolution of symptoms.  Patient states he has ongoing shortness of breath but denies productive cough.  Patient does meet sepsis criteria with tachycardia and fever.  Patient to receive sepsis bolus, Tylenol.  Laboratory evaluation including blood cultures and lactic acid initiated as well as chest x-ray. [MW]   1550 15:50 EDT  Patient with recurrent pneumonia and loculated pleural effusion.  Patient white blood cell count elevating.  Patient continues to have fever despite antibiotics.  Discussed with Dr. Young who will admit patient.  Zosyn and vancomycin.  May need drainage of the loculated fluid collection. [SL]      ED Course User Index  [MW] Samuel Mendez MD  [SL] Saúl Hutchison MD                 DIAGNOSIS  Final diagnoses:   Pneumonia of left lower lobe due to infectious organism   Pleural effusion         DISPOSITION  admit            Latest Documented Vital Signs:  As of 18:42 EDT  BP- 137/76 HR- 97 Temp- (!) 100.6 °F (38.1 °C) (Tympanic) O2 sat- 95%              --    Please note that portions of this were completed with a voice recognition program.       Note Disclaimer: At [facility], we believe that sharing information builds trust and better relationships. You are receiving this note because you are receiving care at [facility] or recently visited. It is possible you will see health information before a provider has talked with you about it. This kind of information can be easy to misunderstand. To help you fully understand what it means for your health, we urge you to discuss this note with your provider.            Saúl Hutchison MD  06/18/24 4731

## 2024-06-18 NOTE — PLAN OF CARE
Goal Outcome Evaluation:  Plan of Care Reviewed With: patient        Progress: no change  Outcome Evaluation: VSS; A & O X4; complaints of left side chest pain; pulmonology, infectious disease, and cardiothoracic surgeon consulted; Ct of chest this evening; safety maintained

## 2024-06-18 NOTE — ED NOTES
"Nursing report ED to floor  Ravinder Robles  38 y.o.  male    HPI :  HPI (Adult)  Stated Reason for Visit: Pt to ED via PV accompanied by spouse. Pt reports hypotension and fatigue earlier today with BPs 80s systolic at home. Pt reports continuous PNA since 5/11/24 with several oral Abx treatment regiments at home without improvement.  History Obtained From: patient, family    Chief Complaint  Chief Complaint   Patient presents with    Fatigue    Fever       Admitting doctor:   Jeovanny Young MD    Admitting diagnosis:   The primary encounter diagnosis was Pneumonia of left lower lobe due to infectious organism. A diagnosis of Pleural effusion was also pertinent to this visit.    Code status:   Current Code Status       Date Active Code Status Order ID Comments User Context       Not on file            Allergies:   Cat hair extract, Lisinopril, and Methylprednisolone    Isolation:   No active isolations    Intake and Output  No intake or output data in the 24 hours ending 06/18/24 1636    Weight:       06/18/24  1441   Weight: 132 kg (291 lb 14.2 oz)       Most recent vitals:   Vitals:    06/18/24 1455 06/18/24 1527 06/18/24 1620 06/18/24 1621   BP:   117/63    BP Location:       Patient Position:       Pulse:  96  96   Resp:       Temp:       TempSrc:       SpO2:  97%  96%   Weight:       Height: 182.9 cm (72\")          Active LDAs/IV Access:   Lines, Drains & Airways       Active LDAs       Name Placement date Placement time Site Days    Peripheral IV 06/18/24 1445 Anterior;Right Forearm 06/18/24  1445  Forearm  less than 1                    Labs (abnormal labs have a star):   Labs Reviewed   CBC WITH AUTO DIFFERENTIAL - Abnormal; Notable for the following components:       Result Value    WBC 17.33 (*)     Neutrophils, Absolute 11.49 (*)     Lymphocytes, Absolute 4.21 (*)     Monocytes, Absolute 1.39 (*)     Immature Grans, Absolute 0.06 (*)     All other components within normal limits   LACTIC ACID, PLASMA - " "Normal   PROCALCITONIN - Normal    Narrative:     As a Marker for Sepsis (Non-Neonates):    1. <0.5 ng/mL represents a low risk of severe sepsis and/or septic shock.  2. >2 ng/mL represents a high risk of severe sepsis and/or septic shock.    As a Marker for Lower Respiratory Tract Infections that require antibiotic therapy:    PCT on Admission    Antibiotic Therapy       6-12 Hrs later    >0.5                Strongly Recommended  >0.25 - <0.5        Recommended   0.1 - 0.25          Discouraged              Remeasure/reassess PCT  <0.1                Strongly Discouraged     Remeasure/reassess PCT    As 28 day mortality risk marker: \"Change in Procalcitonin Result\" (>80% or <=80%) if Day 0 (or Day 1) and Day 4 values are available. Refer to http://www.[facility] [place]pct[facility]calculator.[facility]    Change in PCT <=80%  A decrease of PCT levels below or equal to 80% defines a positive change in PCT test result representing a higher risk for 28-day all-cause mortality of patients diagnosed with severe sepsis for septic shock.    Change in PCT >80%  A decrease of PCT levels of more than 80% defines a negative change in PCT result representing a lower risk for 28-day all-cause mortality of patients diagnosed with severe sepsis or septic shock.      BLOOD CULTURE   BLOOD CULTURE   RAINBOW DRAW    Narrative:     The following orders were created for panel order [place] Draw.  Procedure                               Abnormality         Status                     ---------                               -----------         ------                     Green Top (Gel)[[id number]]                                  Final result               Lavender Top[[id number]]                                     Final result               Gold Top - SST[[id number]]                                   Final result               Light Blue Top[[id number]]                                   Final result                 Please view results for these tests on the " individual orders.   COMPREHENSIVE METABOLIC PANEL   BNP (IN-HOUSE)   SINGLE HS TROPONIN T   CBC AND DIFFERENTIAL    Narrative:     The following orders were created for panel order CBC & Differential.  Procedure                               Abnormality         Status                     ---------                               -----------         ------                     CBC Auto Differential[[id number]]        Abnormal            Final result                 Please view results for these tests on the individual orders.   GREEN TOP   LAVENDER TOP   GOLD TOP - SST   LIGHT BLUE TOP       EKG:   ECG 12 Lead ED Triage Standing Order; SOA   Final Result   HEART RATE= 103  bpm   RR Interval= 583  ms   MT Interval= 125  ms   P Horizontal Axis= -20  deg   P Front Axis= 43  deg   QRSD Interval= 89  ms   QT Interval= 299  ms   QTcB= 392  ms   QRS Axis= 31  deg   T Wave Axis= 35  deg   - BORDERLINE ECG -   Sinus tachycardia   Probable left atrial enlargement   No change from previous tracing   Electronically Signed By: Trey Powell ([facility]) 18-Jun-2024 16:04:15   Date and Time of Study: 2024-06-18 14:48:47          Meds given in ED:   Medications   sodium chloride 0.9 % flush 10 mL (has no administration in time range)   vancomycin 2750 mg/500 mL 0.9% NS IVPB ([facility]) (has no administration in time range)   piperacillin-tazobactam (ZOSYN) 4.5 g IVPB in 100 mL NS MBP (CD) (4.5 g Intravenous New Bag 6/18/24 1622)   acetaminophen (TYLENOL) tablet 1,000 mg (1,000 mg Oral Given 6/18/24 1445)   sepsis fluid LR bolus 2,328 mL (2,328 mL Intravenous New Bag 6/18/24 1445)       Imaging results:  XR Chest 1 View    Result Date: 6/18/2024  As described.  This report was finalized on 6/18/2024 3:09 PM by Dr. Hank Alexander M.D on Workstation: [id number]       Ambulatory status:   - adlib    Social issues:   Social History     Socioeconomic History    Marital status:    Tobacco Use    Smoking status: Former     Current packs/day:  0.00     Average packs/day: 1.5 packs/day for 8.0 years (12.0 ttl pk-yrs)     Types: Cigarettes     Start date: 2010     Quit date: 2018     Years since quittin.4    Smokeless tobacco: Never    Tobacco comments:     nicotine gum    Vaping Use    Vaping status: Never Used   Substance and Sexual Activity    Alcohol use: Yes     Alcohol/week: 1.0 standard drink of alcohol     Types: 1 Standard drinks or equivalent per week     Comment: socially     Drug use: No    Sexual activity: Yes     Partners: Female       Peripheral Neurovascular  Peripheral Neurovascular (Adult)  Peripheral Neurovascular WDL: WDL    Neuro Cognitive  Neuro Cognitive (Adult)  Cognitive/Neuro/Behavioral WDL: WDL    Learning  Learning Assessment (Adult)  Learning Readiness and Ability: no barriers identified    Respiratory  Respiratory (Adult)  Airway WDL: WDL  Respiratory WDL  Respiratory WDL: .WDL except, cough  Cough Frequency: infrequent    Abdominal Pain       Pain Assessments  Pain (Adult)  (0-10) Pain Rating: Rest: 6  Pain Location: chest  Pain Side/Orientation: left  Response to Pain Interventions: nonverbal indicators absent/decreased    NIH Stroke Scale       Marie Rodriguez RN  24 16:36 EDT

## 2024-06-18 NOTE — CONSULTS
Group: [facility]         CONSULT NOTE    Patient Identification:  Ravinder Robles  38 y.o.  male  1985  [id number]            Requesting physician: Dr Young    Reason for Consultation: Pleural effusion     History of Present Illness:  Ravinder Robles is a 38 year old male with past medical history asthma, bronchitis, GERD, diabetes mellitus 2, HTN who we have been asked to evaluate for pleural effusion.  Patient has been seen on an outpatient basis per his PCP and  Thoracic Surgery for left-sided pleural effusion.  Appears pleural effusion has been present since 5/11/2024, was written prescription for oral Levaquin on 6/11 for 10 days.  Referred to thoracic surgery, seen on 6/13 in the office by Chioma PERALTA, instructed to continue oral antibiotics and steroids with repeat CT chest in 6 weeks.  He presented to the ED today for ongoing exertional dyspnea, fever, chills, cough, pleuritic type pain.  He states he has been on at least 5 different oral antibiotics as an outpatient since May without improvement.  ED workup revealed persistent and increasing leukocytosis, procalcitonin.  Lactate negative.  CXR revealed increase in left pleural effusion when compared to films from March.  Patient febrile here, Tmax 100.6    Patient has ex-cigarette smoker, 1.5 PPD x 8 years, quit in 2018.  He does use albuterol inhaler occasionally.      Review of Systems  CONSTITUTIONAL: Positive for fever, chills  EYE:  No new vision changes  EAR:  No change in hearing  CARDIAC:  No chest pain  PULMONARY:  Positive for shortness of breath worsening with exertion, cough  GI:  No diarrhea, hematemesis or hematochezia  RENAL:  No dysuria or urinary frequency  MUSCULOSKELETAL:  No musculoskeletal complaints  ENDOCRINE:  No heat or cold intolerance  INTEGUMENTARY: No skin rashes  NEUROLOGICAL:  No dizziness or confusion.  No seizure activity  PSYCHIATRIC:  No new anxiety or depression  12 system review of systems  performed and all else negative     Past Medical History:  Past Medical History:   Diagnosis Date    Acid reflux     Allergic     Anxiety     Asthma     Bronchitis     Hypertension     Pleurisy 2018    Reactive depression 8/10/2020    Rib fracture     broke 4 ribs    Type 2 diabetes mellitus without complication, without long-term current use of insulin 11/16/2020       Past Surgical History:  Past Surgical History:   Procedure Laterality Date    RESECTION BONE TUMOR KNEE          Home Meds:  Medications Prior to Admission   Medication Sig Dispense Refill Last Dose    albuterol sulfate  (90 Base) MCG/ACT inhaler INHALE 2 PUFFS BY MOUTH EVERY 4 HOURS AS NEEDED FOR WHEEZING 36 g 0 6/18/2024    escitalopram (LEXAPRO) 10 MG tablet Take 1 tablet by mouth once daily 90 tablet 0 6/17/2024    furosemide (LASIX) 40 MG tablet Take 1 tablet by mouth once daily 90 tablet 0 6/17/2024    gabapentin (NEURONTIN) 300 MG capsule Take 1 capsule by mouth 3 (Three) Times a Day for 30 days. 90 capsule 0 6/17/2024    levoFLOXacin (Levaquin) 500 MG tablet Take 1 tablet by mouth Daily. 10 tablet 0 6/17/2024    losartan (COZAAR) 50 MG tablet Take 1 tablet by mouth once daily 90 tablet 0 6/17/2024    metFORMIN ER (GLUCOPHAGE-XR) 500 MG 24 hr tablet Take 1 tablet by mouth 2 (Two) Times a Day With Meals. 180 tablet 0 6/17/2024    cyclobenzaprine (FLEXERIL) 10 MG tablet Take 1 tablet by mouth three times daily as needed for muscle spasm 30 tablet 0     ibuprofen (ADVIL,MOTRIN) 800 MG tablet Take 1 tablet by mouth Every 6 (Six) Hours As Needed for Mild Pain for up to 30 days. 60 tablet 0     omeprazole (priLOSEC) 20 MG capsule Take 1 capsule by mouth Daily As Needed.       pantoprazole (Protonix) 40 MG EC tablet Take 1 tablet by mouth Daily. 90 tablet 4     Tirzepatide (Mounjaro) 7.5 MG/0.5ML solution pen-injector pen Inject 0.5 mL under the skin into the appropriate area as directed 1 (One) Time Per Week. 2 mL 2     Tirzepatide (MOUNJARO)  "7.5 MG/0.5ML solution pen-injector pen Inject 0.5 mL under the skin into the appropriate area as directed 1 (One) Time Per Week.          Allergies:  Allergies   Allergen Reactions    Cat Hair Extract Hives and Itching    Lisinopril Cough    Methylprednisolone Itching     Pt can take prednisone        Social History:   Social History     Socioeconomic History    Marital status:    Tobacco Use    Smoking status: Former     Current packs/day: 0.00     Average packs/day: 1.5 packs/day for 8.0 years (12.0 ttl pk-yrs)     Types: Cigarettes     Start date: 2010     Quit date: 2018     Years since quittin.4    Smokeless tobacco: Never    Tobacco comments:     nicotine gum    Vaping Use    Vaping status: Never Used   Substance and Sexual Activity    Alcohol use: Yes     Alcohol/week: 1.0 standard drink of alcohol     Types: 1 Standard drinks or equivalent per week     Comment: socially     Drug use: No    Sexual activity: Yes     Partners: Female       Family History:  Family History   Problem Relation Age of Onset    Diabetes Mother     Lung disease Mother     Lung disease Maternal Grandmother     Cancer Maternal Grandmother         lung    Emphysema Maternal Grandmother        Physical Exam:  /76   Pulse 97   Temp (!) 100.6 °F (38.1 °C) (Tympanic)   Resp 16   Ht 182.9 cm (72\")   Wt 132 kg (291 lb 14.2 oz)   SpO2 95%   BMI 39.59 kg/m²  Body mass index is 39.59 kg/m². 95% 132 kg (291 lb 14.2 oz)    Physical Exam  Constitutional: Young male pt lying in bed, in no acute distress, on room air   Head: NCAT  Eyes: No pallor, anicteric conjunctiva, EOMI. PERRLA.  ENMT:  No oral thrush. Moist MM.   NECK: Trachea midline, no thyromegaly, no JVD  Heart: Regular rhythm, tachycardic rate, no pedal edema.  Lungs: DONALD +, diminished left lower lobe, no wheezing or crackles   Abdomen: Soft, obese, nondistended.  No tenderness, guarding or rigidity. No palpable masses.  Extremities: Extremities warm and well " perfused. No cyanosis/ clubbing.  All pulses palpable.  Neuro: Conscious, answers appropriately, no gross focal neuro deficits  Psych: Mood and affect appropriate    PPE recommended per [facility] infectious disease Isolation protocol for the current clinical scenario(as mentioned below) was followed.    LABS:  COVID19   Date Value Ref Range Status   06/03/2024 Not Detected Not Detected - Ref. Range Final       Lab Results   Component Value Date    CALCIUM 9.2 06/18/2024     Results from last 7 days   Lab Units 06/18/24  1440 06/14/24  1052   SODIUM mmol/L 135*  --    POTASSIUM mmol/L 3.7  --    CHLORIDE mmol/L 98  --    CO2 mmol/L 25.0  --    BUN mg/dL 12  --    CREATININE mg/dL 0.90  --    GLUCOSE mg/dL 110*  --    CALCIUM mg/dL 9.2  --    WBC 10*3/mm3 17.33* 16.33*   HEMOGLOBIN g/dL 14.5 14.3   PLATELETS 10*3/mm3 309 297   ALT (SGPT) U/L 19  --    AST (SGOT) U/L 8  --    PROBNP pg/mL 119.0  --    PROCALCITONIN ng/mL 0.15  --      Lab Results   Component Value Date    TROPONINT 9 06/18/2024     Results from last 7 days   Lab Units 06/18/24  1440   HSTROP T ng/L 9         Results from last 7 days   Lab Units 06/18/24  1440   PROCALCITONIN ng/mL 0.15   LACTATE mmol/L 1.6                     Lab Results   Component Value Date    TSH 1.690 04/22/2024     Estimated Creatinine Clearance: 158 mL/min (by C-G formula based on SCr of 0.9 mg/dL).         Imaging: I personally visualized the images of scans/x-rays performed within last 3 days.      Assessment:  Loculated left pleural effusion  Pleurisy  Asthma  GERD  Morbid obesity  Diabetes mellitus 2  HTN  Seasonal allergies    Recommendations:  -CXR, CT chest without contrast ordered per Dr Man reviewed.  Needs CT-guided chest tube insertion, will request IR to do tomorrow, will ask radiologist to anchor chest tube as may need lytic therapy in the next day or 2.  NPO after midnight.  -Defer antibiotic therapy to ID, continue zosyn and vanc for now  -Will follow  pending echo, AM labs  -Continue gabapentin, Mucinex, Norco, as needed albuterol for pleuritic chest pain management  -Can use supplemental oxygen if needed, currently tolerating on room air    Veronica Michelle, APRN  6/18/2024  19:12 EDT      Much of this encounter note is an electronic transcription/translation of spoken language to printed text using Dragon Software.

## 2024-06-18 NOTE — H&P
History and physical    Primary care physician      Chief complaint  Shortness of breath    History of present illness  38-year-old white male with history of diabetes mellitus hypertension hyperlipidemia asthma and gastroesophageal reflux disease who was overweight presented to [facility] emergency room with shortness of breath since May 11 and has been treated multiple times with oral antibiotics without any improvement.  Patient continued to have fever chills cough and shortness of breath.  Patient denies any chest pain but chest hurt with cough and also denies any abdominal pain nausea vomiting diarrhea.  Patient failed outpatient treatment admitted for broad-spectrum IV antibiotics and further evaluation by infectious disease and pulmonary.    PAST MEDICAL HISTORY   Acid reflux      Allergic      Asthma      Bronchitis      Hypertension      Pleurisy 2018    Rib fracture      Type 2 diabetes  2020      PAST SURGICAL HISTORY              Procedure Laterality Date    RESECTION BONE TUMOR KNEE             FAMILY HISTORY           Problem Relation Age of Onset    Diabetes Mother      Lung disease Mother      Lung disease Maternal Grandmother      Cancer Maternal Grandmother           lung    Emphysema Maternal Grandmother        SOCIAL HISTORY                 Socioeconomic History    Marital status:    Tobacco Use    Smoking status: Former       Current packs/day: 0.00       Average packs/day: 1.5 packs/day for 8.0 years (12.0 ttl pk-yrs)       Types: Cigarettes       Start date: 2010       Quit date: 2018       Years since quittin.4    Smokeless tobacco: Never    Tobacco comments:       nicotine gum    Vaping Use    Vaping status: Never Used   Substance and Sexual Activity    Alcohol use: Yes       Alcohol/week: 1.0 standard drink of alcohol       Types: 1 Standard drinks or equivalent per week       Comment: socially     Drug use: No    Sexual activity: Yes        "Partners: Female         ALLERGIES  Cat hair extract, Lisinopril, and Methylprednisolone  Home medications reviewed     REVIEW OF SYSTEMS  Per history of present illness     PHYSICAL EXAM  Blood pressure 137/76, pulse 97, temperature (!) 100.6 °F (38.1 °C), temperature source Tympanic, resp. rate 16, height 182.9 cm (72\"), weight 132 kg (291 lb 14.2 oz), SpO2 95%.    GENERAL: Awake and alert no acute distress  HENT: nares patent  EYES: no scleral icterus  CV: regular rhythm, normal rate  RESPIRATORY: normal effort and moving air bilaterally  ABDOMEN: soft nontender nondistended bowel sounds positive  MUSCULOSKELETAL: no deformity  NEURO: alert, moves all extremities, follows commands  PSYCH:  calm, cooperative  SKIN: warm, dry     LAB RESULTS  Lab Results (last 24 hours)       Procedure Component Value Units Date/Time    BNP [[id number]]  (Normal) Collected: 06/18/24 1440    Specimen: Blood Updated: 06/18/24 1643     proBNP 119.0 pg/mL     Narrative:      This assay is used as an aid in the diagnosis of individuals suspected of having heart failure. It can be used as an aid in the diagnosis of acute decompensated heart failure (ADHF) in patients presenting with signs and symptoms of ADHF to the emergency department (ED). In addition, NT-proBNP of <300 pg/mL indicates ADHF is not likely.    Age Range Result Interpretation  NT-proBNP Concentration (pg/mL:      <50             Positive            >450                   Gray                 300-450                    Negative             <300    50-75           Positive            >900                  Gray                300-900                  Negative            <300      >75             Positive            >1800                  Gray                300-1800                  Negative            <300    Single High Sensitivity Troponin T [[id number]]  (Normal) Collected: 06/18/24 1440    Specimen: Blood Updated: 06/18/24 1643     HS Troponin T 9 ng/L     Narrative:   " "   High Sensitive Troponin T Reference Range:  <14.0 ng/L- Negative Female for AMI  <22.0 ng/L- Negative Male for AMI  >=14 - Abnormal Female indicating possible myocardial injury.  >=22 - Abnormal Male indicating possible myocardial injury.   Clinicians would have to utilize clinical acumen, EKG, Troponin, and serial changes to determine if it is an Acute Myocardial Infarction or myocardial injury due to an underlying chronic condition.         Procalcitonin [[id number]]  (Normal) Collected: 06/18/24 1440    Specimen: Blood Updated: 06/18/24 1523     Procalcitonin 0.15 ng/mL     Narrative:      As a Marker for Sepsis (Non-Neonates):    1. <0.5 ng/mL represents a low risk of severe sepsis and/or septic shock.  2. >2 ng/mL represents a high risk of severe sepsis and/or septic shock.    As a Marker for Lower Respiratory Tract Infections that require antibiotic therapy:    PCT on Admission    Antibiotic Therapy       6-12 Hrs later    >0.5                Strongly Recommended  >0.25 - <0.5        Recommended   0.1 - 0.25          Discouraged              Remeasure/reassess PCT  <0.1                Strongly Discouraged     Remeasure/reassess PCT    As 28 day mortality risk marker: \"Change in Procalcitonin Result\" (>80% or <=80%) if Day 0 (or Day 1) and Day 4 values are available. Refer to http://www.[facility]-pct-calculator.com    Change in PCT <=80%  A decrease of PCT levels below or equal to 80% defines a positive change in PCT test result representing a higher risk for 28-day all-cause mortality of patients diagnosed with severe sepsis for septic shock.    Change in PCT >80%  A decrease of PCT levels of more than 80% defines a negative change in PCT result representing a lower risk for 28-day all-cause mortality of patients diagnosed with severe sepsis or septic shock.       Lactic Acid, Plasma [[id number]]  (Normal) Collected: 06/18/24 1440    Specimen: Blood Updated: 06/18/24 1516     Lactate 1.6 mmol/L     Blood Culture " - Blood, Arm, Left [[id number]] Collected: 06/18/24 1507    Specimen: Blood from Arm, Left Updated: 06/18/24 1513    Blood Culture - Blood, Arm, Left [[id number]] Collected: 06/18/24 1507    Specimen: Blood from Arm, Left Updated: 06/18/24 1513    Grants Draw [[id number]] Collected: 06/18/24 1440    Specimen: Blood Updated: 06/18/24 1500    Narrative:      The following orders were created for panel order Grants Draw.  Procedure                               Abnormality         Status                     ---------                               -----------         ------                     Green Top (Gel)[[id number]]                                  Final result               Lavender Top[[id number]]                                     Final result               Gold Top - SST[[id number]]                                   Final result               Light Blue Top[[id number]]                                   Final result                 Please view results for these tests on the individual orders.    Green Top (Gel) [[id number]] Collected: 06/18/24 1440    Specimen: Blood Updated: 06/18/24 1500     Extra Tube Hold for add-ons.     Comment: Auto resulted.       Lavender Top [[id number]] Collected: 06/18/24 1440    Specimen: Blood Updated: 06/18/24 1500     Extra Tube hold for add-on     Comment: Auto resulted       Gold Top - SST [[id number]] Collected: 06/18/24 1440    Specimen: Blood Updated: 06/18/24 1500     Extra Tube Hold for add-ons.     Comment: Auto resulted.       Light Blue Top [[id number]] Collected: 06/18/24 1440    Specimen: Blood Updated: 06/18/24 1500     Extra Tube Hold for add-ons.     Comment: Auto resulted       CBC & Differential [[id number]]  (Abnormal) Collected: 06/18/24 1440    Specimen: Blood Updated: 06/18/24 1457    Narrative:      The following orders were created for panel order CBC & Differential.  Procedure                               Abnormality         Status                     ---------                                -----------         ------                     CBC Auto Differential[[id number]]        Abnormal            Final result                 Please view results for these tests on the individual orders.    CBC Auto Differential [[id number]]  (Abnormal) Collected: 06/18/24 1440    Specimen: Blood Updated: 06/18/24 1457     WBC 17.33 10*3/mm3      RBC 5.25 10*6/mm3      Hemoglobin 14.5 g/dL      Hematocrit 44.5 %      MCV 84.8 fL      MCH 27.6 pg      MCHC 32.6 g/dL      RDW 13.1 %      RDW-SD 40.7 fl      MPV 9.6 fL      Platelets 309 10*3/mm3      Neutrophil % 66.4 %      Lymphocyte % 24.3 %      Monocyte % 8.0 %      Eosinophil % 0.7 %      Basophil % 0.3 %      Immature Grans % 0.3 %      Neutrophils, Absolute 11.49 10*3/mm3      Lymphocytes, Absolute 4.21 10*3/mm3      Monocytes, Absolute 1.39 10*3/mm3      Eosinophils, Absolute 0.12 10*3/mm3      Basophils, Absolute 0.06 10*3/mm3      Immature Grans, Absolute 0.06 10*3/mm3      nRBC 0.0 /100 WBC     Comprehensive Metabolic Panel [[id number]] Collected: 06/18/24 1440    Specimen: Blood Updated: 06/18/24 1451          Imaging Results (Last 24 Hours)       Procedure Component Value Units Date/Time    XR Chest 1 View [[id number]] Collected: 06/18/24 1504     Updated: 06/18/24 1512    Narrative:      XR CHEST 1 VW-     HISTORY: Male who is 38 years-old, short of breath     TECHNIQUE: Frontal view of the chest     COMPARISON: 6/11/2024     FINDINGS: The heart size is borderline. Pulmonary vasculature is  unremarkable. Persistent opacity at the left mid to lower hemithorax,  corresponding to loculated fluid and atelectasis on the CT from  6/12/2024, appears mildly increased, continued follow-up advised. No  pneumothorax. No acute osseous process.       Impression:      As described.     This report was finalized on 6/18/2024 3:09 PM by Dr. Hank Alexander M.D on Workstation: [id number]             Scan on 6/18/2024 1450 by [facility]: ECG  12-LEAD         Author: -- Service: -- Author Type: --   Filed: Date of Service: Creation Time:   Status: (Other)   HEART RATE= 103  bpm  RR Interval= 583  ms  [place] Interval= 125  ms  P Horizontal Axis= -20  deg  P Front Axis= 43  deg  QRSD Interval= 89  ms  QT Interval= 299  ms  QTcB= 392  ms  QRS Axis= 31  deg  T Wave Axis= 35  deg  - BORDERLINE ECG -  Sinus tachycardia  Probable left atrial enlargement  No change from previous tracing          Current Facility-Administered Medications:     acetaminophen (TYLENOL) tablet 650 mg, 650 mg, Oral, Q6H PRN, Jeovanny Young MD    albuterol sulfate HFA (PROVENTIL HFA;VENTOLIN HFA;PROAIR HFA) inhaler 2 puff, 2 puff, Inhalation, Q4H PRN, Jeovanny Young MD    cetirizine (zyrTEC) tablet 10 mg, 10 mg, Oral, Daily, Jeovanny Young MD    dextrose (D50W) (25 g/50 mL) IV injection 25 g, 25 g, Intravenous, Q15 Min PRN, Jeovanny Young MD    dextrose (GLUTOSE) oral gel 15 g, 15 g, Oral, Q15 Min PRN, Jeovanny Young MD    escitalopram (LEXAPRO) tablet 10 mg, 10 mg, Oral, Daily, Jeovanny Young MD    famotidine (PEPCID) tablet 20 mg, 20 mg, Oral, BID, Jeovanny Young MD    gabapentin (NEURONTIN) capsule 300 mg, 300 mg, Oral, TID, Jeovanny Young MD    glucagon (GLUCAGEN) injection 1 mg, 1 mg, Intramuscular, Q15 Min PRN, Jeovanny Young MD    guaiFENesin (MUCINEX) 12 hr tablet 600 mg, 600 mg, Oral, Q12H, Jeovanny Young MD    insulin lispro (HUMALOG/ADMELOG) injection 2-7 Units, 2-7 Units, Subcutaneous, 4x Daily AC & at Bedtime, Jeovanny Young MD    Pharmacy to dose vancomycin, , Does not apply, Continuous PRN, Jeovanny Young MD    Pharmacy to Dose Zosyn, , Does not apply, Continuous PRN, Jeovanny Young MD    sodium chloride 0.9 % flush 10 mL, 10 mL, Intravenous, PRN, Emergency, Triage Protocol, MD    sodium chloride 0.9 % infusion, 75 mL/hr, Intravenous, Continuous, Jeovanny Young MD    vancomycin 2750 mg/500 mL 0.9% NS IVPB ([facility]), 20 mg/kg, Intravenous, Once, Saúl Hutchison MD, 2,750 mg at 06/18/24  4815    Current Outpatient Medications:     albuterol sulfate  (90 Base) MCG/ACT inhaler, INHALE 2 PUFFS BY MOUTH EVERY 4 HOURS AS NEEDED FOR WHEEZING, Disp: 36 g, Rfl: 0    cyclobenzaprine (FLEXERIL) 10 MG tablet, Take 1 tablet by mouth three times daily as needed for muscle spasm, Disp: 30 tablet, Rfl: 0    escitalopram (LEXAPRO) 10 MG tablet, Take 1 tablet by mouth once daily, Disp: 90 tablet, Rfl: 0    furosemide (LASIX) 40 MG tablet, Take 1 tablet by mouth once daily, Disp: 90 tablet, Rfl: 0    gabapentin (NEURONTIN) 300 MG capsule, Take 1 capsule by mouth 3 (Three) Times a Day for 30 days., Disp: 90 capsule, Rfl: 0    ibuprofen (ADVIL,MOTRIN) 800 MG tablet, Take 1 tablet by mouth Every 6 (Six) Hours As Needed for Mild Pain for up to 30 days., Disp: 60 tablet, Rfl: 0    levoFLOXacin (Levaquin) 500 MG tablet, Take 1 tablet by mouth Daily., Disp: 10 tablet, Rfl: 0    losartan (COZAAR) 50 MG tablet, Take 1 tablet by mouth once daily, Disp: 90 tablet, Rfl: 0    metFORMIN ER (GLUCOPHAGE-XR) 500 MG 24 hr tablet, Take 1 tablet by mouth 2 (Two) Times a Day With Meals., Disp: 180 tablet, Rfl: 0    omeprazole (priLOSEC) 20 MG capsule, Take 1 capsule by mouth Daily., Disp: , Rfl:     pantoprazole (Protonix) 40 MG EC tablet, Take 1 tablet by mouth Daily., Disp: 90 tablet, Rfl: 4    predniSONE (DELTASONE) 20 MG tablet, TAKE 1 TABLET BY MOUTH TWICE DAILY FOR 5 DAYS THEN 1 ONCE DAILY FOR 5 DAYS, Disp: , Rfl:     Tirzepatide (Mounjaro) 7.5 MG/0.5ML solution pen-injector pen, Inject 0.5 mL under the skin into the appropriate area as directed 1 (One) Time Per Week., Disp: 2 mL, Rfl: 2    Tirzepatide (MOUNJARO) 7.5 MG/0.5ML solution pen-injector pen, Inject 0.5 mL under the skin into the appropriate area as directed 1 (One) Time Per Week., Disp: , Rfl:      ASSESSMENT  Left lower lobe pneumonia with loculated pleural fluid suspicious for empyema  Diabetes mellitus  Hypertension  Hyperlipidemia  Morbidly  obese  Gastroesophageal reflux disease    PLAN  Admit  IVF  Broad-spectrum IV antibiotics after obtaining the cultures  Check 2D echo  May need drainage of the abscess  Infectious disease consult  Pulmonary and thoracic surgery to follow patient  Adjust home medications  Stress ulcer DVT prophylaxis  Supportive care  Patient is full code  Discussed with family nursing staff  Follow closely further recommendation current hospital course    LIZZY KNOX MD

## 2024-06-19 ENCOUNTER — APPOINTMENT (OUTPATIENT)
Dept: ULTRASOUND IMAGING | Facility: HOSPITAL | Age: 39
DRG: 193 | End: 2024-06-19
Payer: COMMERCIAL

## 2024-06-19 PROBLEM — J18.9 LEFT LOWER LOBE PNEUMONIA: Status: ACTIVE | Noted: 2024-06-19

## 2024-06-19 LAB
ALBUMIN SERPL-MCNC: 2.9 G/DL (ref 3.5–5.2)
ALBUMIN/GLOB SERPL: 0.8 G/DL
ALP SERPL-CCNC: 60 U/L (ref 39–117)
ALT SERPL W P-5'-P-CCNC: 17 U/L (ref 1–41)
ANION GAP SERPL CALCULATED.3IONS-SCNC: 8.5 MMOL/L (ref 5–15)
AST SERPL-CCNC: 10 U/L (ref 1–40)
BASOPHILS # BLD AUTO: 0.06 10*3/MM3 (ref 0–0.2)
BASOPHILS NFR BLD AUTO: 0.4 % (ref 0–1.5)
BILIRUB SERPL-MCNC: 0.4 MG/DL (ref 0–1.2)
BUN SERPL-MCNC: 9 MG/DL (ref 6–20)
BUN/CREAT SERPL: 12 (ref 7–25)
CALCIUM SPEC-SCNC: 8.5 MG/DL (ref 8.6–10.5)
CHLORIDE SERPL-SCNC: 102 MMOL/L (ref 98–107)
CHOLEST SERPL-MCNC: 126 MG/DL (ref 0–200)
CO2 SERPL-SCNC: 24.5 MMOL/L (ref 22–29)
CREAT SERPL-MCNC: 0.75 MG/DL (ref 0.76–1.27)
DEPRECATED RDW RBC AUTO: 41.1 FL (ref 37–54)
EGFRCR SERPLBLD CKD-EPI 2021: 119.2 ML/MIN/1.73
EOSINOPHIL # BLD AUTO: 0.14 10*3/MM3 (ref 0–0.4)
EOSINOPHIL NFR BLD AUTO: 1 % (ref 0.3–6.2)
ERYTHROCYTE [DISTWIDTH] IN BLOOD BY AUTOMATED COUNT: 13.2 % (ref 12.3–15.4)
GLOBULIN UR ELPH-MCNC: 3.5 GM/DL
GLUCOSE BLDC GLUCOMTR-MCNC: 103 MG/DL (ref 70–130)
GLUCOSE BLDC GLUCOMTR-MCNC: 93 MG/DL (ref 70–130)
GLUCOSE BLDC GLUCOMTR-MCNC: 97 MG/DL (ref 70–130)
GLUCOSE SERPL-MCNC: 114 MG/DL (ref 65–99)
HBA1C MFR BLD: 6 % (ref 4.8–5.6)
HCT VFR BLD AUTO: 37.9 % (ref 37.5–51)
HDLC SERPL-MCNC: 40 MG/DL (ref 40–60)
HGB BLD-MCNC: 12.3 G/DL (ref 13–17.7)
IMM GRANULOCYTES # BLD AUTO: 0.05 10*3/MM3 (ref 0–0.05)
IMM GRANULOCYTES NFR BLD AUTO: 0.4 % (ref 0–0.5)
LDLC SERPL CALC-MCNC: 74 MG/DL (ref 0–100)
LDLC/HDLC SERPL: 1.87 {RATIO}
LYMPHOCYTES # BLD AUTO: 3.09 10*3/MM3 (ref 0.7–3.1)
LYMPHOCYTES NFR BLD AUTO: 22.8 % (ref 19.6–45.3)
MCH RBC QN AUTO: 27.5 PG (ref 26.6–33)
MCHC RBC AUTO-ENTMCNC: 32.5 G/DL (ref 31.5–35.7)
MCV RBC AUTO: 84.8 FL (ref 79–97)
MONOCYTES # BLD AUTO: 1.26 10*3/MM3 (ref 0.1–0.9)
MONOCYTES NFR BLD AUTO: 9.3 % (ref 5–12)
MRSA DNA SPEC QL NAA+PROBE: NORMAL
NEUTROPHILS NFR BLD AUTO: 66.1 % (ref 42.7–76)
NEUTROPHILS NFR BLD AUTO: 8.94 10*3/MM3 (ref 1.7–7)
NRBC BLD AUTO-RTO: 0 /100 WBC (ref 0–0.2)
NT-PROBNP SERPL-MCNC: <36 PG/ML (ref 0–450)
PLATELET # BLD AUTO: 262 10*3/MM3 (ref 140–450)
PMV BLD AUTO: 9.3 FL (ref 6–12)
POTASSIUM SERPL-SCNC: 3.5 MMOL/L (ref 3.5–5.2)
PROT SERPL-MCNC: 6.4 G/DL (ref 6–8.5)
RBC # BLD AUTO: 4.47 10*6/MM3 (ref 4.14–5.8)
SODIUM SERPL-SCNC: 135 MMOL/L (ref 136–145)
TRIGL SERPL-MCNC: 56 MG/DL (ref 0–150)
TSH SERPL DL<=0.05 MIU/L-ACNC: 1.37 UIU/ML (ref 0.27–4.2)
VLDLC SERPL-MCNC: 12 MG/DL (ref 5–40)
WBC NRBC COR # BLD AUTO: 13.54 10*3/MM3 (ref 3.4–10.8)

## 2024-06-19 PROCEDURE — 94640 AIRWAY INHALATION TREATMENT: CPT

## 2024-06-19 PROCEDURE — 25810000003 SODIUM CHLORIDE 0.9 % SOLUTION 250 ML FLEX CONT: Performed by: HOSPITALIST

## 2024-06-19 PROCEDURE — 25010000002 LIDOCAINE 1 % SOLUTION: Performed by: RADIOLOGY

## 2024-06-19 PROCEDURE — 94799 UNLISTED PULMONARY SVC/PX: CPT

## 2024-06-19 PROCEDURE — 83880 ASSAY OF NATRIURETIC PEPTIDE: CPT | Performed by: HOSPITALIST

## 2024-06-19 PROCEDURE — 99223 1ST HOSP IP/OBS HIGH 75: CPT

## 2024-06-19 PROCEDURE — 83036 HEMOGLOBIN GLYCOSYLATED A1C: CPT | Performed by: HOSPITALIST

## 2024-06-19 PROCEDURE — 25810000003 SODIUM CHLORIDE 0.9 % SOLUTION: Performed by: HOSPITALIST

## 2024-06-19 PROCEDURE — 25010000002 VANCOMYCIN 1 G RECONSTITUTED SOLUTION 1 EACH VIAL: Performed by: HOSPITALIST

## 2024-06-19 PROCEDURE — 0W9B3ZZ DRAINAGE OF LEFT PLEURAL CAVITY, PERCUTANEOUS APPROACH: ICD-10-PCS | Performed by: RADIOLOGY

## 2024-06-19 PROCEDURE — 80061 LIPID PANEL: CPT | Performed by: HOSPITALIST

## 2024-06-19 PROCEDURE — 94664 DEMO&/EVAL PT USE INHALER: CPT

## 2024-06-19 PROCEDURE — 76942 ECHO GUIDE FOR BIOPSY: CPT

## 2024-06-19 PROCEDURE — 25010000002 VANCOMYCIN HCL 1.25 G RECONSTITUTED SOLUTION 1 EACH VIAL: Performed by: INTERNAL MEDICINE

## 2024-06-19 PROCEDURE — 82948 REAGENT STRIP/BLOOD GLUCOSE: CPT

## 2024-06-19 PROCEDURE — 87641 MR-STAPH DNA AMP PROBE: CPT | Performed by: HOSPITALIST

## 2024-06-19 PROCEDURE — 36415 COLL VENOUS BLD VENIPUNCTURE: CPT | Performed by: HOSPITALIST

## 2024-06-19 PROCEDURE — 25810000003 SODIUM CHLORIDE 0.9 % SOLUTION 250 ML FLEX CONT: Performed by: INTERNAL MEDICINE

## 2024-06-19 PROCEDURE — 94761 N-INVAS EAR/PLS OXIMETRY MLT: CPT

## 2024-06-19 PROCEDURE — 25010000002 PIPERACILLIN SOD-TAZOBACTAM PER 1 G: Performed by: INTERNAL MEDICINE

## 2024-06-19 PROCEDURE — 80050 GENERAL HEALTH PANEL: CPT | Performed by: HOSPITALIST

## 2024-06-19 PROCEDURE — 25010000002 PIPERACILLIN SOD-TAZOBACTAM PER 1 G: Performed by: HOSPITALIST

## 2024-06-19 RX ORDER — ACETYLCYSTEINE 200 MG/ML
3 SOLUTION ORAL; RESPIRATORY (INHALATION)
Status: DISCONTINUED | OUTPATIENT
Start: 2024-06-19 | End: 2024-06-20

## 2024-06-19 RX ORDER — GUAIFENESIN 600 MG/1
1200 TABLET, EXTENDED RELEASE ORAL EVERY 12 HOURS SCHEDULED
Status: DISCONTINUED | OUTPATIENT
Start: 2024-06-19 | End: 2024-06-21 | Stop reason: HOSPADM

## 2024-06-19 RX ORDER — IPRATROPIUM BROMIDE AND ALBUTEROL SULFATE 2.5; .5 MG/3ML; MG/3ML
3 SOLUTION RESPIRATORY (INHALATION) EVERY 8 HOURS
Status: DISCONTINUED | OUTPATIENT
Start: 2024-06-19 | End: 2024-06-20

## 2024-06-19 RX ORDER — LIDOCAINE HYDROCHLORIDE 10 MG/ML
10 INJECTION, SOLUTION INFILTRATION; PERINEURAL ONCE
Status: COMPLETED | OUTPATIENT
Start: 2024-06-19 | End: 2024-06-19

## 2024-06-19 RX ADMIN — SODIUM CHLORIDE 75 ML/HR: 9 INJECTION, SOLUTION INTRAVENOUS at 10:58

## 2024-06-19 RX ADMIN — SODIUM CHLORIDE 1250 MG: 9 INJECTION, SOLUTION INTRAVENOUS at 16:12

## 2024-06-19 RX ADMIN — ESCITALOPRAM OXALATE 10 MG: 10 TABLET, FILM COATED ORAL at 16:12

## 2024-06-19 RX ADMIN — GUAIFENESIN 1200 MG: 600 TABLET, EXTENDED RELEASE ORAL at 21:09

## 2024-06-19 RX ADMIN — PIPERACILLIN AND TAZOBACTAM 4.5 G: 4; .5 INJECTION, POWDER, FOR SOLUTION INTRAVENOUS at 17:45

## 2024-06-19 RX ADMIN — PIPERACILLIN AND TAZOBACTAM 4.5 G: 4; .5 INJECTION, POWDER, FOR SOLUTION INTRAVENOUS at 06:59

## 2024-06-19 RX ADMIN — HYDROCODONE BITARTRATE AND ACETAMINOPHEN 1 TABLET: 5; 325 TABLET ORAL at 05:21

## 2024-06-19 RX ADMIN — LIDOCAINE HYDROCHLORIDE 10 ML: 10 INJECTION, SOLUTION INFILTRATION; PERINEURAL at 14:55

## 2024-06-19 RX ADMIN — PIPERACILLIN AND TAZOBACTAM 4.5 G: 4; .5 INJECTION, POWDER, FOR SOLUTION INTRAVENOUS at 23:13

## 2024-06-19 RX ADMIN — CETIRIZINE HYDROCHLORIDE 10 MG: 10 TABLET ORAL at 16:11

## 2024-06-19 RX ADMIN — FAMOTIDINE 20 MG: 20 TABLET, FILM COATED ORAL at 21:09

## 2024-06-19 RX ADMIN — ACETYLCYSTEINE 3 ML: 200 SOLUTION ORAL; RESPIRATORY (INHALATION) at 16:49

## 2024-06-19 RX ADMIN — HYDROCODONE BITARTRATE AND ACETAMINOPHEN 1 TABLET: 5; 325 TABLET ORAL at 16:11

## 2024-06-19 RX ADMIN — IPRATROPIUM BROMIDE AND ALBUTEROL SULFATE 3 ML: .5; 3 SOLUTION RESPIRATORY (INHALATION) at 16:50

## 2024-06-19 RX ADMIN — SODIUM CHLORIDE 1000 MG: 9 INJECTION, SOLUTION INTRAVENOUS at 05:20

## 2024-06-19 RX ADMIN — GABAPENTIN 300 MG: 300 CAPSULE ORAL at 21:09

## 2024-06-19 RX ADMIN — GABAPENTIN 300 MG: 300 CAPSULE ORAL at 16:11

## 2024-06-19 NOTE — PROGRESS NOTES
"[facility] Clinical Pharmacy Services: Vancomycin Pharmacokinetic Initial Consult Note    Ravinder Robles is a 38 y.o. male who is on day 2 of pharmacy to dose vancomycin.    Indication: Pneumonia  Consulting Provider: Dr Young  Planned Duration of Therapy: 5 days  Loading Dose Ordered or Given: 2750 mg on 6/18 at 1657  MRSA PCR performed: on order; Result: pending  Culture/Source: blood cx pending  Target: -600 mg/L.hr     Other Antimicrobials: Zosyn IV    Vitals/Labs  Ht: 182.9 cm (72\"); Wt: 132 kg (291 lb 14.2 oz)  Temp Readings from Last 1 Encounters:   06/19/24 98.6 °F (37 °C) (Oral)    Estimated Creatinine Clearance: 189.6 mL/min (A) (by C-G formula based on SCr of 0.75 mg/dL (L)).        Results from last 7 days   Lab Units 06/19/24  0501 06/18/24  1440 06/14/24  1052   CREATININE mg/dL 0.75* 0.90  --    WBC 10*3/mm3 13.54* 17.33* 16.33*     Assessment/Plan:    Vancomycin Dose:   Will change regimen to 1250 mg mg IV every 12  hours  Predictive AUC level for the dose ordered is 461 mg/L.hr, which is within the target of 400-600 mg/L.hr  Vanc Trough has been ordered for 06/20/24 at 1330     Pharmacy will follow patient's kidney function and will adjust doses and obtain levels as necessary. Thank you for involving pharmacy in this patient's care. Please contact pharmacy with any questions or concerns.                           Sherwin Zuñiga [facility]  Clinical Pharmacist      "

## 2024-06-19 NOTE — CASE MANAGEMENT/SOCIAL WORK
Discharge Planning Assessment  [facility]     Patient Name: Ravinder Robles  MRN: [id number]  Today's Date: 6/19/2024    Admit Date: 6/18/2024    Plan: Home, family to transport   Discharge Needs Assessment       Row Name 06/19/24 1009       Living Environment    People in Home spouse    Name(s) of People in Home Roxana Robles [phone]    Current Living Arrangements home    Potentially Unsafe Housing Conditions none    In the past 12 months has the electric, gas, oil, or water company threatened to shut off services in your home? No    Primary Care Provided by self    Provides Primary Care For no one    Family Caregiver if Needed spouse    Family Caregiver Names Cyndie Robles    Quality of Family Relationships helpful;involved;supportive    Able to Return to Prior Arrangements yes       Resource/Environmental Concerns    Resource/Environmental Concerns none    Transportation Concerns none       Transportation Needs    In the past 12 months, has lack of transportation kept you from medical appointments or from getting medications? no    In the past 12 months, has lack of transportation kept you from meetings, work, or from getting things needed for daily living? No       Food Insecurity    Within the past 12 months, you worried that your food would run out before you got the money to buy more. Never true    Within the past 12 months, the food you bought just didn't last and you didn't have money to get more. Never true       Transition Planning    Patient/Family Anticipates Transition to home;home with family    Patient/Family Anticipated Services at Transition none    Transportation Anticipated family or friend will provide       Discharge Needs Assessment    Equipment Currently Used at Home none    Concerns to be Addressed no discharge needs identified;denies needs/concerns at this time    Anticipated Changes Related to Illness none    Equipment Needed After Discharge none                   Discharge Plan        Row Name 06/19/24 1011       Plan    Plan Home, family to transport    Plan Comments Met with pt at bedside. Permission obtained to speak to pt in front of spouse. Introduced self and explained role of . Face sheet verified, PCP is Ruiz Escamilla. Pt denies any difficulty paying for medications, and he obtains his medications from [employer]. Pt lives with his spouse, Cyndie, and stated that she could assist with any care needs that may arise. Pt is independent in ADL's, and he does not use, nor require any assistive devices. Pt has never had home health or been to SNF/Rehab and he does not anticipate requiring those serivces upon discharge. Pt continues to work full time. Upon discharge, pt stated that his spouse will transport home. Explained that CCP would follow to assess for discharge needs.                  Continued Care and Services - Admitted Since 6/18/2024    No active coordination exists for this encounter.       Expected Discharge Date and Time       Expected Discharge Date Expected Discharge Time    Jun 21, 2024            Demographic Summary    No documentation.                  Functional Status    No documentation.                  Psychosocial    No documentation.                  Abuse/Neglect    No documentation.                  Legal    No documentation.                  Substance Abuse    No documentation.                  Patient Forms    No documentation.                     Kourtney Mishra RN

## 2024-06-19 NOTE — PROGRESS NOTES
"  Infectious Diseases Progress Note    Jimmy Garza MD     [facility]  Los: 0 days  Patient Identification:  Name: Ravinder Robles  Age: 38 y.o.  Sex: male  :  1985  MRN: [id number]         Primary Care Physician: Ruiz Escamilla MD (Tony)    Seen later in the day.  Was gone for attempted thoracentesis.    Subjective: Feeling somewhat better but still have discomfort in the left side of the chest.  Interval History: Earlier today had attempted thoracentesis which was unsuccessful    Objective:    Scheduled Meds:cetirizine, 10 mg, Oral, Daily  escitalopram, 10 mg, Oral, Daily  famotidine, 20 mg, Oral, BID  gabapentin, 300 mg, Oral, TID  guaiFENesin, 600 mg, Oral, Q12H  insulin lispro, 2-7 Units, Subcutaneous, 4x Daily AC & at Bedtime  piperacillin-tazobactam, 4.5 g, Intravenous, Q8H  vancomycin, 1,250 mg, Intravenous, Q12H      Continuous Infusions:Pharmacy to dose vancomycin,   sodium chloride, 75 mL/hr, Last Rate: 75 mL/hr (24 0653)        Vital signs in last 24 hours:  Temp:  [98.1 °F (36.7 °C)-100.6 °F (38.1 °C)] 98.1 °F (36.7 °C)  Heart Rate:  [] 88  Resp:  [16-18] 18  BP: (117-137)/(63-77) 134/72    Intake/Output:    Intake/Output Summary (Last 24 hours) at 2024 0909  Last data filed at 2024 0653  Gross per 24 hour   Intake 972.5 ml   Output --   Net 972.5 ml       Exam:  /72 (BP Location: Left arm, Patient Position: Lying)   Pulse 88   Temp 98.1 °F (36.7 °C) (Oral)   Resp 18   Ht 182.9 cm (72\")   Wt 132 kg (291 lb 14.2 oz)   SpO2 94%   BMI 39.59 kg/m²   Patient is examined using the personal protective equipment as per guidelines from infection control for this particular patient as enacted.  Hand washing was performed before and after patient interaction.  General Appearance:    Alert, cooperative, no distress, AAOx3                          Head:    Normocephalic, without obvious abnormality, atraumatic                           Eyes:    PERRL, " conjunctivae/corneas clear, EOM's intact, both eyes                         Throat:   Lips, tongue, gums normal; oral mucosa pink and moist                           Neck:   Supple, symmetrical, trachea midline, no JVD                         Lungs:    Decreased breath sounds at the left lung base with no obvious use of accessory muscles of breathing                 Chest Wall:    No tenderness or deformity                          Heart:  S1-S2 regular                  Abdomen:   Soft nontender                 Extremities:   Extremities normal, atraumatic, no cyanosis or edema                        Pulses:   Pulses palpable in all extremities                            Skin:   Skin is warm and dry,  no rashes or palpable lesions                  Neurologic: Alert and oriented x 3       Data Review:    I reviewed the patient's new clinical results.  Results from last 7 days   Lab Units 06/19/24  0501 06/18/24  1440 06/14/24  1052   WBC 10*3/mm3 13.54* 17.33* 16.33*   HEMOGLOBIN g/dL 12.3* 14.5 14.3   PLATELETS 10*3/mm3 262 309 297     Results from last 7 days   Lab Units 06/19/24  0501 06/18/24  1440   SODIUM mmol/L 135* 135*   POTASSIUM mmol/L 3.5 3.7   CHLORIDE mmol/L 102 98   CO2 mmol/L 24.5 25.0   BUN mg/dL 9 12   CREATININE mg/dL 0.75* 0.90   CALCIUM mg/dL 8.5* 9.2   GLUCOSE mg/dL 114* 110*     Microbiology Results (last 10 days)       Procedure Component Value - Date/Time    MRSA Screen, PCR (Inpatient) - Swab, Nares [[id number]]  (Normal) Collected: 06/19/24 0512    Lab Status: Final result Specimen: Swab from Nares Updated: 06/19/24 0717     MRSA PCR No MRSA Detected    Narrative:      The negative predictive value of this diagnostic test is high and should only be used to consider de-escalating anti-MRSA therapy. A positive result may indicate colonization with MRSA and must be correlated clinically.          CT Chest Without Contrast Diagnostic    Result Date: 6/18/2024   1. Similar-appearing complex  likely partially loculated left pleural effusion, probable empyema or possible lung abscesses, with atelectasis and consolidation of the left left lower lobe and base of the left upper lobe and lingula that could reflect pneumonia. 2. Partially visualized enlarged left hilar lymph nodes, likely reactive.  This report was finalized on 6/18/2024 8:45 PM by Deven Schmitz MD on Workstation: [id number]      XR Chest 1 View    Result Date: 6/18/2024  As described.  This report was finalized on 6/18/2024 3:09 PM by Dr. Hank Alexander M.D on Workstation: [id number]      CT Chest With Contrast Diagnostic    Result Date: 6/12/2024  Impression: Loculated pleural fluid within the inferior aspect of the left lower lung with associated pleural wall thickening and enhancement. Findings are suspicious for empyema. There is no evidence of adjacent parenchymal consolidation. Otherwise unremarkable chest CT. Electronically Signed: Nick Bender MD  6/12/2024 12:02 PM EDT  Workstation ID: [id number]    XR Chest PA & Lateral    Result Date: 6/12/2024  Impression: Persistent left lung base consolidation with small possibly loculated pleural effusion. Electronically Signed: Nick Bender MD  6/12/2024 8:59 AM EDT  Workstation ID: [id number]    XR Chest 2 View    Result Date: 6/3/2024  Impression: Mild improvement in left lung consolidation with pleural effusion. Recommend additional follow-up to ensure further improvement/resolution. Electronically Signed: Usman Oleary MD  6/3/2024 2:46 PM EDT  Workstation ID: [id number]       Assessment:    Pneumonia  6-uswgqaxqg-wzmxcdcs pneumonia with progression to parapneumonic effusion and probable empyema partially treated with systemic antibiotic therapy with persistent symptoms and progressive worsening on imaging studies of the loculated effusion.  2-diabetes mellitus  3-asthma  4-morbid obesity  5-hypertension  6-other diagnosis per primary team.     Recommendations/Discussions:  See my  discussion and recommendation on 6/18/2024.  Agree with pulmonary services assessment that eventually patient would require surgical intervention to address his loculated parapneumonic effusion with persistent symptoms and leukocytosis.  There is a chance that we may not be able to grow any pathogens given the amount of antibiotic therapy that he has received since beginning of his symptoms in the last month or so.  Monitor closely his renal function and other side effects while on combination of vancomycin and Zosyn and consider de-escalation.  Since his MRSA screen is negative will recommend DC vancomycin.  Jimmy Garza MD  6/19/2024  09:09 EDT    Parts of this note may be an electronic transcription/translation of spoken language to printed text using the Dragon dictation system.

## 2024-06-19 NOTE — PLAN OF CARE
Goal Outcome Evaluation:  Plan of Care Reviewed With: patient      Outcome Evaluation: VSS. Patient A/O x4. Up ad sukhdev. On room air. PRN Norco given x1 for chest/ shoulder pain. IV antibiotics given. Safety maintained.

## 2024-06-19 NOTE — PROGRESS NOTES
"Daily progress note    Primary care physician      Subjective  Awake and alert and feeling same with shortness of breath chest discomfort with deep inspiration and not feeling well and multiple questions for the doctor and family at bedside.    History of present illness  38-year-old white male with history of diabetes mellitus hypertension hyperlipidemia asthma and gastroesophageal reflux disease who was overweight presented to [facility] emergency room with shortness of breath since May 11 and has been treated multiple times with oral antibiotics without any improvement.  Patient continued to have fever chills cough and shortness of breath.  Patient denies any chest pain but chest hurt with cough and also denies any abdominal pain nausea vomiting diarrhea.  Patient failed outpatient treatment admitted for broad-spectrum IV antibiotics and further evaluation by infectious disease and pulmonary.     REVIEW OF SYSTEMS  Per history of present illness     PHYSICAL EXAM  Blood pressure 134/72, pulse 88, temperature 98.1 °F (36.7 °C), temperature source Oral, resp. rate 18, height 182.9 cm (72\"), weight 132 kg (291 lb 14.2 oz), SpO2 94%.    GENERAL: Awake and alert no acute distress  HENT: nares patent  EYES: no scleral icterus  CV: regular rhythm, normal rate  RESPIRATORY: normal effort and moving air bilaterally  ABDOMEN: soft nontender nondistended bowel sounds positive  MUSCULOSKELETAL: no deformity  NEURO: alert, moves all extremities, follows commands  PSYCH:  calm, cooperative  SKIN: warm, dry     LAB RESULTS  Lab Results (last 24 hours)       Procedure Component Value Units Date/Time    POC Glucose Once [[id number]]  (Normal) Collected: 06/19/24 1133    Specimen: Blood Updated: 06/19/24 1136     Glucose 93 mg/dL     POC Glucose Once [[id number]]  (Normal) Collected: 06/19/24 0815    Specimen: Blood Updated: 06/19/24 0817     Glucose 97 mg/dL     MRSA Screen, PCR (Inpatient) - Swab, Nares " [[id number]]  (Normal) Collected: 06/19/24 0512    Specimen: Swab from Nares Updated: 06/19/24 0717     MRSA PCR No MRSA Detected    Narrative:      The negative predictive value of this diagnostic test is high and should only be used to consider de-escalating anti-MRSA therapy. A positive result may indicate colonization with MRSA and must be correlated clinically.    BNP [[id number]]  (Normal) Collected: 06/19/24 0501    Specimen: Blood Updated: 06/19/24 0551     proBNP <36.0 pg/mL     Narrative:      This assay is used as an aid in the diagnosis of individuals suspected of having heart failure. It can be used as an aid in the diagnosis of acute decompensated heart failure (ADHF) in patients presenting with signs and symptoms of ADHF to the emergency department (ED). In addition, NT-proBNP of <300 pg/mL indicates ADHF is not likely.    Age Range Result Interpretation  NT-proBNP Concentration (pg/mL:      <50             Positive            >450                   Gray                 300-450                    Negative             <300    50-75           Positive            >900                  Gray                300-900                  Negative            <300      >75             Positive            >1800                  Gray                300-1800                  Negative            <300    TSH [[id number]]  (Normal) Collected: 06/19/24 0501    Specimen: Blood Updated: 06/19/24 0551     TSH 1.370 uIU/mL     Comprehensive Metabolic Panel [[id number]]  (Abnormal) Collected: 06/19/24 0501    Specimen: Blood Updated: 06/19/24 0546     Glucose 114 mg/dL      BUN 9 mg/dL      Creatinine 0.75 mg/dL      Sodium 135 mmol/L      Potassium 3.5 mmol/L      Chloride 102 mmol/L      CO2 24.5 mmol/L      Calcium 8.5 mg/dL      Total Protein 6.4 g/dL      Albumin 2.9 g/dL      ALT (SGPT) 17 U/L      AST (SGOT) 10 U/L      Alkaline Phosphatase 60 U/L      Total Bilirubin 0.4 mg/dL      Globulin 3.5 gm/dL      A/G Ratio 0.8  g/dL      BUN/Creatinine Ratio 12.0     Anion Gap 8.5 mmol/L      eGFR 119.2 mL/min/1.73     Narrative:      GFR Normal >60  Chronic Kidney Disease <60  Kidney Failure <15      Lipid Panel [[id number]] Collected: 06/19/24 0501    Specimen: Blood Updated: 06/19/24 0546     Total Cholesterol 126 mg/dL      Triglycerides 56 mg/dL      HDL Cholesterol 40 mg/dL      LDL Cholesterol  74 mg/dL      VLDL Cholesterol 12 mg/dL      LDL/HDL Ratio 1.87    Narrative:      Cholesterol Reference Ranges  (U.S. Department of Health and Human Services ATP III Classifications)    Desirable          <200 mg/dL  Borderline High    200-239 mg/dL  High Risk          >240 mg/dL      Triglyceride Reference Ranges  (U.S. Department of Health and Human Services ATP III Classifications)    Normal           <150 mg/dL  Borderline High  150-199 mg/dL  High             200-499 mg/dL  Very High        >500 mg/dL    HDL Reference Ranges  (U.S. Department of Health and Human Services ATP III Classifications)    Low     <40 mg/dl (major risk factor for CHD)  High    >60 mg/dl ('negative' risk factor for CHD)        LDL Reference Ranges  (U.S. Department of Health and Human Services ATP III Classifications)    Optimal          <100 mg/dL  Near Optimal     100-129 mg/dL  Borderline High  130-159 mg/dL  High             160-189 mg/dL  Very High        >189 mg/dL    Hemoglobin A1c [[id number]]  (Abnormal) Collected: 06/19/24 0501    Specimen: Blood Updated: 06/19/24 0539     Hemoglobin A1C 6.00 %     Narrative:      Hemoglobin A1C Ranges:    Increased Risk for Diabetes  5.7% to 6.4%  Diabetes                     >= 6.5%  Diabetic Goal                < 7.0%    CBC & Differential [[id number]]  (Abnormal) Collected: 06/19/24 0501    Specimen: Blood Updated: 06/19/24 0530    Narrative:      The following orders were created for panel order CBC & Differential.  Procedure                               Abnormality         Status                     ---------                                -----------         ------                     CBC Auto Differential[[id number]]        Abnormal            Final result                 Please view results for these tests on the individual orders.    CBC Auto Differential [[id number]]  (Abnormal) Collected: 06/19/24 0501    Specimen: Blood Updated: 06/19/24 0530     WBC 13.54 10*3/mm3      RBC 4.47 10*6/mm3      Hemoglobin 12.3 g/dL      Hematocrit 37.9 %      MCV 84.8 fL      MCH 27.5 pg      MCHC 32.5 g/dL      RDW 13.2 %      RDW-SD 41.1 fl      MPV 9.3 fL      Platelets 262 10*3/mm3      Neutrophil % 66.1 %      Lymphocyte % 22.8 %      Monocyte % 9.3 %      Eosinophil % 1.0 %      Basophil % 0.4 %      Immature Grans % 0.4 %      Neutrophils, Absolute 8.94 10*3/mm3      Lymphocytes, Absolute 3.09 10*3/mm3      Monocytes, Absolute 1.26 10*3/mm3      Eosinophils, Absolute 0.14 10*3/mm3      Basophils, Absolute 0.06 10*3/mm3      Immature Grans, Absolute 0.05 10*3/mm3      nRBC 0.0 /100 WBC     POC Glucose Once [[id number]]  (Normal) Collected: 06/18/24 2032    Specimen: Blood Updated: 06/18/24 2034     Glucose 107 mg/dL     Comprehensive Metabolic Panel [[id number]]  (Abnormal) Collected: 06/18/24 1440    Specimen: Blood Updated: 06/18/24 1725     Glucose 110 mg/dL      BUN 12 mg/dL      Creatinine 0.90 mg/dL      Sodium 135 mmol/L      Potassium 3.7 mmol/L      Chloride 98 mmol/L      CO2 25.0 mmol/L      Calcium 9.2 mg/dL      Total Protein 7.3 g/dL      Albumin 3.3 g/dL      ALT (SGPT) 19 U/L      AST (SGOT) 8 U/L      Alkaline Phosphatase 68 U/L      Total Bilirubin 0.5 mg/dL      Globulin 4.0 gm/dL      A/G Ratio 0.8 g/dL      BUN/Creatinine Ratio 13.3     Anion Gap 12.0 mmol/L      eGFR 112.8 mL/min/1.73     Narrative:      GFR Normal >60  Chronic Kidney Disease <60  Kidney Failure <15      BNP [[id number]]  (Normal) Collected: 06/18/24 1440    Specimen: Blood Updated: 06/18/24 1643     proBNP 119.0 pg/mL     Narrative:      This assay  is used as an aid in the diagnosis of individuals suspected of having heart failure. It can be used as an aid in the diagnosis of acute decompensated heart failure (ADHF) in patients presenting with signs and symptoms of ADHF to the emergency department (ED). In addition, NT-proBNP of <300 pg/mL indicates ADHF is not likely.    Age Range Result Interpretation  NT-proBNP Concentration (pg/mL:      <50             Positive            >450                   Gray                 300-450                    Negative             <300    50-75           Positive            >900                  Gray                300-900                  Negative            <300      >75             Positive            >1800                  Gray                300-1800                  Negative            <300    Single High Sensitivity Troponin T [[id number]]  (Normal) Collected: 06/18/24 1440    Specimen: Blood Updated: 06/18/24 1643     HS Troponin T 9 ng/L     Narrative:      High Sensitive Troponin T Reference Range:  <14.0 ng/L- Negative Female for AMI  <22.0 ng/L- Negative Male for AMI  >=14 - Abnormal Female indicating possible myocardial injury.  >=22 - Abnormal Male indicating possible myocardial injury.   Clinicians would have to utilize clinical acumen, EKG, Troponin, and serial changes to determine if it is an Acute Myocardial Infarction or myocardial injury due to an underlying chronic condition.         Procalcitonin [[id number]]  (Normal) Collected: 06/18/24 1440    Specimen: Blood Updated: 06/18/24 1523     Procalcitonin 0.15 ng/mL     Narrative:      As a Marker for Sepsis (Non-Neonates):    1. <0.5 ng/mL represents a low risk of severe sepsis and/or septic shock.  2. >2 ng/mL represents a high risk of severe sepsis and/or septic shock.    As a Marker for Lower Respiratory Tract Infections that require antibiotic therapy:    PCT on Admission    Antibiotic Therapy       6-12 Hrs later    >0.5                Strongly  "Recommended  >0.25 - <0.5        Recommended   0.1 - 0.25          Discouraged              Remeasure/reassess PCT  <0.1                Strongly Discouraged     Remeasure/reassess PCT    As 28 day mortality risk marker: \"Change in Procalcitonin Result\" (>80% or <=80%) if Day 0 (or Day 1) and Day 4 values are available. Refer to http://[email or web address]-pct-calculator.com    Change in PCT <=80%  A decrease of PCT levels below or equal to 80% defines a positive change in PCT test result representing a higher risk for 28-day all-cause mortality of patients diagnosed with severe sepsis for septic shock.    Change in PCT >80%  A decrease of PCT levels of more than 80% defines a negative change in PCT result representing a lower risk for 28-day all-cause mortality of patients diagnosed with severe sepsis or septic shock.       Lactic Acid, Plasma [[id number]]  (Normal) Collected: 06/18/24 1440    Specimen: Blood Updated: 06/18/24 1516     Lactate 1.6 mmol/L     Blood Culture - Blood, Arm, Left [[id number]] Collected: 06/18/24 1507    Specimen: Blood from Arm, Left Updated: 06/18/24 1513    Blood Culture - Blood, Arm, Left [[id number]] Collected: 06/18/24 1507    Specimen: Blood from Arm, Left Updated: 06/18/24 1513    Faxon Draw [[id number]] Collected: 06/18/24 1440    Specimen: Blood Updated: 06/18/24 1500    Narrative:      The following orders were created for panel order Faxon Draw.  Procedure                               Abnormality         Status                     ---------                               -----------         ------                     Green Top (Gel)[[id number]]                                  Final result               Lavender Top[[id number]]                                     Final result               Gold Top - SST[[id number]]                                   Final result               Light Blue Top[[id number]]                                   Final result                 Please view results for " these tests on the individual orders.    Green Top (Gel) [[id number]] Collected: 06/18/24 1440    Specimen: Blood Updated: 06/18/24 1500     Extra Tube Hold for add-ons.     Comment: Auto resulted.       Lavender Top [[id number]] Collected: 06/18/24 1440    Specimen: Blood Updated: 06/18/24 1500     Extra Tube hold for add-on     Comment: Auto resulted       Gold Top - SST [[id number]] Collected: 06/18/24 1440    Specimen: Blood Updated: 06/18/24 1500     Extra Tube Hold for add-ons.     Comment: Auto resulted.       Light Blue Top [[id number]] Collected: 06/18/24 1440    Specimen: Blood Updated: 06/18/24 1500     Extra Tube Hold for add-ons.     Comment: Auto resulted       CBC & Differential [[id number]]  (Abnormal) Collected: 06/18/24 1440    Specimen: Blood Updated: 06/18/24 1457    Narrative:      The following orders were created for panel order CBC & Differential.  Procedure                               Abnormality         Status                     ---------                               -----------         ------                     CBC Auto Differential[[id number]]        Abnormal            Final result                 Please view results for these tests on the individual orders.    CBC Auto Differential [[id number]]  (Abnormal) Collected: 06/18/24 1440    Specimen: Blood Updated: 06/18/24 1457     WBC 17.33 10*3/mm3      RBC 5.25 10*6/mm3      Hemoglobin 14.5 g/dL      Hematocrit 44.5 %      MCV 84.8 fL      MCH 27.6 pg      MCHC 32.6 g/dL      RDW 13.1 %      RDW-SD 40.7 fl      MPV 9.6 fL      Platelets 309 10*3/mm3      Neutrophil % 66.4 %      Lymphocyte % 24.3 %      Monocyte % 8.0 %      Eosinophil % 0.7 %      Basophil % 0.3 %      Immature Grans % 0.3 %      Neutrophils, Absolute 11.49 10*3/mm3      Lymphocytes, Absolute 4.21 10*3/mm3      Monocytes, Absolute 1.39 10*3/mm3      Eosinophils, Absolute 0.12 10*3/mm3      Basophils, Absolute 0.06 10*3/mm3      Immature Grans, Absolute 0.06 10*3/mm3       nRBC 0.0 /100 WBC           Imaging Results (Last 24 Hours)       Procedure Component Value Units Date/Time    CT Chest Without Contrast Diagnostic [[id number]] Collected: 06/18/24 2034     Updated: 06/18/24 2048    Narrative:      CT CHEST WO CONTRAST DIAGNOSTIC-     DATE OF EXAM: 6/18/2024 7:38 PM     INDICATION: Order states: Thoracic abscess. Pneumonia, fatigue, and  fever.     COMPARISON: Chest radiographs 6/18/2024, 6/11/2024, and 6/3/2024. CT  chest 6/12/2024 and 11/8/2018.     TECHNIQUE: Multiple contiguous axial images were acquired through the  chest without the intravenous administration of contrast. Reformatted  coronal and sagittal sequences were also reviewed. Radiation dose  reduction techniques were utilized, including automated exposure control  and exposure modulation based on body size.     FINDINGS:  Evaluation is mildly limited by the lack of intravenous contrast.     Similar-appearing complex likely partially loculated left pleural  effusion with atelectasis and consolidation of the left lower lobe and  base of the left upper lobe and lingula. Bronchial plugging in the base  of the left lower lobe. Right lung clear. No pneumothorax.     The heart and great vessels of the chest are normal in size. No  calcified coronary artery disease. Trace pericardial fluid. Partially  visualized enlarged left perihilar lymph nodes with an index posterior  left perihilar lymph node measuring 1.5 cm x 1 cm (axial series 2 image  48), likely reactive.     Limited noncontrast CT imaging of the upper abdomen is unremarkable.     Mild bilateral gynecomastia. Mild multilevel mid and lower thoracic  spondylosis. Partially imaged mild chronic degenerative changes in both  shoulders. No acute osseous abnormality or concerning osseous lesion.       Impression:         1. Similar-appearing complex likely partially loculated left pleural  effusion, probable empyema or possible lung abscesses, with atelectasis  and  consolidation of the left left lower lobe and base of the left upper  lobe and lingula that could reflect pneumonia.  2. Partially visualized enlarged left hilar lymph nodes, likely  reactive.     This report was finalized on 6/18/2024 8:45 PM by Deven Schmitz MD on  Workstation: [id number]       XR Chest 1 View [[id number]] Collected: 06/18/24 1504     Updated: 06/18/24 1512    Narrative:      XR CHEST 1 VW-     HISTORY: Male who is 38 years-old, short of breath     TECHNIQUE: Frontal view of the chest     COMPARISON: 6/11/2024     FINDINGS: The heart size is borderline. Pulmonary vasculature is  unremarkable. Persistent opacity at the left mid to lower hemithorax,  corresponding to loculated fluid and atelectasis on the CT from  6/12/2024, appears mildly increased, continued follow-up advised. No  pneumothorax. No acute osseous process.       Impression:      As described.     This report was finalized on 6/18/2024 3:09 PM by Dr. Hank Alexander M.D on Workstation: [id number]             Scan on 6/18/2024 1450 [facility] [place]: ECG 12-LEAD         Author: -- Service: -- Author Type: --   Filed: Date of Service: Creation Time:   Status: (Other)   HEART RATE= 103  bpm  RR Interval= 583  ms  NE Interval= 125  ms  P Horizontal Axis= -20  deg  P Front Axis= 43  deg  QRSD Interval= 89  ms  QT Interval= 299  ms  QTcB= 392  ms  QRS Axis= 31  deg  T Wave Axis= 35  deg  - BORDERLINE ECG -  Sinus tachycardia  Probable left atrial enlargement  No change from previous tracing          Current Facility-Administered Medications:     acetaminophen (TYLENOL) tablet 650 mg, 650 mg, Oral, Q6H PRN, Jeovanny Young MD    albuterol sulfate HFA (PROVENTIL HFA;VENTOLIN HFA;PROAIR HFA) inhaler 2 puff, 2 puff, Inhalation, Q4H PRN, Jeovanny Young MD    cetirizine (zyrTEC) tablet 10 mg, 10 mg, Oral, Daily, Jeovanny Young MD, 10 mg at 06/18/24 5776    dextrose (D50W) (25 g/50 mL) IV injection 25 g, 25 g, Intravenous, Q15 Min PRN, Hector,  MD Jeovanny    dextrose (GLUTOSE) oral gel 15 g, 15 g, Oral, Q15 Min PRN, Jeovanny Young MD    escitalopram (LEXAPRO) tablet 10 mg, 10 mg, Oral, Daily, Jeovanny Young MD, 10 mg at 06/18/24 2236    famotidine (PEPCID) tablet 20 mg, 20 mg, Oral, BID, Jeovanny Young MD, 20 mg at 06/18/24 2238    gabapentin (NEURONTIN) capsule 300 mg, 300 mg, Oral, TID, Jeovanny Young MD, 300 mg at 06/18/24 2237    glucagon (GLUCAGEN) injection 1 mg, 1 mg, Intramuscular, Q15 Min PRN, Jeovanny Young MD    guaiFENesin (MUCINEX) 12 hr tablet 600 mg, 600 mg, Oral, Q12H, Jeovanny Young MD, 600 mg at 06/18/24 2237    Hold medication, 1 each, Does not apply, Continuous PRN, Luis A Guzman APRN    HYDROcodone-acetaminophen (NORCO) 5-325 MG per tablet 1 tablet, 1 tablet, Oral, Q4H PRN, Jeovanny Young MD, 1 tablet at 06/19/24 0521    insulin lispro (HUMALOG/ADMELOG) injection 2-7 Units, 2-7 Units, Subcutaneous, 4x Daily AC & at Bedtime, Jeovanny Young MD    Pharmacy to dose vancomycin, , Does not apply, Continuous PRN, Jeovanny Young MD    piperacillin-tazobactam (ZOSYN) 4.5 g IVPB in 100 mL NS MBP (CD), 4.5 g, Intravenous, Q8H, Jeovanny Young MD, Last Rate: 0 mL/hr at 06/19/24 0245, 4.5 g at 06/19/24 0659    sodium chloride 0.9 % flush 10 mL, 10 mL, Intravenous, PRN, Emergency, Triage Protocol, MD    sodium chloride 0.9 % infusion, 75 mL/hr, Intravenous, Continuous, Jeovanny Young MD, Last Rate: 75 mL/hr at 06/19/24 1058, 75 mL/hr at 06/19/24 1058    Vancomycin HCl 1,250 mg in sodium chloride 0.9 % 250 mL VTB, 1,250 mg, Intravenous, Q12H, Jimmy Garza MD     ASSESSMENT  Left lower lobe pneumonia with loculated pleural fluid suspicious for empyema  Diabetes mellitus  Hypertension  Hyperlipidemia  Morbidly obese  Gastroesophageal reflux disease    PLAN  CPM  Continue IVF  Continue IV antibiotics   2D echo pending  Left thoracentesis under radiology today  Infectious disease consult appreciated  Pulmonary and thoracic surgery to follow patient  Adjust home  medications  Stress ulcer DVT prophylaxis  Supportive care  Discussed with family nursing staff  Follow closely further recommendation current hospital course    LIZZY KNOX MD    Copied text in this note has been reviewed and is accurate as of 06/19/24

## 2024-06-19 NOTE — PLAN OF CARE
Goal Outcome Evaluation:  Plan of Care Reviewed With: patient        Progress: no change  Outcome Evaluation: CT chest completed this shift. Plan for IR chest tube today. Still with pain to L chest. Pain meds per MAR, IV abx continued. 2L O2 applied overnight, sats dropping into 80s while asleep. Otherwise stable on RA while awake. VSS. Likely transfer to 5E after chest tube. Plan of care updated.                 Problem: Adult Inpatient Plan of Care  Goal: Plan of Care Review  Outcome: Ongoing, Not Progressing  Flowsheets (Taken 6/19/2024 0500)  Progress: no change  Plan of Care Reviewed With: patient  Outcome Evaluation: CT chest completed this shift. Plan for IR chest tube today. Still with pain to L chest. Pain meds per MAR, IV abx continued. 2L O2 applied overnight, sats dropping into 80s while asleep. Otherwise stable on RA while awake. VSS. Likely transfer to 5E after chest tube. Plan of care updated.  Goal: Patient-Specific Goal (Individualized)  Outcome: Ongoing, Not Progressing  Goal: Absence of Hospital-Acquired Illness or Injury  Outcome: Ongoing, Not Progressing  Intervention: Identify and Manage Fall Risk  Recent Flowsheet Documentation  Taken 6/19/2024 0415 by Ceci Ruff, RN  Safety Promotion/Fall Prevention:   safety round/check completed   activity supervised   assistive device/personal items within reach   fall prevention program maintained   nonskid shoes/slippers when out of bed  Taken 6/19/2024 0200 by Ceci Ruff, RN  Safety Promotion/Fall Prevention:   safety round/check completed   activity supervised   assistive device/personal items within reach   fall prevention program maintained   nonskid shoes/slippers when out of bed  Taken 6/19/2024 0030 by Ceci Ruff, RN  Safety Promotion/Fall Prevention:   safety round/check completed   activity supervised   assistive device/personal items within reach   fall prevention program maintained   nonskid shoes/slippers when out of bed  Taken  6/18/2024 2236 by Ceci Ruff RN  Safety Promotion/Fall Prevention:   safety round/check completed   activity supervised   assistive device/personal items within reach   fall prevention program maintained   nonskid shoes/slippers when out of bed  Taken 6/18/2024 2045 by Ceci Ruff RN  Safety Promotion/Fall Prevention:   safety round/check completed   activity supervised   assistive device/personal items within reach   fall prevention program maintained   nonskid shoes/slippers when out of bed  Intervention: Prevent and Manage VTE (Venous Thromboembolism) Risk  Recent Flowsheet Documentation  Taken 6/18/2024 2045 by Ceci Ruff RN  Activity Management: up ad sukhdve  Intervention: Prevent Infection  Recent Flowsheet Documentation  Taken 6/19/2024 0415 by Ceci Ruff RN  Infection Prevention:   personal protective equipment utilized   rest/sleep promoted  Taken 6/19/2024 0200 by Ceci Ruff RN  Infection Prevention:   personal protective equipment utilized   rest/sleep promoted  Taken 6/18/2024 2236 by Ceci Ruff RN  Infection Prevention:   rest/sleep promoted   personal protective equipment utilized  Taken 6/18/2024 2045 by Ceci Ruff RN  Infection Prevention:   rest/sleep promoted   personal protective equipment utilized  Goal: Optimal Comfort and Wellbeing  Outcome: Ongoing, Not Progressing  Intervention: Monitor Pain and Promote Comfort  Recent Flowsheet Documentation  Taken 6/18/2024 2045 by Ceci Ruff RN  Pain Management Interventions:   see MAR   relaxation techniques promoted  Intervention: Provide Person-Centered Care  Recent Flowsheet Documentation  Taken 6/19/2024 0030 by Ceci Ruff RN  Trust Relationship/Rapport:   care explained   choices provided   reassurance provided   thoughts/feelings acknowledged  Taken 6/18/2024 2045 by Ceci Ruff RN  Trust Relationship/Rapport:   care explained   choices provided   questions answered   reassurance provided    thoughts/feelings acknowledged  Goal: Readiness for Transition of Care  Outcome: Ongoing, Not Progressing

## 2024-06-19 NOTE — PROGRESS NOTES
[facility]  [phone]  Dr. Lonnie Angela    Subjective:  LOS: 0    Chief Complaint:  Loculated effusion    States he had pneumonia in May and treated with antibiotics.  He improved and then developed fever cough again.  He seen thoracic surgery as outpatient 1 week ago.  Now here with fever and pleuritic chest pain and cough.    Objective   Vital Signs past 24hrs  Temp range: Temp (24hrs), Av.4 °F (36.9 °C), Min:98.1 °F (36.7 °C), Max:98.6 °F (37 °C)    BP range: BP: (126-137)/(72-80) 134/80  Pulse range: Heart Rate:  [] 93  Resp rate range: Resp:  [16-18] 16  Device (Oxygen Therapy): room airFlow (L/min):  [2] 2  Oxygen range:SpO2:  [94 %-96 %] 95 %   Mechanical Ventilator:     Physical Exam  Constitutional:       Appearance: He is obese.   Eyes:      Pupils: Pupils are equal, round, and reactive to light.   Cardiovascular:      Rate and Rhythm: Normal rate and regular rhythm.      Heart sounds: No murmur heard.  Pulmonary:      Effort: Pulmonary effort is normal.      Breath sounds: Examination of the left-lower field reveals decreased breath sounds. Decreased breath sounds present.   Abdominal:      General: Bowel sounds are normal.      Palpations: Abdomen is soft. There is no mass.      Tenderness: There is no abdominal tenderness.   Musculoskeletal:         General: No swelling.   Neurological:      Mental Status: He is alert.       Results Review:    I have reviewed the laboratory and imaging data since the last note by [facility] physician.  My annotations are noted in assessment and plan.      Result Review:  I have personally reviewed the results from last note by [facility] physician to 2024 16:34 EDT and agree with these findings:  [x]  Laboratory list / accordion  [x]  Microbiology  [x]  Radiology  []  EKG/Telemetry   []  Cardiology/Vascular   []  Pathology  []  Old records  []  Other:    Medication Review:  I have reviewed the current MAR.  My annotations are noted in assessment and  plan.    cetirizine, 10 mg, Oral, Daily  escitalopram, 10 mg, Oral, Daily  famotidine, 20 mg, Oral, BID  gabapentin, 300 mg, Oral, TID  guaiFENesin, 600 mg, Oral, Q12H  insulin lispro, 2-7 Units, Subcutaneous, 4x Daily AC & at Bedtime  piperacillin-tazobactam, 4.5 g, Intravenous, Q8H  vancomycin, 1,250 mg, Intravenous, Q12H        hold, 1 each  Pharmacy to dose vancomycin,   sodium chloride, 50 mL/hr, Last Rate: 50 mL/hr (06/19/24 1613)      Lines, Drains & Airways       Active LDAs       Name Placement date Placement time Site Days    Peripheral IV 06/18/24 1445 Anterior;Right Forearm 06/18/24  1445  Forearm  1                  Isolation status: No active isolations    Dietary Orders (From admission, onward)       Start     Ordered    06/19/24 1558  Diet: Regular/House, Diabetic; Consistent Carbohydrate; Texture: Regular (IDDSI 7); Fluid Consistency: Thin (IDDSI 0)  Diet Effective Now        References:    Diet Order [place]   Question Answer Comment   Diets: Regular/House    Diets: Diabetic    Diabetic Diet: Consistent Carbohydrate    Texture: Regular (IDDSI 7)    Fluid Consistency: Thin (IDDSI 0)        06/19/24 1558                    PCCM Problems  Loculated left pleural effusion  Recent treatment for pneumonia outpatient  Obesity  Type 2 diabetes mellitus  Ex-smoker        THESE ARE NEW MEDICAL PROBLEMS TO ME.    Plan of Treatment    Loculated pleural effusion and given the multiple discrete pockets, unlikely to benefit from chest tube placement.  May need needle sampling for possible infection.  I have recommended to see what thoracic surgery wants before sending patient for procedure.  I suspect this pleural spaces infected with empyema and that he will need a VATS procedure.    Otherwise respiratory status looks stable at this time    Lonnie Angela MD  06/19/24  16:34 EDT      Part of this note may be an electronic transcription/translation of spoken language to printed text using the Dragon Dictation  System.

## 2024-06-19 NOTE — CONSULTS
CONSULT NOTE    Infectious Diseases - Jimmy Ag MD  [facility]       Patient Identification:  Name: Ravinder Robles  Age: 38 y.o.  Sex: male  :  1985  MRN: [id number]             Date of Consultation: 2024      Primary Care Physician: Ruiz Escamilla MD (Tony)                               Requesting Physician: Dr. Young  Reason for Consultation: Loculated pleural effusion possible empyema    History of presenting illness: Patient is a 38-year-old male with history of diabetes mellitus, hypertension, asthma and depression as well as anxiety and morbid obesity was in his usual state of his health until mid April when he started having cough congestion and not feeling well.  He tried to use inhalers to take care of his symptoms but did not improve and eventually was started on antibiotic treatment for suspected bronchitis/pneumonia in mid May 2024.  He has had multiple courses of antibiotic therapy without any significant improvement in his symptoms mainly consisting of shortness of breath pain and discomfort in the left side of the chest fever chills and feeling poorly.  He was evaluated with repeat x-rays and was found to have left-sided pleural effusion for which she has been referred to thoracic surgery service.  His symptoms persisted despite these interventions and was admitted to the hospital for IV antibiotic therapy and pulmonary infectious disease consultation.  Patient had CT scan of the chest with contrast performed on 2024 which showed loculated pleural effusion within the inferior aspect of the left lower lung with pleural wall thickening and enhancement further suspicion for empyema was raised.  Despite taking oral Levaquin did not improve and today chest x-ray shows evidence of increased left hemithorax effusion and obesity.  Pulmonary service has evaluated the patient and repeat CT scan of the chest has been ordered which is currently  pending.  Impression:  This presentation and above context is concerning and consistent with:  3-rfxerqqit-frngijkj pneumonia with progression to parapneumonic effusion and probable empyema partially treated with systemic antibiotic therapy with persistent symptoms and progressive worsening on imaging studies of the loculated effusion.  2-diabetes mellitus  3-asthma  4-morbid obesity  5-hypertension  6-other diagnosis per primary team.    Recommendations/Discussions:  At this juncture I agree with the care plan consisting of empiric broad-spectrum antibiotic therapy consisting of vancomycin and Zosyn with close monitoring of renal function while receiving this combination in the setting of diabetes and prior antibiotic usage.  De-escalate antibiotic treatment based on culture data and whether or not he develop any side effects.  Agree with concept of care going forward which is to have some sort of surgical intervention to address this loculated parapneumonic effusion and based on his subsequent clinical course and microbiological data decide on specific antibiotic therapy and duration of treatment.  Thank you Dr. Nicole for letting me be the part of your patient care please see above impression and recommendations.      Past Medical History:  Past Medical History:   Diagnosis Date    Acid reflux     Allergic     Anxiety     Asthma     Bronchitis     Hypertension     Pleurisy 2018    Reactive depression 8/10/2020    Rib fracture     broke 4 ribs    Type 2 diabetes mellitus without complication, without long-term current use of insulin 11/16/2020     Past Surgical History:  Past Surgical History:   Procedure Laterality Date    RESECTION BONE TUMOR KNEE        Home Meds:  Medications Prior to Admission   Medication Sig Dispense Refill Last Dose    albuterol sulfate  (90 Base) MCG/ACT inhaler INHALE 2 PUFFS BY MOUTH EVERY 4 HOURS AS NEEDED FOR WHEEZING 36 g 0 6/18/2024    escitalopram (LEXAPRO) 10 MG tablet Take  1 tablet by mouth once daily 90 tablet 0 6/17/2024    furosemide (LASIX) 40 MG tablet Take 1 tablet by mouth once daily 90 tablet 0 6/17/2024    gabapentin (NEURONTIN) 300 MG capsule Take 1 capsule by mouth 3 (Three) Times a Day for 30 days. 90 capsule 0 6/17/2024    levoFLOXacin (Levaquin) 500 MG tablet Take 1 tablet by mouth Daily. 10 tablet 0 6/17/2024    losartan (COZAAR) 50 MG tablet Take 1 tablet by mouth once daily 90 tablet 0 6/17/2024    metFORMIN ER (GLUCOPHAGE-XR) 500 MG 24 hr tablet Take 1 tablet by mouth 2 (Two) Times a Day With Meals. 180 tablet 0 6/17/2024    cyclobenzaprine (FLEXERIL) 10 MG tablet Take 1 tablet by mouth three times daily as needed for muscle spasm 30 tablet 0     ibuprofen (ADVIL,MOTRIN) 800 MG tablet Take 1 tablet by mouth Every 6 (Six) Hours As Needed for Mild Pain for up to 30 days. 60 tablet 0     omeprazole (priLOSEC) 20 MG capsule Take 1 capsule by mouth Daily As Needed.       pantoprazole (Protonix) 40 MG EC tablet Take 1 tablet by mouth Daily. 90 tablet 4     Tirzepatide (Mounjaro) 7.5 MG/0.5ML solution pen-injector pen Inject 0.5 mL under the skin into the appropriate area as directed 1 (One) Time Per Week. 2 mL 2     Tirzepatide (MOUNJARO) 7.5 MG/0.5ML solution pen-injector pen Inject 0.5 mL under the skin into the appropriate area as directed 1 (One) Time Per Week.        Current Meds:     Current Facility-Administered Medications:     acetaminophen (TYLENOL) tablet 650 mg, 650 mg, Oral, Q6H PRN, Jeovanny Young MD    albuterol sulfate HFA (PROVENTIL HFA;VENTOLIN HFA;PROAIR HFA) inhaler 2 puff, 2 puff, Inhalation, Q4H PRN, Jeovanny Young MD    cetirizine (zyrTEC) tablet 10 mg, 10 mg, Oral, Daily, Jeovanny Young MD    dextrose (D50W) (25 g/50 mL) IV injection 25 g, 25 g, Intravenous, Q15 Min PRN, Jeovanny Young MD    dextrose (GLUTOSE) oral gel 15 g, 15 g, Oral, Q15 Min PRN, Jeovanny Young MD    escitalopram (LEXAPRO) tablet 10 mg, 10 mg, Oral, Daily, Jeovanny Young MD     famotidine (PEPCID) tablet 20 mg, 20 mg, Oral, BID, Jeovanny Young MD    gabapentin (NEURONTIN) capsule 300 mg, 300 mg, Oral, TID, Jeovanny Young MD    glucagon (GLUCAGEN) injection 1 mg, 1 mg, Intramuscular, Q15 Min PRN, Jeovanny Young MD    guaiFENesin (MUCINEX) 12 hr tablet 600 mg, 600 mg, Oral, Q12H, Jeovanny Young MD    HYDROcodone-acetaminophen (NORCO) 5-325 MG per tablet 1 tablet, 1 tablet, Oral, Q4H PRN, Jeovanny Young MD, 1 tablet at 24 1840    insulin lispro (HUMALOG/ADMELOG) injection 2-7 Units, 2-7 Units, Subcutaneous, 4x Daily AC & at Bedtime, Jeovanny Young MD    Pharmacy to dose vancomycin, , Does not apply, Continuous PRN, Jeovanny Young MD    Pharmacy to Dose Zosyn, , Does not apply, Continuous PRN, Jeovanny Young MD    piperacillin-tazobactam (ZOSYN) 4.5 g IVPB in 100 mL NS MBP (CD), 4.5 g, Intravenous, Q8H, Jeovanny Young MD    sodium chloride 0.9 % flush 10 mL, 10 mL, Intravenous, PRN, Emergency, Triage Protocol, MD    sodium chloride 0.9 % infusion, 75 mL/hr, Intravenous, Continuous, Jeovanny Young MD, Last Rate: 75 mL/hr at 24, 75 mL/hr at 24  Allergies:  Allergies   Allergen Reactions    Cat Hair Extract Hives and Itching    Lisinopril Cough    Methylprednisolone Itching     Pt can take prednisone      Social History:   Social History     Tobacco Use    Smoking status: Former     Current packs/day: 0.00     Average packs/day: 1.5 packs/day for 8.0 years (12.0 ttl pk-yrs)     Types: Cigarettes     Start date: 2010     Quit date: 2018     Years since quittin.4    Smokeless tobacco: Never    Tobacco comments:     nicotine gum    Substance Use Topics    Alcohol use: Yes     Alcohol/week: 1.0 standard drink of alcohol     Types: 1 Standard drinks or equivalent per week     Comment: socially       Family History:  Family History   Problem Relation Age of Onset    Diabetes Mother     Lung disease Mother     Lung disease Maternal Grandmother     Cancer Maternal  "Grandmother         lung    Emphysema Maternal Grandmother           Review of Systems  See history of present illness and past medical history.        Vitals:   /76   Pulse 97   Temp (!) 100.6 °F (38.1 °C) (Tympanic)   Resp 16   Ht 182.9 cm (72\")   Wt 132 kg (291 lb 14.2 oz)   SpO2 95%   BMI 39.59 kg/m²   I/O:   Intake/Output Summary (Last 24 hours) at 6/18/2024 2055  Last data filed at 6/18/2024 1657  Gross per 24 hour   Intake 100 ml   Output --   Net 100 ml     Exam:  Patient is examined using the personal protective equipment as per guidelines from infection control for this particular patient as enacted.  Hand washing was performed before and after patient interaction.  General Appearance:  Alert cooperative male does not appear to be in any acute distress does appear ill   Head:    Normocephalic, without obvious abnormality, atraumatic   Eyes:    PERRL, conjunctivae/corneas clear, EOM's intact, both eyes   Ears:    Normal external ear canals, both ears   Nose:   Nares normal, septum midline, mucosa normal, no drainage    or sinus tenderness   Throat:   Lips, tongue, gums normal; oral mucosa pink and moist   Neck:   Supple, symmetrical, trachea midline, no adenopathy;     thyroid:  no enlargement/tenderness/nodules; no carotid    bruit or JVD   Back:     Symmetric, no curvature, ROM normal, no CVA tenderness   Lungs:   No obvious use of accessory muscles of breathing noted decreased breath sounds in the left lung base   Chest Wall:    No tenderness or deformity    Heart:  S1-S2 regular   Abdomen:     Soft, non-tender, bowel sounds active all four quadrants,     no masses, no hepatomegaly, no splenomegaly   Extremities:   Extremities normal, atraumatic, no cyanosis or edema   Pulses:   Pulses palpable in all extremities; symmetric all extremities   Skin:   Skin color normal, Skin is warm and dry,  no rashes or palpable lesions   Neurologic:   CNII-XII intact, motor strength grossly intact, " sensation grossly intact to light touch, no focal deficits noted       Data Review:    I reviewed the patient's new clinical results.  Results from last 7 days   Lab Units 06/18/24  1440 06/14/24  1052   WBC 10*3/mm3 17.33* 16.33*   HEMOGLOBIN g/dL 14.5 14.3   PLATELETS 10*3/mm3 309 297     Results from last 7 days   Lab Units 06/18/24  1440   SODIUM mmol/L 135*   POTASSIUM mmol/L 3.7   CHLORIDE mmol/L 98   CO2 mmol/L 25.0   BUN mg/dL 12   CREATININE mg/dL 0.90   CALCIUM mg/dL 9.2   GLUCOSE mg/dL 110*     Microbiology Results (last 10 days)       ** No results found for the last 240 hours. **              Assessment:  Active Hospital Problems    Diagnosis  POA    **Pneumonia [J18.9]  Yes      Resolved Hospital Problems   No resolved problems to display.           Jimmy Garza MD   6/18/2024  20:55 EDT    Parts of this note may be an electronic transcription/translation of spoken language to printed text using the Dragon dictation system.

## 2024-06-19 NOTE — CONSULTS
Inpatient Thoracic Surgery Consult  Consult performed by: Luis A Guzman APRN  Consult ordered by: Jeovanny Young MD          Patient Care Team:  Ruiz Escamilla MD (Tony) as PCP - General (Internal Medicine)    Chief Complaint   Patient presents with    Fatigue    Fever       Subjective     History of Present Illness  Mr. Robles is a 38-year-old gentleman who presented to [facility] on 6/18/2024 with complaints of dyspnea, lethargy, fevers.  He is known to our service after being evaluated by my colleague last week on 6/13/2024 for his left pleural effusion suspected to be of parapneumonic etiology.  He was advised to continue steroids and antibiotics and to be monitored closely with a CT of the chest to be performed in 6 weeks.    He presented to [facility] yesterday with above complaints.  He is accompanied by his wife who reports he was quite lethargic yesterday and has been febrile and diaphoretic.  CT of the chest was obtained which demonstrated a left pleural effusion versus possible lung abscess which appears similar compared to previous imaging.  We have been consulted given this finding.  He is a former smoker and reports to quitting approximately 3 years ago.  At most he smoked approximately 1.5 packs/day for a little over a decade.    Review of Systems   Constitutional:  Positive for diaphoresis, fatigue and fever. Negative for activity change and chills.   HENT:  Negative for congestion, sneezing and sore throat.    Respiratory:  Positive for cough (Nonproductive). Negative for shortness of breath, wheezing and stridor.    Cardiovascular:  Negative for chest pain.   Gastrointestinal:  Negative for abdominal pain, nausea and vomiting.   Genitourinary:  Negative for dysuria.   Musculoskeletal:  Negative for back pain.   Skin:  Negative for pallor.   Neurological:  Negative for dizziness, seizures and facial asymmetry.   Psychiatric/Behavioral:  Negative for  confusion.         Past Medical History:   Diagnosis Date    Acid reflux     Allergic     Anxiety     Asthma     Bronchitis     Hypertension     Pleurisy 2018    Reactive depression 8/10/2020    Rib fracture     broke 4 ribs    Type 2 diabetes mellitus without complication, without long-term current use of insulin 2020     Past Surgical History:   Procedure Laterality Date    RESECTION BONE TUMOR KNEE       Family History   Problem Relation Age of Onset    Diabetes Mother     Lung disease Mother     Lung disease Maternal Grandmother     Cancer Maternal Grandmother         lung    Emphysema Maternal Grandmother      Social History     Socioeconomic History    Marital status:    Tobacco Use    Smoking status: Former     Current packs/day: 0.00     Average packs/day: 1.5 packs/day for 8.0 years (12.0 ttl pk-yrs)     Types: Cigarettes     Start date: 2010     Quit date: 2018     Years since quittin.4    Smokeless tobacco: Never    Tobacco comments:     nicotine gum    Vaping Use    Vaping status: Never Used   Substance and Sexual Activity    Alcohol use: Yes     Alcohol/week: 1.0 standard drink of alcohol     Types: 1 Standard drinks or equivalent per week     Comment: socially     Drug use: No    Sexual activity: Yes     Partners: Female     Medications Prior to Admission   Medication Sig Dispense Refill Last Dose    albuterol sulfate  (90 Base) MCG/ACT inhaler INHALE 2 PUFFS BY MOUTH EVERY 4 HOURS AS NEEDED FOR WHEEZING 36 g 0 2024    escitalopram (LEXAPRO) 10 MG tablet Take 1 tablet by mouth once daily 90 tablet 0 2024    furosemide (LASIX) 40 MG tablet Take 1 tablet by mouth once daily 90 tablet 0 2024    gabapentin (NEURONTIN) 300 MG capsule Take 1 capsule by mouth 3 (Three) Times a Day for 30 days. 90 capsule 0 2024    levoFLOXacin (Levaquin) 500 MG tablet Take 1 tablet by mouth Daily. 10 tablet 0 2024    losartan (COZAAR) 50 MG tablet Take 1 tablet by mouth  once daily 90 tablet 0 6/17/2024    metFORMIN ER (GLUCOPHAGE-XR) 500 MG 24 hr tablet Take 1 tablet by mouth 2 (Two) Times a Day With Meals. 180 tablet 0 6/17/2024    cyclobenzaprine (FLEXERIL) 10 MG tablet Take 1 tablet by mouth three times daily as needed for muscle spasm 30 tablet 0     ibuprofen (ADVIL,MOTRIN) 800 MG tablet Take 1 tablet by mouth Every 6 (Six) Hours As Needed for Mild Pain for up to 30 days. 60 tablet 0     omeprazole (priLOSEC) 20 MG capsule Take 1 capsule by mouth Daily As Needed.       pantoprazole (Protonix) 40 MG EC tablet Take 1 tablet by mouth Daily. 90 tablet 4     Tirzepatide (Mounjaro) 7.5 MG/0.5ML solution pen-injector pen Inject 0.5 mL under the skin into the appropriate area as directed 1 (One) Time Per Week. 2 mL 2     Tirzepatide (MOUNJARO) 7.5 MG/0.5ML solution pen-injector pen Inject 0.5 mL under the skin into the appropriate area as directed 1 (One) Time Per Week.        Allergies   Allergen Reactions    Cat Hair Extract Hives and Itching    Lisinopril Cough    Methylprednisolone Itching     Pt can take prednisone        Objective      Vital Signs  Temp:  [98.1 °F (36.7 °C)-98.6 °F (37 °C)] 98.1 °F (36.7 °C)  Heart Rate:  [] 93  Resp:  [16-18] 16  BP: (117-137)/(63-80) 134/80    Intake & Output (last day)         06/18 0701  06/19 0700 06/19 0701  06/20 0700    I.V. (mL/kg) 772.5 (5.9)     IV Piggyback 200     Total Intake(mL/kg) 972.5 (7.4)     Net +972.5           Urine Unmeasured Occurrence 1 x             Physical Exam  Constitutional:       General: He is not in acute distress.     Appearance: He is not ill-appearing or toxic-appearing.   HENT:      Head: Normocephalic and atraumatic.      Mouth/Throat:      Mouth: Mucous membranes are moist.      Pharynx: Oropharynx is clear.   Eyes:      Pupils: Pupils are equal, round, and reactive to light.   Cardiovascular:      Rate and Rhythm: Normal rate and regular rhythm.      Pulses: Normal pulses.   Pulmonary:      Effort:  Pulmonary effort is normal. No respiratory distress.      Breath sounds: Normal breath sounds. No wheezing.   Abdominal:      General: Abdomen is flat. There is no distension.      Palpations: Abdomen is soft.   Musculoskeletal:         General: No swelling or tenderness.      Cervical back: Neck supple. No tenderness.   Skin:     General: Skin is warm and dry.      Capillary Refill: Capillary refill takes less than 2 seconds.   Neurological:      General: No focal deficit present.      Mental Status: He is alert and oriented to person, place, and time.   Psychiatric:         Mood and Affect: Mood normal.         Results Review:    I reviewed the patient's new clinical results.  I reviewed the patient's new imaging results and agree with the interpretation.  Discussed with patient, nurse, and surgeon.    Imaging Results (Last 24 Hours)       Procedure Component Value Units Date/Time    US Thoracentesis [[id number]] Collected: 06/19/24 1537    Specimen: Body Fluid Updated: 06/19/24 1537    Narrative:      ULTRASOUND-GUIDED LEFT THORACENTESIS, 6/19/2024     HISTORY:   38-year-old male with loculated left pleural effusion.     CONSENT:   The procedure, risks and alternatives were discussed in detail with the  patient, emphasizing risks of pneumothorax, chest tube placement and  bleeding. Questions were entertained, and written informed consent was  obtained. Timeout was observed in the procedure room for patient  identification and procedure site verification, and the procedure site  was marked by me. Permanent images were recorded.     PROCEDURE:   Using sterile technique, 1% lidocaine local anesthetic and real-time  ultrasound guidance, a 5 [place]i thoracentesis catheter was placed into  the largest portion of the pleural effusion. No fluid was able to be  aspirated. The patient remained stable in the department during and  after the procedure.           Impression:      Unsuccessful left ultrasound-guided  thoracentesis. Real-time ultrasound  guidance identifies the needle within the fluid collection with  inability to obtain any aspirate.     Procedure performed by KATHRYN Singleton.  By electronically signing this report, I, the supervising radiologist,  attest that I was not present for the procedure(s) . I agree with the  finalized report.       CT Chest Without Contrast Diagnostic [[id number]] Collected: 06/18/24 2034     Updated: 06/18/24 2048    Narrative:      CT CHEST WO CONTRAST DIAGNOSTIC-     DATE OF EXAM: 6/18/2024 7:38 PM     INDICATION: Order states: Thoracic abscess. Pneumonia, fatigue, and  fever.     COMPARISON: Chest radiographs 6/18/2024, 6/11/2024, and 6/3/2024. CT  chest 6/12/2024 and 11/8/2018.     TECHNIQUE: Multiple contiguous axial images were acquired through the  chest without the intravenous administration of contrast. Reformatted  coronal and sagittal sequences were also reviewed. Radiation dose  reduction techniques were utilized, including automated exposure control  and exposure modulation based on body size.     FINDINGS:  Evaluation is mildly limited by the lack of intravenous contrast.     Similar-appearing complex likely partially loculated left pleural  effusion with atelectasis and consolidation of the left lower lobe and  base of the left upper lobe and lingula. Bronchial plugging in the base  of the left lower lobe. Right lung clear. No pneumothorax.     The heart and great vessels of the chest are normal in size. No  calcified coronary artery disease. Trace pericardial fluid. Partially  visualized enlarged left perihilar lymph nodes with an index posterior  left perihilar lymph node measuring 1.5 cm x 1 cm (axial series 2 image  48), likely reactive.     Limited noncontrast CT imaging of the upper abdomen is unremarkable.     Mild bilateral gynecomastia. Mild multilevel mid and lower thoracic  spondylosis. Partially imaged mild chronic degenerative changes in both  shoulders. No  acute osseous abnormality or concerning osseous lesion.       Impression:         1. Similar-appearing complex likely partially loculated left pleural  effusion, probable empyema or possible lung abscesses, with atelectasis  and consolidation of the left left lower lobe and base of the left upper  lobe and lingula that could reflect pneumonia.  2. Partially visualized enlarged left hilar lymph nodes, likely  reactive.     This report was finalized on 6/18/2024 8:45 PM by Deven Schmitz MD on  Workstation: [id number]               Lab Results:  Lab Results (last 24 hours)       Procedure Component Value Units Date/Time    Blood Culture - Blood, Arm, Left [[id number]]  (Normal) Collected: 06/18/24 1507    Specimen: Blood from Arm, Left Updated: 06/19/24 1515     Blood Culture No growth at 24 hours    Blood Culture - Blood, Arm, Left [[id number]]  (Normal) Collected: 06/18/24 1507    Specimen: Blood from Arm, Left Updated: 06/19/24 1515     Blood Culture No growth at 24 hours    Narrative:      Less than seven (7) mL's of blood was collected.  Insufficient quantity may yield false negative results.    POC Glucose Once [[id number]]  (Normal) Collected: 06/19/24 1133    Specimen: Blood Updated: 06/19/24 1136     Glucose 93 mg/dL     POC Glucose Once [[id number]]  (Normal) Collected: 06/19/24 0815    Specimen: Blood Updated: 06/19/24 0817     Glucose 97 mg/dL     MRSA Screen, PCR (Inpatient) - Swab, Nares [[id number]]  (Normal) Collected: 06/19/24 0512    Specimen: Swab from Nares Updated: 06/19/24 0717     MRSA PCR No MRSA Detected    Narrative:      The negative predictive value of this diagnostic test is high and should only be used to consider de-escalating anti-MRSA therapy. A positive result may indicate colonization with MRSA and must be correlated clinically.    BNP [[id number]]  (Normal) Collected: 06/19/24 0501    Specimen: Blood Updated: 06/19/24 0551     proBNP <36.0 pg/mL     Narrative:      This assay is used as  an aid in the diagnosis of individuals suspected of having heart failure. It can be used as an aid in the diagnosis of acute decompensated heart failure (ADHF) in patients presenting with signs and symptoms of ADHF to the emergency department (ED). In addition, NT-proBNP of <300 pg/mL indicates ADHF is not likely.    Age Range Result Interpretation  NT-proBNP Concentration (pg/mL:      <50             Positive            >450                   Gray                 300-450                    Negative             <300    50-75           Positive            >900                  Gray                300-900                  Negative            <300      >75             Positive            >1800                  Gray                300-1800                  Negative            <300    TSH [[id number]]  (Normal) Collected: 06/19/24 0501    Specimen: Blood Updated: 06/19/24 0551     TSH 1.370 uIU/mL     Comprehensive Metabolic Panel [[id number]]  (Abnormal) Collected: 06/19/24 0501    Specimen: Blood Updated: 06/19/24 0546     Glucose 114 mg/dL      BUN 9 mg/dL      Creatinine 0.75 mg/dL      Sodium 135 mmol/L      Potassium 3.5 mmol/L      Chloride 102 mmol/L      CO2 24.5 mmol/L      Calcium 8.5 mg/dL      Total Protein 6.4 g/dL      Albumin 2.9 g/dL      ALT (SGPT) 17 U/L      AST (SGOT) 10 U/L      Alkaline Phosphatase 60 U/L      Total Bilirubin 0.4 mg/dL      Globulin 3.5 gm/dL      A/G Ratio 0.8 g/dL      BUN/Creatinine Ratio 12.0     Anion Gap 8.5 mmol/L      eGFR 119.2 mL/min/1.73     Narrative:      GFR Normal >60  Chronic Kidney Disease <60  Kidney Failure <15      Lipid Panel [[id number]] Collected: 06/19/24 0501    Specimen: Blood Updated: 06/19/24 0546     Total Cholesterol 126 mg/dL      Triglycerides 56 mg/dL      HDL Cholesterol 40 mg/dL      LDL Cholesterol  74 mg/dL      VLDL Cholesterol 12 mg/dL      LDL/HDL Ratio 1.87    Narrative:      Cholesterol Reference Ranges  (U.S. Department of Health and Human  Services ATP III Classifications)    Desirable          <200 mg/dL  Borderline High    200-239 mg/dL  High Risk          >240 mg/dL      Triglyceride Reference Ranges  (U.S. Department of Health and Human Services ATP III Classifications)    Normal           <150 mg/dL  Borderline High  150-199 mg/dL  High             200-499 mg/dL  Very High        >500 mg/dL    HDL Reference Ranges  (U.S. Department of Health and Human Services ATP III Classifications)    Low     <40 mg/dl (major risk factor for CHD)  High    >60 mg/dl ('negative' risk factor for CHD)        LDL Reference Ranges  (U.S. Department of Health and Human Services ATP III Classifications)    Optimal          <100 mg/dL  Near Optimal     100-129 mg/dL  Borderline High  130-159 mg/dL  High             160-189 mg/dL  Very High        >189 mg/dL    Hemoglobin A1c [[id number]]  (Abnormal) Collected: 06/19/24 0501    Specimen: Blood Updated: 06/19/24 0539     Hemoglobin A1C 6.00 %     Narrative:      Hemoglobin A1C Ranges:    Increased Risk for Diabetes  5.7% to 6.4%  Diabetes                     >= 6.5%  Diabetic Goal                < 7.0%    CBC & Differential [[id number]]  (Abnormal) Collected: 06/19/24 0501    Specimen: Blood Updated: 06/19/24 0530    Narrative:      The following orders were created for panel order CBC & Differential.  Procedure                               Abnormality         Status                     ---------                               -----------         ------                     CBC Auto Differential[[id number]]        Abnormal            Final result                 Please view results for these tests on the individual orders.    CBC Auto Differential [[id number]]  (Abnormal) Collected: 06/19/24 0501    Specimen: Blood Updated: 06/19/24 0530     WBC 13.54 10*3/mm3      RBC 4.47 10*6/mm3      Hemoglobin 12.3 g/dL      Hematocrit 37.9 %      MCV 84.8 fL      MCH 27.5 pg      MCHC 32.5 g/dL      RDW 13.2 %      RDW-SD 41.1 fl       MPV 9.3 fL      Platelets 262 10*3/mm3      Neutrophil % 66.1 %      Lymphocyte % 22.8 %      Monocyte % 9.3 %      Eosinophil % 1.0 %      Basophil % 0.4 %      Immature Grans % 0.4 %      Neutrophils, Absolute 8.94 10*3/mm3      Lymphocytes, Absolute 3.09 10*3/mm3      Monocytes, Absolute 1.26 10*3/mm3      Eosinophils, Absolute 0.14 10*3/mm3      Basophils, Absolute 0.06 10*3/mm3      Immature Grans, Absolute 0.05 10*3/mm3      nRBC 0.0 /100 WBC     POC Glucose Once [[id number]]  (Normal) Collected: 06/18/24 2032    Specimen: Blood Updated: 06/18/24 2034     Glucose 107 mg/dL     Comprehensive Metabolic Panel [[id number]]  (Abnormal) Collected: 06/18/24 1440    Specimen: Blood Updated: 06/18/24 1725     Glucose 110 mg/dL      BUN 12 mg/dL      Creatinine 0.90 mg/dL      Sodium 135 mmol/L      Potassium 3.7 mmol/L      Chloride 98 mmol/L      CO2 25.0 mmol/L      Calcium 9.2 mg/dL      Total Protein 7.3 g/dL      Albumin 3.3 g/dL      ALT (SGPT) 19 U/L      AST (SGOT) 8 U/L      Alkaline Phosphatase 68 U/L      Total Bilirubin 0.5 mg/dL      Globulin 4.0 gm/dL      A/G Ratio 0.8 g/dL      BUN/Creatinine Ratio 13.3     Anion Gap 12.0 mmol/L      eGFR 112.8 mL/min/1.73     Narrative:      GFR Normal >60  Chronic Kidney Disease <60  Kidney Failure <15      BNP [[id number]]  (Normal) Collected: 06/18/24 1440    Specimen: Blood Updated: 06/18/24 1643     proBNP 119.0 pg/mL     Narrative:      This assay is used as an aid in the diagnosis of individuals suspected of having heart failure. It can be used as an aid in the diagnosis of acute decompensated heart failure (ADHF) in patients presenting with signs and symptoms of ADHF to the emergency department (ED). In addition, NT-proBNP of <300 pg/mL indicates ADHF is not likely.    Age Range Result Interpretation  NT-proBNP Concentration (pg/mL:      <50             Positive            >450                   Gray                 300-450                    Negative              <300    50-75           Positive            >900                  Gray                300-900                  Negative            <300      >75             Positive            >1800                  Gray                300-1800                  Negative            <300    Single High Sensitivity Troponin T [[id number]]  (Normal) Collected: 06/18/24 1440    Specimen: Blood Updated: 06/18/24 1643     HS Troponin T 9 ng/L     Narrative:      High Sensitive Troponin T Reference Range:  <14.0 ng/L- Negative Female for AMI  <22.0 ng/L- Negative Male for AMI  >=14 - Abnormal Female indicating possible myocardial injury.  >=22 - Abnormal Male indicating possible myocardial injury.   Clinicians would have to utilize clinical acumen, EKG, Troponin, and serial changes to determine if it is an Acute Myocardial Infarction or myocardial injury due to an underlying chronic condition.                     Assessment & Plan       Pneumonia    Left lower lobe pneumonia      Assessment & Plan  I have independently reviewed the CT of the chest performed yesterday, 6/18/2024 which demonstrates a similar-appearing complex likely partially loculated left pleural effusion with atelectasis and consolidation of the left lower lobe.  Atelectasis and consolidation of the base of the left upper lobe and lingula which could reflect pneumonia.  Case reviewed with Dr. Davis Rodriguez.    No significant effusion. Patient is on room air and denies any dyspnea or orthopnea. He has a nonproductive cough.  Antibiotics ongoing per infectious disease recommendations.  Recommend thoracentesis for fluid sampling, cultures.  Recommend pulmonary hygiene regimen to include I-S, OPEP, mucolytic's, bronchodilators.  Repeat a.m. chest x-ray.  Increase mobilization as able.  May resume p.o. diet following thoracentesis.      I discussed the patients findings and our recommendations with patient, family, nursing staff, and consulting provider    Thank you for this  consult and allowing us to participate in the care of your patient.  We will follow along with you during this hospitalization.     KATHRYN Pena  [facility]  06/19/24  15:39 EDT    I have spent greater than 75 minutes reviewing the patient's chart including medical history, notes, radiographic images, labs, and performing assessment and development of a plan and discussion with the patient/family at bedside.

## 2024-06-19 NOTE — PROGRESS NOTES
"[facility] Clinical Pharmacy Services: Vancomycin Pharmacokinetic Initial Consult Note    Ravinder Robles is a 38 y.o. male who is on day 1 of pharmacy to dose vancomycin.    Indication: Pneumonia  Consulting Provider: Dr Young  Planned Duration of Therapy: 5 days  Loading Dose Ordered or Given: 2750 mg on 6/18 at 1657  MRSA PCR performed: on order; Result: pending  Culture/Source: blood cx pending  Target: -600 mg/L.hr     Other Antimicrobials: Zosyn IV    Vitals/Labs  Ht: 182.9 cm (72\"); Wt: 132 kg (291 lb 14.2 oz)  Temp Readings from Last 1 Encounters:   06/18/24 98.4 °F (36.9 °C) (Oral)    Estimated Creatinine Clearance: 158 mL/min (by C-G formula based on SCr of 0.9 mg/dL).        Results from last 7 days   Lab Units 06/18/24  1440 06/14/24  1052   CREATININE mg/dL 0.90  --    WBC 10*3/mm3 17.33* 16.33*     Assessment/Plan:    Vancomycin Dose:   1000 mg IV every  8  hours  Predictive AUC level for the dose ordered is 549 mg/L.hr, which is within the target of 400-600 mg/L.hr  Vanc Trough has been ordered for 06/20/24 at 1330     Pharmacy will follow patient's kidney function and will adjust doses and obtain levels as necessary. Thank you for involving pharmacy in this patient's care. Please contact pharmacy with any questions or concerns.                           Evangelina Alexander, Pharm.D., [facility]  Clinical Pharmacist    "

## 2024-06-20 ENCOUNTER — APPOINTMENT (OUTPATIENT)
Dept: GENERAL RADIOLOGY | Facility: HOSPITAL | Age: 39
DRG: 193 | End: 2024-06-20
Payer: COMMERCIAL

## 2024-06-20 LAB
ANION GAP SERPL CALCULATED.3IONS-SCNC: 5.8 MMOL/L (ref 5–15)
BASOPHILS # BLD AUTO: 0.03 10*3/MM3 (ref 0–0.2)
BASOPHILS NFR BLD AUTO: 0.3 % (ref 0–1.5)
BUN SERPL-MCNC: 11 MG/DL (ref 6–20)
BUN/CREAT SERPL: 13.1 (ref 7–25)
CALCIUM SPEC-SCNC: 8.4 MG/DL (ref 8.6–10.5)
CHLORIDE SERPL-SCNC: 104 MMOL/L (ref 98–107)
CO2 SERPL-SCNC: 25.2 MMOL/L (ref 22–29)
CREAT SERPL-MCNC: 0.84 MG/DL (ref 0.76–1.27)
DEPRECATED RDW RBC AUTO: 40.6 FL (ref 37–54)
EGFRCR SERPLBLD CKD-EPI 2021: 115.2 ML/MIN/1.73
EOSINOPHIL # BLD AUTO: 0.26 10*3/MM3 (ref 0–0.4)
EOSINOPHIL NFR BLD AUTO: 2.3 % (ref 0.3–6.2)
ERYTHROCYTE [DISTWIDTH] IN BLOOD BY AUTOMATED COUNT: 13.1 % (ref 12.3–15.4)
GLUCOSE BLDC GLUCOMTR-MCNC: 104 MG/DL (ref 70–130)
GLUCOSE BLDC GLUCOMTR-MCNC: 121 MG/DL (ref 70–130)
GLUCOSE BLDC GLUCOMTR-MCNC: 122 MG/DL (ref 70–130)
GLUCOSE BLDC GLUCOMTR-MCNC: 135 MG/DL (ref 70–130)
GLUCOSE SERPL-MCNC: 131 MG/DL (ref 65–99)
HCT VFR BLD AUTO: 37.5 % (ref 37.5–51)
HGB BLD-MCNC: 12.2 G/DL (ref 13–17.7)
IMM GRANULOCYTES # BLD AUTO: 0.04 10*3/MM3 (ref 0–0.05)
IMM GRANULOCYTES NFR BLD AUTO: 0.3 % (ref 0–0.5)
LYMPHOCYTES # BLD AUTO: 2.76 10*3/MM3 (ref 0.7–3.1)
LYMPHOCYTES NFR BLD AUTO: 24.1 % (ref 19.6–45.3)
MCH RBC QN AUTO: 27.7 PG (ref 26.6–33)
MCHC RBC AUTO-ENTMCNC: 32.5 G/DL (ref 31.5–35.7)
MCV RBC AUTO: 85 FL (ref 79–97)
MONOCYTES # BLD AUTO: 0.84 10*3/MM3 (ref 0.1–0.9)
MONOCYTES NFR BLD AUTO: 7.3 % (ref 5–12)
NEUTROPHILS NFR BLD AUTO: 65.7 % (ref 42.7–76)
NEUTROPHILS NFR BLD AUTO: 7.53 10*3/MM3 (ref 1.7–7)
NRBC BLD AUTO-RTO: 0 /100 WBC (ref 0–0.2)
PLATELET # BLD AUTO: 239 10*3/MM3 (ref 140–450)
PMV BLD AUTO: 9.6 FL (ref 6–12)
POTASSIUM SERPL-SCNC: 3.7 MMOL/L (ref 3.5–5.2)
RBC # BLD AUTO: 4.41 10*6/MM3 (ref 4.14–5.8)
SODIUM SERPL-SCNC: 135 MMOL/L (ref 136–145)
WBC NRBC COR # BLD AUTO: 11.46 10*3/MM3 (ref 3.4–10.8)

## 2024-06-20 PROCEDURE — 25010000002 PIPERACILLIN SOD-TAZOBACTAM PER 1 G: Performed by: HOSPITALIST

## 2024-06-20 PROCEDURE — 94799 UNLISTED PULMONARY SVC/PX: CPT

## 2024-06-20 PROCEDURE — 85025 COMPLETE CBC W/AUTO DIFF WBC: CPT | Performed by: HOSPITALIST

## 2024-06-20 PROCEDURE — 99232 SBSQ HOSP IP/OBS MODERATE 35: CPT

## 2024-06-20 PROCEDURE — 82948 REAGENT STRIP/BLOOD GLUCOSE: CPT

## 2024-06-20 PROCEDURE — 94664 DEMO&/EVAL PT USE INHALER: CPT

## 2024-06-20 PROCEDURE — 25010000002 VANCOMYCIN HCL 1.25 G RECONSTITUTED SOLUTION 1 EACH VIAL: Performed by: INTERNAL MEDICINE

## 2024-06-20 PROCEDURE — 25810000003 SODIUM CHLORIDE 0.9 % SOLUTION 250 ML FLEX CONT: Performed by: INTERNAL MEDICINE

## 2024-06-20 PROCEDURE — 71045 X-RAY EXAM CHEST 1 VIEW: CPT

## 2024-06-20 PROCEDURE — 25010000002 HYALURONIDASE (HUMAN) 150 UNIT/ML SOLUTION 1 ML VIAL: Performed by: HOSPITALIST

## 2024-06-20 PROCEDURE — 80048 BASIC METABOLIC PNL TOTAL CA: CPT | Performed by: HOSPITALIST

## 2024-06-20 RX ADMIN — CETIRIZINE HYDROCHLORIDE 10 MG: 10 TABLET ORAL at 08:22

## 2024-06-20 RX ADMIN — PIPERACILLIN AND TAZOBACTAM 4.5 G: 4; .5 INJECTION, POWDER, FOR SOLUTION INTRAVENOUS at 06:05

## 2024-06-20 RX ADMIN — HYALURONIDASE (HUMAN RECOMBINANT) 150 UNITS: 150 INJECTION, SOLUTION SUBCUTANEOUS at 22:00

## 2024-06-20 RX ADMIN — HYDROCODONE BITARTRATE AND ACETAMINOPHEN 1 TABLET: 5; 325 TABLET ORAL at 08:22

## 2024-06-20 RX ADMIN — IPRATROPIUM BROMIDE AND ALBUTEROL SULFATE 3 ML: .5; 3 SOLUTION RESPIRATORY (INHALATION) at 15:38

## 2024-06-20 RX ADMIN — FAMOTIDINE 20 MG: 20 TABLET, FILM COATED ORAL at 08:22

## 2024-06-20 RX ADMIN — FAMOTIDINE 20 MG: 20 TABLET, FILM COATED ORAL at 21:59

## 2024-06-20 RX ADMIN — IPRATROPIUM BROMIDE AND ALBUTEROL SULFATE 3 ML: .5; 3 SOLUTION RESPIRATORY (INHALATION) at 07:44

## 2024-06-20 RX ADMIN — GABAPENTIN 300 MG: 300 CAPSULE ORAL at 17:18

## 2024-06-20 RX ADMIN — PIPERACILLIN AND TAZOBACTAM 4.5 G: 4; .5 INJECTION, POWDER, FOR SOLUTION INTRAVENOUS at 22:54

## 2024-06-20 RX ADMIN — HYDROCODONE BITARTRATE AND ACETAMINOPHEN 1 TABLET: 5; 325 TABLET ORAL at 17:18

## 2024-06-20 RX ADMIN — GUAIFENESIN 1200 MG: 600 TABLET, EXTENDED RELEASE ORAL at 21:59

## 2024-06-20 RX ADMIN — HYDROCODONE BITARTRATE AND ACETAMINOPHEN 1 TABLET: 5; 325 TABLET ORAL at 22:59

## 2024-06-20 RX ADMIN — PIPERACILLIN AND TAZOBACTAM 4.5 G: 4; .5 INJECTION, POWDER, FOR SOLUTION INTRAVENOUS at 15:15

## 2024-06-20 RX ADMIN — GUAIFENESIN 1200 MG: 600 TABLET, EXTENDED RELEASE ORAL at 08:22

## 2024-06-20 RX ADMIN — SODIUM CHLORIDE 1250 MG: 9 INJECTION, SOLUTION INTRAVENOUS at 01:16

## 2024-06-20 RX ADMIN — ACETYLCYSTEINE 3 ML: 200 SOLUTION ORAL; RESPIRATORY (INHALATION) at 15:38

## 2024-06-20 RX ADMIN — ESCITALOPRAM OXALATE 10 MG: 10 TABLET, FILM COATED ORAL at 08:22

## 2024-06-20 RX ADMIN — ACETYLCYSTEINE 3 ML: 200 SOLUTION ORAL; RESPIRATORY (INHALATION) at 07:45

## 2024-06-20 RX ADMIN — GABAPENTIN 300 MG: 300 CAPSULE ORAL at 08:22

## 2024-06-20 RX ADMIN — GABAPENTIN 300 MG: 300 CAPSULE ORAL at 22:00

## 2024-06-20 NOTE — PROGRESS NOTES
"    Chief Complaint: Left lower lobe PNA, Effusion.   S/P: Attempted thoracentesis, Left     Subjective:  Symptoms:  Improved.  He reports chest pain.  No shortness of breath or cough.    Diet:  Adequate intake.  No nausea or vomiting.    Activity level: Normal.    Pain:  He complains of pain that is mild.  (Left pleuritic pain with coughing, deep inspiration).       Vital Signs:  Temp:  [98.1 °F (36.7 °C)-98.6 °F (37 °C)] 98.3 °F (36.8 °C)  Heart Rate:  [] 95  Resp:  [16-18] 16  BP: (130-147)/(79-87) 130/81    Intake & Output (last day)         06/19 0701  06/20 0700 06/20 0701  06/21 0700    I.V. (mL/kg)      IV Piggyback 250     Total Intake(mL/kg) 250 (1.9)     Net +250           Urine Unmeasured Occurrence  2 x            Objective:  General Appearance:  In no acute distress.    Vital signs: (most recent): Blood pressure 130/81, pulse 95, temperature 98.3 °F (36.8 °C), temperature source Oral, resp. rate 16, height 182.9 cm (72\"), weight 132 kg (291 lb 14.2 oz), SpO2 95%.    Lungs:  Normal effort and normal respiratory rate.  He is not in respiratory distress.    Heart: Normal rate.  Regular rhythm.    Chest: Symmetric chest wall expansion.   Abdomen: Abdomen is soft and non-distended.    Neurological: Patient is alert and oriented to person, place and time.    Skin:  Warm and dry.              Results Review:     I reviewed the patient's new clinical results.  I reviewed the patient's new imaging results and agree with the interpretation.  Discussed with patient, nurse, and Libertad Man & Jennifer     Imaging Results (Last 24 Hours)       Procedure Component Value Units Date/Time    XR Chest 1 View [[id number]] Collected: 06/20/24 0854     Updated: 06/20/24 0902    Narrative:      ONE-VIEW PORTABLE CHEST AT 5:17 A.M.     HISTORY: Follow-up of left pleural effusion.     FINDINGS: The patient had attempted left thoracentesis yesterday which  was unsuccessful and review of the chest CT scan performed 2 days ago " on  6/18/2024 shows considerable dense pleural thickening and adjacent  atelectasis and consolidation rather than pleural effusion at the left  base. There is persistent increased density in the lower left chest on  today's chest x-ray that is unchanged from 2 days ago. There is no  pneumothorax following attempted thoracentesis. The right lung remains  clear.     This report was finalized on 6/20/2024 8:59 AM by Dr. Adryan Villarreal M.D  on Workstation: [id number]               Lab Results:     Lab Results (last 24 hours)       Procedure Component Value Units Date/Time    POC Glucose Once [[id number]]  (Normal) Collected: 06/20/24 1603    Specimen: Blood Updated: 06/20/24 1611     Glucose 104 mg/dL     Blood Culture - Blood, Arm, Left [[id number]]  (Normal) Collected: 06/18/24 1507    Specimen: Blood from Arm, Left Updated: 06/20/24 1515     Blood Culture No growth at 2 days    Blood Culture - Blood, Arm, Left [[id number]]  (Normal) Collected: 06/18/24 1507    Specimen: Blood from Arm, Left Updated: 06/20/24 1515     Blood Culture No growth at 2 days    Narrative:      Less than seven (7) mL's of blood was collected.  Insufficient quantity may yield false negative results.    POC Glucose Once [[id number]]  (Normal) Collected: 06/20/24 1148    Specimen: Blood Updated: 06/20/24 1150     Glucose 121 mg/dL     POC Glucose Once [[id number]]  (Normal) Collected: 06/20/24 0749    Specimen: Blood Updated: 06/20/24 0750     Glucose 122 mg/dL     Basic Metabolic Panel [[id number]]  (Abnormal) Collected: 06/20/24 0424    Specimen: Blood Updated: 06/20/24 0506     Glucose 131 mg/dL      BUN 11 mg/dL      Creatinine 0.84 mg/dL      Sodium 135 mmol/L      Potassium 3.7 mmol/L      Chloride 104 mmol/L      CO2 25.2 mmol/L      Calcium 8.4 mg/dL      BUN/Creatinine Ratio 13.1     Anion Gap 5.8 mmol/L      eGFR 115.2 mL/min/1.73     Narrative:      GFR Normal >60  Chronic Kidney Disease <60  Kidney Failure <15      CBC & Differential  [[id number]]  (Abnormal) Collected: 06/20/24 0424    Specimen: Blood Updated: 06/20/24 0452    Narrative:      The following orders were created for panel order CBC & Differential.  Procedure                               Abnormality         Status                     ---------                               -----------         ------                     CBC Auto Differential[[id number]]        Abnormal            Final result                 Please view results for these tests on the individual orders.    CBC Auto Differential [[id number]]  (Abnormal) Collected: 06/20/24 0424    Specimen: Blood Updated: 06/20/24 0452     WBC 11.46 10*3/mm3      RBC 4.41 10*6/mm3      Hemoglobin 12.2 g/dL      Hematocrit 37.5 %      MCV 85.0 fL      MCH 27.7 pg      MCHC 32.5 g/dL      RDW 13.1 %      RDW-SD 40.6 fl      MPV 9.6 fL      Platelets 239 10*3/mm3      Neutrophil % 65.7 %      Lymphocyte % 24.1 %      Monocyte % 7.3 %      Eosinophil % 2.3 %      Basophil % 0.3 %      Immature Grans % 0.3 %      Neutrophils, Absolute 7.53 10*3/mm3      Lymphocytes, Absolute 2.76 10*3/mm3      Monocytes, Absolute 0.84 10*3/mm3      Eosinophils, Absolute 0.26 10*3/mm3      Basophils, Absolute 0.03 10*3/mm3      Immature Grans, Absolute 0.04 10*3/mm3      nRBC 0.0 /100 WBC     POC Glucose Once [[id number]]  (Normal) Collected: 06/19/24 2005    Specimen: Blood Updated: 06/19/24 2006     Glucose 103 mg/dL              Assessment & Plan       Pneumonia    Left lower lobe pneumonia       Assessment & Plan  Denies any dyspnea or productive cough.  Primary complaint today is left-sided pleuritic pain worsened by deep inspiration or cough.  Urine analgesics for pleurisy.  Attempted thoracentesis yesterday with no fluid drained.  Leukocytosis nearly resolved.  Follow-up chest x-ray performed today demonstrates persistent increased density in the left lower chest likely due to significant intraparenchymal consolidation.  Do not suspect any significant  volume effusion.      Discussed case with Libertad Rodriguez and Magda.  We do not recommend further instrumentation to his chest at this time.  We recommend prolonged course of IV antibiotics x 4 to 6 weeks, defer to infectious disease.  Will arrange for outpatient follow-up CT chest lung with subsequent appointment in 4 weeks to monitor his progress.  Recommend continuation of good pulmonary hygiene.  Increase activity as able.  Not much more to add from thoracic surgery standpoint.  Will sign off at this time.  Please call with any questions.    KATHRYN Pena  [facility]  06/20/24  17:48 EDT

## 2024-06-20 NOTE — PAYOR COMM NOTE
"Ravinder Shankar (38 y.o. Male)              ATTENTION; INITIAL CLINICALS AUTH PENDING [id number]           REPLY TO [facility] DEPT  [phone] OR  [phone]    ALESHIA FLANGAAN LPN           Date of Birth   1985    Social Security Number       Address   [address] [place] [address]    Home Phone   [phone]    MRN   [id number]       Sikh   None    Marital Status                               Admission Date   6/18/24    Admission Type   Emergency    Admitting Provider   Jeovanny Young MD    Attending Provider   Jeovanny Young MD    Department, Room/Bed   [address], N443/1       Discharge Date       Discharge Disposition       Discharge Destination                                 Attending Provider: Jeovanny Young MD    Allergies: Cat Hair Extract, Lisinopril, Methylprednisolone    Isolation: None   Infection: None   Code Status: CPR    Ht: 182.9 cm (72\")   Wt: 132 kg (291 lb 14.2 oz)    Admission Cmt: None   Principal Problem: Pneumonia [J18.9]                   Active Insurance as of 6/18/2024       Primary Coverage       Payor Plan Insurance Group Employer/Plan Group    [employer]UE [employer] PPO [id number]       Payor Plan Address Payor Plan Phone Number Payor Plan Fax Number Effective Dates    [address] [phone]  8/1/2021 - None Entered    [place] [address]         Subscriber Name Subscriber Birth Date Member ID       RAVINDER SHANKAR 1985 [id number]                     Emergency Contacts        (Rel.) Home Phone Work Phone Mobile Phone    Cyndie Shankar (Spouse) -- -- [phone]                 History & Physical        Jeovanny Young MD at 06/18/24 5542          History and physical    Primary care physician      Chief complaint  Shortness of breath    History of present illness  38-year-old white male with history of diabetes mellitus hypertension hyperlipidemia asthma and gastroesophageal " reflux disease who was overweight presented to [facility] emergency room with shortness of breath since May 11 and has been treated multiple times with oral antibiotics without any improvement.  Patient continued to have fever chills cough and shortness of breath.  Patient denies any chest pain but chest hurt with cough and also denies any abdominal pain nausea vomiting diarrhea.  Patient failed outpatient treatment admitted for broad-spectrum IV antibiotics and further evaluation by infectious disease and pulmonary.    PAST MEDICAL HISTORY   Acid reflux      Allergic      Asthma      Bronchitis      Hypertension      Pleurisy 2018    Rib fracture      Type 2 diabetes  2020      PAST SURGICAL HISTORY              Procedure Laterality Date    RESECTION BONE TUMOR KNEE             FAMILY HISTORY           Problem Relation Age of Onset    Diabetes Mother      Lung disease Mother      Lung disease Maternal Grandmother      Cancer Maternal Grandmother           lung    Emphysema Maternal Grandmother        SOCIAL HISTORY                 Socioeconomic History    Marital status:    Tobacco Use    Smoking status: Former       Current packs/day: 0.00       Average packs/day: 1.5 packs/day for 8.0 years (12.0 ttl pk-yrs)       Types: Cigarettes       Start date: 2010       Quit date: 2018       Years since quittin.4    Smokeless tobacco: Never    Tobacco comments:       nicotine gum    Vaping Use    Vaping status: Never Used   Substance and Sexual Activity    Alcohol use: Yes       Alcohol/week: 1.0 standard drink of alcohol       Types: 1 Standard drinks or equivalent per week       Comment: socially     Drug use: No    Sexual activity: Yes       Partners: Female         ALLERGIES  Cat hair extract, Lisinopril, and Methylprednisolone  Home medications reviewed     REVIEW OF SYSTEMS  Per history of present illness     PHYSICAL EXAM  Blood pressure 137/76, pulse 97, temperature (!) 100.6 °F  "(38.1 °C), temperature source Tympanic, resp. rate 16, height 182.9 cm (72\"), weight 132 kg (291 lb 14.2 oz), SpO2 95%.    GENERAL: Awake and alert no acute distress  HENT: nares patent  EYES: no scleral icterus  CV: regular rhythm, normal rate  RESPIRATORY: normal effort and moving air bilaterally  ABDOMEN: soft nontender nondistended bowel sounds positive  MUSCULOSKELETAL: no deformity  NEURO: alert, moves all extremities, follows commands  PSYCH:  calm, cooperative  SKIN: warm, dry     LAB RESULTS  Lab Results (last 24 hours)       Procedure Component Value Units Date/Time    BNP [[id number]]  (Normal) Collected: 06/18/24 1440    Specimen: Blood Updated: 06/18/24 1643     proBNP 119.0 pg/mL     Narrative:      This assay is used as an aid in the diagnosis of individuals suspected of having heart failure. It can be used as an aid in the diagnosis of acute decompensated heart failure (ADHF) in patients presenting with signs and symptoms of ADHF to the emergency department (ED). In addition, NT-proBNP of <300 pg/mL indicates ADHF is not likely.    Age Range Result Interpretation  NT-proBNP Concentration (pg/mL:      <50             Positive            >450                   Gray                 300-450                    Negative             <300    50-75           Positive            >900                  Gray                300-900                  Negative            <300      >75             Positive            >1800                  Gray                300-1800                  Negative            <300    Single High Sensitivity Troponin T [[id number]]  (Normal) Collected: 06/18/24 1440    Specimen: Blood Updated: 06/18/24 1643     HS Troponin T 9 ng/L     Narrative:      High Sensitive Troponin T Reference Range:  <14.0 ng/L- Negative Female for AMI  <22.0 ng/L- Negative Male for AMI  >=14 - Abnormal Female indicating possible myocardial injury.  >=22 - Abnormal Male indicating possible myocardial injury. " "  Clinicians would have to utilize clinical acumen, EKG, Troponin, and serial changes to determine if it is an Acute Myocardial Infarction or myocardial injury due to an underlying chronic condition.         Procalcitonin [[id number]]  (Normal) Collected: 06/18/24 1440    Specimen: Blood Updated: 06/18/24 1523     Procalcitonin 0.15 ng/mL     Narrative:      As a Marker for Sepsis (Non-Neonates):    1. <0.5 ng/mL represents a low risk of severe sepsis and/or septic shock.  2. >2 ng/mL represents a high risk of severe sepsis and/or septic shock.    As a Marker for Lower Respiratory Tract Infections that require antibiotic therapy:    PCT on Admission    Antibiotic Therapy       6-12 Hrs later    >0.5                Strongly Recommended  >0.25 - <0.5        Recommended   0.1 - 0.25          Discouraged              Remeasure/reassess PCT  <0.1                Strongly Discouraged     Remeasure/reassess PCT    As 28 day mortality risk marker: \"Change in Procalcitonin Result\" (>80% or <=80%) if Day 0 (or Day 1) and Day 4 values are available. Refer to http://www.[employer]-pct-calculator.com    Change in PCT <=80%  A decrease of PCT levels below or equal to 80% defines a positive change in PCT test result representing a higher risk for 28-day all-cause mortality of patients diagnosed with severe sepsis for septic shock.    Change in PCT >80%  A decrease of PCT levels of more than 80% defines a negative change in PCT result representing a lower risk for 28-day all-cause mortality of patients diagnosed with severe sepsis or septic shock.       Lactic Acid, Plasma [[id number]]  (Normal) Collected: 06/18/24 1440    Specimen: Blood Updated: 06/18/24 1516     Lactate 1.6 mmol/L     Blood Culture - Blood, Arm, Left [[id number]] Collected: 06/18/24 1507    Specimen: Blood from Arm, Left Updated: 06/18/24 1513    Blood Culture - Blood, Arm, Left [[id number]] Collected: 06/18/24 1507    Specimen: Blood from Arm, Left Updated: 06/18/24 " 1513    [place] Draw [[id number]] Collected: 06/18/24 1440    Specimen: Blood Updated: 06/18/24 1500    Narrative:      The following orders were created for panel order [place] Draw.  Procedure                               Abnormality         Status                     ---------                               -----------         ------                     Green Top (Gel)[[id number]]                                  Final result               Lavender Top[[id number]]                                     Final result               Gold Top - SST[[id number]]                                   Final result               Light Blue Top[[id number]]                                   Final result                 Please view results for these tests on the individual orders.    Green Top (Gel) [[id number]] Collected: 06/18/24 1440    Specimen: Blood Updated: 06/18/24 1500     Extra Tube Hold for add-ons.     Comment: Auto resulted.       Lavender Top [[id number]] Collected: 06/18/24 1440    Specimen: Blood Updated: 06/18/24 1500     Extra Tube hold for add-on     Comment: Auto resulted       Gold Top - SST [[id number]] Collected: 06/18/24 1440    Specimen: Blood Updated: 06/18/24 1500     Extra Tube Hold for add-ons.     Comment: Auto resulted.       Light Blue Top [[id number]] Collected: 06/18/24 1440    Specimen: Blood Updated: 06/18/24 1500     Extra Tube Hold for add-ons.     Comment: Auto resulted       CBC & Differential [[id number]]  (Abnormal) Collected: 06/18/24 1440    Specimen: Blood Updated: 06/18/24 1457    Narrative:      The following orders were created for panel order CBC & Differential.  Procedure                               Abnormality         Status                     ---------                               -----------         ------                     CBC Auto Differential[[id number]]        Abnormal            Final result                 Please view results for these tests on the individual orders.    CBC Auto  Differential [[id number]]  (Abnormal) Collected: 06/18/24 1440    Specimen: Blood Updated: 06/18/24 1457     WBC 17.33 10*3/mm3      RBC 5.25 10*6/mm3      Hemoglobin 14.5 g/dL      Hematocrit 44.5 %      MCV 84.8 fL      MCH 27.6 pg      MCHC 32.6 g/dL      RDW 13.1 %      RDW-SD 40.7 fl      MPV 9.6 fL      Platelets 309 10*3/mm3      Neutrophil % 66.4 %      Lymphocyte % 24.3 %      Monocyte % 8.0 %      Eosinophil % 0.7 %      Basophil % 0.3 %      Immature Grans % 0.3 %      Neutrophils, Absolute 11.49 10*3/mm3      Lymphocytes, Absolute 4.21 10*3/mm3      Monocytes, Absolute 1.39 10*3/mm3      Eosinophils, Absolute 0.12 10*3/mm3      Basophils, Absolute 0.06 10*3/mm3      Immature Grans, Absolute 0.06 10*3/mm3      nRBC 0.0 /100 WBC     Comprehensive Metabolic Panel [[id number]] Collected: 06/18/24 1440    Specimen: Blood Updated: 06/18/24 1451          Imaging Results (Last 24 Hours)       Procedure Component Value Units Date/Time    XR Chest 1 View [[id number]] Collected: 06/18/24 1504     Updated: 06/18/24 1512    Narrative:      XR CHEST 1 VW-     HISTORY: Male who is 38 years-old, short of breath     TECHNIQUE: Frontal view of the chest     COMPARISON: 6/11/2024     FINDINGS: The heart size is borderline. Pulmonary vasculature is  unremarkable. Persistent opacity at the left mid to lower hemithorax,  corresponding to loculated fluid and atelectasis on the CT from  6/12/2024, appears mildly increased, continued follow-up advised. No  pneumothorax. No acute osseous process.       Impression:      As described.     This report was finalized on 6/18/2024 3:09 PM by Dr. Hank Alexander M.D on Workstation: [employer]             Scan on 6/18/2024 1450 by [employer], [place]: ECG 12-LEAD         Author: -- Service: -- Author Type: --   Filed: Date of Service: Creation Time:   Status: (Other)   HEART RATE= 103  bpm  RR Interval= 583  ms  WA Interval= 125  ms  P Horizontal Axis= -20  deg  P Front Axis= 43  deg  QRSD  Interval= 89  ms  QT Interval= 299  ms  QTcB= 392  ms  QRS Axis= 31  deg  T Wave Axis= 35  deg  - BORDERLINE ECG -  Sinus tachycardia  Probable left atrial enlargement  No change from previous tracing          Current Facility-Administered Medications:     acetaminophen (TYLENOL) tablet 650 mg, 650 mg, Oral, Q6H PRN, Jeovanny Young MD    albuterol sulfate HFA (PROVENTIL HFA;VENTOLIN HFA;PROAIR HFA) inhaler 2 puff, 2 puff, Inhalation, Q4H PRN, Joevanny Young MD    cetirizine (zyrTEC) tablet 10 mg, 10 mg, Oral, Daily, Jeovanny Young MD    dextrose (D50W) (25 g/50 mL) IV injection 25 g, 25 g, Intravenous, Q15 Min PRN, Jeovanny Young MD    dextrose (GLUTOSE) oral gel 15 g, 15 g, Oral, Q15 Min PRN, Jeovanny Young MD    escitalopram (LEXAPRO) tablet 10 mg, 10 mg, Oral, Daily, Jeovanny Young MD    famotidine (PEPCID) tablet 20 mg, 20 mg, Oral, BID, Jeovanny Young MD    gabapentin (NEURONTIN) capsule 300 mg, 300 mg, Oral, TID, Jeovanny Young MD    glucagon (GLUCAGEN) injection 1 mg, 1 mg, Intramuscular, Q15 Min PRN, Jeovanny Young MD    guaiFENesin (MUCINEX) 12 hr tablet 600 mg, 600 mg, Oral, Q12H, Jeovanny Young MD    insulin lispro (HUMALOG/ADMELOG) injection 2-7 Units, 2-7 Units, Subcutaneous, 4x Daily AC & at Bedtime, Jeovanny Young MD    Pharmacy to dose vancomycin, , Does not apply, Continuous PRN, Jeovanny Young MD    Pharmacy to Dose Zosyn, , Does not apply, Continuous PRN, Jeovanny Young MD    sodium chloride 0.9 % flush 10 mL, 10 mL, Intravenous, PRN, Emergency, Triage Protocol, MD    sodium chloride 0.9 % infusion, 75 mL/hr, Intravenous, Continuous, Jeovanny Young MD    vancomycin 2750 mg/500 mL 0.9% NS IVPB ([facility]), 20 mg/kg, Intravenous, Once, Saúl Hutchison MD, 2,750 mg at 06/18/24 9476    Current Outpatient Medications:     albuterol sulfate  (90 Base) MCG/ACT inhaler, INHALE 2 PUFFS BY MOUTH EVERY 4 HOURS AS NEEDED FOR WHEEZING, Disp: 36 g, Rfl: 0    cyclobenzaprine (FLEXERIL) 10 MG tablet, Take 1 tablet by mouth  three times daily as needed for muscle spasm, Disp: 30 tablet, Rfl: 0    escitalopram (LEXAPRO) 10 MG tablet, Take 1 tablet by mouth once daily, Disp: 90 tablet, Rfl: 0    furosemide (LASIX) 40 MG tablet, Take 1 tablet by mouth once daily, Disp: 90 tablet, Rfl: 0    gabapentin (NEURONTIN) 300 MG capsule, Take 1 capsule by mouth 3 (Three) Times a Day for 30 days., Disp: 90 capsule, Rfl: 0    ibuprofen (ADVIL,MOTRIN) 800 MG tablet, Take 1 tablet by mouth Every 6 (Six) Hours As Needed for Mild Pain for up to 30 days., Disp: 60 tablet, Rfl: 0    levoFLOXacin (Levaquin) 500 MG tablet, Take 1 tablet by mouth Daily., Disp: 10 tablet, Rfl: 0    losartan (COZAAR) 50 MG tablet, Take 1 tablet by mouth once daily, Disp: 90 tablet, Rfl: 0    metFORMIN ER (GLUCOPHAGE-XR) 500 MG 24 hr tablet, Take 1 tablet by mouth 2 (Two) Times a Day With Meals., Disp: 180 tablet, Rfl: 0    omeprazole (priLOSEC) 20 MG capsule, Take 1 capsule by mouth Daily., Disp: , Rfl:     pantoprazole (Protonix) 40 MG EC tablet, Take 1 tablet by mouth Daily., Disp: 90 tablet, Rfl: 4    predniSONE (DELTASONE) 20 MG tablet, TAKE 1 TABLET BY MOUTH TWICE DAILY FOR 5 DAYS THEN 1 ONCE DAILY FOR 5 DAYS, Disp: , Rfl:     Tirzepatide (Mounjaro) 7.5 MG/0.5ML solution pen-injector pen, Inject 0.5 mL under the skin into the appropriate area as directed 1 (One) Time Per Week., Disp: 2 mL, Rfl: 2    Tirzepatide (MOUNJARO) 7.5 MG/0.5ML solution pen-injector pen, Inject 0.5 mL under the skin into the appropriate area as directed 1 (One) Time Per Week., Disp: , Rfl:      ASSESSMENT  Left lower lobe pneumonia with loculated pleural fluid suspicious for empyema  Diabetes mellitus  Hypertension  Hyperlipidemia  Morbidly obese  Gastroesophageal reflux disease    PLAN  Admit  IVF  Broad-spectrum IV antibiotics after obtaining the cultures  Check 2D echo  May need drainage of the abscess  Infectious disease consult  Pulmonary and thoracic surgery to follow patient  Adjust home  "medications  Stress ulcer DVT prophylaxis  Supportive care  Patient is full code  Discussed with family nursing staff  Follow closely further recommendation current hospital course    LIZZY KNOX MD         Electronically signed by Lizzy Knox MD at 06/18/24 1729          Emergency Department Notes        Marie Rodriguez, RN at 06/18/24 1700          Attempted to call report    Electronically signed by Marie Rodriguez, RN at 06/18/24 1700       Marie Rodriguez, RN at 06/18/24 1636          Nursing report ED to floor  Ravinder Robles  38 y.o.  male    HPI :  HPI (Adult)  Stated Reason for Visit: Pt to ED via PV accompanied by spouse. Pt reports hypotension and fatigue earlier today with BPs 80s systolic at home. Pt reports continuous PNA since 5/11/24 with several oral Abx treatment regiments at home without improvement.  History Obtained From: patient, family    Chief Complaint  Chief Complaint   Patient presents with    Fatigue    Fever       Admitting doctor:   Lizzy Knox MD    Admitting diagnosis:   The primary encounter diagnosis was Pneumonia of left lower lobe due to infectious organism. A diagnosis of Pleural effusion was also pertinent to this visit.    Code status:   Current Code Status       Date Active Code Status Order ID Comments User Context       Not on file            Allergies:   Cat hair extract, Lisinopril, and Methylprednisolone    Isolation:   No active isolations    Intake and Output  No intake or output data in the 24 hours ending 06/18/24 1636    Weight:       06/18/24  1441   Weight: 132 kg (291 lb 14.2 oz)       Most recent vitals:   Vitals:    06/18/24 1455 06/18/24 1527 06/18/24 1620 06/18/24 1621   BP:   117/63    BP Location:       Patient Position:       Pulse:  96  96   Resp:       Temp:       TempSrc:       SpO2:  97%  96%   Weight:       Height: 182.9 cm (72\")          Active LDAs/IV Access:   Lines, Drains & Airways       Active LDAs       Name Placement date " "Placement time Site Days    Peripheral IV 06/18/24 1445 Anterior;Right Forearm 06/18/24  1445  Forearm  less than 1                    Labs (abnormal labs have a star):   Labs Reviewed   CBC WITH AUTO DIFFERENTIAL - Abnormal; Notable for the following components:       Result Value    WBC 17.33 (*)     Neutrophils, Absolute 11.49 (*)     Lymphocytes, Absolute 4.21 (*)     Monocytes, Absolute 1.39 (*)     Immature Grans, Absolute 0.06 (*)     All other components within normal limits   LACTIC ACID, PLASMA - Normal   PROCALCITONIN - Normal    Narrative:     As a Marker for Sepsis (Non-Neonates):    1. <0.5 ng/mL represents a low risk of severe sepsis and/or septic shock.  2. >2 ng/mL represents a high risk of severe sepsis and/or septic shock.    As a Marker for Lower Respiratory Tract Infections that require antibiotic therapy:    PCT on Admission    Antibiotic Therapy       6-12 Hrs later    >0.5                Strongly Recommended  >0.25 - <0.5        Recommended   0.1 - 0.25          Discouraged              Remeasure/reassess PCT  <0.1                Strongly Discouraged     Remeasure/reassess PCT    As 28 day mortality risk marker: \"Change in Procalcitonin Result\" (>80% or <=80%) if Day 0 (or Day 1) and Day 4 values are available. Refer to http://www.[facility] [email or web address] [facility] EnterprisesSaint [facility] [place]-pct-calculator.com    Change in PCT <=80%  A decrease of PCT levels below or equal to 80% defines a positive change in PCT test result representing a higher risk for 28-day all-cause mortality of patients diagnosed with severe sepsis for septic shock.    Change in PCT >80%  A decrease of PCT levels of more than 80% defines a negative change in PCT result representing a lower risk for 28-day all-cause mortality of patients diagnosed with severe sepsis or septic shock.      BLOOD CULTURE   BLOOD CULTURE   RAINBOW DRAW    Narrative:     The following orders were created for panel order [place] Draw.  Procedure                               Abnormality         " Status                     ---------                               -----------         ------                     Green Top (Gel)[[id number]]                                  Final result               Lavender Top[[id number]]                                     Final result               Gold Top - SST[[id number]]                                   Final result               Light Blue Top[[id number]]                                   Final result                 Please view results for these tests on the individual orders.   COMPREHENSIVE METABOLIC PANEL   BNP (IN-HOUSE)   SINGLE HS TROPONIN T   CBC AND DIFFERENTIAL    Narrative:     The following orders were created for panel order CBC & Differential.  Procedure                               Abnormality         Status                     ---------                               -----------         ------                     CBC Auto Differential[[id number]]        Abnormal            Final result                 Please view results for these tests on the individual orders.   GREEN TOP   LAVENDER TOP   GOLD TOP - SST   LIGHT BLUE TOP       EKG:   ECG 12 Lead ED Triage Standing Order; SOA   Final Result   HEART RATE= 103  bpm   RR Interval= 583  ms   MD Interval= 125  ms   P Horizontal Axis= -20  deg   P Front Axis= 43  deg   QRSD Interval= 89  ms   QT Interval= 299  ms   QTcB= 392  ms   QRS Axis= 31  deg   T Wave Axis= 35  deg   - BORDERLINE ECG -   Sinus tachycardia   Probable left atrial enlargement   No change from previous tracing   Electronically Signed By: Trey Powell ([facility]) 18-Jun-2024 16:04:15   Date and Time of Study: 2024-06-18 14:48:47          Meds given in ED:   Medications   sodium chloride 0.9 % flush 10 mL (has no administration in time range)   vancomycin 2750 mg/500 mL 0.9% NS IVPB ([facility]) (has no administration in time range)   piperacillin-tazobactam (ZOSYN) 4.5 g IVPB in 100 mL NS MBP (CD) (4.5 g Intravenous New Bag 6/18/24 1622)   acetaminophen  (TYLENOL) tablet 1,000 mg (1,000 mg Oral Given 24 1445)   sepsis fluid LR bolus 2,328 mL (2,328 mL Intravenous New Bag 24 1445)       Imaging results:  XR Chest 1 View    Result Date: 2024  As described.  This report was finalized on 2024 3:09 PM by Dr. Hank Alexander M.D on Workstation: [email or web address]       Ambulatory status:   - adlib    Social issues:   Social History     Socioeconomic History    Marital status:    Tobacco Use    Smoking status: Former     Current packs/day: 0.00     Average packs/day: 1.5 packs/day for 8.0 years (12.0 ttl pk-yrs)     Types: Cigarettes     Start date: 2010     Quit date: 2018     Years since quittin.4    Smokeless tobacco: Never    Tobacco comments:     nicotine gum    Vaping Use    Vaping status: Never Used   Substance and Sexual Activity    Alcohol use: Yes     Alcohol/week: 1.0 standard drink of alcohol     Types: 1 Standard drinks or equivalent per week     Comment: socially     Drug use: No    Sexual activity: Yes     Partners: Female       Peripheral Neurovascular  Peripheral Neurovascular (Adult)  Peripheral Neurovascular WDL: WDL    Neuro Cognitive  Neuro Cognitive (Adult)  Cognitive/Neuro/Behavioral WDL: WDL    Learning  Learning Assessment (Adult)  Learning Readiness and Ability: no barriers identified    Respiratory  Respiratory (Adult)  Airway WDL: WDL  Respiratory WDL  Respiratory WDL: .WDL except, cough  Cough Frequency: infrequent    Abdominal Pain       Pain Assessments  Pain (Adult)  (0-10) Pain Rating: Rest: 6  Pain Location: chest  Pain Side/Orientation: left  Response to Pain Interventions: nonverbal indicators absent/decreased    NIH Stroke Scale       Marie Rodriguez RN  24 16:36 EDT      Electronically signed by Marie Rodriguez RN at 24 1637       Saúl Hutchison MD at 24 1540           EMERGENCY DEPARTMENT ENCOUNTER    Room Number:  N443/1  PCP: Ruiz Escamilla MD (Tony)  Historian:  Patient      HPI:  Chief Complaint: Pneumonia  A complete HPI/ROS/PMH/PSH/SH/FH are unobtainable due to: None  Context: Ravinder Robles is a 38 y.o. male who presents to the ED c/o pneumonia.  Patient presents for evaluation of left-sided pneumonia.  Patient states he has been having symptoms for months.  Patient has been on Augmentin, azithromycin, doxycycline, cefdinir.  Patient currently on Levaquin.  Patient has followed up with thoracic surgery and they would like to continue antibiotics.  Patient states symptoms are persisting.  Patient has fever.  Patient has pleuritic chest pain.  Has had cough.  Is currently on an antibiotics.  Has had no vomiting or diarrhea.            PAST MEDICAL HISTORY  Active Ambulatory Problems     Diagnosis Date Noted    Airway hyperreactivity 08/24/2016    Class 3 severe obesity due to excess calories without serious comorbidity with body mass index (BMI) of 45.0 to 49.9 in adult 08/24/2016    Folliculitis 08/24/2016    Wheezing 04/04/2017    Essential hypertension 08/27/2019    Other hyperlipidemia 08/27/2019    Anxiety 12/31/2019    Reactive depression 08/10/2020    Type 2 diabetes mellitus with hyperglycemia, without long-term current use of insulin 11/16/2020    Pedal edema 06/03/2021    Observed sleep apnea 01/21/2022    Snoring 01/21/2022    Hypersomnia 01/21/2022    Non-restorative sleep 01/21/2022    Tonsillar hypertrophy 01/21/2022     Resolved Ambulatory Problems     Diagnosis Date Noted    Sore throat 04/04/2017    Cough 04/04/2017    Elevated AST (SGOT) 08/27/2019    Tobacco use disorder 12/31/2019     Past Medical History:   Diagnosis Date    Acid reflux     Allergic     Asthma     Bronchitis     Hypertension     Pleurisy 2018    Rib fracture     Type 2 diabetes mellitus without complication, without long-term current use of insulin 11/16/2020         PAST SURGICAL HISTORY  Past Surgical History:   Procedure Laterality Date    RESECTION BONE TUMOR KNEE            FAMILY HISTORY  Family History   Problem Relation Age of Onset    Diabetes Mother     Lung disease Mother     Lung disease Maternal Grandmother     Cancer Maternal Grandmother         lung    Emphysema Maternal Grandmother          SOCIAL HISTORY  Social History     Socioeconomic History    Marital status:    Tobacco Use    Smoking status: Former     Current packs/day: 0.00     Average packs/day: 1.5 packs/day for 8.0 years (12.0 ttl pk-yrs)     Types: Cigarettes     Start date: 2010     Quit date: 2018     Years since quittin.4    Smokeless tobacco: Never    Tobacco comments:     nicotine gum    Vaping Use    Vaping status: Never Used   Substance and Sexual Activity    Alcohol use: Yes     Alcohol/week: 1.0 standard drink of alcohol     Types: 1 Standard drinks or equivalent per week     Comment: socially     Drug use: No    Sexual activity: Yes     Partners: Female         ALLERGIES  Cat hair extract, Lisinopril, and Methylprednisolone        REVIEW OF SYSTEMS  Review of Systems   Fever cough pleuritic chest pain      PHYSICAL EXAM  ED Triage Vitals   Temp Heart Rate Resp BP SpO2   24 1419 24 1419 24 1419 24 1421 24 1419   (!) 100.6 °F (38.1 °C) (!) 122 16 127/77 97 %      Temp src Heart Rate Source Patient Position BP Location FiO2 (%)   24 1419 24 1419 24 1421 24 1421 --   Tympanic Monitor Sitting Left arm        Physical Exam      GENERAL: no acute distress  HENT: nares patent  EYES: no scleral icterus  CV: regular rhythm, normal rate  RESPIRATORY: normal effort  ABDOMEN: soft  MUSCULOSKELETAL: no deformity  NEURO: alert, moves all extremities, follows commands  PSYCH:  calm, cooperative  SKIN: warm, dry    Vital signs and nursing notes reviewed.          LAB RESULTS  Recent Results (from the past 24 hour(s))   Comprehensive Metabolic Panel    Collection Time: 24  2:40 PM    Specimen: Blood   Result Value Ref Range    Glucose  110 (H) 65 - 99 mg/dL    BUN 12 6 - 20 mg/dL    Creatinine 0.90 0.76 - 1.27 mg/dL    Sodium 135 (L) 136 - 145 mmol/L    Potassium 3.7 3.5 - 5.2 mmol/L    Chloride 98 98 - 107 mmol/L    CO2 25.0 22.0 - 29.0 mmol/L    Calcium 9.2 8.6 - 10.5 mg/dL    Total Protein 7.3 6.0 - 8.5 g/dL    Albumin 3.3 (L) 3.5 - 5.2 g/dL    ALT (SGPT) 19 1 - 41 U/L    AST (SGOT) 8 1 - 40 U/L    Alkaline Phosphatase 68 39 - 117 U/L    Total Bilirubin 0.5 0.0 - 1.2 mg/dL    Globulin 4.0 gm/dL    A/G Ratio 0.8 g/dL    BUN/Creatinine Ratio 13.3 7.0 - 25.0    Anion Gap 12.0 5.0 - 15.0 mmol/L    eGFR 112.8 >60.0 mL/min/1.73   BNP    Collection Time: 06/18/24  2:40 PM    Specimen: Blood   Result Value Ref Range    proBNP 119.0 0.0 - 450.0 pg/mL   Single High Sensitivity Troponin T    Collection Time: 06/18/24  2:40 PM    Specimen: Blood   Result Value Ref Range    HS Troponin T 9 <22 ng/L   Lactic Acid, Plasma    Collection Time: 06/18/24  2:40 PM    Specimen: Blood   Result Value Ref Range    Lactate 1.6 0.5 - 2.0 mmol/L   Procalcitonin    Collection Time: 06/18/24  2:40 PM    Specimen: Blood   Result Value Ref Range    Procalcitonin 0.15 0.00 - 0.25 ng/mL   Green Top (Gel)    Collection Time: 06/18/24  2:40 PM   Result Value Ref Range    Extra Tube Hold for add-ons.    Lavender Top    Collection Time: 06/18/24  2:40 PM   Result Value Ref Range    Extra Tube hold for add-on    Gold Top - SST    Collection Time: 06/18/24  2:40 PM   Result Value Ref Range    Extra Tube Hold for add-ons.    Light Blue Top    Collection Time: 06/18/24  2:40 PM   Result Value Ref Range    Extra Tube Hold for add-ons.    CBC Auto Differential    Collection Time: 06/18/24  2:40 PM    Specimen: Blood   Result Value Ref Range    WBC 17.33 (H) 3.40 - 10.80 10*3/mm3    RBC 5.25 4.14 - 5.80 10*6/mm3    Hemoglobin 14.5 13.0 - 17.7 g/dL    Hematocrit 44.5 37.5 - 51.0 %    MCV 84.8 79.0 - 97.0 fL    MCH 27.6 26.6 - 33.0 pg    MCHC 32.6 31.5 - 35.7 g/dL    RDW 13.1 12.3 - 15.4 %     RDW-SD 40.7 37.0 - 54.0 fl    MPV 9.6 6.0 - 12.0 fL    Platelets 309 140 - 450 10*3/mm3    Neutrophil % 66.4 42.7 - 76.0 %    Lymphocyte % 24.3 19.6 - 45.3 %    Monocyte % 8.0 5.0 - 12.0 %    Eosinophil % 0.7 0.3 - 6.2 %    Basophil % 0.3 0.0 - 1.5 %    Immature Grans % 0.3 0.0 - 0.5 %    Neutrophils, Absolute 11.49 (H) 1.70 - 7.00 10*3/mm3    Lymphocytes, Absolute 4.21 (H) 0.70 - 3.10 10*3/mm3    Monocytes, Absolute 1.39 (H) 0.10 - 0.90 10*3/mm3    Eosinophils, Absolute 0.12 0.00 - 0.40 10*3/mm3    Basophils, Absolute 0.06 0.00 - 0.20 10*3/mm3    Immature Grans, Absolute 0.06 (H) 0.00 - 0.05 10*3/mm3    nRBC 0.0 0.0 - 0.2 /100 WBC   ECG 12 Lead ED Triage Standing Order; SOA    Collection Time: 06/18/24  2:48 PM   Result Value Ref Range    QT Interval 299 ms    QTC Interval 392 ms       Ordered the above labs and reviewed the results.        RADIOLOGY  XR Chest 1 View    Result Date: 6/18/2024  XR CHEST 1 VW-  HISTORY: Male who is 38 years-old, short of breath  TECHNIQUE: Frontal view of the chest  COMPARISON: 6/11/2024  FINDINGS: The heart size is borderline. Pulmonary vasculature is unremarkable. Persistent opacity at the left mid to lower hemithorax, corresponding to loculated fluid and atelectasis on the CT from 6/12/2024, appears mildly increased, continued follow-up advised. No pneumothorax. No acute osseous process.      As described.  This report was finalized on 6/18/2024 3:09 PM by Dr. Hank Alexander M.D on Workstation: [id number]       Ordered the above noted radiological studies.  Chest x-ray independently interpreted by me and does show pneumonia with pleural effusion left side          PROCEDURES  Procedures          MEDICATIONS GIVEN IN ER  Medications   sodium chloride 0.9 % flush 10 mL (has no administration in time range)   vancomycin 2750 mg/500 mL 0.9% NS IVPB ([facility]) (2,750 mg Intravenous New Bag 6/18/24 4276)   Pharmacy to Dose Zosyn (has no administration in time range)   Pharmacy to dose  vancomycin (has no administration in time range)   sodium chloride 0.9 % infusion (has no administration in time range)   albuterol sulfate HFA (PROVENTIL HFA;VENTOLIN HFA;PROAIR HFA) inhaler 2 puff (has no administration in time range)   escitalopram (LEXAPRO) tablet 10 mg (has no administration in time range)   gabapentin (NEURONTIN) capsule 300 mg (has no administration in time range)   famotidine (PEPCID) tablet 20 mg (has no administration in time range)   cetirizine (zyrTEC) tablet 10 mg (has no administration in time range)   guaiFENesin (MUCINEX) 12 hr tablet 600 mg (has no administration in time range)   dextrose (GLUTOSE) oral gel 15 g (has no administration in time range)   dextrose (D50W) (25 g/50 mL) IV injection 25 g (has no administration in time range)   glucagon (GLUCAGEN) injection 1 mg (has no administration in time range)   insulin lispro (HUMALOG/ADMELOG) injection 2-7 Units ( Subcutaneous Not Given 6/18/24 1841)   acetaminophen (TYLENOL) tablet 650 mg (has no administration in time range)   HYDROcodone-acetaminophen (NORCO) 5-325 MG per tablet 1 tablet (1 tablet Oral Given 6/18/24 1840)   piperacillin-tazobactam (ZOSYN) 4.5 g IVPB in 100 mL NS MBP (CD) (has no administration in time range)   acetaminophen (TYLENOL) tablet 1,000 mg (1,000 mg Oral Given 6/18/24 1445)   sepsis fluid LR bolus 2,328 mL (2,328 mL Intravenous New Bag 6/18/24 1445)   piperacillin-tazobactam (ZOSYN) 4.5 g IVPB in 100 mL NS MBP (CD) (0 g Intravenous Stopped 6/18/24 1657)                   MEDICAL DECISION MAKING, PROGRESS, and CONSULTS    All labs have been independently reviewed by me.  All radiology studies have been reviewed by me and I have also reviewed the radiology report.   EKG's independently viewed and interpreted by me.  Discussion below represents my analysis of pertinent findings related to patient's condition, differential diagnosis, treatment plan and final disposition.      Additional sources:  -  Discussed/ obtained information from independent historians: None    - External (non-ED) record review: Epic reviewed patient seen by thoracic surgery 6/13/2024    - Chronic or social conditions impacting care: None     - Shared decision making: None      Orders placed during this visit:  Orders Placed This Encounter   Procedures    Blood Culture - Blood,    Blood Culture - Blood,    MRSA Screen, PCR (Inpatient) - Swab, Nares    XR Chest 1 View    CT Chest Without Contrast Diagnostic    [place] Draw    Comprehensive Metabolic Panel    BNP    Single High Sensitivity Troponin T    Lactic Acid, Plasma    Procalcitonin    CBC Auto Differential    Comprehensive Metabolic Panel    BNP    TSH    Lipid Panel    Hemoglobin A1c    Diet: Regular/House; Fluid Consistency: Thin (IDDSI 0)    Undress & Gown    Continuous Pulse Oximetry    Vital Signs    Verify & Document Antipyretic Administration    Reassess & Document Temperature 30-60 Minutes After Antipyretic Administration    Place Sequential Compression Device    Maintain Sequential Compression Device    Activity As Tolerated    Code Status and Medical Interventions:    Inpatient Pulmonology Consult    Inpatient Thoracic Surgery Consult    Inpatient Infectious Diseases Consult    Oxygen Therapy- Nasal Cannula; Titrate 1-6 LPM Per SpO2; 90 - 95%    POC Glucose 4x Daily Before Meals & at Bedtime    ECG 12 Lead ED Triage Standing Order; SOA    Telemetry Scan    Insert Peripheral IV    Initiate Observation Status    CBC & Differential    Green Top (Gel)    Lavender Top    Gold Top - SST    Light Blue Top    CBC & Differential         Additional orders considered but not ordered:  None        Differential diagnosis includes but is not limited to:    Pneumonia versus pleural effusion versus empyema      Independent interpretation of labs, radiology studies, and discussions with consultants:  ED Course as of 06/18/24 8732   Tue Jun 18, 2024   4052 First look:    38-year-old male  with history of diabetes and hypertension presenting for evaluation of fever and cough.  Patient first diagnosed with pneumonia 1 month ago and has been on 5 separate rounds of outpatient antibiotics without resolution of symptoms.  Patient states he has ongoing shortness of breath but denies productive cough.  Patient does meet sepsis criteria with tachycardia and fever.  Patient to receive sepsis bolus, Tylenol.  Laboratory evaluation including blood cultures and lactic acid initiated as well as chest x-ray. [MW]   1550 15:50 EDT  Patient with recurrent pneumonia and loculated pleural effusion.  Patient white blood cell count elevating.  Patient continues to have fever despite antibiotics.  Discussed with Dr. Young who will admit patient.  Zosyn and vancomycin.  May need drainage of the loculated fluid collection. [SL]      ED Course User Index  [MW] Samuel Mendez MD  [SL] Saúl Hutchison MD                 DIAGNOSIS  Final diagnoses:   Pneumonia of left lower lobe due to infectious organism   Pleural effusion         DISPOSITION  admit            Latest Documented Vital Signs:  As of 18:42 EDT  BP- 137/76 HR- 97 Temp- (!) 100.6 °F (38.1 °C) (Tympanic) O2 sat- 95%              --    Please note that portions of this were completed with a voice recognition program.       Note Disclaimer: At [facility], we believe that sharing information builds trust and better relationships. You are receiving this note because you are receiving care at [facility] or recently visited. It is possible you will see health information before a provider has talked with you about it. This kind of information can be easy to misunderstand. To help you fully understand what it means for your health, we urge you to discuss this note with your provider.            Saúl Hutchison MD  06/18/24 7923      Electronically signed by Saúl Hutchison MD at 06/18/24 1843       Vital Signs (last 3 days)       Date/Time Temp Temp  src Pulse Resp BP Patient Position SpO2    06/20/24 0755 -- -- 100 18 -- -- 99    06/20/24 0745 -- -- 100 18 -- -- 93    06/20/24 0729 98.1 (36.7) -- 98 18 138/79 -- 91    06/20/24 0425 -- -- -- -- -- -- 90    06/20/24 0011 -- -- -- -- -- -- 95    06/19/24 2333 98.4 (36.9) Oral 98 16 147/87 Lying 97    06/19/24 1930 98.6 (37) Oral 104 16 140/86 Sitting 93    06/19/24 1650 -- -- 94 -- -- -- 96    06/19/24 1529 -- -- 93 16 134/80 Lying 95    Comment rows:    OBSERV: Patient returned from thoracentesis. Band aid to left back clean, dry and intact. Unable to get any fluid during thoracentesis. at 06/19/24 1529    06/19/24 1405 -- -- 100 17 126/77 Lying 96    06/19/24 1300 98.1 (36.7) -- 96 -- 125/71 -- 96    06/19/24 1258 -- Oral -- 18 -- Lying --    06/19/24 0718 98.1 (36.7) Oral 88 18 134/72 Lying 94    06/19/24 0000 98.6 (37) Oral 89 18 137/73 Lying 96    06/18/24 2030 98.4 (36.9) Oral 94 16 130/74 Lying 95    06/18/24 1631 -- -- -- -- 137/76 -- --    06/18/24 1624 -- -- 97 -- -- -- 95    06/18/24 1621 -- -- 96 -- -- -- 96    06/18/24 1620 -- -- -- -- 117/63 -- --    06/18/24 1527 -- -- 96 -- -- -- 97    06/18/24 1431 -- -- 111 -- 126/74 -- 97    06/18/24 1428 -- -- 114 -- -- -- --    06/18/24 1421 -- -- -- -- 127/77 Sitting --    06/18/24 1419 100.6 (38.1) Tympanic 122 16 -- -- 97          Oxygen Therapy (last 3 days)       Date/Time SpO2 Device (Oxygen Therapy) Flow (L/min) Oxygen Concentration (%) ETCO2 (mmHg)    06/20/24 0755 99 -- -- -- --    06/20/24 0745 93 room air -- -- --    06/20/24 0729 91 -- -- -- --    06/20/24 0425 90 nasal cannula 2 -- --    06/20/24 0011 95 nasal cannula 2 -- --    06/19/24 2333 97 room air -- -- --    06/19/24 2020 -- room air -- -- --    06/19/24 1930 93 room air -- -- --    06/19/24 1650 96 room air -- -- --    06/19/24 1529 95 room air -- -- --    06/19/24 1405 96 room air -- -- --    06/19/24 1300 96 -- -- -- --    06/19/24 1258 -- room air -- -- --    06/19/24 0823 -- room air  -- -- --    06/19/24 0718 94 -- -- -- --    06/19/24 0030 -- nasal cannula 2 -- --    06/19/24 0000 96 room air -- -- --    06/18/24 2045 -- room air -- -- --    06/18/24 2030 95 room air -- -- --    06/18/24 1843 -- room air -- -- --    06/18/24 1624 95 -- -- -- --    06/18/24 1621 96 -- -- -- --    06/18/24 1527 97 -- -- -- --    06/18/24 1431 97 -- -- -- --    06/18/24 1419 97 room air -- -- --          Lines, Drains & Airways       Active LDAs       Name Placement date Placement time Site Days    Peripheral IV 06/18/24 1445 Anterior;Right Forearm 06/18/24  1445  Forearm  1              Inactive LDAs       None                  Facility-Administered Medications as of 6/20/2024   Medication Dose Route Frequency Provider Last Rate Last Admin    [COMPLETED] acetaminophen (TYLENOL) tablet 1,000 mg  1,000 mg Oral Once Emergency, Triage Protocol, MD   1,000 mg at 06/18/24 1445    acetaminophen (TYLENOL) tablet 650 mg  650 mg Oral Q6H PRN Jeovanny Young MD        acetylcysteine (MUCOMYST) 20 % nebulizer solution 3 mL  3 mL Nebulization Q8H - RT Luis A Guzman APRN   3 mL at 06/20/24 0745    albuterol sulfate HFA (PROVENTIL HFA;VENTOLIN HFA;PROAIR HFA) inhaler 2 puff  2 puff Inhalation Q4H PRN Jeovanny Young MD        cetirizine (zyrTEC) tablet 10 mg  10 mg Oral Daily Jeovanny Young MD   10 mg at 06/19/24 1611    dextrose (D50W) (25 g/50 mL) IV injection 25 g  25 g Intravenous Q15 Min PRN Jeovanny Young MD        dextrose (GLUTOSE) oral gel 15 g  15 g Oral Q15 Min PRN Jeovanny Young MD        escitalopram (LEXAPRO) tablet 10 mg  10 mg Oral Daily Jeovanny Young MD   10 mg at 06/19/24 1612    famotidine (PEPCID) tablet 20 mg  20 mg Oral BID Jeovanny Young MD   20 mg at 06/19/24 2109    gabapentin (NEURONTIN) capsule 300 mg  300 mg Oral TID Jeovanny Young MD   300 mg at 06/19/24 2109    glucagon (GLUCAGEN) injection 1 mg  1 mg Intramuscular Q15 Min PRN Jeovanny Young MD        guaiFENesin (MUCINEX) 12 hr tablet 1,200 mg  1,200 mg  Oral Q12H Luis A Guzman APRN   1,200 mg at 06/19/24 2109    Hold medication  1 each Does not apply Continuous PRN Luis A Guzman APRN        HYDROcodone-acetaminophen (NORCO) 5-325 MG per tablet 1 tablet  1 tablet Oral Q4H PRN Jeovanny Young MD   1 tablet at 06/19/24 1611    insulin lispro (HUMALOG/ADMELOG) injection 2-7 Units  2-7 Units Subcutaneous 4x Daily AC & at Bedtime Jeovanny Young MD        ipratropium-albuterol (DUO-NEB) nebulizer solution 3 mL  3 mL Nebulization Q8H Luis A Guzman APRN   3 mL at 06/20/24 0744    [COMPLETED] lidocaine (XYLOCAINE) 1 % injection 10 mL  10 mL Subcutaneous Once Jay Mckeon MD   10 mL at 06/19/24 1455    [COMPLETED] piperacillin-tazobactam (ZOSYN) 4.5 g IVPB in 100 mL NS MBP (CD)  4.5 g Intravenous Once Saúl Hutchison MD   Stopped at 06/18/24 1657    piperacillin-tazobactam (ZOSYN) 4.5 g IVPB in 100 mL NS MBP (CD)  4.5 g Intravenous Q8H Jeovanny Young MD 0 mL/hr at 06/19/24 0245 4.5 g at 06/20/24 0605    [COMPLETED] sepsis fluid LR bolus 2,328 mL  30 mL/kg (Ideal) Intravenous Once Emergency, Triage Protocol, MD   2,328 mL at 06/18/24 1445    sodium chloride 0.9 % flush 10 mL  10 mL Intravenous PRN Emergency, Triage Protocol, MD        sodium chloride 0.9 % infusion  50 mL/hr Intravenous Continuous Jeovanny Young MD 50 mL/hr at 06/19/24 1613 50 mL/hr at 06/19/24 1613    [COMPLETED] vancomycin 2750 mg/500 mL 0.9% NS IVPB ([facility])  20 mg/kg Intravenous Once Saúl Hutchison MD   Stopped at 06/18/24 2029     Orders (all)        Start     Ordered    06/20/24 1330  Vancomycin, Trough  Timed,   Status:  Canceled         06/18/24 2325    06/20/24 0751  POC Glucose Once  PROCEDURE ONCE        Comments: Complete no more than 45 minutes prior to patient eating      06/20/24 0749    06/20/24 0600  XR Chest 1 View  1 Time Imaging         06/19/24 1034    06/20/24 0600  CBC & Differential  Morning Draw         06/19/24 1405    06/20/24 0600  Basic Metabolic Panel  Morning  Draw         06/19/24 1405    06/20/24 0600  CBC Auto Differential  PROCEDURE ONCE         06/19/24 2202    06/19/24 2100  guaiFENesin (MUCINEX) 12 hr tablet 1,200 mg  Every 12 Hours Scheduled         06/19/24 1636    06/19/24 2007  POC Glucose Once  PROCEDURE ONCE        Comments: Complete no more than 45 minutes prior to patient eating      06/19/24 2005 06/19/24 1930  Oscillating Positive Expiratory Pressure (OPEP)  Every 4 Hours - RT       06/19/24 1636 06/19/24 1730  ipratropium-albuterol (DUO-NEB) nebulizer solution 3 mL  Every 8 Hours         06/19/24 1636    06/19/24 1730  acetylcysteine (MUCOMYST) 20 % nebulizer solution 3 mL  Every 8 Hours - RT         06/19/24 1636    06/19/24 1700  Incentive Spirometry  Every Hour While Awake       06/19/24 1636    06/19/24 1558  Diet: Regular/House, Diabetic; Consistent Carbohydrate; Texture: Regular (IDDSI 7); Fluid Consistency: Thin (IDDSI 0)  Diet Effective Now         06/19/24 1558    06/19/24 1530  lidocaine (XYLOCAINE) 1 % injection 10 mL  Once         06/19/24 1435    06/19/24 1400  Vancomycin HCl 1,250 mg in sodium chloride 0.9 % 250 mL VTB  Every 12 Hours,   Status:  Discontinued         06/19/24 0801    06/19/24 1314  Inpatient Admission  Once         06/19/24 1314    06/19/24 1137  POC Glucose Once  PROCEDURE ONCE        Comments: Complete no more than 45 minutes prior to patient eating      06/19/24 1133    06/19/24 1030  Obtain Informed Consent  Once         06/19/24 1034    06/19/24 1030  US Thoracentesis  1 Time Imaging         06/19/24 1034    06/19/24 1029  Hold medication  Continuous PRN         06/19/24 1034    06/19/24 0818  POC Glucose Once  PROCEDURE ONCE        Comments: Complete no more than 45 minutes prior to patient eating      06/19/24 0815    06/19/24 0600  pantoprazole (PROTONIX) EC tablet 40 mg  Every Early Morning,   Status:  Discontinued         06/18/24 1717    06/19/24 0600  CBC & Differential  Morning Draw         06/18/24 1718     06/19/24 0600  Comprehensive Metabolic Panel  Morning Draw         06/18/24 1718 06/19/24 0600  TSH  Morning Draw         06/18/24 1719 06/19/24 0600  Lipid Panel  Morning Draw         06/18/24 1719 06/19/24 0600  Hemoglobin A1c  Morning Draw         06/18/24 1719 06/19/24 0600  CBC Auto Differential  PROCEDURE ONCE         06/18/24 2202    06/19/24 0600  vancomycin (VANCOCIN) 1,000 mg in sodium chloride 0.9 % 250 mL IVPB-VTB  Every 8 Hours,   Status:  Discontinued         06/18/24 2325    06/19/24 0001  NPO Diet NPO Type: Strict NPO  Diet Effective Midnight,   Status:  Canceled         06/18/24 1931 06/19/24 0000  BNP  Once         06/18/24 1719 06/18/24 2300  piperacillin-tazobactam (ZOSYN) 4.5 g IVPB in 100 mL NS MBP (CD)  Every 8 Hours         06/18/24 1731 06/18/24 2200  POC Glucose 4x Daily Before Meals & at Bedtime  4 Times Daily Before Meals & at Bedtime      Comments: Complete no more than 45 minutes prior to patient eating      06/18/24 1718 06/18/24 2100  famotidine (PEPCID) tablet 20 mg  2 Times Daily         06/18/24 1717 06/18/24 2100  guaiFENesin (MUCINEX) 12 hr tablet 600 mg  Every 12 Hours Scheduled,   Status:  Discontinued         06/18/24 1717 06/18/24 2035  POC Glucose Once  PROCEDURE ONCE        Comments: Complete no more than 45 minutes prior to patient eating      06/18/24 2032 06/18/24 1929  Respiratory Culture - Sputum, Cough  Once         06/18/24 1929 06/18/24 1806  CT Chest Without Contrast Diagnostic  1 Time Imaging         06/18/24 1806 06/18/24 1734  insulin lispro (HUMALOG/ADMELOG) injection 2-7 Units  4 Times Daily Before Meals & Nightly         06/18/24 1718 06/18/24 1733  escitalopram (LEXAPRO) tablet 10 mg  Daily         06/18/24 1717 06/18/24 1733  gabapentin (NEURONTIN) capsule 300 mg  3 Times Daily         06/18/24 1717 06/18/24 1733  losartan (COZAAR) tablet 50 mg  Daily,   Status:  Discontinued         06/18/24 171     06/18/24 1733  cetirizine (zyrTEC) tablet 10 mg  Daily         06/18/24 1717    06/18/24 1732  sodium chloride 0.9 % infusion  Continuous         06/18/24 1716    06/18/24 1732  Inpatient Infectious Diseases Consult  Once        Specialty:  Infectious Diseases  Provider:  Jimmy Garza MD    06/18/24 1731    06/18/24 1732  Activity As Tolerated  Until Discontinued         06/18/24 1732    06/18/24 1731  sepsis fluid LR bolus 2,328 mL  Once,   Status:  Discontinued         06/18/24 1715    06/18/24 1731  acetaminophen (TYLENOL) tablet 1,000 mg  Once,   Status:  Discontinued         06/18/24 1715    06/18/24 1731  Inpatient Thoracic Surgery Consult  Once        Specialty:  Thoracic Surgery  Provider:  Davis Rodriguez MD    06/18/24 1730    06/18/24 1730  HYDROcodone-acetaminophen (NORCO) 5-325 MG per tablet 1 tablet  Every 4 Hours PRN         06/18/24 1730    06/18/24 1730  HYDROcodone-acetaminophen (NORCO) 5-325 MG per tablet 1 tablet  Every 6 Hours PRN,   Status:  Discontinued         06/18/24 1730    06/18/24 1730  piperacillin-tazobactam (ZOSYN) 3.375 g IVPB in 100 mL NS MBP (CD)  Once,   Status:  Discontinued         06/18/24 1714    06/18/24 1725  Inpatient Pulmonology Consult  Once        Specialty:  Pulmonary Disease  Provider:  Aamir Dailey MD    06/18/24 1725    06/18/24 1724  acetaminophen (TYLENOL) tablet 650 mg  Every 6 Hours PRN         06/18/24 1725    06/18/24 1720  Place Sequential Compression Device  Once         06/18/24 1719    06/18/24 1720  Maintain Sequential Compression Device  Continuous         06/18/24 1719    06/18/24 1720  Code Status and Medical Interventions:  Continuous         06/18/24 1719    06/18/24 1719  BNP  Once,   Status:  Canceled         06/18/24 1718    06/18/24 1718  dextrose (GLUTOSE) oral gel 15 g  Every 15 Minutes PRN         06/18/24 1718    06/18/24 1718  dextrose (D50W) (25 g/50 mL) IV injection 25 g  Every 15 Minutes PRN         06/18/24 1718    06/18/24 1718   glucagon (GLUCAGEN) injection 1 mg  Every 15 Minutes PRN         06/18/24 1718    06/18/24 1717  MRSA Screen, PCR (Inpatient) - Swab, Nares  Once         06/18/24 1716    06/18/24 1716  albuterol sulfate HFA (PROVENTIL HFA;VENTOLIN HFA;PROAIR HFA) inhaler 2 puff  Every 4 Hours PRN         06/18/24 1717    06/18/24 1716  Diet: Regular/House; Fluid Consistency: Thin (IDDSI 0)  Diet Effective Now,   Status:  Canceled         06/18/24 1715    06/18/24 1715  Pharmacy to dose vancomycin  Continuous PRN,   Status:  Discontinued         06/18/24 1716    06/18/24 1715  Pharmacy to Dose Zosyn  Continuous PRN,   Status:  Discontinued         06/18/24 1716    06/18/24 1608  piperacillin-tazobactam (ZOSYN) 4.5 g IVPB in 100 mL NS MBP (CD)  Once         06/18/24 1552    06/18/24 1606  piperacillin-tazobactam (ZOSYN) 3.375 g IVPB in 100 mL NS MBP (CD)  Once,   Status:  Discontinued         06/18/24 1550    06/18/24 1606  vancomycin 2750 mg/500 mL 0.9% NS IVPB ([facility])  Once         06/18/24 1550    06/18/24 1553  Initiate Observation Status  Once         06/18/24 1552    06/18/24 1543  LIPPS (on-call MD unless specified)  Once,   Status:  Canceled        Specialty:  Internal Medicine  Provider:  Jeovanny Young MD    06/18/24 1542    06/18/24 1504  Reassess & Document Temperature 30-60 Minutes After Antipyretic Administration  Once         06/18/24 1433    06/18/24 1500  sepsis fluid LR bolus 2,328 mL  Once         06/18/24 1444    06/18/24 1449  acetaminophen (TYLENOL) tablet 1,000 mg  Once         06/18/24 1433    06/18/24 1434  NPO Diet NPO Type: Sips with Meds  Diet Effective Now,   Status:  Canceled        Comments: May Take Antipyretic Medications    06/18/24 1433    06/18/24 1434  Verify & Document Antipyretic Administration  Once         06/18/24 1433    06/18/24 1430  Blood Culture - Blood, Arm, Left  Once         06/18/24 1429    06/18/24 1430  Blood Culture - Blood, Arm, Left  Once         06/18/24 1429    06/18/24 1430   Lactic Acid, Plasma  Once         06/18/24 1429    06/18/24 1430  Procalcitonin  Once         06/18/24 1429    06/18/24 1429  NPO Diet NPO Type: Strict NPO  Diet Effective Now,   Status:  Canceled         06/18/24 1429    06/18/24 1429  Undress & Gown  Once         06/18/24 1429    06/18/24 1429  Cardiac Monitoring  Continuous        Comments: Follow Standing Orders As Outlined in Process Instructions (Open Order Report to View Full Instructions)    06/18/24 1429    06/18/24 1429  Continuous Pulse Oximetry  Continuous         06/18/24 1429    06/18/24 1429  Vital Signs  Per Hospital Policy         06/18/24 1429    06/18/24 1429  ECG 12 Lead ED Triage Standing Order; SOA  Once         06/18/24 1429    06/18/24 1429  XR Chest 1 View  1 Time Imaging         06/18/24 1429    06/18/24 1429  Insert Peripheral IV  Once         06/18/24 1429    06/18/24 1429  [place]olt Draw  Once         06/18/24 1429    06/18/24 1429  CBC & Differential  Once         06/18/24 1429    06/18/24 1429  Comprehensive Metabolic Panel  Once         06/18/24 1429    06/18/24 1429  BNP  Once         06/18/24 1429    06/18/24 1429  Single High Sensitivity Troponin T  Once         06/18/24 1429    06/18/24 1429  Green Top (Gel)  PROCEDURE ONCE         06/18/24 1429    06/18/24 1429  Lavender Top  PROCEDURE ONCE         06/18/24 1429    06/18/24 1429  Gold Top - SST  PROCEDURE ONCE         06/18/24 1429    06/18/24 1429  Light Blue Top  PROCEDURE ONCE         06/18/24 1429    06/18/24 1429  CBC Auto Differential  PROCEDURE ONCE         06/18/24 1429    06/18/24 1428  sodium chloride 0.9 % flush 10 mL  As Needed         06/18/24 1429    06/18/24 0000  Telemetry Scan  Once         06/18/24 0000    06/18/24 0000  Telemetry Scan  Once         06/18/24 0000    06/18/24 0000  Telemetry Scan  Once         06/18/24 0000    Unscheduled  Oxygen Therapy- Nasal Cannula; Titrate 1-6 LPM Per SpO2; 90 - 95%  Continuous PRN       06/18/24 1429    Unscheduled  Follow  Hypoglycemia Standing Orders For Blood Glucose <70 & Notify Provider of Treatment  As Needed      Comments: Follow Hypoglycemia Orders As Outlined in Process Instructions (Open Order Report to View Full Instructions)  Notify Provider Any Time Hypoglycemia Treatment is Administered    06/18/24 1718    Signed and Held  Obtain Informed Consent  Once,   Status:  Canceled         Signed and Held    Signed and Held  CT Guided Chest Tube  1 Time Imaging,   Status:  Canceled         Signed and Held    Signed and Held  Body Fluid Cell Count With Differential - Body Fluid, Pleural Cavity  Once,   Status:  Canceled         Signed and Held    Signed and Held  pH, Body Fluid - Body Fluid, Pleural Cavity  Once,   Status:  Canceled         Signed and Held    Signed and Held  Protein, Body Fluid - Body Fluid, Pleural Cavity  Once,   Status:  Canceled         Signed and Held    Signed and Held  Lactate Dehydrogenase, Body Fluid - Body Fluid, Pleural Cavity  Once,   Status:  Canceled         Signed and Held    Signed and Held  Glucose, Body Fluid - Body Fluid, Pleural Cavity  Once,   Status:  Canceled         Signed and Held    Signed and Held  Albumin, Fluid - Body Fluid, Pleural Cavity  Once,   Status:  Canceled         Signed and Held    Signed and Held  Triglycerides, Body Fluid - Body Fluid, Pleural Cavity  Once,   Status:  Canceled         Signed and Held    Signed and Held  Cholesterol, Body Fluid - Bile, Pleural Cavity  Once,   Status:  Canceled         Signed and Held    Signed and Held  Amylase, Body Fluid - Body Fluid, Pleural Cavity  Once,   Status:  Canceled         Signed and Held    Signed and Held  Body Fluid Culture - Body Fluid, Pleural Cavity  Once,   Status:  Canceled         Signed and Held    Signed and Held  Anaerobic Culture - Swab, Pleural Cavity  Once,   Status:  Canceled         Signed and Held    Signed and Held  Fungus Smear - Sputum, Pleural Cavity  Once,   Status:  Canceled         Signed and Held     Signed and Held  Fungus Culture - Aspirate, Pleural Cavity  Once,   Status:  Canceled         Signed and Held    Signed and Held  AFB Culture - Aspirate, Pleural Cavity  Once,   Status:  Canceled         Signed and Held    Signed and Held  Non-gynecologic Cytology  Once,   Status:  Canceled         Signed and Held    Signed and Held  Body Fluid Cell Count With Differential - Pleural Fluid, Pleural Cavity  STAT         Signed and Held    Signed and Held  pH, Body Fluid - Pleural Fluid, Pleural Cavity  STAT         Signed and Held    Signed and Held  Lactate Dehydrogenase, Body Fluid - Pleural Fluid, Pleural Cavity  STAT         Signed and Held    Signed and Held  Glucose, Body Fluid - Pleural Fluid, Pleural Cavity  STAT         Signed and Held    Signed and Held  Protein, Body Fluid - Pleural Fluid, Pleural Cavity  STAT         Signed and Held    Signed and Held  AFB Culture - Body Fluid, Pleural Cavity  STAT         Signed and Held    Signed and Held  Body Fluid Culture - Body Fluid, Pleural Cavity  STAT         Signed and Held    Signed and Held  Anaerobic Culture - Pleural Fluid, Pleural Cavity  STAT         Signed and Held    Signed and Held  Fungus Culture - Body Fluid, Pleural Cavity  STAT         Signed and Held    Signed and Held  KOH Prep - Body Fluid, Pleural Cavity  STAT         Signed and Held    Signed and Held  Fungus Smear - Body Fluid, Pleural Cavity  STAT         Signed and Held                     Physician Progress Notes (all)        Lonnie Angela MD at 24 1634              [facility]  [phone]  Dr. Lonnie Angela    Subjective:  LOS: 0    Chief Complaint:  Loculated effusion    States he had pneumonia in May and treated with antibiotics.  He improved and then developed fever cough again.  He seen thoracic surgery as outpatient 1 week ago.  Now here with fever and pleuritic chest pain and cough.    Objective   Vital Signs past 24hrs  Temp range: Temp (24hrs), Av.4 °F (36.9  °C), Min:98.1 °F (36.7 °C), Max:98.6 °F (37 °C)    BP range: BP: (126-137)/(72-80) 134/80  Pulse range: Heart Rate:  [] 93  Resp rate range: Resp:  [16-18] 16  Device (Oxygen Therapy): room airFlow (L/min):  [2] 2  Oxygen range:SpO2:  [94 %-96 %] 95 %   Mechanical Ventilator:     Physical Exam  Constitutional:       Appearance: He is obese.   Eyes:      Pupils: Pupils are equal, round, and reactive to light.   Cardiovascular:      Rate and Rhythm: Normal rate and regular rhythm.      Heart sounds: No murmur heard.  Pulmonary:      Effort: Pulmonary effort is normal.      Breath sounds: Examination of the left-lower field reveals decreased breath sounds. Decreased breath sounds present.   Abdominal:      General: Bowel sounds are normal.      Palpations: Abdomen is soft. There is no mass.      Tenderness: There is no abdominal tenderness.   Musculoskeletal:         General: No swelling.   Neurological:      Mental Status: He is alert.       Results Review:    I have reviewed the laboratory and imaging data since the last note by [facility] physician.  My annotations are noted in assessment and plan.      Result Review:  I have personally reviewed the results from last note by [facility] physician to 6/19/2024 16:34 EDT and agree with these findings:  [x]  Laboratory list / accordion  [x]  Microbiology  [x]  Radiology  []  EKG/Telemetry   []  Cardiology/Vascular   []  Pathology  []  Old records  []  Other:    Medication Review:  I have reviewed the current MAR.  My annotations are noted in assessment and plan.    cetirizine, 10 mg, Oral, Daily  escitalopram, 10 mg, Oral, Daily  famotidine, 20 mg, Oral, BID  gabapentin, 300 mg, Oral, TID  guaiFENesin, 600 mg, Oral, Q12H  insulin lispro, 2-7 Units, Subcutaneous, 4x Daily AC & at Bedtime  piperacillin-tazobactam, 4.5 g, Intravenous, Q8H  vancomycin, 1,250 mg, Intravenous, Q12H        hold, 1 each  Pharmacy to dose vancomycin,   sodium chloride, 50 mL/hr, Last Rate: 50 mL/hr  (24 1613)      Lines, Drains & Airways       Active LDAs       Name Placement date Placement time Site Days    Peripheral IV 24 1445 Anterior;Right Forearm 24  1445  Forearm  1                  Isolation status: No active isolations    Dietary Orders (From admission, onward)       Start     Ordered    24 1558  Diet: Regular/House, Diabetic; Consistent Carbohydrate; Texture: Regular (IDDSI 7); Fluid Consistency: Thin (IDDSI 0)  Diet Effective Now        References:    Diet Order [place]   Question Answer Comment   Diets: Regular/House    Diets: Diabetic    Diabetic Diet: Consistent Carbohydrate    Texture: Regular (IDDSI 7)    Fluid Consistency: Thin (IDDSI 0)        24 1558                    PCCM Problems  Loculated left pleural effusion  Recent treatment for pneumonia outpatient  Obesity  Type 2 diabetes mellitus  Ex-smoker        THESE ARE NEW MEDICAL PROBLEMS TO ME.    Plan of Treatment    Loculated pleural effusion and given the multiple discrete pockets, unlikely to benefit from chest tube placement.  May need needle sampling for possible infection.  I have recommended to see what thoracic surgery wants before sending patient for procedure.  I suspect this pleural spaces infected with empyema and that he will need a VATS procedure.    Otherwise respiratory status looks stable at this time    Lonnie Angela MD  24  16:34 EDT      Part of this note may be an electronic transcription/translation of spoken language to printed text using the Dragon Dictation System.      Electronically signed by Lonnie Angela MD at 24 1638       Jimmy Garza MD at 24 0909            Infectious Diseases Progress Note    Jimmy Garza MD     [facility]  Los: 0 days  Patient Identification:  Name: Ravinder Robles  Age: 38 y.o.  Sex: male  :  1985  MRN: [id number]         Primary Care Physician: Ruiz Escamilla MD (Tony)    Seen later in the day.  Was gone for  "attempted thoracentesis.    Subjective: Feeling somewhat better but still have discomfort in the left side of the chest.  Interval History: Earlier today had attempted thoracentesis which was unsuccessful    Objective:    Scheduled Meds:cetirizine, 10 mg, Oral, Daily  escitalopram, 10 mg, Oral, Daily  famotidine, 20 mg, Oral, BID  gabapentin, 300 mg, Oral, TID  guaiFENesin, 600 mg, Oral, Q12H  insulin lispro, 2-7 Units, Subcutaneous, 4x Daily AC & at Bedtime  piperacillin-tazobactam, 4.5 g, Intravenous, Q8H  vancomycin, 1,250 mg, Intravenous, Q12H      Continuous Infusions:Pharmacy to dose vancomycin,   sodium chloride, 75 mL/hr, Last Rate: 75 mL/hr (06/19/24 0653)        Vital signs in last 24 hours:  Temp:  [98.1 °F (36.7 °C)-100.6 °F (38.1 °C)] 98.1 °F (36.7 °C)  Heart Rate:  [] 88  Resp:  [16-18] 18  BP: (117-137)/(63-77) 134/72    Intake/Output:    Intake/Output Summary (Last 24 hours) at 6/19/2024 0909  Last data filed at 6/19/2024 0653  Gross per 24 hour   Intake 972.5 ml   Output --   Net 972.5 ml       Exam:  /72 (BP Location: Left arm, Patient Position: Lying)   Pulse 88   Temp 98.1 °F (36.7 °C) (Oral)   Resp 18   Ht 182.9 cm (72\")   Wt 132 kg (291 lb 14.2 oz)   SpO2 94%   BMI 39.59 kg/m²   Patient is examined using the personal protective equipment as per guidelines from infection control for this particular patient as enacted.  Hand washing was performed before and after patient interaction.  General Appearance:    Alert, cooperative, no distress, AAOx3                          Head:    Normocephalic, without obvious abnormality, atraumatic                           Eyes:    PERRL, conjunctivae/corneas clear, EOM's intact, both eyes                         Throat:   Lips, tongue, gums normal; oral mucosa pink and moist                           Neck:   Supple, symmetrical, trachea midline, no JVD                         Lungs:    Decreased breath sounds at the left lung base with no " obvious use of accessory muscles of breathing                 Chest Wall:    No tenderness or deformity                          Heart:  S1-S2 regular                  Abdomen:   Soft nontender                 Extremities:   Extremities normal, atraumatic, no cyanosis or edema                        Pulses:   Pulses palpable in all extremities                            Skin:   Skin is warm and dry,  no rashes or palpable lesions                  Neurologic: Alert and oriented x 3       Data Review:    I reviewed the patient's new clinical results.  Results from last 7 days   Lab Units 06/19/24  0501 06/18/24  1440 06/14/24  1052   WBC 10*3/mm3 13.54* 17.33* 16.33*   HEMOGLOBIN g/dL 12.3* 14.5 14.3   PLATELETS 10*3/mm3 262 309 297     Results from last 7 days   Lab Units 06/19/24  0501 06/18/24  1440   SODIUM mmol/L 135* 135*   POTASSIUM mmol/L 3.5 3.7   CHLORIDE mmol/L 102 98   CO2 mmol/L 24.5 25.0   BUN mg/dL 9 12   CREATININE mg/dL 0.75* 0.90   CALCIUM mg/dL 8.5* 9.2   GLUCOSE mg/dL 114* 110*     Microbiology Results (last 10 days)       Procedure Component Value - Date/Time    MRSA Screen, PCR (Inpatient) - Swab, Nares [[id number]]  (Normal) Collected: 06/19/24 0512    Lab Status: Final result Specimen: Swab from Nares Updated: 06/19/24 0717     MRSA PCR No MRSA Detected    Narrative:      The negative predictive value of this diagnostic test is high and should only be used to consider de-escalating anti-MRSA therapy. A positive result may indicate colonization with MRSA and must be correlated clinically.          CT Chest Without Contrast Diagnostic    Result Date: 6/18/2024   1. Similar-appearing complex likely partially loculated left pleural effusion, probable empyema or possible lung abscesses, with atelectasis and consolidation of the left left lower lobe and base of the left upper lobe and lingula that could reflect pneumonia. 2. Partially visualized enlarged left hilar lymph nodes, likely reactive.  This  report was finalized on 6/18/2024 8:45 PM by Deven Schmitz MD on Workstation: [id number]      XR Chest 1 View    Result Date: 6/18/2024  As described.  This report was finalized on 6/18/2024 3:09 PM by Dr. Hank Alexander M.D on Workstation: [id number]      CT Chest With Contrast Diagnostic    Result Date: 6/12/2024  Impression: Loculated pleural fluid within the inferior aspect of the left lower lung with associated pleural wall thickening and enhancement. Findings are suspicious for empyema. There is no evidence of adjacent parenchymal consolidation. Otherwise unremarkable chest CT. Electronically Signed: Nick Bender MD  6/12/2024 12:02 PM EDT  Workstation ID: [id number]    XR Chest PA & Lateral    Result Date: 6/12/2024  Impression: Persistent left lung base consolidation with small possibly loculated pleural effusion. Electronically Signed: Nick Bender MD  6/12/2024 8:59 AM EDT  Workstation ID: [id number]    XR Chest 2 View    Result Date: 6/3/2024  Impression: Mild improvement in left lung consolidation with pleural effusion. Recommend additional follow-up to ensure further improvement/resolution. Electronically Signed: Usman Oleary MD  6/3/2024 2:46 PM EDT  Workstation ID: [id number]       Assessment:    Pneumonia  3-yvkloxrol-piimejll pneumonia with progression to parapneumonic effusion and probable empyema partially treated with systemic antibiotic therapy with persistent symptoms and progressive worsening on imaging studies of the loculated effusion.  2-diabetes mellitus  3-asthma  4-morbid obesity  5-hypertension  6-other diagnosis per primary team.     Recommendations/Discussions:  See my discussion and recommendation on 6/18/2024.  Agree with pulmonary services assessment that eventually patient would require surgical intervention to address his loculated parapneumonic effusion with persistent symptoms and leukocytosis.  There is a chance that we may not be able to grow any pathogens given the amount of  "antibiotic therapy that he has received since beginning of his symptoms in the last month or so.  Monitor closely his renal function and other side effects while on combination of vancomycin and Zosyn and consider de-escalation.  Since his MRSA screen is negative will recommend DC vancomycin.  Jimmy Garza MD  6/19/2024  09:09 EDT    Parts of this note may be an electronic transcription/translation of spoken language to printed text using the Dragon dictation system.      Electronically signed by Jimmy Garza MD at 06/20/24 0650       Jeovanny Young MD at 06/19/24 0901          Daily progress note    Primary care physician      Subjective  Awake and alert and feeling same with shortness of breath chest discomfort with deep inspiration and not feeling well and multiple questions for the doctor and family at bedside.    History of present illness  38-year-old white male with history of diabetes mellitus hypertension hyperlipidemia asthma and gastroesophageal reflux disease who was overweight presented to [facility] emergency room with shortness of breath since May 11 and has been treated multiple times with oral antibiotics without any improvement.  Patient continued to have fever chills cough and shortness of breath.  Patient denies any chest pain but chest hurt with cough and also denies any abdominal pain nausea vomiting diarrhea.  Patient failed outpatient treatment admitted for broad-spectrum IV antibiotics and further evaluation by infectious disease and pulmonary.     REVIEW OF SYSTEMS  Per history of present illness     PHYSICAL EXAM  Blood pressure 134/72, pulse 88, temperature 98.1 °F (36.7 °C), temperature source Oral, resp. rate 18, height 182.9 cm (72\"), weight 132 kg (291 lb 14.2 oz), SpO2 94%.    GENERAL: Awake and alert no acute distress  HENT: nares patent  EYES: no scleral icterus  CV: regular rhythm, normal rate  RESPIRATORY: normal effort and moving air bilaterally  ABDOMEN: soft " nontender nondistended bowel sounds positive  MUSCULOSKELETAL: no deformity  NEURO: alert, moves all extremities, follows commands  PSYCH:  calm, cooperative  SKIN: warm, dry     LAB RESULTS  Lab Results (last 24 hours)       Procedure Component Value Units Date/Time    POC Glucose Once [[id number]]  (Normal) Collected: 06/19/24 1133    Specimen: Blood Updated: 06/19/24 1136     Glucose 93 mg/dL     POC Glucose Once [[id number]]  (Normal) Collected: 06/19/24 0815    Specimen: Blood Updated: 06/19/24 0817     Glucose 97 mg/dL     MRSA Screen, PCR (Inpatient) - Swab, Nares [[id number]]  (Normal) Collected: 06/19/24 0512    Specimen: Swab from Nares Updated: 06/19/24 0717     MRSA PCR No MRSA Detected    Narrative:      The negative predictive value of this diagnostic test is high and should only be used to consider de-escalating anti-MRSA therapy. A positive result may indicate colonization with MRSA and must be correlated clinically.    BNP [[id number]]  (Normal) Collected: 06/19/24 0501    Specimen: Blood Updated: 06/19/24 0551     proBNP <36.0 pg/mL     Narrative:      This assay is used as an aid in the diagnosis of individuals suspected of having heart failure. It can be used as an aid in the diagnosis of acute decompensated heart failure (ADHF) in patients presenting with signs and symptoms of ADHF to the emergency department (ED). In addition, NT-proBNP of <300 pg/mL indicates ADHF is not likely.    Age Range Result Interpretation  NT-proBNP Concentration (pg/mL:      <50             Positive            >450                   Gray                 300-450                    Negative             <300    50-75           Positive            >900                  Gray                300-900                  Negative            <300      >75             Positive            >1800                  Gray                300-1800                  Negative            <300    TSH [[id number]]  (Normal) Collected: 06/19/24 0501     Specimen: Blood Updated: 06/19/24 0551     TSH 1.370 uIU/mL     Comprehensive Metabolic Panel [[id number]]  (Abnormal) Collected: 06/19/24 0501    Specimen: Blood Updated: 06/19/24 0546     Glucose 114 mg/dL      BUN 9 mg/dL      Creatinine 0.75 mg/dL      Sodium 135 mmol/L      Potassium 3.5 mmol/L      Chloride 102 mmol/L      CO2 24.5 mmol/L      Calcium 8.5 mg/dL      Total Protein 6.4 g/dL      Albumin 2.9 g/dL      ALT (SGPT) 17 U/L      AST (SGOT) 10 U/L      Alkaline Phosphatase 60 U/L      Total Bilirubin 0.4 mg/dL      Globulin 3.5 gm/dL      A/G Ratio 0.8 g/dL      BUN/Creatinine Ratio 12.0     Anion Gap 8.5 mmol/L      eGFR 119.2 mL/min/1.73     Narrative:      GFR Normal >60  Chronic Kidney Disease <60  Kidney Failure <15      Lipid Panel [[id number]] Collected: 06/19/24 0501    Specimen: Blood Updated: 06/19/24 0546     Total Cholesterol 126 mg/dL      Triglycerides 56 mg/dL      HDL Cholesterol 40 mg/dL      LDL Cholesterol  74 mg/dL      VLDL Cholesterol 12 mg/dL      LDL/HDL Ratio 1.87    Narrative:      Cholesterol Reference Ranges  (U.S. Department of Health and Human Services ATP III Classifications)    Desirable          <200 mg/dL  Borderline High    200-239 mg/dL  High Risk          >240 mg/dL      Triglyceride Reference Ranges  (U.S. Department of Health and Human Services ATP III Classifications)    Normal           <150 mg/dL  Borderline High  150-199 mg/dL  High             200-499 mg/dL  Very High        >500 mg/dL    HDL Reference Ranges  (U.S. Department of Health and Human Services ATP III Classifications)    Low     <40 mg/dl (major risk factor for CHD)  High    >60 mg/dl ('negative' risk factor for CHD)        LDL Reference Ranges  (U.S. Department of Health and Human Services ATP III Classifications)    Optimal          <100 mg/dL  Near Optimal     100-129 mg/dL  Borderline High  130-159 mg/dL  High             160-189 mg/dL  Very High        >189 mg/dL    Hemoglobin A1c [[id number]]   (Abnormal) Collected: 06/19/24 0501    Specimen: Blood Updated: 06/19/24 0539     Hemoglobin A1C 6.00 %     Narrative:      Hemoglobin A1C Ranges:    Increased Risk for Diabetes  5.7% to 6.4%  Diabetes                     >= 6.5%  Diabetic Goal                < 7.0%    CBC & Differential [[id number]]  (Abnormal) Collected: 06/19/24 0501    Specimen: Blood Updated: 06/19/24 0530    Narrative:      The following orders were created for panel order CBC & Differential.  Procedure                               Abnormality         Status                     ---------                               -----------         ------                     CBC Auto Differential[[id number]]        Abnormal            Final result                 Please view results for these tests on the individual orders.    CBC Auto Differential [[id number]]  (Abnormal) Collected: 06/19/24 0501    Specimen: Blood Updated: 06/19/24 0530     WBC 13.54 10*3/mm3      RBC 4.47 10*6/mm3      Hemoglobin 12.3 g/dL      Hematocrit 37.9 %      MCV 84.8 fL      MCH 27.5 pg      MCHC 32.5 g/dL      RDW 13.2 %      RDW-SD 41.1 fl      MPV 9.3 fL      Platelets 262 10*3/mm3      Neutrophil % 66.1 %      Lymphocyte % 22.8 %      Monocyte % 9.3 %      Eosinophil % 1.0 %      Basophil % 0.4 %      Immature Grans % 0.4 %      Neutrophils, Absolute 8.94 10*3/mm3      Lymphocytes, Absolute 3.09 10*3/mm3      Monocytes, Absolute 1.26 10*3/mm3      Eosinophils, Absolute 0.14 10*3/mm3      Basophils, Absolute 0.06 10*3/mm3      Immature Grans, Absolute 0.05 10*3/mm3      nRBC 0.0 /100 WBC     POC Glucose Once [[id number]]  (Normal) Collected: 06/18/24 2032    Specimen: Blood Updated: 06/18/24 2034     Glucose 107 mg/dL     Comprehensive Metabolic Panel [[id number]]  (Abnormal) Collected: 06/18/24 1440    Specimen: Blood Updated: 06/18/24 1725     Glucose 110 mg/dL      BUN 12 mg/dL      Creatinine 0.90 mg/dL      Sodium 135 mmol/L      Potassium 3.7 mmol/L      Chloride 98  mmol/L      CO2 25.0 mmol/L      Calcium 9.2 mg/dL      Total Protein 7.3 g/dL      Albumin 3.3 g/dL      ALT (SGPT) 19 U/L      AST (SGOT) 8 U/L      Alkaline Phosphatase 68 U/L      Total Bilirubin 0.5 mg/dL      Globulin 4.0 gm/dL      A/G Ratio 0.8 g/dL      BUN/Creatinine Ratio 13.3     Anion Gap 12.0 mmol/L      eGFR 112.8 mL/min/1.73     Narrative:      GFR Normal >60  Chronic Kidney Disease <60  Kidney Failure <15      BNP [[id number]]  (Normal) Collected: 06/18/24 1440    Specimen: Blood Updated: 06/18/24 1643     proBNP 119.0 pg/mL     Narrative:      This assay is used as an aid in the diagnosis of individuals suspected of having heart failure. It can be used as an aid in the diagnosis of acute decompensated heart failure (ADHF) in patients presenting with signs and symptoms of ADHF to the emergency department (ED). In addition, NT-proBNP of <300 pg/mL indicates ADHF is not likely.    Age Range Result Interpretation  NT-proBNP Concentration (pg/mL:      <50             Positive            >450                   Gray                 300-450                    Negative             <300    50-75           Positive            >900                  Gray                300-900                  Negative            <300      >75             Positive            >1800                  Gray                300-1800                  Negative            <300    Single High Sensitivity Troponin T [[id number]]  (Normal) Collected: 06/18/24 1440    Specimen: Blood Updated: 06/18/24 1643     HS Troponin T 9 ng/L     Narrative:      High Sensitive Troponin T Reference Range:  <14.0 ng/L- Negative Female for AMI  <22.0 ng/L- Negative Male for AMI  >=14 - Abnormal Female indicating possible myocardial injury.  >=22 - Abnormal Male indicating possible myocardial injury.   Clinicians would have to utilize clinical acumen, EKG, Troponin, and serial changes to determine if it is an Acute Myocardial Infarction or myocardial injury  "due to an underlying chronic condition.         Procalcitonin [[id number]]  (Normal) Collected: 06/18/24 1440    Specimen: Blood Updated: 06/18/24 1523     Procalcitonin 0.15 ng/mL     Narrative:      As a Marker for Sepsis (Non-Neonates):    1. <0.5 ng/mL represents a low risk of severe sepsis and/or septic shock.  2. >2 ng/mL represents a high risk of severe sepsis and/or septic shock.    As a Marker for Lower Respiratory Tract Infections that require antibiotic therapy:    PCT on Admission    Antibiotic Therapy       6-12 Hrs later    >0.5                Strongly Recommended  >0.25 - <0.5        Recommended   0.1 - 0.25          Discouraged              Remeasure/reassess PCT  <0.1                Strongly Discouraged     Remeasure/reassess PCT    As 28 day mortality risk marker: \"Change in Procalcitonin Result\" (>80% or <=80%) if Day 0 (or Day 1) and Day 4 values are available. Refer to http://www.[email or web address]pct-calculator.com    Change in PCT <=80%  A decrease of PCT levels below or equal to 80% defines a positive change in PCT test result representing a higher risk for 28-day all-cause mortality of patients diagnosed with severe sepsis for septic shock.    Change in PCT >80%  A decrease of PCT levels of more than 80% defines a negative change in PCT result representing a lower risk for 28-day all-cause mortality of patients diagnosed with severe sepsis or septic shock.       Lactic Acid, Plasma [[id number]]  (Normal) Collected: 06/18/24 1440    Specimen: Blood Updated: 06/18/24 1516     Lactate 1.6 mmol/L     Blood Culture - Blood, Arm, Left [[id number]] Collected: 06/18/24 1507    Specimen: Blood from Arm, Left Updated: 06/18/24 1513    Blood Culture - Blood, Arm, Left [[id number]] Collected: 06/18/24 1507    Specimen: Blood from Arm, Left Updated: 06/18/24 1513    [place] Draw [[id number]] Collected: 06/18/24 1440    Specimen: Blood Updated: 06/18/24 1500    Narrative:      The following orders were created for panel " order [place] Draw.  Procedure                               Abnormality         Status                     ---------                               -----------         ------                     Green Top (Gel)[[id number]]                                  Final result               Lavender Top[[id number]]                                     Final result               Gold Top - SST[[id number]]                                   Final result               Light Blue Top[[id number]]                                   Final result                 Please view results for these tests on the individual orders.    Green Top (Gel) [[id number]] Collected: 06/18/24 1440    Specimen: Blood Updated: 06/18/24 1500     Extra Tube Hold for add-ons.     Comment: Auto resulted.       Lavender Top [[id number]] Collected: 06/18/24 1440    Specimen: Blood Updated: 06/18/24 1500     Extra Tube hold for add-on     Comment: Auto resulted       Gold Top - SST [[id number]] Collected: 06/18/24 1440    Specimen: Blood Updated: 06/18/24 1500     Extra Tube Hold for add-ons.     Comment: Auto resulted.       Light Blue Top [[id number]] Collected: 06/18/24 1440    Specimen: Blood Updated: 06/18/24 1500     Extra Tube Hold for add-ons.     Comment: Auto resulted       CBC & Differential [[id number]]  (Abnormal) Collected: 06/18/24 1440    Specimen: Blood Updated: 06/18/24 1457    Narrative:      The following orders were created for panel order CBC & Differential.  Procedure                               Abnormality         Status                     ---------                               -----------         ------                     CBC Auto Differential[[id number]]        Abnormal            Final result                 Please view results for these tests on the individual orders.    CBC Auto Differential [[id number]]  (Abnormal) Collected: 06/18/24 1440    Specimen: Blood Updated: 06/18/24 1457     WBC 17.33 10*3/mm3      RBC 5.25 10*6/mm3       Hemoglobin 14.5 g/dL      Hematocrit 44.5 %      MCV 84.8 fL      MCH 27.6 pg      MCHC 32.6 g/dL      RDW 13.1 %      RDW-SD 40.7 fl      MPV 9.6 fL      Platelets 309 10*3/mm3      Neutrophil % 66.4 %      Lymphocyte % 24.3 %      Monocyte % 8.0 %      Eosinophil % 0.7 %      Basophil % 0.3 %      Immature Grans % 0.3 %      Neutrophils, Absolute 11.49 10*3/mm3      Lymphocytes, Absolute 4.21 10*3/mm3      Monocytes, Absolute 1.39 10*3/mm3      Eosinophils, Absolute 0.12 10*3/mm3      Basophils, Absolute 0.06 10*3/mm3      Immature Grans, Absolute 0.06 10*3/mm3      nRBC 0.0 /100 WBC           Imaging Results (Last 24 Hours)       Procedure Component Value Units Date/Time    CT Chest Without Contrast Diagnostic [[id number]] Collected: 06/18/24 2034     Updated: 06/18/24 2048    Narrative:      CT CHEST WO CONTRAST DIAGNOSTIC-     DATE OF EXAM: 6/18/2024 7:38 PM     INDICATION: Order states: Thoracic abscess. Pneumonia, fatigue, and  fever.     COMPARISON: Chest radiographs 6/18/2024, 6/11/2024, and 6/3/2024. CT  chest 6/12/2024 and 11/8/2018.     TECHNIQUE: Multiple contiguous axial images were acquired through the  chest without the intravenous administration of contrast. Reformatted  coronal and sagittal sequences were also reviewed. Radiation dose  reduction techniques were utilized, including automated exposure control  and exposure modulation based on body size.     FINDINGS:  Evaluation is mildly limited by the lack of intravenous contrast.     Similar-appearing complex likely partially loculated left pleural  effusion with atelectasis and consolidation of the left lower lobe and  base of the left upper lobe and lingula. Bronchial plugging in the base  of the left lower lobe. Right lung clear. No pneumothorax.     The heart and great vessels of the chest are normal in size. No  calcified coronary artery disease. Trace pericardial fluid. Partially  visualized enlarged left perihilar lymph nodes with an index  posterior  left perihilar lymph node measuring 1.5 cm x 1 cm (axial series 2 image  48), likely reactive.     Limited noncontrast CT imaging of the upper abdomen is unremarkable.     Mild bilateral gynecomastia. Mild multilevel mid and lower thoracic  spondylosis. Partially imaged mild chronic degenerative changes in both  shoulders. No acute osseous abnormality or concerning osseous lesion.       Impression:         1. Similar-appearing complex likely partially loculated left pleural  effusion, probable empyema or possible lung abscesses, with atelectasis  and consolidation of the left left lower lobe and base of the left upper  lobe and lingula that could reflect pneumonia.  2. Partially visualized enlarged left hilar lymph nodes, likely  reactive.     This report was finalized on 6/18/2024 8:45 PM by Deven Schmitz MD on  Workstation: [id number]       XR Chest 1 View [[id number]] Collected: 06/18/24 1504     Updated: 06/18/24 1512    Narrative:      XR CHEST 1 VW-     HISTORY: Male who is 38 years-old, short of breath     TECHNIQUE: Frontal view of the chest     COMPARISON: 6/11/2024     FINDINGS: The heart size is borderline. Pulmonary vasculature is  unremarkable. Persistent opacity at the left mid to lower hemithorax,  corresponding to loculated fluid and atelectasis on the CT from  6/12/2024, appears mildly increased, continued follow-up advised. No  pneumothorax. No acute osseous process.       Impression:      As described.     This report was finalized on 6/18/2024 3:09 PM by Dr. Hank Alexander M.D on Workstation: [id number]             Scan on 6/18/2024 1450 by [facility]: ECG 12-LEAD         Author: -- Service: -- Author Type: --   Filed: Date of Service: Creation Time:   Status: (Other)   HEART RATE= 103  bpm  RR Interval= 583  ms  VA Interval= 125  ms  P Horizontal Axis= -20  deg  P Front Axis= 43  deg  QRSD Interval= 89  ms  QT Interval= 299  ms  QTcB= 392  ms  QRS Axis= 31  deg  T Wave  Axis= 35  deg  - BORDERLINE ECG -  Sinus tachycardia  Probable left atrial enlargement  No change from previous tracing          Current Facility-Administered Medications:     acetaminophen (TYLENOL) tablet 650 mg, 650 mg, Oral, Q6H PRN, Jeovanny Young MD    albuterol sulfate HFA (PROVENTIL HFA;VENTOLIN HFA;PROAIR HFA) inhaler 2 puff, 2 puff, Inhalation, Q4H PRN, Jeovanny Young MD    cetirizine (zyrTEC) tablet 10 mg, 10 mg, Oral, Daily, Jeovanny Young MD, 10 mg at 06/18/24 2236    dextrose (D50W) (25 g/50 mL) IV injection 25 g, 25 g, Intravenous, Q15 Min PRN, Jeovanny Young MD    dextrose (GLUTOSE) oral gel 15 g, 15 g, Oral, Q15 Min PRN, Jeovanny Young MD    escitalopram (LEXAPRO) tablet 10 mg, 10 mg, Oral, Daily, Jeovanny Young MD, 10 mg at 06/18/24 2236    famotidine (PEPCID) tablet 20 mg, 20 mg, Oral, BID, Jeovanny Young MD, 20 mg at 06/18/24 2238    gabapentin (NEURONTIN) capsule 300 mg, 300 mg, Oral, TID, Jeovanny Young MD, 300 mg at 06/18/24 2237    glucagon (GLUCAGEN) injection 1 mg, 1 mg, Intramuscular, Q15 Min PRN, Jeovanny Young MD    guaiFENesin (MUCINEX) 12 hr tablet 600 mg, 600 mg, Oral, Q12H, Jeovanny Young MD, 600 mg at 06/18/24 2237    Hold medication, 1 each, Does not apply, Continuous PRN, Luis A Guzman APRN    HYDROcodone-acetaminophen (NORCO) 5-325 MG per tablet 1 tablet, 1 tablet, Oral, Q4H PRN, Jeovanny Young MD, 1 tablet at 06/19/24 0521    insulin lispro (HUMALOG/ADMELOG) injection 2-7 Units, 2-7 Units, Subcutaneous, 4x Daily AC & at Bedtime, Jeovanny Young MD    Pharmacy to dose vancomycin, , Does not apply, Continuous PRN, Jeovanny Young MD    piperacillin-tazobactam (ZOSYN) 4.5 g IVPB in 100 mL NS MBP (CD), 4.5 g, Intravenous, Q8H, Jeovanny Young MD, Last Rate: 0 mL/hr at 06/19/24 0245, 4.5 g at 06/19/24 0659    sodium chloride 0.9 % flush 10 mL, 10 mL, Intravenous, PRN, Emergency, Triage Protocol, MD    sodium chloride 0.9 % infusion, 75 mL/hr, Intravenous, Continuous, Jeovanny Young MD, Last Rate:  75 mL/hr at 06/19/24 1058, 75 mL/hr at 06/19/24 1058    Vancomycin HCl 1,250 mg in sodium chloride 0.9 % 250 mL VTB, 1,250 mg, Intravenous, Q12H, Jimmy Garza MD     ASSESSMENT  Left lower lobe pneumonia with loculated pleural fluid suspicious for empyema  Diabetes mellitus  Hypertension  Hyperlipidemia  Morbidly obese  Gastroesophageal reflux disease    PLAN  CPM  Continue IVF  Continue IV antibiotics   2D echo pending  Left thoracentesis under radiology today  Infectious disease consult appreciated  Pulmonary and thoracic surgery to follow patient  Adjust home medications  Stress ulcer DVT prophylaxis  Supportive care  Discussed with family nursing staff  Follow closely further recommendation current hospital course    LIZZY YOUNG MD    Copied text in this note has been reviewed and is accurate as of 06/19/24           Electronically signed by Lizzy Young MD at 06/19/24 1405          Consult Notes (all)        Luis A Guzman APRN at 06/19/24 1034        Consult Orders    1. Inpatient Thoracic Surgery Consult [[id number]] ordered by Lizzy Young MD              Attestation signed by Davis Rodriguez MD at 06/19/24 1825    I have reviewed this documentation and agree.                      Inpatient Thoracic Surgery Consult  Consult performed by: Luis A Guzman APRN  Consult ordered by: Lizzy Young MD          Patient Care Team:  Ruiz Escamilla MD (Tony) as PCP - General (Internal Medicine)    Chief Complaint   Patient presents with    Fatigue    Fever       Subjective     History of Present Illness  Mr. Robles is a 38-year-old gentleman who presented to [facility] on 6/18/2024 with complaints of dyspnea, lethargy, fevers.  He is known to our service after being evaluated by my colleague last week on 6/13/2024 for his left pleural effusion suspected to be of parapneumonic etiology.  He was advised to continue steroids and antibiotics and to be monitored closely with a CT of the chest  to be performed in 6 weeks.    He presented to [facility] yesterday with above complaints.  He is accompanied by his wife who reports he was quite lethargic yesterday and has been febrile and diaphoretic.  CT of the chest was obtained which demonstrated a left pleural effusion versus possible lung abscess which appears similar compared to previous imaging.  We have been consulted given this finding.  He is a former smoker and reports to quitting approximately 3 years ago.  At most he smoked approximately 1.5 packs/day for a little over a decade.    Review of Systems   Constitutional:  Positive for diaphoresis, fatigue and fever. Negative for activity change and chills.   HENT:  Negative for congestion, sneezing and sore throat.    Respiratory:  Positive for cough (Nonproductive). Negative for shortness of breath, wheezing and stridor.    Cardiovascular:  Negative for chest pain.   Gastrointestinal:  Negative for abdominal pain, nausea and vomiting.   Genitourinary:  Negative for dysuria.   Musculoskeletal:  Negative for back pain.   Skin:  Negative for pallor.   Neurological:  Negative for dizziness, seizures and facial asymmetry.   Psychiatric/Behavioral:  Negative for confusion.         Past Medical History:   Diagnosis Date    Acid reflux     Allergic     Anxiety     Asthma     Bronchitis     Hypertension     Pleurisy 2018    Reactive depression 8/10/2020    Rib fracture     broke 4 ribs    Type 2 diabetes mellitus without complication, without long-term current use of insulin 11/16/2020     Past Surgical History:   Procedure Laterality Date    RESECTION BONE TUMOR KNEE       Family History   Problem Relation Age of Onset    Diabetes Mother     Lung disease Mother     Lung disease Maternal Grandmother     Cancer Maternal Grandmother         lung    Emphysema Maternal Grandmother      Social History     Socioeconomic History    Marital status:    Tobacco Use    Smoking status: Former      Current packs/day: 0.00     Average packs/day: 1.5 packs/day for 8.0 years (12.0 ttl pk-yrs)     Types: Cigarettes     Start date: 2010     Quit date: 2018     Years since quittin.4    Smokeless tobacco: Never    Tobacco comments:     nicotine gum    Vaping Use    Vaping status: Never Used   Substance and Sexual Activity    Alcohol use: Yes     Alcohol/week: 1.0 standard drink of alcohol     Types: 1 Standard drinks or equivalent per week     Comment: socially     Drug use: No    Sexual activity: Yes     Partners: Female     Medications Prior to Admission   Medication Sig Dispense Refill Last Dose    albuterol sulfate  (90 Base) MCG/ACT inhaler INHALE 2 PUFFS BY MOUTH EVERY 4 HOURS AS NEEDED FOR WHEEZING 36 g 0 2024    escitalopram (LEXAPRO) 10 MG tablet Take 1 tablet by mouth once daily 90 tablet 0 2024    furosemide (LASIX) 40 MG tablet Take 1 tablet by mouth once daily 90 tablet 0 2024    gabapentin (NEURONTIN) 300 MG capsule Take 1 capsule by mouth 3 (Three) Times a Day for 30 days. 90 capsule 0 2024    levoFLOXacin (Levaquin) 500 MG tablet Take 1 tablet by mouth Daily. 10 tablet 0 2024    losartan (COZAAR) 50 MG tablet Take 1 tablet by mouth once daily 90 tablet 0 2024    metFORMIN ER (GLUCOPHAGE-XR) 500 MG 24 hr tablet Take 1 tablet by mouth 2 (Two) Times a Day With Meals. 180 tablet 0 2024    cyclobenzaprine (FLEXERIL) 10 MG tablet Take 1 tablet by mouth three times daily as needed for muscle spasm 30 tablet 0     ibuprofen (ADVIL,MOTRIN) 800 MG tablet Take 1 tablet by mouth Every 6 (Six) Hours As Needed for Mild Pain for up to 30 days. 60 tablet 0     omeprazole (priLOSEC) 20 MG capsule Take 1 capsule by mouth Daily As Needed.       pantoprazole (Protonix) 40 MG EC tablet Take 1 tablet by mouth Daily. 90 tablet 4     Tirzepatide (Mounjaro) 7.5 MG/0.5ML solution pen-injector pen Inject 0.5 mL under the skin into the appropriate area as directed 1 (One)  Time Per Week. 2 mL 2     Tirzepatide (MOUNJARO) 7.5 MG/0.5ML solution pen-injector pen Inject 0.5 mL under the skin into the appropriate area as directed 1 (One) Time Per Week.        Allergies   Allergen Reactions    Cat Hair Extract Hives and Itching    Lisinopril Cough    Methylprednisolone Itching     Pt can take prednisone        Objective      Vital Signs  Temp:  [98.1 °F (36.7 °C)-98.6 °F (37 °C)] 98.1 °F (36.7 °C)  Heart Rate:  [] 93  Resp:  [16-18] 16  BP: (117-137)/(63-80) 134/80    Intake & Output (last day)         06/18 0701  06/19 0700 06/19 0701  06/20 0700    I.V. (mL/kg) 772.5 (5.9)     IV Piggyback 200     Total Intake(mL/kg) 972.5 (7.4)     Net +972.5           Urine Unmeasured Occurrence 1 x             Physical Exam  Constitutional:       General: He is not in acute distress.     Appearance: He is not ill-appearing or toxic-appearing.   HENT:      Head: Normocephalic and atraumatic.      Mouth/Throat:      Mouth: Mucous membranes are moist.      Pharynx: Oropharynx is clear.   Eyes:      Pupils: Pupils are equal, round, and reactive to light.   Cardiovascular:      Rate and Rhythm: Normal rate and regular rhythm.      Pulses: Normal pulses.   Pulmonary:      Effort: Pulmonary effort is normal. No respiratory distress.      Breath sounds: Normal breath sounds. No wheezing.   Abdominal:      General: Abdomen is flat. There is no distension.      Palpations: Abdomen is soft.   Musculoskeletal:         General: No swelling or tenderness.      Cervical back: Neck supple. No tenderness.   Skin:     General: Skin is warm and dry.      Capillary Refill: Capillary refill takes less than 2 seconds.   Neurological:      General: No focal deficit present.      Mental Status: He is alert and oriented to person, place, and time.   Psychiatric:         Mood and Affect: Mood normal.         Results Review:    I reviewed the patient's new clinical results.  I reviewed the patient's new imaging results and  agree with the interpretation.  Discussed with patient, nurse, and surgeon.    Imaging Results (Last 24 Hours)       Procedure Component Value Units Date/Time    US Thoracentesis [[id number]] Collected: 06/19/24 1537    Specimen: Body Fluid Updated: 06/19/24 1537    Narrative:      ULTRASOUND-GUIDED LEFT THORACENTESIS, 6/19/2024     HISTORY:   38-year-old male with loculated left pleural effusion.     CONSENT:   The procedure, risks and alternatives were discussed in detail with the  patient, emphasizing risks of pneumothorax, chest tube placement and  bleeding. Questions were entertained, and written informed consent was  obtained. Timeout was observed in the procedure room for patient  identification and procedure site verification, and the procedure site  was marked by me. Permanent images were recorded.     PROCEDURE:   Using sterile technique, 1% lidocaine local anesthetic and real-time  ultrasound guidance, a 5 [place] thoracentesis catheter was placed into  the largest portion of the pleural effusion. No fluid was able to be  aspirated. The patient remained stable in the department during and  after the procedure.           Impression:      Unsuccessful left ultrasound-guided thoracentesis. Real-time ultrasound  guidance identifies the needle within the fluid collection with  inability to obtain any aspirate.     Procedure performed by KATHRYN Singleton.  By electronically signing this report, I, the supervising radiologist,  attest that I was not present for the procedure(s) . I agree with the  finalized report.       CT Chest Without Contrast Diagnostic [[id number]] Collected: 06/18/24 2034     Updated: 06/18/24 2048    Narrative:      CT CHEST WO CONTRAST DIAGNOSTIC-     DATE OF EXAM: 6/18/2024 7:38 PM     INDICATION: Order states: Thoracic abscess. Pneumonia, fatigue, and  fever.     COMPARISON: Chest radiographs 6/18/2024, 6/11/2024, and 6/3/2024. CT  chest 6/12/2024 and 11/8/2018.     TECHNIQUE: Multiple  contiguous axial images were acquired through the  chest without the intravenous administration of contrast. Reformatted  coronal and sagittal sequences were also reviewed. Radiation dose  reduction techniques were utilized, including automated exposure control  and exposure modulation based on body size.     FINDINGS:  Evaluation is mildly limited by the lack of intravenous contrast.     Similar-appearing complex likely partially loculated left pleural  effusion with atelectasis and consolidation of the left lower lobe and  base of the left upper lobe and lingula. Bronchial plugging in the base  of the left lower lobe. Right lung clear. No pneumothorax.     The heart and great vessels of the chest are normal in size. No  calcified coronary artery disease. Trace pericardial fluid. Partially  visualized enlarged left perihilar lymph nodes with an index posterior  left perihilar lymph node measuring 1.5 cm x 1 cm (axial series 2 image  48), likely reactive.     Limited noncontrast CT imaging of the upper abdomen is unremarkable.     Mild bilateral gynecomastia. Mild multilevel mid and lower thoracic  spondylosis. Partially imaged mild chronic degenerative changes in both  shoulders. No acute osseous abnormality or concerning osseous lesion.       Impression:         1. Similar-appearing complex likely partially loculated left pleural  effusion, probable empyema or possible lung abscesses, with atelectasis  and consolidation of the left left lower lobe and base of the left upper  lobe and lingula that could reflect pneumonia.  2. Partially visualized enlarged left hilar lymph nodes, likely  reactive.     This report was finalized on 6/18/2024 8:45 PM by Deven Schmitz MD on  Workstation: [id number]               Lab Results:  Lab Results (last 24 hours)       Procedure Component Value Units Date/Time    Blood Culture - Blood, Arm, Left [[id number]]  (Normal) Collected: 06/18/24 1507    Specimen: Blood from Arm, Left  Updated: 06/19/24 1515     Blood Culture No growth at 24 hours    Blood Culture - Blood, Arm, Left [[id number]]  (Normal) Collected: 06/18/24 1507    Specimen: Blood from Arm, Left Updated: 06/19/24 1515     Blood Culture No growth at 24 hours    Narrative:      Less than seven (7) mL's of blood was collected.  Insufficient quantity may yield false negative results.    POC Glucose Once [[id number]]  (Normal) Collected: 06/19/24 1133    Specimen: Blood Updated: 06/19/24 1136     Glucose 93 mg/dL     POC Glucose Once [[id number]]  (Normal) Collected: 06/19/24 0815    Specimen: Blood Updated: 06/19/24 0817     Glucose 97 mg/dL     MRSA Screen, PCR (Inpatient) - Swab, Nares [[id number]]  (Normal) Collected: 06/19/24 0512    Specimen: Swab from Nares Updated: 06/19/24 0717     MRSA PCR No MRSA Detected    Narrative:      The negative predictive value of this diagnostic test is high and should only be used to consider de-escalating anti-MRSA therapy. A positive result may indicate colonization with MRSA and must be correlated clinically.    BNP [[id number]]  (Normal) Collected: 06/19/24 0501    Specimen: Blood Updated: 06/19/24 0551     proBNP <36.0 pg/mL     Narrative:      This assay is used as an aid in the diagnosis of individuals suspected of having heart failure. It can be used as an aid in the diagnosis of acute decompensated heart failure (ADHF) in patients presenting with signs and symptoms of ADHF to the emergency department (ED). In addition, NT-proBNP of <300 pg/mL indicates ADHF is not likely.    Age Range Result Interpretation  NT-proBNP Concentration (pg/mL:      <50             Positive            >450                   Gray                 300-450                    Negative             <300    50-75           Positive            >900                  Gray                300-900                  Negative            <300      >75             Positive            >1800                  Adame                 300-1800                  Negative            <300    TSH [[id number]]  (Normal) Collected: 06/19/24 0501    Specimen: Blood Updated: 06/19/24 0551     TSH 1.370 uIU/mL     Comprehensive Metabolic Panel [[id number]]  (Abnormal) Collected: 06/19/24 0501    Specimen: Blood Updated: 06/19/24 0546     Glucose 114 mg/dL      BUN 9 mg/dL      Creatinine 0.75 mg/dL      Sodium 135 mmol/L      Potassium 3.5 mmol/L      Chloride 102 mmol/L      CO2 24.5 mmol/L      Calcium 8.5 mg/dL      Total Protein 6.4 g/dL      Albumin 2.9 g/dL      ALT (SGPT) 17 U/L      AST (SGOT) 10 U/L      Alkaline Phosphatase 60 U/L      Total Bilirubin 0.4 mg/dL      Globulin 3.5 gm/dL      A/G Ratio 0.8 g/dL      BUN/Creatinine Ratio 12.0     Anion Gap 8.5 mmol/L      eGFR 119.2 mL/min/1.73     Narrative:      GFR Normal >60  Chronic Kidney Disease <60  Kidney Failure <15      Lipid Panel [[id number]] Collected: 06/19/24 0501    Specimen: Blood Updated: 06/19/24 0546     Total Cholesterol 126 mg/dL      Triglycerides 56 mg/dL      HDL Cholesterol 40 mg/dL      LDL Cholesterol  74 mg/dL      VLDL Cholesterol 12 mg/dL      LDL/HDL Ratio 1.87    Narrative:      Cholesterol Reference Ranges  (U.S. Department of Health and Human Services ATP III Classifications)    Desirable          <200 mg/dL  Borderline High    200-239 mg/dL  High Risk          >240 mg/dL      Triglyceride Reference Ranges  (U.S. Department of Health and Human Services ATP III Classifications)    Normal           <150 mg/dL  Borderline High  150-199 mg/dL  High             200-499 mg/dL  Very High        >500 mg/dL    HDL Reference Ranges  (U.S. Department of Health and Human Services ATP III Classifications)    Low     <40 mg/dl (major risk factor for CHD)  High    >60 mg/dl ('negative' risk factor for CHD)        LDL Reference Ranges  (U.S. Department of Health and Human Services ATP III Classifications)    Optimal          <100 mg/dL  Near Optimal     100-129 mg/dL  Borderline High   130-159 mg/dL  High             160-189 mg/dL  Very High        >189 mg/dL    Hemoglobin A1c [[id number]]  (Abnormal) Collected: 06/19/24 0501    Specimen: Blood Updated: 06/19/24 0539     Hemoglobin A1C 6.00 %     Narrative:      Hemoglobin A1C Ranges:    Increased Risk for Diabetes  5.7% to 6.4%  Diabetes                     >= 6.5%  Diabetic Goal                < 7.0%    CBC & Differential [[id number]]  (Abnormal) Collected: 06/19/24 0501    Specimen: Blood Updated: 06/19/24 0530    Narrative:      The following orders were created for panel order CBC & Differential.  Procedure                               Abnormality         Status                     ---------                               -----------         ------                     CBC Auto Differential[[id number]]        Abnormal            Final result                 Please view results for these tests on the individual orders.    CBC Auto Differential [[id number]]  (Abnormal) Collected: 06/19/24 0501    Specimen: Blood Updated: 06/19/24 0530     WBC 13.54 10*3/mm3      RBC 4.47 10*6/mm3      Hemoglobin 12.3 g/dL      Hematocrit 37.9 %      MCV 84.8 fL      MCH 27.5 pg      MCHC 32.5 g/dL      RDW 13.2 %      RDW-SD 41.1 fl      MPV 9.3 fL      Platelets 262 10*3/mm3      Neutrophil % 66.1 %      Lymphocyte % 22.8 %      Monocyte % 9.3 %      Eosinophil % 1.0 %      Basophil % 0.4 %      Immature Grans % 0.4 %      Neutrophils, Absolute 8.94 10*3/mm3      Lymphocytes, Absolute 3.09 10*3/mm3      Monocytes, Absolute 1.26 10*3/mm3      Eosinophils, Absolute 0.14 10*3/mm3      Basophils, Absolute 0.06 10*3/mm3      Immature Grans, Absolute 0.05 10*3/mm3      nRBC 0.0 /100 WBC     POC Glucose Once [[id number]]  (Normal) Collected: 06/18/24 2032    Specimen: Blood Updated: 06/18/24 2034     Glucose 107 mg/dL     Comprehensive Metabolic Panel [[id number]]  (Abnormal) Collected: 06/18/24 1440    Specimen: Blood Updated: 06/18/24 1725     Glucose 110 mg/dL      BUN  12 mg/dL      Creatinine 0.90 mg/dL      Sodium 135 mmol/L      Potassium 3.7 mmol/L      Chloride 98 mmol/L      CO2 25.0 mmol/L      Calcium 9.2 mg/dL      Total Protein 7.3 g/dL      Albumin 3.3 g/dL      ALT (SGPT) 19 U/L      AST (SGOT) 8 U/L      Alkaline Phosphatase 68 U/L      Total Bilirubin 0.5 mg/dL      Globulin 4.0 gm/dL      A/G Ratio 0.8 g/dL      BUN/Creatinine Ratio 13.3     Anion Gap 12.0 mmol/L      eGFR 112.8 mL/min/1.73     Narrative:      GFR Normal >60  Chronic Kidney Disease <60  Kidney Failure <15      BNP [[id number]]  (Normal) Collected: 06/18/24 1440    Specimen: Blood Updated: 06/18/24 1643     proBNP 119.0 pg/mL     Narrative:      This assay is used as an aid in the diagnosis of individuals suspected of having heart failure. It can be used as an aid in the diagnosis of acute decompensated heart failure (ADHF) in patients presenting with signs and symptoms of ADHF to the emergency department (ED). In addition, NT-proBNP of <300 pg/mL indicates ADHF is not likely.    Age Range Result Interpretation  NT-proBNP Concentration (pg/mL:      <50             Positive            >450                   Gray                 300-450                    Negative             <300    50-75           Positive            >900                  Gray                300-900                  Negative            <300      >75             Positive            >1800                  Gray                300-1800                  Negative            <300    Single High Sensitivity Troponin T [[id number]]  (Normal) Collected: 06/18/24 1440    Specimen: Blood Updated: 06/18/24 1643     HS Troponin T 9 ng/L     Narrative:      High Sensitive Troponin T Reference Range:  <14.0 ng/L- Negative Female for AMI  <22.0 ng/L- Negative Male for AMI  >=14 - Abnormal Female indicating possible myocardial injury.  >=22 - Abnormal Male indicating possible myocardial injury.   Clinicians would have to utilize clinical acumen, EKG,  Troponin, and serial changes to determine if it is an Acute Myocardial Infarction or myocardial injury due to an underlying chronic condition.                     Assessment & Plan       Pneumonia    Left lower lobe pneumonia      Assessment & Plan  I have independently reviewed the CT of the chest performed yesterday, 6/18/2024 which demonstrates a similar-appearing complex likely partially loculated left pleural effusion with atelectasis and consolidation of the left lower lobe.  Atelectasis and consolidation of the base of the left upper lobe and lingula which could reflect pneumonia.  Case reviewed with Dr. Davis Rodriguez.    No significant effusion. Patient is on room air and denies any dyspnea or orthopnea. He has a nonproductive cough.  Antibiotics ongoing per infectious disease recommendations.  Recommend thoracentesis for fluid sampling, cultures.  Recommend pulmonary hygiene regimen to include I-S, OPEP, mucolytic's, bronchodilators.  Repeat a.m. chest x-ray.  Increase mobilization as able.  May resume p.o. diet following thoracentesis.      I discussed the patients findings and our recommendations with patient, family, nursing staff, and consulting provider    Thank you for this consult and allowing us to participate in the care of your patient.  We will follow along with you during this hospitalization.     KATHRYN Pena  [facility]  06/19/24  15:39 EDT    I have spent greater than 75 minutes reviewing the patient's chart including medical history, notes, radiographic images, labs, and performing assessment and development of a plan and discussion with the patient/family at bedside.          Electronically signed by Davis Rodriguez MD at 06/19/24 1825       Jimmy Garza MD at 06/18/24 2055        Consult Orders    1. Inpatient Infectious Diseases Consult [[id number]] ordered by Jeovanny Young MD at 06/18/24 1731                 CONSULT NOTE    Infectious Diseases - Jimmy Garza.  MD  [facility]       Patient Identification:  Name: Ravinder Robles  Age: 38 y.o.  Sex: male  :  1985  MRN: [id number]             Date of Consultation: 2024      Primary Care Physician: Ruiz Escamilla MD (Tony)                               Requesting Physician: Dr. Young  Reason for Consultation: Loculated pleural effusion possible empyema    History of presenting illness: Patient is a 38-year-old male with history of diabetes mellitus, hypertension, asthma and depression as well as anxiety and morbid obesity was in his usual state of his health until mid April when he started having cough congestion and not feeling well.  He tried to use inhalers to take care of his symptoms but did not improve and eventually was started on antibiotic treatment for suspected bronchitis/pneumonia in mid May 2024.  He has had multiple courses of antibiotic therapy without any significant improvement in his symptoms mainly consisting of shortness of breath pain and discomfort in the left side of the chest fever chills and feeling poorly.  He was evaluated with repeat x-rays and was found to have left-sided pleural effusion for which she has been referred to thoracic surgery service.  His symptoms persisted despite these interventions and was admitted to the hospital for IV antibiotic therapy and pulmonary infectious disease consultation.  Patient had CT scan of the chest with contrast performed on 2024 which showed loculated pleural effusion within the inferior aspect of the left lower lung with pleural wall thickening and enhancement further suspicion for empyema was raised.  Despite taking oral Levaquin did not improve and today chest x-ray shows evidence of increased left hemithorax effusion and obesity.  Pulmonary service has evaluated the patient and repeat CT scan of the chest has been ordered which is currently pending.  Impression:  This presentation and above context is concerning  and consistent with:  8-sbmddsxwe-hvidfoft pneumonia with progression to parapneumonic effusion and probable empyema partially treated with systemic antibiotic therapy with persistent symptoms and progressive worsening on imaging studies of the loculated effusion.  2-diabetes mellitus  3-asthma  4-morbid obesity  5-hypertension  6-other diagnosis per primary team.    Recommendations/Discussions:  At this juncture I agree with the care plan consisting of empiric broad-spectrum antibiotic therapy consisting of vancomycin and Zosyn with close monitoring of renal function while receiving this combination in the setting of diabetes and prior antibiotic usage.  De-escalate antibiotic treatment based on culture data and whether or not he develop any side effects.  Agree with concept of care going forward which is to have some sort of surgical intervention to address this loculated parapneumonic effusion and based on his subsequent clinical course and microbiological data decide on specific antibiotic therapy and duration of treatment.  Thank you Dr. Nicole for letting me be the part of your patient care please see above impression and recommendations.      Past Medical History:  Past Medical History:   Diagnosis Date    Acid reflux     Allergic     Anxiety     Asthma     Bronchitis     Hypertension     Pleurisy 2018    Reactive depression 8/10/2020    Rib fracture     broke 4 ribs    Type 2 diabetes mellitus without complication, without long-term current use of insulin 11/16/2020     Past Surgical History:  Past Surgical History:   Procedure Laterality Date    RESECTION BONE TUMOR KNEE        Home Meds:  Medications Prior to Admission   Medication Sig Dispense Refill Last Dose    albuterol sulfate  (90 Base) MCG/ACT inhaler INHALE 2 PUFFS BY MOUTH EVERY 4 HOURS AS NEEDED FOR WHEEZING 36 g 0 6/18/2024    escitalopram (LEXAPRO) 10 MG tablet Take 1 tablet by mouth once daily 90 tablet 0 6/17/2024    furosemide (LASIX)  40 MG tablet Take 1 tablet by mouth once daily 90 tablet 0 6/17/2024    gabapentin (NEURONTIN) 300 MG capsule Take 1 capsule by mouth 3 (Three) Times a Day for 30 days. 90 capsule 0 6/17/2024    levoFLOXacin (Levaquin) 500 MG tablet Take 1 tablet by mouth Daily. 10 tablet 0 6/17/2024    losartan (COZAAR) 50 MG tablet Take 1 tablet by mouth once daily 90 tablet 0 6/17/2024    metFORMIN ER (GLUCOPHAGE-XR) 500 MG 24 hr tablet Take 1 tablet by mouth 2 (Two) Times a Day With Meals. 180 tablet 0 6/17/2024    cyclobenzaprine (FLEXERIL) 10 MG tablet Take 1 tablet by mouth three times daily as needed for muscle spasm 30 tablet 0     ibuprofen (ADVIL,MOTRIN) 800 MG tablet Take 1 tablet by mouth Every 6 (Six) Hours As Needed for Mild Pain for up to 30 days. 60 tablet 0     omeprazole (priLOSEC) 20 MG capsule Take 1 capsule by mouth Daily As Needed.       pantoprazole (Protonix) 40 MG EC tablet Take 1 tablet by mouth Daily. 90 tablet 4     Tirzepatide (Mounjaro) 7.5 MG/0.5ML solution pen-injector pen Inject 0.5 mL under the skin into the appropriate area as directed 1 (One) Time Per Week. 2 mL 2     Tirzepatide (MOUNJARO) 7.5 MG/0.5ML solution pen-injector pen Inject 0.5 mL under the skin into the appropriate area as directed 1 (One) Time Per Week.        Current Meds:     Current Facility-Administered Medications:     acetaminophen (TYLENOL) tablet 650 mg, 650 mg, Oral, Q6H PRN, Jeovanny Young MD    albuterol sulfate HFA (PROVENTIL HFA;VENTOLIN HFA;PROAIR HFA) inhaler 2 puff, 2 puff, Inhalation, Q4H PRN, Jeovanny Young MD    cetirizine (zyrTEC) tablet 10 mg, 10 mg, Oral, Daily, Jeovanny Young MD    dextrose (D50W) (25 g/50 mL) IV injection 25 g, 25 g, Intravenous, Q15 Min PRN, Jeovanny Young MD    dextrose (GLUTOSE) oral gel 15 g, 15 g, Oral, Q15 Min PRN, Jeovanny Young MD    escitalopram (LEXAPRO) tablet 10 mg, 10 mg, Oral, Daily, Jeovanny Young MD    famotidine (PEPCID) tablet 20 mg, 20 mg, Oral, BID, Jeovanny Young MD     gabapentin (NEURONTIN) capsule 300 mg, 300 mg, Oral, TID, Jeovanny Young MD    glucagon (GLUCAGEN) injection 1 mg, 1 mg, Intramuscular, Q15 Min PRN, Jeovanny Young MD    guaiFENesin (MUCINEX) 12 hr tablet 600 mg, 600 mg, Oral, Q12H, Jeovanny Young MD    HYDROcodone-acetaminophen (NORCO) 5-325 MG per tablet 1 tablet, 1 tablet, Oral, Q4H PRN, Jeovanny Young MD, 1 tablet at 24 1840    insulin lispro (HUMALOG/ADMELOG) injection 2-7 Units, 2-7 Units, Subcutaneous, 4x Daily AC & at Bedtime, Jeovanny Young MD    Pharmacy to dose vancomycin, , Does not apply, Continuous PRN, Jeovanny Young MD    Pharmacy to Dose Zosyn, , Does not apply, Continuous PRN, Jeovanny Young MD    piperacillin-tazobactam (ZOSYN) 4.5 g IVPB in 100 mL NS MBP (CD), 4.5 g, Intravenous, Q8H, Jeovanny Young MD    sodium chloride 0.9 % flush 10 mL, 10 mL, Intravenous, PRN, Emergency, Triage Protocol, MD    sodium chloride 0.9 % infusion, 75 mL/hr, Intravenous, Continuous, Jeovanny Young MD, Last Rate: 75 mL/hr at 24, 75 mL/hr at 24  Allergies:  Allergies   Allergen Reactions    Cat Hair Extract Hives and Itching    Lisinopril Cough    Methylprednisolone Itching     Pt can take prednisone      Social History:   Social History     Tobacco Use    Smoking status: Former     Current packs/day: 0.00     Average packs/day: 1.5 packs/day for 8.0 years (12.0 ttl pk-yrs)     Types: Cigarettes     Start date: 2010     Quit date: 2018     Years since quittin.4    Smokeless tobacco: Never    Tobacco comments:     nicotine gum    Substance Use Topics    Alcohol use: Yes     Alcohol/week: 1.0 standard drink of alcohol     Types: 1 Standard drinks or equivalent per week     Comment: socially       Family History:  Family History   Problem Relation Age of Onset    Diabetes Mother     Lung disease Mother     Lung disease Maternal Grandmother     Cancer Maternal Grandmother         lung    Emphysema Maternal Grandmother           Review of  "Systems  See history of present illness and past medical history.        Vitals:   /76   Pulse 97   Temp (!) 100.6 °F (38.1 °C) (Tympanic)   Resp 16   Ht 182.9 cm (72\")   Wt 132 kg (291 lb 14.2 oz)   SpO2 95%   BMI 39.59 kg/m²   I/O:   Intake/Output Summary (Last 24 hours) at 6/18/2024 2055  Last data filed at 6/18/2024 1657  Gross per 24 hour   Intake 100 ml   Output --   Net 100 ml     Exam:  Patient is examined using the personal protective equipment as per guidelines from infection control for this particular patient as enacted.  Hand washing was performed before and after patient interaction.  General Appearance:  Alert cooperative male does not appear to be in any acute distress does appear ill   Head:    Normocephalic, without obvious abnormality, atraumatic   Eyes:    PERRL, conjunctivae/corneas clear, EOM's intact, both eyes   Ears:    Normal external ear canals, both ears   Nose:   Nares normal, septum midline, mucosa normal, no drainage    or sinus tenderness   Throat:   Lips, tongue, gums normal; oral mucosa pink and moist   Neck:   Supple, symmetrical, trachea midline, no adenopathy;     thyroid:  no enlargement/tenderness/nodules; no carotid    bruit or JVD   Back:     Symmetric, no curvature, ROM normal, no CVA tenderness   Lungs:   No obvious use of accessory muscles of breathing noted decreased breath sounds in the left lung base   Chest Wall:    No tenderness or deformity    Heart:  S1-S2 regular   Abdomen:     Soft, non-tender, bowel sounds active all four quadrants,     no masses, no hepatomegaly, no splenomegaly   Extremities:   Extremities normal, atraumatic, no cyanosis or edema   Pulses:   Pulses palpable in all extremities; symmetric all extremities   Skin:   Skin color normal, Skin is warm and dry,  no rashes or palpable lesions   Neurologic:   CNII-XII intact, motor strength grossly intact, sensation grossly intact to light touch, no focal deficits noted       Data Review:    " I reviewed the patient's new clinical results.  Results from last 7 days   Lab Units 06/18/24  1440 06/14/24  1052   WBC 10*3/mm3 17.33* 16.33*   HEMOGLOBIN g/dL 14.5 14.3   PLATELETS 10*3/mm3 309 297     Results from last 7 days   Lab Units 06/18/24  1440   SODIUM mmol/L 135*   POTASSIUM mmol/L 3.7   CHLORIDE mmol/L 98   CO2 mmol/L 25.0   BUN mg/dL 12   CREATININE mg/dL 0.90   CALCIUM mg/dL 9.2   GLUCOSE mg/dL 110*     Microbiology Results (last 10 days)       ** No results found for the last 240 hours. **              Assessment:  Active Hospital Problems    Diagnosis  POA    **Pneumonia [J18.9]  Yes      Resolved Hospital Problems   No resolved problems to display.           Jimmy Garza MD   6/18/2024  20:55 EDT    Parts of this note may be an electronic transcription/translation of spoken language to printed text using the Dragon dictation system.      Electronically signed by Jimmy Garza MD at 06/19/24 0712       Veronica Michelle APRN at 06/18/24 1912       Attestation signed by Josh Meyer MD at 06/18/24 2056    I have reviewed this documentation and agree.  I, Josh Meyer MD, personally performed the services described in this documentation as scribed by KATHRYN , and it is both accurate and complete.  I have independently examined the patient and agree with the assessment and plan with additions and clarifications.  I performed > 50% of the management (including interview, exam, assessment, and plan) required in the care of this patient. I have reviewed this documentation and agree.    38-year-old male with a history of asthma, gastroesophageal reflux disease, diabetes mellitus type 2 who presents for evaluation of pleural effusion.  Patient initially had pneumonia back in early May at which time he was treated with antibiotics.  Subsequently has received multiple courses of antibiotics.  Was seen in thoracic surgery clinic with plans to continue antibiotics and to repeat CT imaging.  CT chest  was performed today which demonstrated partially loculated left pleural effusion similar to previously.  Also with atelectasis/consolidation of the left lung fields.  Evaluated with ultrasound at bedside and attempts to find appropriate pocket for thoracentesis versus chest tube placement.  Unfortunately due to body habitus and loculated nature of the effusion, unable to adequately visualize.  CT-guided chest tube placement with IR ordered.  Labs ordered to be collected for further evaluation of the effusion.  Concern is for empyema/parapneumonic effusion.  This would likely explain the recurrent nature of his symptoms and an adequate improvement.  Based on imaging he will likely need tPA/dornase therapy to completely clear the pleural space, if this is inadequate he will need surgical intervention.  Thoracic surgery already following.  Continue empiric antibiotics with vancomycin and Zosyn at this time.                Group: [facility]         CONSULT NOTE    Patient Identification:  Ravinder Robles  38 y.o.  male  1985  [id number]            Requesting physician: Dr Young    Reason for Consultation: Pleural effusion     History of Present Illness:  Ravinder Robles is a 38 year old male with past medical history asthma, bronchitis, GERD, diabetes mellitus 2, HTN who we have been asked to evaluate for pleural effusion.  Patient has been seen on an outpatient basis per his PCP and  Thoracic Surgery for left-sided pleural effusion.  Appears pleural effusion has been present since 5/11/2024, was written prescription for oral Levaquin on 6/11 for 10 days.  Referred to thoracic surgery, seen on 6/13 in the office by Chioma PERALTA, instructed to continue oral antibiotics and steroids with repeat CT chest in 6 weeks.  He presented to the ED today for ongoing exertional dyspnea, fever, chills, cough, pleuritic type pain.  He states he has been on at least 5 different oral antibiotics as an  outpatient since May without improvement.  ED workup revealed persistent and increasing leukocytosis, procalcitonin.  Lactate negative.  CXR revealed increase in left pleural effusion when compared to films from March.  Patient febrile here, Tmax 100.6    Patient has ex-cigarette smoker, 1.5 PPD x 8 years, quit in 2018.  He does use albuterol inhaler occasionally.      Review of Systems  CONSTITUTIONAL: Positive for fever, chills  EYE:  No new vision changes  EAR:  No change in hearing  CARDIAC:  No chest pain  PULMONARY:  Positive for shortness of breath worsening with exertion, cough  GI:  No diarrhea, hematemesis or hematochezia  RENAL:  No dysuria or urinary frequency  MUSCULOSKELETAL:  No musculoskeletal complaints  ENDOCRINE:  No heat or cold intolerance  INTEGUMENTARY: No skin rashes  NEUROLOGICAL:  No dizziness or confusion.  No seizure activity  PSYCHIATRIC:  No new anxiety or depression  12 system review of systems performed and all else negative     Past Medical History:  Past Medical History:   Diagnosis Date    Acid reflux     Allergic     Anxiety     Asthma     Bronchitis     Hypertension     Pleurisy 2018    Reactive depression 8/10/2020    Rib fracture     broke 4 ribs    Type 2 diabetes mellitus without complication, without long-term current use of insulin 11/16/2020       Past Surgical History:  Past Surgical History:   Procedure Laterality Date    RESECTION BONE TUMOR KNEE          Home Meds:  Medications Prior to Admission   Medication Sig Dispense Refill Last Dose    albuterol sulfate  (90 Base) MCG/ACT inhaler INHALE 2 PUFFS BY MOUTH EVERY 4 HOURS AS NEEDED FOR WHEEZING 36 g 0 6/18/2024    escitalopram (LEXAPRO) 10 MG tablet Take 1 tablet by mouth once daily 90 tablet 0 6/17/2024    furosemide (LASIX) 40 MG tablet Take 1 tablet by mouth once daily 90 tablet 0 6/17/2024    gabapentin (NEURONTIN) 300 MG capsule Take 1 capsule by mouth 3 (Three) Times a Day for 30 days. 90 capsule 0  2024    levoFLOXacin (Levaquin) 500 MG tablet Take 1 tablet by mouth Daily. 10 tablet 0 2024    losartan (COZAAR) 50 MG tablet Take 1 tablet by mouth once daily 90 tablet 0 2024    metFORMIN ER (GLUCOPHAGE-XR) 500 MG 24 hr tablet Take 1 tablet by mouth 2 (Two) Times a Day With Meals. 180 tablet 0 2024    cyclobenzaprine (FLEXERIL) 10 MG tablet Take 1 tablet by mouth three times daily as needed for muscle spasm 30 tablet 0     ibuprofen (ADVIL,MOTRIN) 800 MG tablet Take 1 tablet by mouth Every 6 (Six) Hours As Needed for Mild Pain for up to 30 days. 60 tablet 0     omeprazole (priLOSEC) 20 MG capsule Take 1 capsule by mouth Daily As Needed.       pantoprazole (Protonix) 40 MG EC tablet Take 1 tablet by mouth Daily. 90 tablet 4     Tirzepatide (Mounjaro) 7.5 MG/0.5ML solution pen-injector pen Inject 0.5 mL under the skin into the appropriate area as directed 1 (One) Time Per Week. 2 mL 2     Tirzepatide (MOUNJARO) 7.5 MG/0.5ML solution pen-injector pen Inject 0.5 mL under the skin into the appropriate area as directed 1 (One) Time Per Week.          Allergies:  Allergies   Allergen Reactions    Cat Hair Extract Hives and Itching    Lisinopril Cough    Methylprednisolone Itching     Pt can take prednisone        Social History:   Social History     Socioeconomic History    Marital status:    Tobacco Use    Smoking status: Former     Current packs/day: 0.00     Average packs/day: 1.5 packs/day for 8.0 years (12.0 ttl pk-yrs)     Types: Cigarettes     Start date: 2010     Quit date: 2018     Years since quittin.4    Smokeless tobacco: Never    Tobacco comments:     nicotine gum    Vaping Use    Vaping status: Never Used   Substance and Sexual Activity    Alcohol use: Yes     Alcohol/week: 1.0 standard drink of alcohol     Types: 1 Standard drinks or equivalent per week     Comment: socially     Drug use: No    Sexual activity: Yes     Partners: Female       Family History:  Family  "History   Problem Relation Age of Onset    Diabetes Mother     Lung disease Mother     Lung disease Maternal Grandmother     Cancer Maternal Grandmother         lung    Emphysema Maternal Grandmother        Physical Exam:  /76   Pulse 97   Temp (!) 100.6 °F (38.1 °C) (Tympanic)   Resp 16   Ht 182.9 cm (72\")   Wt 132 kg (291 lb 14.2 oz)   SpO2 95%   BMI 39.59 kg/m²  Body mass index is 39.59 kg/m². 95% 132 kg (291 lb 14.2 oz)    Physical Exam  Constitutional: Young male pt lying in bed, in no acute distress, on room air   Head: NCAT  Eyes: No pallor, anicteric conjunctiva, EOMI. PERRLA.  ENMT:  No oral thrush. Moist MM.   NECK: Trachea midline, no thyromegaly, no JVD  Heart: Regular rhythm, tachycardic rate, no pedal edema.  Lungs: DONALD +, diminished left lower lobe, no wheezing or crackles   Abdomen: Soft, obese, nondistended.  No tenderness, guarding or rigidity. No palpable masses.  Extremities: Extremities warm and well perfused. No cyanosis/ clubbing.  All pulses palpable.  Neuro: Conscious, answers appropriately, no gross focal neuro deficits  Psych: Mood and affect appropriate    PPE recommended per [facility] infectious disease Isolation protocol for the current clinical scenario(as mentioned below) was followed.    LABS:  COVID19   Date Value Ref Range Status   06/03/2024 Not Detected Not Detected - Ref. Range Final       Lab Results   Component Value Date    CALCIUM 9.2 06/18/2024     Results from last 7 days   Lab Units 06/18/24  1440 06/14/24  1052   SODIUM mmol/L 135*  --    POTASSIUM mmol/L 3.7  --    CHLORIDE mmol/L 98  --    CO2 mmol/L 25.0  --    BUN mg/dL 12  --    CREATININE mg/dL 0.90  --    GLUCOSE mg/dL 110*  --    CALCIUM mg/dL 9.2  --    WBC 10*3/mm3 17.33* 16.33*   HEMOGLOBIN g/dL 14.5 14.3   PLATELETS 10*3/mm3 309 297   ALT (SGPT) U/L 19  --    AST (SGOT) U/L 8  --    PROBNP pg/mL 119.0  --    PROCALCITONIN ng/mL 0.15  --      Lab Results   Component Value Date    TROPONINT " 9 06/18/2024     Results from last 7 days   Lab Units 06/18/24  1440   HSTROP T ng/L 9         Results from last 7 days   Lab Units 06/18/24  1440   PROCALCITONIN ng/mL 0.15   LACTATE mmol/L 1.6                     Lab Results   Component Value Date    TSH 1.690 04/22/2024     Estimated Creatinine Clearance: 158 mL/min (by C-G formula based on SCr of 0.9 mg/dL).         Imaging: I personally visualized the images of scans/x-rays performed within last 3 days.      Assessment:  Loculated left pleural effusion  Pleurisy  Asthma  GERD  Morbid obesity  Diabetes mellitus 2  HTN  Seasonal allergies    Recommendations:  -CXR, CT chest without contrast ordered per Dr Man reviewed.  Needs CT-guided chest tube insertion, will request IR to do tomorrow, will ask radiologist to anchor chest tube as may need lytic therapy in the next day or 2.  NPO after midnight.  -Defer antibiotic therapy to ID, continue zosyn and vanc for now  -Will follow pending echo, AM labs  -Continue gabapentin, Mucinex, Norco, as needed albuterol for pleuritic chest pain management  -Can use supplemental oxygen if needed, currently tolerating on room air    Veronica Michelle, KATHRYN  6/18/2024  19:12 EDT      Much of this encounter note is an electronic transcription/translation of spoken language to printed text using Dragon Software.     Electronically signed by Josh Meyer MD at 06/18/24 2056

## 2024-06-20 NOTE — PLAN OF CARE
Goal Outcome Evaluation:              Outcome Evaluation: VSS. REQUIRES O2 AT 2 L/M N/C OVERNIGHT TO KEEP SATS FROM DROPPING BELOW 90%.

## 2024-06-20 NOTE — PROGRESS NOTES
"Daily progress note    Primary care physician      Subjective  Doing same with no new complaints.  Patient still have pleuritic pain with shortness of breath with minimal exertion and left-sided thoracentesis was unsuccessful yesterday.    History of present illness  38-year-old white male with history of diabetes mellitus hypertension hyperlipidemia asthma and gastroesophageal reflux disease who was overweight presented to [facility] emergency room with shortness of breath since May 11 and has been treated multiple times with oral antibiotics without any improvement.  Patient continued to have fever chills cough and shortness of breath.  Patient denies any chest pain but chest hurt with cough and also denies any abdominal pain nausea vomiting diarrhea.  Patient failed outpatient treatment admitted for broad-spectrum IV antibiotics and further evaluation by infectious disease and pulmonary.     REVIEW OF SYSTEMS  Unremarkable except pleuritic pain and shortness of breath     PHYSICAL EXAM  Blood pressure 130/81, pulse 98, temperature 98.3 °F (36.8 °C), temperature source Oral, resp. rate 16, height 182.9 cm (72\"), weight 132 kg (291 lb 14.2 oz), SpO2 95%.    GENERAL: Awake and alert no acute distress  HENT: nares patent  EYES: no scleral icterus  CV: regular rhythm, normal rate  RESPIRATORY: normal effort and moving air bilaterally  ABDOMEN: soft nontender nondistended bowel sounds positive  MUSCULOSKELETAL: no deformity  NEURO: alert, moves all extremities, follows commands  PSYCH:  calm, cooperative  SKIN: warm, dry     LAB RESULTS  Lab Results (last 24 hours)       Procedure Component Value Units Date/Time    POC Glucose Once [[id number]]  (Normal) Collected: 06/20/24 1148    Specimen: Blood Updated: 06/20/24 1150     Glucose 121 mg/dL     POC Glucose Once [[id number]]  (Normal) Collected: 06/20/24 0749    Specimen: Blood Updated: 06/20/24 0750     Glucose 122 mg/dL     Basic Metabolic Panel " [[id number]]  (Abnormal) Collected: 06/20/24 0424    Specimen: Blood Updated: 06/20/24 0506     Glucose 131 mg/dL      BUN 11 mg/dL      Creatinine 0.84 mg/dL      Sodium 135 mmol/L      Potassium 3.7 mmol/L      Chloride 104 mmol/L      CO2 25.2 mmol/L      Calcium 8.4 mg/dL      BUN/Creatinine Ratio 13.1     Anion Gap 5.8 mmol/L      eGFR 115.2 mL/min/1.73     Narrative:      GFR Normal >60  Chronic Kidney Disease <60  Kidney Failure <15      CBC & Differential [[id number]]  (Abnormal) Collected: 06/20/24 0424    Specimen: Blood Updated: 06/20/24 0452    Narrative:      The following orders were created for panel order CBC & Differential.  Procedure                               Abnormality         Status                     ---------                               -----------         ------                     CBC Auto Differential[[id number]]        Abnormal            Final result                 Please view results for these tests on the individual orders.    CBC Auto Differential [[id number]]  (Abnormal) Collected: 06/20/24 0424    Specimen: Blood Updated: 06/20/24 0452     WBC 11.46 10*3/mm3      RBC 4.41 10*6/mm3      Hemoglobin 12.2 g/dL      Hematocrit 37.5 %      MCV 85.0 fL      MCH 27.7 pg      MCHC 32.5 g/dL      RDW 13.1 %      RDW-SD 40.6 fl      MPV 9.6 fL      Platelets 239 10*3/mm3      Neutrophil % 65.7 %      Lymphocyte % 24.1 %      Monocyte % 7.3 %      Eosinophil % 2.3 %      Basophil % 0.3 %      Immature Grans % 0.3 %      Neutrophils, Absolute 7.53 10*3/mm3      Lymphocytes, Absolute 2.76 10*3/mm3      Monocytes, Absolute 0.84 10*3/mm3      Eosinophils, Absolute 0.26 10*3/mm3      Basophils, Absolute 0.03 10*3/mm3      Immature Grans, Absolute 0.04 10*3/mm3      nRBC 0.0 /100 WBC     POC Glucose Once [[id number]]  (Normal) Collected: 06/19/24 2005    Specimen: Blood Updated: 06/19/24 2006     Glucose 103 mg/dL     Blood Culture - Blood, Arm, Left [[id number]]  (Normal) Collected: 06/18/24 7971     Specimen: Blood from Arm, Left Updated: 06/19/24 1515     Blood Culture No growth at 24 hours    Blood Culture - Blood, Arm, Left [[id number]]  (Normal) Collected: 06/18/24 1507    Specimen: Blood from Arm, Left Updated: 06/19/24 1515     Blood Culture No growth at 24 hours    Narrative:      Less than seven (7) mL's of blood was collected.  Insufficient quantity may yield false negative results.          Imaging Results (Last 24 Hours)       Procedure Component Value Units Date/Time    XR Chest 1 View [[id number]] Collected: 06/20/24 0854     Updated: 06/20/24 0902    Narrative:      ONE-VIEW PORTABLE CHEST AT 5:17 A.M.     HISTORY: Follow-up of left pleural effusion.     FINDINGS: The patient had attempted left thoracentesis yesterday which  was unsuccessful and review of the chest CT scan performed 2 days ago on  6/18/2024 shows considerable dense pleural thickening and adjacent  atelectasis and consolidation rather than pleural effusion at the left  base. There is persistent increased density in the lower left chest on  today's chest x-ray that is unchanged from 2 days ago. There is no  pneumothorax following attempted thoracentesis. The right lung remains  clear.     This report was finalized on 6/20/2024 8:59 AM by Dr. Adryan Villarreal M.D  on Workstation: [id number]       US Thoracentesis [[id number]] Collected: 06/19/24 1537    Specimen: Body Fluid Updated: 06/19/24 1549    Narrative:      ULTRASOUND-GUIDED LEFT THORACENTESIS, 6/19/2024     HISTORY:   38-year-old male with loculated left pleural effusion.     CONSENT:   The procedure, risks and alternatives were discussed in detail with the  patient, emphasizing risks of pneumothorax, chest tube placement and  bleeding. Questions were entertained, and written informed consent was  obtained. Timeout was observed in the procedure room for patient  identification and procedure site verification, and the procedure site  was marked by me. Permanent images were  recorded.     PROCEDURE:   Using sterile technique, 1% lidocaine local anesthetic and real-time  ultrasound guidance, a 5 Polish thoracentesis catheter was placed into  the largest portion of the pleural effusion. No fluid was able to be  aspirated. The patient remained stable in the department during and  after the procedure.           Impression:      Unsuccessful left ultrasound-guided thoracentesis. Real-time ultrasound  guidance identifies the needle within the fluid collection with  inability to obtain any aspirate.     Procedure performed by KATHRYN Singleton.  By electronically signing this report, I, the supervising radiologist,  attest that I was not present for the procedure(s) . I agree with the  finalized report.     This report was finalized on 6/19/2024 3:46 PM by Dr. Jay Mckeon M.D on Workstation: [id number]             Scan on 6/18/2024 1450 by [employer], [place]: ECG 12-LEAD         Author: -- Service: -- Author Type: --   Filed: Date of Service: Creation Time:   Status: (Other)   HEART RATE= 103  bpm  RR Interval= 583  ms  NC Interval= 125  ms  P Horizontal Axis= -20  deg  P Front Axis= 43  deg  QRSD Interval= 89  ms  QT Interval= 299  ms  QTcB= 392  ms  QRS Axis= 31  deg  T Wave Axis= 35  deg  - BORDERLINE ECG -  Sinus tachycardia  Probable left atrial enlargement  No change from previous tracing          Current Facility-Administered Medications:     acetaminophen (TYLENOL) tablet 650 mg, 650 mg, Oral, Q6H PRN, Jeovanny Young MD    acetylcysteine (MUCOMYST) 20 % nebulizer solution 3 mL, 3 mL, Nebulization, Q8H - RT, Luis A Guzman APRN, 3 mL at 06/20/24 0745    albuterol sulfate HFA (PROVENTIL HFA;VENTOLIN HFA;PROAIR HFA) inhaler 2 puff, 2 puff, Inhalation, Q4H PRN, Jeovanny Young MD    cetirizine (zyrTEC) tablet 10 mg, 10 mg, Oral, Daily, Jeovanny Young MD, 10 mg at 06/20/24 0822    dextrose (D50W) (25 g/50 mL) IV injection 25 g, 25 g, Intravenous, Q15 Min PRN, Jeovanny Young MD     dextrose (GLUTOSE) oral gel 15 g, 15 g, Oral, Q15 Min PRN, Jeovanny Young MD    escitalopram (LEXAPRO) tablet 10 mg, 10 mg, Oral, Daily, Joevanny Young MD, 10 mg at 06/20/24 0822    famotidine (PEPCID) tablet 20 mg, 20 mg, Oral, BID, Jeovanny Young MD, 20 mg at 06/20/24 0822    gabapentin (NEURONTIN) capsule 300 mg, 300 mg, Oral, TID, Jeovanny Young MD, 300 mg at 06/20/24 0822    glucagon (GLUCAGEN) injection 1 mg, 1 mg, Intramuscular, Q15 Min PRN, Jeovanny Young MD    guaiFENesin (MUCINEX) 12 hr tablet 1,200 mg, 1,200 mg, Oral, Q12H, Luis A Guzman APRN, 1,200 mg at 06/20/24 0822    Hold medication, 1 each, Does not apply, Continuous PRN, Luis A Guzman APRN    HYDROcodone-acetaminophen (NORCO) 5-325 MG per tablet 1 tablet, 1 tablet, Oral, Q4H PRN, Jeovanny Young MD, 1 tablet at 06/20/24 0822    insulin lispro (HUMALOG/ADMELOG) injection 2-7 Units, 2-7 Units, Subcutaneous, 4x Daily AC & at Bedtime, Jeovanny Young MD    ipratropium-albuterol (DUO-NEB) nebulizer solution 3 mL, 3 mL, Nebulization, Q8H, Luis A Guzman APRN, 3 mL at 06/20/24 0744    piperacillin-tazobactam (ZOSYN) 4.5 g IVPB in 100 mL NS MBP (CD), 4.5 g, Intravenous, Q8H, Jeovanny Young MD, Last Rate: 0 mL/hr at 06/19/24 0245, 4.5 g at 06/20/24 0605    sodium chloride 0.9 % flush 10 mL, 10 mL, Intravenous, PRN, Emergency, Triage Protocol, MD    sodium chloride 0.9 % infusion, 50 mL/hr, Intravenous, Continuous, Jeovanny Young MD, Last Rate: 50 mL/hr at 06/19/24 1613, 50 mL/hr at 06/19/24 1613     ASSESSMENT  Left lower lobe pneumonia with loculated pleural fluid suspicious for empyema with unsuccessful thoracentesis  Diabetes mellitus  Hypertension  Hyperlipidemia  Morbidly obese  Gastroesophageal reflux disease    PLAN  CPM  Continue IV antibiotics   Infectious disease consult appreciated  Pulmonary and thoracic surgery to follow patient  Adjust home medications  Stress ulcer DVT prophylaxis  Supportive care  Discussed with family nursing staff and  thoracic surgery  Follow closely further recommendation current hospital course    LIZZY KNOX MD    Copied text in this note has been reviewed and is accurate as of 06/20/24

## 2024-06-20 NOTE — PROGRESS NOTES
[facility]  [phone]  Dr. Lonnie Angela    Subjective:  LOS: 1    Chief Complaint:  Loculated effusion    Attempted pleural tap yesterday but unsuccessful.  Patient feels about the same and remains on room air.    Objective   Vital Signs past 24hrs  Temp range: Temp (24hrs), Av.3 °F (36.8 °C), Min:98.1 °F (36.7 °C), Max:98.6 °F (37 °C)    BP range: BP: (125-147)/(71-87) 138/79  Pulse range: Heart Rate:  [] 100  Resp rate range: Resp:  [16-18] 18  Device (Oxygen Therapy): room airFlow (L/min):  [2] 2  Oxygen range:SpO2:  [90 %-99 %] 99 %   Mechanical Ventilator:     Physical Exam  Constitutional:       Appearance: He is obese.   Eyes:      Pupils: Pupils are equal, round, and reactive to light.   Cardiovascular:      Rate and Rhythm: Normal rate and regular rhythm.      Heart sounds: No murmur heard.  Pulmonary:      Effort: Pulmonary effort is normal.      Breath sounds: Examination of the left-lower field reveals decreased breath sounds. Decreased breath sounds present.   Abdominal:      General: Bowel sounds are normal.      Palpations: Abdomen is soft. There is no mass.      Tenderness: There is no abdominal tenderness.   Musculoskeletal:         General: No swelling.   Neurological:      Mental Status: He is alert.       Results Review:    I have reviewed the laboratory and imaging data since the last note by [facility] physician.  My annotations are noted in assessment and plan.      Result Review:  I have personally reviewed the results from last note by [facility] physician to 2024 09:55 EDT and agree with these findings:  [x]  Laboratory list / accordion  [x]  Microbiology  [x]  Radiology  []  EKG/Telemetry   []  Cardiology/Vascular   []  Pathology  []  Old records  []  Other:    Medication Review:  I have reviewed the current MAR.  My annotations are noted in assessment and plan.    acetylcysteine, 3 mL, Nebulization, Q8H - RT  cetirizine, 10 mg, Oral, Daily  escitalopram, 10 mg, Oral,  Daily  famotidine, 20 mg, Oral, BID  gabapentin, 300 mg, Oral, TID  guaiFENesin, 1,200 mg, Oral, Q12H  insulin lispro, 2-7 Units, Subcutaneous, 4x Daily AC & at Bedtime  ipratropium-albuterol, 3 mL, Nebulization, Q8H  piperacillin-tazobactam, 4.5 g, Intravenous, Q8H        hold, 1 each  sodium chloride, 50 mL/hr, Last Rate: 50 mL/hr (06/19/24 1613)      Lines, Drains & Airways       Active LDAs       Name Placement date Placement time Site Days    Peripheral IV 06/18/24 1445 Anterior;Right Forearm 06/18/24  1445  Forearm  1                  Isolation status: No active isolations    Dietary Orders (From admission, onward)       Start     Ordered    06/19/24 1558  Diet: Regular/House, Diabetic; Consistent Carbohydrate; Texture: Regular (IDDSI 7); Fluid Consistency: Thin (IDDSI 0)  Diet Effective Now        References:    Diet Order [place]   Question Answer Comment   Diets: Regular/House    Diets: Diabetic    Diabetic Diet: Consistent Carbohydrate    Texture: Regular (IDDSI 7)    Fluid Consistency: Thin (IDDSI 0)        06/19/24 1558                    PCCM Problems  Loculated left pleural effusion  Recent treatment for pneumonia outpatient  Obesity  Type 2 diabetes mellitus  Ex-smoker        Plan of Treatment    Loculated pleural effusion and multiple discrete pockets with possible infection.  Discussed with Dr. Rodriguez today.  He feels patient should be treated with antibiotics for appropriate duration.  Concern remains for development of pulmonary abscess.  He recommends review in his office with chest x-ray or CT chest in 3 to 4 weeks.  Patient remains on antibiotics per ID.        Lonnie Angela MD  06/20/24  09:55 EDT      Part of this note may be an electronic transcription/translation of spoken language to printed text using the Dragon Dictation System.

## 2024-06-20 NOTE — PLAN OF CARE
Goal Outcome Evaluation:  Plan of Care Reviewed With: patient        Progress: improving  Outcome Evaluation: Pt maintained on RA, denies any SOA. Pt c/o increase  epigastric pain w/ deep breaths. demonstrates use of IS. Pt given Norco for pain, expresses relief. Pt states he feels a little better but just tired. Pt up adlib, showered independently. Zosyn IV admin. Pt expresses no further needs at this time. Call light within reach.       Discharge 1-2days

## 2024-06-20 NOTE — PROGRESS NOTES
"  Infectious Diseases Progress Note    Jimmy Garza MD     [facility]  [place]: 1 day  Patient Identification:  Name: Ravinder Robles  Age: 38 y.o.  Sex: male  :  1985  MRN: [id number]         Primary Care Physician: Ruiz Escamilla MD (Tony)    Seen later in the day.  Was gone for attempted thoracentesis.    Subjective: Feeling better denies any fever and chills.  Slight left-sided chest discomfort.  Interval History: Earlier today had attempted thoracentesis which was unsuccessful    Objective:    Scheduled Meds:acetylcysteine, 3 mL, Nebulization, Q8H - RT  cetirizine, 10 mg, Oral, Daily  escitalopram, 10 mg, Oral, Daily  famotidine, 20 mg, Oral, BID  gabapentin, 300 mg, Oral, TID  guaiFENesin, 1,200 mg, Oral, Q12H  insulin lispro, 2-7 Units, Subcutaneous, 4x Daily AC & at Bedtime  ipratropium-albuterol, 3 mL, Nebulization, Q8H  piperacillin-tazobactam, 4.5 g, Intravenous, Q8H      Continuous Infusions:hold, 1 each  sodium chloride, 50 mL/hr, Last Rate: 50 mL/hr (24 1613)        Vital signs in last 24 hours:  Temp:  [98.1 °F (36.7 °C)-98.6 °F (37 °C)] 98.1 °F (36.7 °C)  Heart Rate:  [] 100  Resp:  [16-18] 18  BP: (125-147)/(71-87) 138/79    Intake/Output:    Intake/Output Summary (Last 24 hours) at 2024 0815  Last data filed at 2024 1612  Gross per 24 hour   Intake 250 ml   Output --   Net 250 ml       Exam:  /79   Pulse 100   Temp 98.1 °F (36.7 °C)   Resp 18   Ht 182.9 cm (72\")   Wt 132 kg (291 lb 14.2 oz)   SpO2 99%   BMI 39.59 kg/m²   Patient is examined using the personal protective equipment as per guidelines from infection control for this particular patient as enacted.  Hand washing was performed before and after patient interaction.  General Appearance:    Alert, cooperative, no distress, AAOx3                          Head:    Normocephalic, without obvious abnormality, atraumatic                           Eyes:    PERRL, conjunctivae/corneas " Patient Education        Ear Infection (Otitis Media) in Teens: Care Instructions  Your Care Instructions    An ear infection may start with a cold and affect the middle ear (otitis media). It can hurt a lot. Most ear infections clear up on their own in a couple of days and do not need antibiotics. Also, antibiotics do not work against viruses, which may be the cause of your infection. Regular doses of pain relievers are the best way to reduce your fever and help you feel better. Follow-up care is a key part of your treatment and safety. Be sure to make and go to all appointments, and call your doctor if you are having problems. It's also a good idea to know your test results and keep a list of the medicines you take. How can you care for yourself at home? · Take pain medicines exactly as directed. ? If the doctor gave you a prescription medicine for pain, take it as prescribed. ? If you are not taking a prescription pain medicine, take an over-the-counter medicine, such as acetaminophen (Tylenol), ibuprofen (Advil, Motrin), or naproxen (Aleve). Read and follow all instructions on the label. No one younger than 20 should take aspirin. It has been linked to Reye syndrome, a serious illness. ? Do not take two or more pain medicines at the same time unless the doctor told you to. Many pain medicines have acetaminophen, which is Tylenol. Too much acetaminophen (Tylenol) can be harmful. · Plan to take a full dose of pain reliever before bedtime. Getting enough sleep will help you get better. · Try a warm, moist washcloth on the ear to see if it helps relieve pain. · If your doctor prescribed antibiotics, take them as directed. Do not stop taking them just because you feel better. You need to take the full course of antibiotics. When should you call for help?   Call your doctor now or seek immediate medical care if:    · You have new or worse symptoms of infection, such as:  ? Increased pain, swelling, warmth, clear, EOM's intact, both eyes                         Throat:   Lips, tongue, gums normal; oral mucosa pink and moist                           Neck:   Supple, symmetrical, trachea midline, no JVD                         Lungs:    Decreased breath sounds at the left lung base with no obvious use of accessory muscles of breathing                 Chest Wall:    No tenderness or deformity                          Heart:  S1-S2 regular                  Abdomen:   Soft nontender                 Extremities:   Extremities normal, atraumatic, no cyanosis or edema                        Pulses:   Pulses palpable in all extremities                            Skin:   Skin is warm and dry,  no rashes or palpable lesions                  Neurologic: Alert and oriented x 3       Data Review:    I reviewed the patient's new clinical results.  Results from last 7 days   Lab Units 06/20/24  0424 06/19/24  0501 06/18/24  1440 06/14/24  1052   WBC 10*3/mm3 11.46* 13.54* 17.33* 16.33*   HEMOGLOBIN g/dL 12.2* 12.3* 14.5 14.3   PLATELETS 10*3/mm3 239 262 309 297     Results from last 7 days   Lab Units 06/20/24  0424 06/19/24  0501 06/18/24  1440   SODIUM mmol/L 135* 135* 135*   POTASSIUM mmol/L 3.7 3.5 3.7   CHLORIDE mmol/L 104 102 98   CO2 mmol/L 25.2 24.5 25.0   BUN mg/dL 11 9 12   CREATININE mg/dL 0.84 0.75* 0.90   CALCIUM mg/dL 8.4* 8.5* 9.2   GLUCOSE mg/dL 131* 114* 110*     Microbiology Results (last 10 days)       Procedure Component Value - Date/Time    MRSA Screen, PCR (Inpatient) - Swab, Nares [[id number]]  (Normal) Collected: 06/19/24 0512    Lab Status: Final result Specimen: Swab from Nares Updated: 06/19/24 0717     MRSA PCR No MRSA Detected    Narrative:      The negative predictive value of this diagnostic test is high and should only be used to consider de-escalating anti-MRSA therapy. A positive result may indicate colonization with MRSA and must be correlated clinically.    Blood Culture - Blood, Arm, Left  or redness. ? Red streaks leading from the area. ? Pus draining from the area. ? A fever.    Watch closely for changes in your health, and be sure to contact your doctor if:    · You have new or worse discharge coming from your ear.     · You do not get better as expected. Where can you learn more? Go to [email or web address]://chpepicewlyndsay.PaperFlies. org and sign in to your [employer] account. Enter W724 in the [employer] box to learn more about \"Ear Infection (Otitis Media) in Teens: Care Instructions. \"     If you do not have an account, please click on the \"Sign Up Now\" link. Current as of: October 21, 2018  Content Version: 12.0  © 7823-3014 [employer]. Care instructions adapted under license by [facility] ([facility]). If you have questions about a medical condition or this instruction, always ask your healthcare professional. [place] [address] any warranty or liability for your use of this information. Patient Education        Allergies in Teens: Care Instructions  Your Care Instructions    Allergies occur when your body's defense system (immune system) overreacts to certain substances. The immune system treats a harmless substance as if it is a harmful germ or virus. Many things can cause this overreaction, including pollens, medicine, food, dust, animal dander, and mold. Allergies can be mild or severe. Mild allergies can be managed with home treatment. But medicine may be needed to prevent problems. Managing your allergies is an important part of staying healthy. Your doctor may suggest that you have allergy testing to help find out what is causing your allergies. When you know what things trigger your symptoms, you can avoid them. This can prevent allergy symptoms, asthma, and other health problems.   For severe allergies that cause reactions that affect your whole body (anaphylactic reactions), your doctor may prescribe a shot of epinephrine to carry with you in case [[id number]]  (Normal) Collected: 06/18/24 1507    Lab Status: Preliminary result Specimen: Blood from Arm, Left Updated: 06/19/24 1515     Blood Culture No growth at 24 hours    Blood Culture - Blood, Arm, Left [[id number]]  (Normal) Collected: 06/18/24 1507    Lab Status: Preliminary result Specimen: Blood from Arm, Left Updated: 06/19/24 1515     Blood Culture No growth at 24 hours    Narrative:      Less than seven (7) mL's of blood was collected.  Insufficient quantity may yield false negative results.          US Thoracentesis    Result Date: 6/19/2024  Unsuccessful left ultrasound-guided thoracentesis. Real-time ultrasound guidance identifies the needle within the fluid collection with inability to obtain any aspirate.  Procedure performed by KATHRYN Singleton. By electronically signing this report, I, the supervising radiologist, attest that I was not present for the procedure(s) . I agree with the finalized report.  This report was finalized on 6/19/2024 3:46 PM by Dr. Jay Mckeon M.D on Workstation: [id number]      CT Chest Without Contrast Diagnostic    Result Date: 6/18/2024   1. Similar-appearing complex likely partially loculated left pleural effusion, probable empyema or possible lung abscesses, with atelectasis and consolidation of the left left lower lobe and base of the left upper lobe and lingula that could reflect pneumonia. 2. Partially visualized enlarged left hilar lymph nodes, likely reactive.  This report was finalized on 6/18/2024 8:45 PM by Deven Schmitz MD on Workstation: [id number]      XR Chest 1 View    Result Date: 6/18/2024  As described.  This report was finalized on 6/18/2024 3:09 PM by Dr. Hank Alexander M.D on Workstation: [id number]      CT Chest With Contrast Diagnostic    Result Date: 6/12/2024  Impression: Loculated pleural fluid within the inferior aspect of the left lower lung with associated pleural wall thickening and enhancement. Findings are suspicious for empyema.  you have a severe reaction. Learn how to give yourself the shot and keep it with you at all times. Make sure it is not . Follow-up care is a key part of your treatment and safety. Be sure to make and go to all appointments, and call your doctor if you are having problems. It's also a good idea to know your test results and keep a list of the medicines you take. How can you care for yourself at home? · If you have been told by your doctor that dust or dust mites are causing your allergy, decrease the dust around your bed. ? Wash sheets, pillowcases, and other bedding in hot water every week. ? Use dust-proof covers for pillows, duvets, and mattresses. Avoid plastic covers, because they tear easily and do not \"breathe. \" Wash as instructed on the label. ? Do not use any blankets and pillows that you do not need. ? Use blankets that you can wash in your washing machine. ? Consider removing drapes and carpets, which attract and hold dust, from your bedroom. · If you are allergic to house dust and mites, do not use home humidifiers. Your doctor can suggest ways you can control dust and mites. · Look for signs of cockroaches. Cockroaches cause allergic reactions. Use cockroach baits to get rid of them. Then, clean your home well. Cockroaches like areas where grocery bags, newspapers, empty bottles, or cardboard boxes are stored. Do not keep these inside your home, and keep trash and food containers sealed. Seal off any spots where cockroaches might enter your home. · If you are allergic to mold, get rid of furniture, rugs, and drapes that smell musty. Check for mold in the bathroom. · If you are allergic to outdoor pollen or mold spores, use air-conditioning. Change or clean all filters every month. Keep windows closed. · If you are allergic to pollen, stay inside when pollen counts are high. Use a vacuum  with a HEPA filter or a double-thickness filter at least 2 times each week.   · Stay inside There is no evidence of adjacent parenchymal consolidation. Otherwise unremarkable chest CT. Electronically Signed: Nick Bender MD  6/12/2024 12:02 PM EDT  Workstation ID: [id number]    XR Chest PA & Lateral    Result Date: 6/12/2024  Impression: Persistent left lung base consolidation with small possibly loculated pleural effusion. Electronically Signed: Nick Bender MD  6/12/2024 8:59 AM EDT  Workstation ID: [id number]    XR Chest 2 View    Result Date: 6/3/2024  Impression: Mild improvement in left lung consolidation with pleural effusion. Recommend additional follow-up to ensure further improvement/resolution. Electronically Signed: Usman Oleary MD  6/3/2024 2:46 PM EDT  Workstation ID: [id number]       Assessment:    Pneumonia    Left lower lobe pneumonia  7-skuvambpc-nawvmphg pneumonia with progression to parapneumonic effusion and probable empyema partially treated with systemic antibiotic therapy with persistent symptoms and progressive worsening on imaging studies of the loculated effusion.  2-diabetes mellitus  3-asthma  4-morbid obesity  5-hypertension  6-other diagnosis per primary team.     Recommendations/Discussions:  See my discussion and recommendation on 6/18 and 6/19/2024.  Continue with Zosyn with plans for further de-escalation based on the operative culture results.  Eventual intervention and choices between intervention ranging from chest tube management of loculated pleural effusion/empyema versus VATS and decortication and possible wedge resection of the inflamed infected lung with specimen sent to pathology and special stains will be deferred to our thoracic surgery colleagues.  Final antibiotic therapy after above is finalized.  Overall concept of care discussed with patient and family member at the bedside.  Jimmy Garza MD  6/20/2024  08:15 EDT    Parts of this note may be an electronic transcription/translation of spoken language to printed text using the Dragon dictation system.     when air pollution is bad. Avoid paint fumes, perfumes, and other strong odors. · Avoid conditions that make your allergies worse. Stay away from smoke. Do not smoke or let anyone else smoke in your house. Do not use fireplaces or wood-burning stoves. · If you are allergic to your pets, change the air filter in your furnace every month. Use high-efficiency filters. · If you are allergic to pet dander, keep pets outside or out of your bedroom. Old carpet and cloth furniture can hold a lot of animal dander. You may need to replace them. When should you call for help? Give an epinephrine shot if:    · You think you are having a severe allergic reaction.    After giving an epinephrine shot call 911, even if you feel better.   Call 911 if:    · You have symptoms of a severe allergic reaction. These may include:  ? Sudden raised, red areas (hives) all over your body. ? Swelling of the throat, mouth, lips, or tongue. ? Trouble breathing. ? Passing out (losing consciousness). Or you may feel very lightheaded or suddenly feel weak, confused, or restless.     · You have been given an epinephrine shot, even if you feel better.    Call your doctor now or seek immediate medical care if:    · You have symptoms of an allergic reaction, such as:  ? A rash or hives (raised, red areas on the skin). ? Itching. ? Swelling. ? Belly pain, nausea, or vomiting.    Watch closely for changes in your health, and be sure to contact your doctor if:    · You do not get better as expected. Where can you learn more? Go to [email or web address] [email or web address]. org and sign in to your [employer] account. Enter C920 in the [facility] box to learn more about \"Allergies in Teens: Care Instructions. \"     If you do not have an account, please click on the \"Sign Up Now\" link. Current as of: June 27, 2018  Content Version: 12.0  © 6621-6546 [employer]. Care instructions adapted under license by [facility] ([facility]).  If you have questions about a medical condition or this instruction, always ask your healthcare professional. [place] [address] any warranty or liability for your use of this information.

## 2024-06-21 ENCOUNTER — READMISSION MANAGEMENT (OUTPATIENT)
Dept: CALL CENTER | Facility: HOSPITAL | Age: 39
End: 2024-06-21
Payer: COMMERCIAL

## 2024-06-21 ENCOUNTER — HOME HEALTH ADMISSION (OUTPATIENT)
Dept: HOME HEALTH SERVICES | Facility: HOME HEALTHCARE | Age: 39
End: 2024-06-21
Payer: COMMERCIAL

## 2024-06-21 VITALS
HEIGHT: 72 IN | RESPIRATION RATE: 16 BRPM | BODY MASS INDEX: 39.54 KG/M2 | SYSTOLIC BLOOD PRESSURE: 118 MMHG | TEMPERATURE: 99.1 F | HEART RATE: 92 BPM | DIASTOLIC BLOOD PRESSURE: 80 MMHG | WEIGHT: 291.89 LBS | OXYGEN SATURATION: 96 %

## 2024-06-21 LAB
ANION GAP SERPL CALCULATED.3IONS-SCNC: 8 MMOL/L (ref 5–15)
BASOPHILS # BLD AUTO: 0.04 10*3/MM3 (ref 0–0.2)
BASOPHILS NFR BLD AUTO: 0.4 % (ref 0–1.5)
BUN SERPL-MCNC: 9 MG/DL (ref 6–20)
BUN/CREAT SERPL: 11.4 (ref 7–25)
CALCIUM SPEC-SCNC: 8.4 MG/DL (ref 8.6–10.5)
CHLORIDE SERPL-SCNC: 100 MMOL/L (ref 98–107)
CO2 SERPL-SCNC: 26 MMOL/L (ref 22–29)
CREAT SERPL-MCNC: 0.79 MG/DL (ref 0.76–1.27)
DEPRECATED RDW RBC AUTO: 41.4 FL (ref 37–54)
EGFRCR SERPLBLD CKD-EPI 2021: 117.3 ML/MIN/1.73
EOSINOPHIL # BLD AUTO: 0.37 10*3/MM3 (ref 0–0.4)
EOSINOPHIL NFR BLD AUTO: 3.8 % (ref 0.3–6.2)
ERYTHROCYTE [DISTWIDTH] IN BLOOD BY AUTOMATED COUNT: 13.2 % (ref 12.3–15.4)
GLUCOSE BLDC GLUCOMTR-MCNC: 101 MG/DL (ref 70–130)
GLUCOSE BLDC GLUCOMTR-MCNC: 95 MG/DL (ref 70–130)
GLUCOSE SERPL-MCNC: 118 MG/DL (ref 65–99)
HCT VFR BLD AUTO: 37.2 % (ref 37.5–51)
HGB BLD-MCNC: 12.3 G/DL (ref 13–17.7)
IMM GRANULOCYTES # BLD AUTO: 0.03 10*3/MM3 (ref 0–0.05)
IMM GRANULOCYTES NFR BLD AUTO: 0.3 % (ref 0–0.5)
LYMPHOCYTES # BLD AUTO: 2.34 10*3/MM3 (ref 0.7–3.1)
LYMPHOCYTES NFR BLD AUTO: 24.2 % (ref 19.6–45.3)
MCH RBC QN AUTO: 28.1 PG (ref 26.6–33)
MCHC RBC AUTO-ENTMCNC: 33.1 G/DL (ref 31.5–35.7)
MCV RBC AUTO: 84.9 FL (ref 79–97)
MONOCYTES # BLD AUTO: 0.77 10*3/MM3 (ref 0.1–0.9)
MONOCYTES NFR BLD AUTO: 8 % (ref 5–12)
NEUTROPHILS NFR BLD AUTO: 6.1 10*3/MM3 (ref 1.7–7)
NEUTROPHILS NFR BLD AUTO: 63.3 % (ref 42.7–76)
NRBC BLD AUTO-RTO: 0 /100 WBC (ref 0–0.2)
PLATELET # BLD AUTO: 255 10*3/MM3 (ref 140–450)
PMV BLD AUTO: 9.5 FL (ref 6–12)
POTASSIUM SERPL-SCNC: 3.6 MMOL/L (ref 3.5–5.2)
RBC # BLD AUTO: 4.38 10*6/MM3 (ref 4.14–5.8)
SODIUM SERPL-SCNC: 134 MMOL/L (ref 136–145)
WBC NRBC COR # BLD AUTO: 9.65 10*3/MM3 (ref 3.4–10.8)

## 2024-06-21 PROCEDURE — 80048 BASIC METABOLIC PNL TOTAL CA: CPT | Performed by: HOSPITALIST

## 2024-06-21 PROCEDURE — C1751 CATH, INF, PER/CENT/MIDLINE: HCPCS

## 2024-06-21 PROCEDURE — 82948 REAGENT STRIP/BLOOD GLUCOSE: CPT

## 2024-06-21 PROCEDURE — 85025 COMPLETE CBC W/AUTO DIFF WBC: CPT | Performed by: HOSPITALIST

## 2024-06-21 PROCEDURE — 25010000002 PIPERACILLIN SOD-TAZOBACTAM PER 1 G: Performed by: HOSPITALIST

## 2024-06-21 RX ORDER — GUAIFENESIN 600 MG/1
1200 TABLET, EXTENDED RELEASE ORAL EVERY 12 HOURS SCHEDULED
Qty: 120 TABLET | Refills: 0 | Status: SHIPPED | OUTPATIENT
Start: 2024-06-21 | End: 2024-07-21

## 2024-06-21 RX ORDER — HYDROCODONE BITARTRATE AND ACETAMINOPHEN 5; 325 MG/1; MG/1
1 TABLET ORAL EVERY 6 HOURS PRN
Qty: 20 TABLET | Refills: 0 | Status: SHIPPED | OUTPATIENT
Start: 2024-06-21 | End: 2024-06-26

## 2024-06-21 RX ORDER — SODIUM CHLORIDE 0.9 % (FLUSH) 0.9 %
10 SYRINGE (ML) INJECTION EVERY 12 HOURS SCHEDULED
OUTPATIENT
Start: 2024-06-21

## 2024-06-21 RX ORDER — SODIUM CHLORIDE 0.9 % (FLUSH) 0.9 %
10 SYRINGE (ML) INJECTION AS NEEDED
OUTPATIENT
Start: 2024-06-21

## 2024-06-21 RX ORDER — CETIRIZINE HYDROCHLORIDE 10 MG/1
10 TABLET ORAL DAILY
Qty: 30 TABLET | Refills: 0 | Status: SHIPPED | OUTPATIENT
Start: 2024-06-22 | End: 2024-07-22

## 2024-06-21 RX ORDER — SODIUM CHLORIDE 0.9 % (FLUSH) 0.9 %
20 SYRINGE (ML) INJECTION AS NEEDED
OUTPATIENT
Start: 2024-06-21

## 2024-06-21 RX ORDER — SODIUM CHLORIDE 9 MG/ML
40 INJECTION, SOLUTION INTRAVENOUS AS NEEDED
OUTPATIENT
Start: 2024-06-21

## 2024-06-21 RX ADMIN — GUAIFENESIN 1200 MG: 600 TABLET, EXTENDED RELEASE ORAL at 09:38

## 2024-06-21 RX ADMIN — GABAPENTIN 300 MG: 300 CAPSULE ORAL at 15:29

## 2024-06-21 RX ADMIN — CETIRIZINE HYDROCHLORIDE 10 MG: 10 TABLET ORAL at 09:39

## 2024-06-21 RX ADMIN — ESCITALOPRAM OXALATE 10 MG: 10 TABLET, FILM COATED ORAL at 09:38

## 2024-06-21 RX ADMIN — HYDROCODONE BITARTRATE AND ACETAMINOPHEN 1 TABLET: 5; 325 TABLET ORAL at 09:40

## 2024-06-21 RX ADMIN — FAMOTIDINE 20 MG: 20 TABLET, FILM COATED ORAL at 09:39

## 2024-06-21 RX ADMIN — GABAPENTIN 300 MG: 300 CAPSULE ORAL at 09:39

## 2024-06-21 RX ADMIN — PIPERACILLIN AND TAZOBACTAM 4.5 G: 4; .5 INJECTION, POWDER, FOR SOLUTION INTRAVENOUS at 07:05

## 2024-06-21 NOTE — SIGNIFICANT NOTE
"   06/21/24 1450   PICC Single Lumen 06/21/24 Left Cephalic   Placement date: If unknown, DO NOT use \"Add Comment\" note/Placement time: If unknown, DO NOT use \"Add Comment\" note: 06/21/24 1450   Hand Hygiene Completed: Yes  Size (Fr): 4  Description (optional): single lumen picc  Length (cm): 46 cm  Orientation:...   Site Assessment Clean;Dry;Intact   #1 Lumen Status Blood return noted;Capped;Flushed;Normal saline locked   Length mary (cm) 0 cm   Extremity Circumference (cm) 36 cm   Dressing Type Border Dressing;Transparent;Securing device;Antimicrobial dressing/disc   Dressing Status Clean;Dry;Intact   Dressing Intervention New dressing   Dressing Change Due 06/28/24   Indication/Daily Review of Necessity long-term IV access >7 days     LOT# [id number]  EXPIRES 2025-07-31    Picc placed without difficulty. Tip locations confirmed SVC via sherlock 3cg. Picc is ok to use now.    3 Needles, 2 wires, and 1 scalpel counted and disposed of properly      "

## 2024-06-21 NOTE — DISCHARGE PLACEMENT REQUEST
"Margaret Ravinder IQBAL (38 y.o. Male)       Date of Birth   1985    Social Security Number       Address   [address] [place] [address] [facility]    Home Phone   [phone]    MRN   [id number]       Uatsdin   None    Marital Status                               Admission Date   6/18/24    Admission Type   Emergency    Admitting Provider   Jeovanny Young MD    Attending Provider   Jeovanny Young MD    Department, Room/Bed   [address], N[address]/1       Discharge Date       Discharge Disposition       Discharge Destination                                 Attending Provider: Jeovanny Young MD    Allergies: Cat Hair Extract, Lisinopril, Methylprednisolone    Isolation: None   Infection: None   Code Status: CPR    Ht: 182.9 cm (72\")   Wt: 132 kg (291 lb 14.2 oz)    Admission Cmt: None   Principal Problem: Pneumonia [J18.9]                   Active Insurance as of 6/18/2024       Primary Coverage       Payor Plan Insurance Group Employer/Plan Group    Counts include 234 beds at the [facility] BLUE CROSS Counts include 234 beds at the [facility] [employer] CROSS BLUE [facility] [id number]       Payor Plan Address Payor Plan Phone Number Payor Plan Fax Number Effective Dates    [address] [phone]  8/1/2021 - None Entered    [facility] [id number]         Subscriber Name Subscriber Birth Date Member ID       RAVINDER SHANKAR 1985 [id number]                     Emergency Contacts        (Rel.) Home Phone Work Phone Mobile Phone    MargaretCyndie (Spouse) -- -- [phone]                "

## 2024-06-21 NOTE — PROGRESS NOTES
"  Infectious Diseases Progress Note    Jimmy Garza MD     [facility]  [place]: 2 days  Patient Identification:  Name: Ravinder Robles  Age: 38 y.o.  Sex: male  :  1985  MRN: [id number]         Primary Care Physician: Ruiz Escamilla MD (Tony)    Seen later in the day.  Was gone for attempted thoracentesis.    Subjective: feeling better than how he felt when he came to the hospital  Interval History:   2024 had attempted thoracentesis which was unsuccessful  2024 Thoracic surgery recommend iv antibiotics with repeat CT scan of chest in 4 weeks to decide whether intervention is needed  Objective:    Scheduled Meds:cetirizine, 10 mg, Oral, Daily  escitalopram, 10 mg, Oral, Daily  famotidine, 20 mg, Oral, BID  gabapentin, 300 mg, Oral, TID  guaiFENesin, 1,200 mg, Oral, Q12H  insulin lispro, 2-7 Units, Subcutaneous, 4x Daily AC & at Bedtime  piperacillin-tazobactam, 4.5 g, Intravenous, Q8H      Continuous Infusions:hold, 1 each        Vital signs in last 24 hours:  Temp:  [98.1 °F (36.7 °C)-99.5 °F (37.5 °C)] 98.2 °F (36.8 °C)  Heart Rate:  [] 99  Resp:  [16-18] 16  BP: (124-138)/(65-81) 124/65    Intake/Output:  No intake or output data in the 24 hours ending 24 0727      Exam:  /65 (BP Location: Left arm, Patient Position: Lying)   Pulse 99   Temp 98.2 °F (36.8 °C) (Oral)   Resp 16   Ht 182.9 cm (72\")   Wt 132 kg (291 lb 14.2 oz)   SpO2 95%   BMI 39.59 kg/m²   Patient is examined using the personal protective equipment as per guidelines from infection control for this particular patient as enacted.  Hand washing was performed before and after patient interaction.  General Appearance:    Alert, cooperative, no distress, AAOx3                          Head:    Normocephalic, without obvious abnormality, atraumatic                           Eyes:    PERRL, conjunctivae/corneas clear, EOM's intact, both eyes                         Throat:   Lips, tongue, gums " normal; oral mucosa pink and moist                           Neck:   Supple, symmetrical, trachea midline, no JVD                         Lungs:    Decreased breath sounds at the left lung base with no obvious use of accessory muscles of breathing                 Chest Wall:    No tenderness or deformity                          Heart:  S1-S2 regular                  Abdomen:   Soft nontender                 Extremities:   Extremities normal, atraumatic, no cyanosis or edema                        Pulses:   Pulses palpable in all extremities                            Skin:   Skin is warm and dry,  no rashes or palpable lesions                  Neurologic: Alert and oriented x 3       Data Review:    I reviewed the patient's new clinical results.  Results from last 7 days   Lab Units 06/21/24  0330 06/20/24  0424 06/19/24  0501 06/18/24  1440 06/14/24  1052   WBC 10*3/mm3 9.65 11.46* 13.54* 17.33* 16.33*   HEMOGLOBIN g/dL 12.3* 12.2* 12.3* 14.5 14.3   PLATELETS 10*3/mm3 255 239 262 309 297     Results from last 7 days   Lab Units 06/21/24  0330 06/20/24  0424 06/19/24  0501 06/18/24  1440   SODIUM mmol/L 134* 135* 135* 135*   POTASSIUM mmol/L 3.6 3.7 3.5 3.7   CHLORIDE mmol/L 100 104 102 98   CO2 mmol/L 26.0 25.2 24.5 25.0   BUN mg/dL 9 11 9 12   CREATININE mg/dL 0.79 0.84 0.75* 0.90   CALCIUM mg/dL 8.4* 8.4* 8.5* 9.2   GLUCOSE mg/dL 118* 131* 114* 110*     Microbiology Results (last 10 days)       Procedure Component Value - Date/Time    MRSA Screen, PCR (Inpatient) - Swab, Nares [[id number]]  (Normal) Collected: 06/19/24 0512    Lab Status: Final result Specimen: Swab from Nares Updated: 06/19/24 0717     MRSA PCR No MRSA Detected    Narrative:      The negative predictive value of this diagnostic test is high and should only be used to consider de-escalating anti-MRSA therapy. A positive result may indicate colonization with MRSA and must be correlated clinically.    Blood Culture - Blood, Arm, Left [[id number]]   (Normal) Collected: 06/18/24 1507    Lab Status: Preliminary result Specimen: Blood from Arm, Left Updated: 06/20/24 1515     Blood Culture No growth at 2 days    Blood Culture - Blood, Arm, Left [[id number]]  (Normal) Collected: 06/18/24 1507    Lab Status: Preliminary result Specimen: Blood from Arm, Left Updated: 06/20/24 1515     Blood Culture No growth at 2 days    Narrative:      Less than seven (7) mL's of blood was collected.  Insufficient quantity may yield false negative results.          US Thoracentesis    Result Date: 6/19/2024  Unsuccessful left ultrasound-guided thoracentesis. Real-time ultrasound guidance identifies the needle within the fluid collection with inability to obtain any aspirate.  Procedure performed by KATHRYN Singleton. By electronically signing this report, I, the supervising radiologist, attest that I was not present for the procedure(s) . I agree with the finalized report.  This report was finalized on 6/19/2024 3:46 PM by Dr. Jay Mckeon M.D on Workstation: [id number]      CT Chest Without Contrast Diagnostic    Result Date: 6/18/2024   1. Similar-appearing complex likely partially loculated left pleural effusion, probable empyema or possible lung abscesses, with atelectasis and consolidation of the left left lower lobe and base of the left upper lobe and lingula that could reflect pneumonia. 2. Partially visualized enlarged left hilar lymph nodes, likely reactive.  This report was finalized on 6/18/2024 8:45 PM by Deven Schmitz MD on Workstation: [id number]      XR Chest 1 View    Result Date: 6/18/2024  As described.  This report was finalized on 6/18/2024 3:09 PM by Dr. Hank Alexander M.D on Workstation: [id number]      CT Chest With Contrast Diagnostic    Result Date: 6/12/2024  Impression: Loculated pleural fluid within the inferior aspect of the left lower lung with associated pleural wall thickening and enhancement. Findings are suspicious for empyema. There is no  evidence of adjacent parenchymal consolidation. Otherwise unremarkable chest CT. Electronically Signed: Nick Bender MD  6/12/2024 12:02 PM EDT  Workstation ID: [id number]    XR Chest PA & Lateral    Result Date: 6/12/2024  Impression: Persistent left lung base consolidation with small possibly loculated pleural effusion. Electronically Signed: Nick Bender MD  6/12/2024 8:59 AM EDT  Workstation ID: [id number]    XR Chest 2 View    Result Date: 6/3/2024  Impression: Mild improvement in left lung consolidation with pleural effusion. Recommend additional follow-up to ensure further improvement/resolution. Electronically Signed: Usman Oleary MD  6/3/2024 2:46 PM EDT  Workstation ID: [id number]       Assessment:    Pneumonia    Left lower lobe pneumonia  9-tntssyycx-zcygsvxv pneumonia with progression to parapneumonic effusion and probable empyema partially treated with systemic antibiotic therapy with persistent symptoms and progressive worsening on imaging studies of the loculated effusion.  2-diabetes mellitus  3-asthma  4-morbid obesity  5-hypertension  6-other diagnosis per primary team.     Recommendations/Discussions:  Clinically improved.  All the cultures are negative and the Zosyn could very well be too much for this entity but given his failure on out of hospital antibiotic regimens that he is taking and he has improved on current regimen would recommend continuation of Zosyn.  Side effects of antibiotic therapy that can potentially occur discussed with the patient and family members including potential for nausea vomiting diarrhea rash decreased appetite loss of or lack of taste hepatic and renal dysfunction cytopenias fever rash interaction with other medications and selection of resistant pathogens.  Risk of secondary infection due to yeast and C. difficile also discussed.  Signs and symptoms of potential antibiotic toxicities are discussed with instructions to the patient to reach out to either primary care  provider or to myself at [phone] with eventual plan could be readmission or visit to the emergency room.  Recommendation from thoracic surgery service noted.  Given his improvement on IV Zosyn and sense of wellbeing with resolution of leukocytosis will recommend 4-6 weeks of IV Zosyn to be administered in out of the hospital setting with close monitoring of his clinical course, side effects and repeat imaging studies as per thoracic surgery service.  Following orders were written for case management:  Please arrange for 6 weeks IV Zosyn at 4.5 g every 8 hours starting 6/18/2024.  Check weekly CBC CMP and CRP and call abnormal results to Dr. Garza at [phone] while patient is receiving IV antibiotic therapy  Please educate patient to call Dr. Garza [phone] on a weekly basis to go review systems to monitor antibiotic toxicity and side effects.  Thoracic surgery follow-up as per thoracic surgery services recommendations including arrangement of out of hospital CT scan of the chest to monitor progression of parapneumonic effusion/empyema and loculated effusion on antibiotic therapy with assessment for need for intervention based on his clinical course and response to treatment.  Diagnosis: Complicated pneumonia with parapneumonic effusion/empyema with recommendation for medical management.  Jimmy Garza MD  6/21/2024  07:27 EDT    Parts of this note may be an electronic transcription/translation of spoken language to printed text using the Dragon dictation system.

## 2024-06-21 NOTE — DISCHARGE SUMMARY
Discharge summary    Date of admission 6/18/2024  Date of discharge 6/21/2024    Final diagnosis  Left lower lobe pneumonia with loculated empyema with unsuccessful thoracentesis  Diabetes mellitus  Hypertension  Hyperlipidemia  Morbidly obese  Gastroesophageal reflux disease    Discharge medications    Current Facility-Administered Medications:     cetirizine (zyrTEC) tablet 10 mg, 10 mg, Oral, Daily, Jeovanny Young MD, 10 mg at 06/21/24 0939    escitalopram (LEXAPRO) tablet 10 mg, 10 mg, Oral, Daily, Jeovanny Young MD, 10 mg at 06/21/24 0938    famotidine (PEPCID) tablet 20 mg, 20 mg, Oral, BID, Jeovanny Young MD, 20 mg at 06/21/24 0939    gabapentin (NEURONTIN) capsule 300 mg, 300 mg, Oral, TID, Jeovanny Young MD, 300 mg at 06/21/24 0939    guaiFENesin (MUCINEX) 12 hr tablet 1,200 mg, 1,200 mg, Oral, Q12H, Luis A Guzman APRN, 1,200 mg at 06/21/24 0938    insulin lispro (HUMALOG/ADMELOG) injection 2-7 Units, 2-7 Units, Subcutaneous, 4x Daily AC & at Bedtime, Jeovanny Young MD    piperacillin-tazobactam (ZOSYN) 4.5 g IVPB in 100 mL NS MBP (CD), 4.5 g, Intravenous, Q8H, Jimmy Garza MD, Last Rate: 0 mL/hr at 06/19/24 0245, 4.5 g at 06/21/24 0705     Consults obtained  Pulmonary  Thoracic surgery  Infectious disease    Procedures  Attempted thoracentesis under radiology    Hospital course  38-year-old white male with history of diabetes hypertension hyperlipidemia asthma who is morbidly obese admitted to emergency room with shortness of breath for several weeks and was diagnosed with pneumonia and treated with multiple rounds of oral antibiotics without any improvement and continued to have shortness of breath fever chills nonproductive cough and left-sided pleuritic pain.  Patient workup in ER revealed left lower lobe pneumonia with loculated pleural effusion suspicious for empyema admitted for management.  Patient admitted treated with broad-spectrum IV antibiotics and further evaluated by pulmonary and thoracic  surgery and infectious disease.  Patient has had attempted thoracentesis without any luck and after multiple discussion with all the consultant it is decided to treat him with broad spectrum IV antibiotics for 6 weeks and no surgical intervention needed at this time.  Patient and family agrees and we will placed PICC line and discharge him on Zosyn for total of 6 weeks with outpatient follow-up.  Patient pain improved shortness of breath improved and his white count remained within normal limit and all other medical problems stable and cleared for discharge.  Patient PICC line can be discontinued after completion of IV antibiotics.    Discharge diet regular    Activity as tolerated    Medication as above    Follow-up with prime doctor in 1 week and follow-up with thoracic surgery pulmonary and infectious disease per the instructions and take medication as directed.    LIZZY KNOX MD

## 2024-06-21 NOTE — DISCHARGE INSTR - OTHER ORDERS
YOU ARE SCHEDULED TO GO TO [facility] FOR YOUR LAB AND YOUR DRESSING CHANGES. YOU ARE SCHEDULED THE FOLLOWING DATES;  JUNE 28 1:30 PM  JULY 5 1:30 PM  JULY 12 11:30AM  JULY 19 11:00 AM  JULY 26 1:00PM  AUG 2 11:00 AM    CALL DR. MARK VICK [phone] ON A WEEKLY BASIS TO REVIEW YOUR HEALTH STATUS AND TO MONITOR ANTIBIOTIC TOXICITY AND SIDE EFFECTS.    BE SURE TO FOLLOW UP WITH THORACIC SURGERY FOR YOUR OUT OF HOSPITAL CT SCAN.

## 2024-06-21 NOTE — PLAN OF CARE
Goal Outcome Evaluation:  Plan of Care Reviewed With: patient, spouse        Progress: improving  Outcome Evaluation: pt discharged home w/ PICC line and ABX. education completed.

## 2024-06-21 NOTE — OUTREACH NOTE
Prep Survey      Flowsheet Row Responses   [facility] patient discharged from? [facility]   Is LACE score < 7 ? No   Eligibility [facility]   Date of Admission 06/18/24   Date of Discharge 06/21/24   Discharge Disposition Home or Self Care   Discharge diagnosis Pneumonia   Does the patient have one of the following disease processes/diagnoses(primary or secondary)? Pneumonia   Does the patient have Home health ordered? Yes   What is the Home health agency?  IV abx from [facility] home infusion   Is there a DME ordered? No   Comments regarding appointments ACU at [facility] for weekly labs and dressing changes to PICC line.   Prep survey completed? Yes            Annie LU - Registered Nurse

## 2024-06-21 NOTE — CASE MANAGEMENT/SOCIAL WORK
Case Management Discharge Note      Final Note: Home with [facility] infusion to supply IVAB, pt to go to ACU at [facility] for weekly labs and dressing changes to PICC line. Family to transport.         Selected Continued Care - Discharged on 6/21/2024 Admission date: 6/18/2024 - Discharge disposition: Home or Self Care      Destination    No services have been selected for the patient.                Durable Medical Equipment    No services have been selected for the patient.                Dialysis/Infusion Coordination complete.      Service Provider Selected Services Address Phone Fax Patient Preferred    [facility] Infusion and IV Therapy [address], [place] [address] [phone] --              Home Medical Care    No services have been selected for the patient.                Therapy    No services have been selected for the patient.                Community Resources    No services have been selected for the patient.                Community & DME    No services have been selected for the patient.                    Transportation Services  Private: Car    Final Discharge Disposition Code: 06 - home with home health care

## 2024-06-21 NOTE — CASE MANAGEMENT/SOCIAL WORK
Continued Stay Note  [facility]     Patient Name: Ravinder Richardsonzybnena  MRN: [id number]  Today's Date: 6/21/2024    Admit Date: 6/18/2024    Plan: Home with [facility] infusion to supply Zosyn, family to transport pt home   Discharge Plan       Row Name 06/21/24 1203       Plan    Plan Home with [facility] infusion to supply Zosyn, family to transport pt home    Plan Comments Pt to have IVAB delievered by [facility] [place] by [facility] infusion, who will also instruct pt and spouse. Pt will go to [facility] to have weekly lab work and to have his PICC line dressing changed. Dates given to pt, as well as placed on his instructions.                   Discharge Codes    No documentation.                 Expected Discharge Date and Time       Expected Discharge Date Expected Discharge Time    Jun 21, 2024               Kourtney Mishra RN

## 2024-06-21 NOTE — PROGRESS NOTES
[facility]  [phone]  Dr. Lonnie Angela    Subjective:  LOS: 2    Chief Complaint:  Loculated effusion    Remains on RA and on abx.    Objective   Vital Signs past 24hrs  Temp range: Temp (24hrs), Av.6 °F (37 °C), Min:98.2 °F (36.8 °C), Max:99.5 °F (37.5 °C)    BP range: BP: (121-137)/(65-81) 121/72  Pulse range: Heart Rate:  [] 89  Resp rate range: Resp:  [16] 16  Device (Oxygen Therapy): room airFlow (L/min):  [2] 2  Oxygen range:SpO2:  [85 %-97 %] 97 %   Mechanical Ventilator:     Physical Exam  Constitutional:       Appearance: He is obese.   Eyes:      Pupils: Pupils are equal, round, and reactive to light.   Cardiovascular:      Rate and Rhythm: Normal rate and regular rhythm.      Heart sounds: No murmur heard.  Pulmonary:      Effort: Pulmonary effort is normal.      Breath sounds: Examination of the left-lower field reveals decreased breath sounds. Decreased breath sounds present.   Abdominal:      General: Bowel sounds are normal.      Palpations: Abdomen is soft. There is no mass.      Tenderness: There is no abdominal tenderness.   Musculoskeletal:         General: No swelling.   Neurological:      Mental Status: He is alert.       Results Review:    I have reviewed the laboratory and imaging data since the last note by [facility] physician.  My annotations are noted in assessment and plan.      Result Review:  I have personally reviewed the results from last note by [facility] physician to 2024 09:54 EDT and agree with these findings:  [x]  Laboratory list / accordion  [x]  Microbiology  [x]  Radiology  []  EKG/Telemetry   []  Cardiology/Vascular   []  Pathology  []  Old records  []  Other:    Medication Review:  I have reviewed the current MAR.  My annotations are noted in assessment and plan.    cetirizine, 10 mg, Oral, Daily  escitalopram, 10 mg, Oral, Daily  famotidine, 20 mg, Oral, BID  gabapentin, 300 mg, Oral, TID  guaiFENesin, 1,200 mg, Oral, Q12H  insulin lispro, 2-7 Units,  Subcutaneous, 4x Daily AC & at Bedtime  piperacillin-tazobactam, 4.5 g, Intravenous, Q8H        hold, 1 each      Lines, Drains & Airways       Active LDAs       Name Placement date Placement time Site Days    Peripheral IV 06/18/24 1445 Anterior;Right Forearm 06/18/24  1445  Forearm  1                  Isolation status: No active isolations    Dietary Orders (From admission, onward)       Start     Ordered    06/19/24 1558  Diet: Regular/House, Diabetic; Consistent Carbohydrate; Texture: Regular (IDDSI 7); Fluid Consistency: Thin (IDDSI 0)  Diet Effective Now        References:    Diet Order [place]   Question Answer Comment   Diets: Regular/House    Diets: Diabetic    Diabetic Diet: Consistent Carbohydrate    Texture: Regular (IDDSI 7)    Fluid Consistency: Thin (IDDSI 0)        06/19/24 1558                    PCCM Problems  Loculated left pleural effusion  Recent treatment for pneumonia outpatient  Obesity  Type 2 diabetes mellitus  Ex-smoker        Plan of Treatment    Loculated pleural effusion and multiple discrete pockets with possible infection.  Discussed with Dr. Rodriguez .  He feels patient should be treated with antibiotics for appropriate duration.  Concern remains for development of pulmonary abscess.  He recommends review in his office with chest x-ray or CT chest in 3 to 4 weeks.  Patient remains on antibiotics per ID.    No objections discharge   Per us.  Must follow-up with Dr. Rodriguez in 2-4 weeks.    Lonnie Angela MD  06/21/24  09:54 EDT      Part of this note may be an electronic transcription/translation of spoken language to printed text using the Dragon Dictation System.

## 2024-06-21 NOTE — PLAN OF CARE
Goal Outcome Evaluation:              Outcome Evaluation: PAIN PILL X 1 AS HE DESCRIBES PLEURISY TYPE PAIN WITH ADEQUATE RELIEF. DOES REQUIRE O2 AT AT LEAST 2 L/M N/C OVERNIGHT WHILE SLEEPING DUE TO DESATING WITH HIS SLEEP APNEA.

## 2024-06-21 NOTE — PROGRESS NOTES
"Daily progress note    Primary care physician      Subjective  Doing same with no new complaints and agreeable to go home on IV antibiotics for 6 weeks.    History of present illness  38-year-old white male with history of diabetes mellitus hypertension hyperlipidemia asthma and gastroesophageal reflux disease who was overweight presented to [facility] emergency room with shortness of breath since May 11 and has been treated multiple times with oral antibiotics without any improvement.  Patient continued to have fever chills cough and shortness of breath.  Patient denies any chest pain but chest hurt with cough and also denies any abdominal pain nausea vomiting diarrhea.  Patient failed outpatient treatment admitted for broad-spectrum IV antibiotics and further evaluation by infectious disease and pulmonary.     REVIEW OF SYSTEMS  Unremarkable      PHYSICAL EXAM  Blood pressure 118/80, pulse 92, temperature 99.1 °F (37.3 °C), temperature source Oral, resp. rate 16, height 182.9 cm (72\"), weight 132 kg (291 lb 14.2 oz), SpO2 96%.    GENERAL: Awake and alert no acute distress  HENT: nares patent  EYES: no scleral icterus  CV: regular rhythm, normal rate  RESPIRATORY: normal effort and moving air bilaterally  ABDOMEN: soft nontender nondistended bowel sounds positive  MUSCULOSKELETAL: no deformity  NEURO: alert, moves all extremities, follows commands  PSYCH:  calm, cooperative  SKIN: warm, dry     LAB RESULTS  Lab Results (last 24 hours)       Procedure Component Value Units Date/Time    POC Glucose Once [[id number]]  (Normal) Collected: 06/21/24 1125    Specimen: Blood Updated: 06/21/24 1127     Glucose 95 mg/dL     POC Glucose Once [[id number]]  (Normal) Collected: 06/21/24 0759    Specimen: Blood Updated: 06/21/24 0801     Glucose 101 mg/dL     Basic Metabolic Panel [[id number]]  (Abnormal) Collected: 06/21/24 0330    Specimen: Blood Updated: 06/21/24 0423     Glucose 118 mg/dL      BUN 9 mg/dL      " Creatinine 0.79 mg/dL      Sodium 134 mmol/L      Potassium 3.6 mmol/L      Chloride 100 mmol/L      CO2 26.0 mmol/L      Calcium 8.4 mg/dL      BUN/Creatinine Ratio 11.4     Anion Gap 8.0 mmol/L      eGFR 117.3 mL/min/1.73     Narrative:      GFR Normal >60  Chronic Kidney Disease <60  Kidney Failure <15      CBC & Differential [[id number]]  (Abnormal) Collected: 06/21/24 0330    Specimen: Blood Updated: 06/21/24 0404    Narrative:      The following orders were created for panel order CBC & Differential.  Procedure                               Abnormality         Status                     ---------                               -----------         ------                     CBC Auto Differential[[id number]]        Abnormal            Final result                 Please view results for these tests on the individual orders.    CBC Auto Differential [[id number]]  (Abnormal) Collected: 06/21/24 0330    Specimen: Blood Updated: 06/21/24 0404     WBC 9.65 10*3/mm3      RBC 4.38 10*6/mm3      Hemoglobin 12.3 g/dL      Hematocrit 37.2 %      MCV 84.9 fL      MCH 28.1 pg      MCHC 33.1 g/dL      RDW 13.2 %      RDW-SD 41.4 fl      MPV 9.5 fL      Platelets 255 10*3/mm3      Neutrophil % 63.3 %      Lymphocyte % 24.2 %      Monocyte % 8.0 %      Eosinophil % 3.8 %      Basophil % 0.4 %      Immature Grans % 0.3 %      Neutrophils, Absolute 6.10 10*3/mm3      Lymphocytes, Absolute 2.34 10*3/mm3      Monocytes, Absolute 0.77 10*3/mm3      Eosinophils, Absolute 0.37 10*3/mm3      Basophils, Absolute 0.04 10*3/mm3      Immature Grans, Absolute 0.03 10*3/mm3      nRBC 0.0 /100 WBC     POC Glucose Once [[id number]]  (Abnormal) Collected: 06/20/24 2028    Specimen: Blood Updated: 06/20/24 2029     Glucose 135 mg/dL     POC Glucose Once [[id number]]  (Normal) Collected: 06/20/24 1603    Specimen: Blood Updated: 06/20/24 1611     Glucose 104 mg/dL     Blood Culture - Blood, Arm, Left [[id number]]  (Normal) Collected: 06/18/24 1157     Specimen: Blood from Arm, Left Updated: 06/20/24 1515     Blood Culture No growth at 2 days    Blood Culture - Blood, Arm, Left [[id number]]  (Normal) Collected: 06/18/24 1507    Specimen: Blood from Arm, Left Updated: 06/20/24 1515     Blood Culture No growth at 2 days    Narrative:      Less than seven (7) mL's of blood was collected.  Insufficient quantity may yield false negative results.          Imaging Results (Last 24 Hours)       ** No results found for the last 24 hours. **          Scan on 6/18/2024 1450 by [facility], [place]: ECG 12-LEAD         Author: -- Service: -- Author Type: --   Filed: Date of Service: Creation Time:   Status: (Other)   HEART RATE= 103  bpm  RR Interval= 583  ms  LA Interval= 125  ms  P Horizontal Axis= -20  deg  P Front Axis= 43  deg  QRSD Interval= 89  ms  QT Interval= 299  ms  QTcB= 392  ms  QRS Axis= 31  deg  T Wave Axis= 35  deg  - BORDERLINE ECG -  Sinus tachycardia  Probable left atrial enlargement  No change from previous tracing          Current Facility-Administered Medications:     acetaminophen (TYLENOL) tablet 650 mg, 650 mg, Oral, Q6H PRN, Jeovanny Young MD    albuterol sulfate HFA (PROVENTIL HFA;VENTOLIN HFA;PROAIR HFA) inhaler 2 puff, 2 puff, Inhalation, Q4H PRN, Jeovanny Young MD    cetirizine (zyrTEC) tablet 10 mg, 10 mg, Oral, Daily, Jeovanny Young MD, 10 mg at 06/21/24 0939    dextrose (D50W) (25 g/50 mL) IV injection 25 g, 25 g, Intravenous, Q15 Min PRN, Jeovanny Young MD    dextrose (GLUTOSE) oral gel 15 g, 15 g, Oral, Q15 Min PRN, Jevoanny Young MD    escitalopram (LEXAPRO) tablet 10 mg, 10 mg, Oral, Daily, Jeovanny Young MD, 10 mg at 06/21/24 0938    famotidine (PEPCID) tablet 20 mg, 20 mg, Oral, BID, Jeovanny Young MD, 20 mg at 06/21/24 0939    gabapentin (NEURONTIN) capsule 300 mg, 300 mg, Oral, TID, Jeovanny Young MD, 300 mg at 06/21/24 0939    glucagon (GLUCAGEN) injection 1 mg, 1 mg, Intramuscular, Q15 Min PRN, Jeovanny Young MD    guaiFENesin (MUCINEX) 12 hr  tablet 1,200 mg, 1,200 mg, Oral, Q12H, Luis A Guzman APRN, 1,200 mg at 06/21/24 0938    Hold medication, 1 each, Does not apply, Continuous PRN, Luis A Guzman APRN    HYDROcodone-acetaminophen (NORCO) 5-325 MG per tablet 1 tablet, 1 tablet, Oral, Q4H PRN, Lizzy Young MD, 1 tablet at 06/21/24 0940    insulin lispro (HUMALOG/ADMELOG) injection 2-7 Units, 2-7 Units, Subcutaneous, 4x Daily AC & at Bedtime, Lizzy Young MD    piperacillin-tazobactam (ZOSYN) 4.5 g IVPB in 100 mL NS MBP (CD), 4.5 g, Intravenous, Q8H, Greg, MD Jimmy, Last Rate: 0 mL/hr at 06/19/24 0245, 4.5 g at 06/21/24 0705    sodium chloride 0.9 % flush 10 mL, 10 mL, Intravenous, PRN, Emergency, Triage Protocol, MD     ASSESSMENT  Left lower lobe pneumonia with loculated empyema with unsuccessful thoracentesis  Diabetes mellitus  Hypertension  Hyperlipidemia  Morbidly obese  Gastroesophageal reflux disease    PLAN  Discharge home  Discharge summary dictated    LIZZY YOUNG MD    Copied text in this note has been reviewed and is accurate as of 06/21/24

## 2024-06-23 LAB
BACTERIA SPEC AEROBE CULT: NORMAL
BACTERIA SPEC AEROBE CULT: NORMAL

## 2024-06-24 ENCOUNTER — TELEPHONE (OUTPATIENT)
Dept: INTERNAL MEDICINE | Facility: CLINIC | Age: 39
End: 2024-06-24

## 2024-06-24 ENCOUNTER — TRANSITIONAL CARE MANAGEMENT TELEPHONE ENCOUNTER (OUTPATIENT)
Dept: CALL CENTER | Facility: HOSPITAL | Age: 39
End: 2024-06-24
Payer: COMMERCIAL

## 2024-06-24 NOTE — TELEPHONE ENCOUNTER
Caller: Ravinder Robles    Relationship: Self    Best call back number:     GunnerRavinder lee (Self) [phone] (Mobile)       What was the call regarding:     PATIENT IS ON 6 WKS OF ANTIBIOTIC PICK LINE     AT THE HOSPITAL, WAS DIAGNOSED WITH PNEUMONIA - THOSE  DRS DISCONTINUED MOUNJARO AND LASIX, METFORMIN AND LOSARTAN       HE IS ALSO NEEDING FMLA PAPERWORK NEEDED FOR HIS HOSPITAL VISIT       Is it okay if the provider responds through [employer]: CAN YOU CALL AND DISCUSS THESE CHANGES       6/18/2024 - 6/21/2024 (3 days)  [facility]

## 2024-06-24 NOTE — OUTREACH NOTE
Call Center TCM Note      Flowsheet Row Responses   [facility] patient discharged from? [facility]   Does the patient have one of the following disease processes/diagnoses(primary or secondary)? Pneumonia   TCM attempt successful? No   Unsuccessful attempts Attempt 2            Emmie Love RN    6/24/2024, 12:17 EDT

## 2024-06-24 NOTE — PAYOR COMM NOTE
"Ravinder Shankar (38 y.o. Male)                           ATTENTION; CLINICALS CASE REF [id number]                           REPLY TO [facility] DEPT  [phone]            Date of Birth   1985    Social Security Number       Address   [place] [address]    Home Phone   [phone]    MRN   [id number]       Sikhism   None    Marital Status                               Admission Date   6/18/24    Admission Type   Emergency    Admitting Provider   Jeovanny Young MD    Attending Provider       Department, Room/Bed   [address], [address]/1       Discharge Date   6/21/2024    Discharge Disposition   Home or Self Care    Discharge Destination                                 Attending Provider: (none)   Allergies: Cat Hair Extract, Lisinopril, Methylprednisolone    Isolation: None   Infection: None   Code Status: Prior    Ht: 182.9 cm (72\")   Wt: 132 kg (291 lb 14.2 oz)    Admission Cmt: None   Principal Problem: Pneumonia [J18.9]                   Active Insurance as of 6/18/2024       Primary Coverage       Payor Plan Insurance Group Employer/Plan Group    [employer] [facility] [employer] [facility] PPO [id number]       Payor Plan Address Payor Plan Phone Number Payor Plan Fax Number Effective Dates    [address] [phone]  8/1/2021 - None Entered    [place] [address]         Subscriber Name Subscriber Birth Date Member ID       RAVINDER SHANKAR 1985 [id number]                     Emergency Contacts        (Rel.) Home Phone Work Phone Mobile Phone    Cyndie Shankar (Spouse) -- -- [phone]              Vital Signs (last 7 days) before discharge       Date/Time Temp Temp src Pulse Resp BP Patient Position SpO2    06/21/24 1244 99.1 (37.3) Oral 92 16 118/80 Lying 96    06/21/24 0712 98.4 (36.9) Oral 89 16 121/72 Lying 97    06/21/24 0420 -- -- -- -- -- -- 91    06/21/24 0415 -- -- -- -- -- -- 87    06/21/24 0410 -- -- -- -- -- " -- 85     SpO2: DESATS WHILE SLEEPING. at 06/21/24 0410    06/20/24 2321 98.2 (36.8) Oral 99 16 124/65 Lying 95    06/20/24 1937 99.5 (37.5) Oral 101 16 137/78 Lying 94    06/20/24 1552 -- -- 95 16 -- -- --    06/20/24 1538 -- -- 100 16 -- -- 95    06/20/24 1348 98.3 (36.8) Oral 98 16 130/81 Lying 95    06/20/24 0755 -- -- 100 18 -- -- 99    06/20/24 0745 -- -- 100 18 -- -- 93    06/20/24 0729 98.1 (36.7) Oral 98 18 138/79 Lying 91    06/20/24 0425 -- -- -- -- -- -- 90    06/20/24 0011 -- -- -- -- -- -- 95    06/19/24 2333 98.4 (36.9) Oral 98 16 147/87 Lying 97    06/19/24 1930 98.6 (37) Oral 104 16 140/86 Sitting 93    06/19/24 1650 -- -- 94 -- -- -- 96    06/19/24 1529 -- -- 93 16 134/80 Lying 95    Comment rows:    OBSERV: Patient returned from thoracentesis. Band aid to left back clean, dry and intact. Unable to get any fluid during thoracentesis. at 06/19/24 1529    06/19/24 1405 -- -- 100 17 126/77 Lying 96    06/19/24 1300 98.1 (36.7) -- 96 -- 125/71 -- 96    06/19/24 1258 -- Oral -- 18 -- Lying --    06/19/24 0718 98.1 (36.7) Oral 88 18 134/72 Lying 94    06/19/24 0000 98.6 (37) Oral 89 18 137/73 Lying 96    06/18/24 2030 98.4 (36.9) Oral 94 16 130/74 Lying 95    06/18/24 1631 -- -- -- -- 137/76 -- --    06/18/24 1624 -- -- 97 -- -- -- 95    06/18/24 1621 -- -- 96 -- -- -- 96    06/18/24 1620 -- -- -- -- 117/63 -- --    06/18/24 1527 -- -- 96 -- -- -- 97    06/18/24 1431 -- -- 111 -- 126/74 -- 97    06/18/24 1428 -- -- 114 -- -- -- --    06/18/24 1421 -- -- -- -- 127/77 Sitting --    06/18/24 1419 100.6 (38.1) Tympanic 122 16 -- -- 97          Oxygen Therapy (last 7 days) before discharge       Date/Time SpO2 Device (Oxygen Therapy) Flow (L/min) Oxygen Concentration (%) ETCO2 (mmHg)    06/21/24 1445 -- room air -- -- --    06/21/24 1244 96 room air -- -- --    06/21/24 0830 -- room air -- -- --    06/21/24 0712 97 room air -- -- --    06/21/24 0420 91 nasal cannula 2 -- --    06/21/24 0415 87 nasal cannula 2  -- --    06/21/24 0410 85 nasal cannula 2 -- --    06/21/24 0001 -- room air -- -- --    06/20/24 2321 95 room air -- -- --    06/20/24 2022 -- room air -- -- --    06/20/24 1937 94 room air -- -- --    06/20/24 1538 95 room air -- -- --    06/20/24 1443 -- room air -- -- --    06/20/24 1348 95 -- -- -- --    06/20/24 0822 -- room air -- -- --    06/20/24 0755 99 -- -- -- --    06/20/24 0745 93 room air -- -- --    06/20/24 0729 91 -- -- -- --    06/20/24 0425 90 nasal cannula 2 -- --    06/20/24 0011 95 nasal cannula 2 -- --    06/19/24 2333 97 room air -- -- --    06/19/24 2020 -- room air -- -- --    06/19/24 1930 93 room air -- -- --    06/19/24 1650 96 room air -- -- --    06/19/24 1529 95 room air -- -- --    06/19/24 1405 96 room air -- -- --    06/19/24 1300 96 -- -- -- --    06/19/24 1258 -- room air -- -- --    06/19/24 0823 -- room air -- -- --    06/19/24 0718 94 -- -- -- --    06/19/24 0030 -- nasal cannula 2 -- --    06/19/24 0000 96 room air -- -- --    06/18/24 2045 -- room air -- -- --    06/18/24 2030 95 room air -- -- --    06/18/24 1843 -- room air -- -- --    06/18/24 1624 95 -- -- -- --    06/18/24 1621 96 -- -- -- --    06/18/24 1527 97 -- -- -- --    06/18/24 1431 97 -- -- -- --    06/18/24 1419 97 room air -- -- --          Orders (last 7 days)        Start     Ordered    06/22/24 0400  Extravasation Standing Orders - Encourage Active Range of Motion After 48 Hours  As Needed,   Status:  Canceled       06/20/24 2103    06/22/24 0000  cetirizine (zyrTEC) 10 MG tablet  Daily         06/21/24 1408    06/21/24 1408  Discharge patient  Once         06/21/24 1408    06/21/24 1128  POC Glucose Once  PROCEDURE ONCE        Comments: Complete no more than 45 minutes prior to patient eating      06/21/24 1125    06/21/24 0945  Inpatient IV Team Consult PICC 1 Lumen  Once        Comments: For IV antibiotics at home   Provider:  (Not yet assigned)    06/21/24 0946    06/21/24 0945  RN To Release PICC  Line Care Orders Once Line in Place  Once         06/21/24 0946    06/21/24 0802  POC Glucose Once  PROCEDURE ONCE        Comments: Complete no more than 45 minutes prior to patient eating      06/21/24 0759    06/21/24 0733  Inpatient Case Management  Consult  Once        Comments: · Given his improvement on IV Zosyn and sense of wellbeing with resolution of leukocytosis will recommend 4-6 weeks of IV Zosyn to be administered in out of the hospital setting with close monitoring of his clinical course, side effects and repeat imaging studies as per thoracic surgery service.  · Following orders were written for case management:  · Please arrange for 6 weeks IV Zosyn at 4.5 g every 8 hours starting 6/18/2024.  Check weekly CBC CMP and CRP and call abnormal results to Dr. Garza at [phone] while patient is receiving IV antibiotic therapy  · Please educate patient to call Dr. Garza [phone] on a weekly basis to go review systems to monitor antibiotic toxicity and side effects.  · Thoracic surgery follow-up as per thoracic surgery services recommendations including arrangement of out of hospital CT scan of the chest to monitor progression of parapneumonic effusion/empyema and loculated effusion on antibiotic therapy with assessment for need for intervention based on his clinical course and response to treatment.  · Diagnosis: Complicated pneumonia with parapneumonic effusion/empyema with recommendation for medical management.   Provider:  (Not yet assigned)    06/21/24 0732    06/21/24 0600  CBC & Differential  Morning Draw         06/20/24 1515    06/21/24 0600  Basic Metabolic Panel  Morning Draw         06/20/24 1515    06/21/24 0600  CBC Auto Differential  PROCEDURE ONCE         06/20/24 2202    06/21/24 0000  Ambulatory Referral to [facility] For Infusion Treatment         06/21/24 1143    06/21/24 0000  guaiFENesin (MUCINEX) 600 MG 12 hr tablet  Every 12 Hours Scheduled         06/21/24 1408    06/21/24  0000  sodium chloride 0.9 % solution 100 mL with piperacillin-tazobactam 4.5 (4-0.5) g reconstituted solution 4.5 g IVPB  Every 8 Hours         06/21/24 1408    06/21/24 0000  HYDROcodone-acetaminophen (Norco) 5-325 MG per tablet  Every 6 Hours PRN         06/21/24 1556    06/20/24 2200  hyaluronidase 150 units in NS 10 ml syringe  Once         06/20/24 2103 06/20/24 2200  Apply Warm Compress to Affected Area for 20 Minutes  4 Times Daily,   Status:  Canceled       06/20/24 2103 06/20/24 2103  Extravasation Standing Orders - IMMEDIATELY STOP INFUSION  Once,   Status:  Canceled         06/20/24 2103 06/20/24 2103  Extravasation Standing Orders - DO NOT REMOVE CATHETER / NEEDLE  Once,   Status:  Canceled         06/20/24 2103 06/20/24 2103  Extravasation Standing Orders - AVOID PRESSURE  Once,   Status:  Canceled         06/20/24 2103 06/20/24 2103  Extravasation Standing Orders - Disconnect the IV Administration Set  Once,   Status:  Canceled         06/20/24 2103 06/20/24 2103  Extravasation Standing Orders - Evaluate the Site for Extravasation of Fluid (Check for Blood Return)  Once,   Status:  Canceled         06/20/24 2103 06/20/24 2103  Extravasation Standing Orders - Aspirate as Much of the Medication From the Needle / Cannula As Possible Using A Small (1-5mL) Syringe - Verify Recommended Treatment - If Antidote Does Not Require Infusion Through Extravasated Line Remove Needle / Cannula After Aspiration  Once,   Status:  Canceled         06/20/24 2103 06/20/24 2103  Extravasation Standing Orders - Hilario Around the Area With A Pen  Once,   Status:  Canceled         06/20/24 2103 06/20/24 2103  Extravasation Standing Orders - Photograph the Area for Medical Record If Indicated Per Photo Policy  Once,   Status:  Canceled         06/20/24 2103 06/20/24 2103  Extravasation Standing Orders - Elevate Affected Extremity for 24-48 Hours  Continuous,   Status:  Canceled         06/20/24 2103     06/20/24 2103  Notify Provider Prior to Administration of Antidote - Extravasation Standing Orders  Continuous,   Status:  Canceled        Comments: Open Order Report to View Parameters Requiring Provider Notification    06/20/24 2103 06/20/24 2103  Notify Provider for Tissue Sloughing, Necrosis or Blistering at Extravasation Site  Continuous,   Status:  Canceled        Comments: Open Order Report to View Parameters Requiring Provider Notification    06/20/24 2103 06/20/24 2103  Indicate Extravasated Medication to Determine Antidote to Initiate  Once,   Status:  Canceled        Comments: Select infusion extravasated to determine antidote to initiate. (Search by generic name)    06/20/24 2103 06/20/24 2030  POC Glucose Once  PROCEDURE ONCE        Comments: Complete no more than 45 minutes prior to patient eating      06/20/24 2028    06/20/24 1612  POC Glucose Once  PROCEDURE ONCE        Comments: Complete no more than 45 minutes prior to patient eating      06/20/24 1603    06/20/24 1330  Vancomycin, Trough  Timed,   Status:  Canceled         06/18/24 2325    06/20/24 1241  Patient May Shower  Once         06/20/24 1240    06/20/24 1151  POC Glucose Once  PROCEDURE ONCE        Comments: Complete no more than 45 minutes prior to patient eating      06/20/24 1148    06/20/24 0751  POC Glucose Once  PROCEDURE ONCE        Comments: Complete no more than 45 minutes prior to patient eating      06/20/24 0749    06/20/24 0600  XR Chest 1 View  1 Time Imaging         06/19/24 1034    06/20/24 0600  CBC & Differential  Morning Draw         06/19/24 1405    06/20/24 0600  Basic Metabolic Panel  Morning Draw         06/19/24 1405    06/20/24 0600  CBC Auto Differential  PROCEDURE ONCE         06/19/24 2202    06/19/24 2100  guaiFENesin (MUCINEX) 12 hr tablet 1,200 mg  Every 12 Hours Scheduled,   Status:  Discontinued         06/19/24 1636    06/19/24 2007  POC Glucose Once  PROCEDURE ONCE        Comments: Complete  no more than 45 minutes prior to patient eating      06/19/24 2005 06/19/24 1930  Oscillating Positive Expiratory Pressure (OPEP)  Every 4 Hours - RT,   Status:  Canceled       06/19/24 1636 06/19/24 1730  ipratropium-albuterol (DUO-NEB) nebulizer solution 3 mL  Every 8 Hours,   Status:  Discontinued         06/19/24 1636    06/19/24 1730  acetylcysteine (MUCOMYST) 20 % nebulizer solution 3 mL  Every 8 Hours - RT,   Status:  Discontinued         06/19/24 1636    06/19/24 1700  Incentive Spirometry  Every Hour While Awake,   Status:  Canceled       06/19/24 1636    06/19/24 1558  Diet: Regular/House, Diabetic; Consistent Carbohydrate; Texture: Regular (IDDSI 7); Fluid Consistency: Thin (IDDSI 0)  Diet Effective Now,   Status:  Canceled         06/19/24 1558    06/19/24 1530  lidocaine (XYLOCAINE) 1 % injection 10 mL  Once         06/19/24 1435    06/19/24 1400  Vancomycin HCl 1,250 mg in sodium chloride 0.9 % 250 mL VTB  Every 12 Hours,   Status:  Discontinued         06/19/24 0801    06/19/24 1314  Inpatient Admission  Once         06/19/24 1314    06/19/24 1137  POC Glucose Once  PROCEDURE ONCE        Comments: Complete no more than 45 minutes prior to patient eating      06/19/24 1133    06/19/24 1030  Obtain Informed Consent  Once         06/19/24 1034    06/19/24 1030  US Thoracentesis  1 Time Imaging         06/19/24 1034    06/19/24 1029  Hold medication  Continuous PRN,   Status:  Discontinued         06/19/24 1034    06/19/24 0818  POC Glucose Once  PROCEDURE ONCE        Comments: Complete no more than 45 minutes prior to patient eating      06/19/24 0815    06/19/24 0600  pantoprazole (PROTONIX) EC tablet 40 mg  Every Early Morning,   Status:  Discontinued         06/18/24 1717 06/19/24 0600  CBC & Differential  Morning Draw         06/18/24 1718    06/19/24 0600  Comprehensive Metabolic Panel  Morning Draw         06/18/24 1718    06/19/24 0600  TSH  Morning Draw         06/18/24 1719 06/19/24  0600  Lipid Panel  Morning Draw         06/18/24 1719 06/19/24 0600  Hemoglobin A1c  Morning Draw         06/18/24 1719 06/19/24 0600  CBC Auto Differential  PROCEDURE ONCE         06/18/24 2202    06/19/24 0600  vancomycin (VANCOCIN) 1,000 mg in sodium chloride 0.9 % 250 mL IVPB-VTB  Every 8 Hours,   Status:  Discontinued         06/18/24 2325    06/19/24 0001  NPO Diet NPO Type: Strict NPO  Diet Effective Midnight,   Status:  Canceled         06/18/24 1931    06/19/24 0000  BNP  Once         06/18/24 1719 06/18/24 2300  piperacillin-tazobactam (ZOSYN) 4.5 g IVPB in 100 mL NS MBP (CD)  Every 8 Hours,   Status:  Discontinued         06/18/24 1731 06/18/24 2200  POC Glucose 4x Daily Before Meals & at Bedtime  4 Times Daily Before Meals & at Bedtime,   Status:  Canceled      Comments: Complete no more than 45 minutes prior to patient eating      06/18/24 1718 06/18/24 2100  famotidine (PEPCID) tablet 20 mg  2 Times Daily,   Status:  Discontinued         06/18/24 1717 06/18/24 2100  guaiFENesin (MUCINEX) 12 hr tablet 600 mg  Every 12 Hours Scheduled,   Status:  Discontinued         06/18/24 1717 06/18/24 2035  POC Glucose Once  PROCEDURE ONCE        Comments: Complete no more than 45 minutes prior to patient eating      06/18/24 2032 06/18/24 1929  Respiratory Culture - Sputum, Cough  Once         06/18/24 1929 06/18/24 1806  CT Chest Without Contrast Diagnostic  1 Time Imaging         06/18/24 1806 06/18/24 1734  insulin lispro (HUMALOG/ADMELOG) injection 2-7 Units  4 Times Daily Before Meals & Nightly,   Status:  Discontinued         06/18/24 1718 06/18/24 1733  escitalopram (LEXAPRO) tablet 10 mg  Daily,   Status:  Discontinued         06/18/24 1717 06/18/24 1733  gabapentin (NEURONTIN) capsule 300 mg  3 Times Daily,   Status:  Discontinued         06/18/24 1717 06/18/24 1733  losartan (COZAAR) tablet 50 mg  Daily,   Status:  Discontinued         06/18/24 1717 06/18/24 1738   cetirizine (zyrTEC) tablet 10 mg  Daily,   Status:  Discontinued         06/18/24 1717    06/18/24 1732  sodium chloride 0.9 % infusion  Continuous,   Status:  Discontinued         06/18/24 1716    06/18/24 1732  Inpatient Infectious Diseases Consult  Once        Specialty:  Infectious Diseases  Provider:  Jimmy Garza MD    06/18/24 1731    06/18/24 1732  Activity As Tolerated  Until Discontinued,   Status:  Canceled         06/18/24 1732    06/18/24 1731  sepsis fluid LR bolus 2,328 mL  Once,   Status:  Discontinued         06/18/24 1715    06/18/24 1731  acetaminophen (TYLENOL) tablet 1,000 mg  Once,   Status:  Discontinued         06/18/24 1715    06/18/24 1731  Inpatient Thoracic Surgery Consult  Once        Specialty:  Thoracic Surgery  Provider:  Davis Rodriguez MD    06/18/24 1730    06/18/24 1730  HYDROcodone-acetaminophen (NORCO) 5-325 MG per tablet 1 tablet  Every 4 Hours PRN,   Status:  Discontinued         06/18/24 1730    06/18/24 1730  HYDROcodone-acetaminophen (NORCO) 5-325 MG per tablet 1 tablet  Every 6 Hours PRN,   Status:  Discontinued         06/18/24 1730    06/18/24 1730  piperacillin-tazobactam (ZOSYN) 3.375 g IVPB in 100 mL NS MBP (CD)  Once,   Status:  Discontinued         06/18/24 1714    06/18/24 1725  Inpatient Pulmonology Consult  Once,   Status:  Canceled        Specialty:  Pulmonary Disease  Provider:  Aamir Dailey MD    06/18/24 1725    06/18/24 1724  acetaminophen (TYLENOL) tablet 650 mg  Every 6 Hours PRN,   Status:  Discontinued         06/18/24 1725    06/18/24 1720  Place Sequential Compression Device  Once,   Status:  Canceled         06/18/24 1719    06/18/24 1720  Maintain Sequential Compression Device  Continuous,   Status:  Canceled         06/18/24 1719    06/18/24 1720  Code Status and Medical Interventions:  Continuous,   Status:  Canceled         06/18/24 1719 06/18/24 1719  BNP  Once,   Status:  Canceled         06/18/24 1718    06/18/24 1718  Follow Hypoglycemia  Standing Orders For Blood Glucose <70 & Notify Provider of Treatment  As Needed,   Status:  Canceled      Comments: Follow Hypoglycemia Orders As Outlined in Process Instructions (Open Order Report to View Full Instructions)  Notify Provider Any Time Hypoglycemia Treatment is Administered    06/18/24 1718 06/18/24 1718  dextrose (GLUTOSE) oral gel 15 g  Every 15 Minutes PRN,   Status:  Discontinued         06/18/24 1718 06/18/24 1718  dextrose (D50W) (25 g/50 mL) IV injection 25 g  Every 15 Minutes PRN,   Status:  Discontinued         06/18/24 1718 06/18/24 1718  glucagon (GLUCAGEN) injection 1 mg  Every 15 Minutes PRN,   Status:  Discontinued         06/18/24 1718 06/18/24 1717  MRSA Screen, PCR (Inpatient) - Swab, Nares  Once         06/18/24 1716 06/18/24 1716  albuterol sulfate HFA (PROVENTIL HFA;VENTOLIN HFA;PROAIR HFA) inhaler 2 puff  Every 4 Hours PRN,   Status:  Discontinued         06/18/24 1717 06/18/24 1716  Diet: Regular/House; Fluid Consistency: Thin (IDDSI 0)  Diet Effective Now,   Status:  Canceled         06/18/24 1715    06/18/24 1715  Pharmacy to dose vancomycin  Continuous PRN,   Status:  Discontinued         06/18/24 1716 06/18/24 1715  Pharmacy to Dose Zosyn  Continuous PRN,   Status:  Discontinued         06/18/24 1716 06/18/24 1608  piperacillin-tazobactam (ZOSYN) 4.5 g IVPB in 100 mL NS MBP (CD)  Once         06/18/24 1552    06/18/24 1606  piperacillin-tazobactam (ZOSYN) 3.375 g IVPB in 100 mL NS MBP (CD)  Once,   Status:  Discontinued         06/18/24 1550    06/18/24 1606  vancomycin 2750 mg/500 mL 0.9% NS IVPB ([facility])  Once         06/18/24 1550    06/18/24 1553  Initiate Observation Status  Once         06/18/24 1552    06/18/24 1543  LIPPS (on-call MD unless specified)  Once,   Status:  Canceled        Specialty:  Internal Medicine  Provider:  Jeovanny Young MD    06/18/24 1542    06/18/24 9471  Reassess & Document Temperature 30-60 Minutes After Antipyretic  Administration  Once         06/18/24 1433    06/18/24 1500  sepsis fluid LR bolus 2,328 mL  Once         06/18/24 1444    06/18/24 1449  acetaminophen (TYLENOL) tablet 1,000 mg  Once         06/18/24 1433    06/18/24 1434  NPO Diet NPO Type: Sips with Meds  Diet Effective Now,   Status:  Canceled        Comments: May Take Antipyretic Medications    06/18/24 1433    06/18/24 1434  Verify & Document Antipyretic Administration  Once,   Status:  Canceled         06/18/24 1433    06/18/24 1430  Blood Culture - Blood, Arm, Left  Once         06/18/24 1429    06/18/24 1430  Blood Culture - Blood, Arm, Left  Once         06/18/24 1429    06/18/24 1430  Lactic Acid, Plasma  Once         06/18/24 1429    06/18/24 1430  Procalcitonin  Once         06/18/24 1429    06/18/24 1429  NPO Diet NPO Type: Strict NPO  Diet Effective Now,   Status:  Canceled         06/18/24 1429    06/18/24 1429  Undress & Gown  Once         06/18/24 1429    06/18/24 1429  Cardiac Monitoring  Continuous,   Status:  Canceled        Comments: Follow Standing Orders As Outlined in Process Instructions (Open Order Report to View Full Instructions)    06/18/24 1429    06/18/24 1429  Continuous Pulse Oximetry  Continuous         06/18/24 1429    06/18/24 1429  Vital Signs  Per Hospital Policy         06/18/24 1429    06/18/24 1429  ECG 12 Lead ED Triage Standing Order; SOA  Once         06/18/24 1429    06/18/24 1429  XR Chest 1 View  1 Time Imaging         06/18/24 1429    06/18/24 1429  Insert Peripheral IV  Once,   Status:  Canceled         06/18/24 1429    06/18/24 1429  Weare Draw  Once         06/18/24 1429    06/18/24 1429  CBC & Differential  Once         06/18/24 1429    06/18/24 1429  Comprehensive Metabolic Panel  Once         06/18/24 1429    06/18/24 1429  BNP  Once         06/18/24 1429    06/18/24 1429  Single High Sensitivity Troponin T  Once         06/18/24 1429    06/18/24 1429  Green Top (Gel)  PROCEDURE ONCE         06/18/24 1421     06/18/24 1429  Lavender Top  PROCEDURE ONCE         06/18/24 1429    06/18/24 1429  Gold Top - SST  PROCEDURE ONCE         06/18/24 1429    06/18/24 1429  Light Blue Top  PROCEDURE ONCE         06/18/24 1429    06/18/24 1429  CBC Auto Differential  PROCEDURE ONCE         06/18/24 1429    06/18/24 1428  Oxygen Therapy- Nasal Cannula; Titrate 1-6 LPM Per SpO2; 90 - 95%  Continuous PRN,   Status:  Canceled       06/18/24 1429    06/18/24 1428  sodium chloride 0.9 % flush 10 mL  As Needed,   Status:  Discontinued         06/18/24 1429    06/18/24 0000  Telemetry Scan  Once         06/18/24 0000    06/18/24 0000  Telemetry Scan  Once         06/18/24 0000    06/18/24 0000  Telemetry Scan  Once         06/18/24 0000    06/18/24 0000  Telemetry Scan  Once         06/18/24 0000    06/18/24 0000  Telemetry Scan  Once         06/18/24 0000    06/18/24 0000  Telemetry Scan  Once         06/18/24 0000    06/18/24 0000  Telemetry Scan  Once         06/18/24 0000    06/18/24 0000  Telemetry Scan  Once         06/18/24 0000    06/18/24 0000  Telemetry Scan  Once         06/18/24 0000    Signed and Held  Obtain Informed Consent  Once,   Status:  Canceled         Signed and Held    Signed and Held  CT Guided Chest Tube  1 Time Imaging,   Status:  Canceled         Signed and Held    Signed and Held  Body Fluid Cell Count With Differential - Body Fluid, Pleural Cavity  Once,   Status:  Canceled         Signed and Held    Signed and Held  pH, Body Fluid - Body Fluid, Pleural Cavity  Once,   Status:  Canceled         Signed and Held    Signed and Held  Protein, Body Fluid - Body Fluid, Pleural Cavity  Once,   Status:  Canceled         Signed and Held    Signed and Held  Lactate Dehydrogenase, Body Fluid - Body Fluid, Pleural Cavity  Once,   Status:  Canceled         Signed and Held    Signed and Held  Glucose, Body Fluid - Body Fluid, Pleural Cavity  Once,   Status:  Canceled         Signed and Held    Signed and Held  Albumin, Fluid -  Body Fluid, Pleural Cavity  Once,   Status:  Canceled         Signed and Held    Signed and Held  Triglycerides, Body Fluid - Body Fluid, Pleural Cavity  Once,   Status:  Canceled         Signed and Held    Signed and Held  Cholesterol, Body Fluid - Bile, Pleural Cavity  Once,   Status:  Canceled         Signed and Held    Signed and Held  Amylase, Body Fluid - Body Fluid, Pleural Cavity  Once,   Status:  Canceled         Signed and Held    Signed and Held  Body Fluid Culture - Body Fluid, Pleural Cavity  Once,   Status:  Canceled         Signed and Held    Signed and Held  Anaerobic Culture - Swab, Pleural Cavity  Once,   Status:  Canceled         Signed and Held    Signed and Held  Fungus Smear - Sputum, Pleural Cavity  Once,   Status:  Canceled         Signed and Held    Signed and Held  Fungus Culture - Aspirate, Pleural Cavity  Once,   Status:  Canceled         Signed and Held    Signed and Held  AFB Culture - Aspirate, Pleural Cavity  Once,   Status:  Canceled         Signed and Held    Signed and Held  Non-gynecologic Cytology  Once,   Status:  Canceled         Signed and Held    Signed and Held  Body Fluid Cell Count With Differential - Pleural Fluid, Pleural Cavity  STAT         Signed and Held    Signed and Held  pH, Body Fluid - Pleural Fluid, Pleural Cavity  STAT         Signed and Held    Signed and Held  Lactate Dehydrogenase, Body Fluid - Pleural Fluid, Pleural Cavity  STAT         Signed and Held    Signed and Held  Glucose, Body Fluid - Pleural Fluid, Pleural Cavity  STAT         Signed and Held    Signed and Held  Protein, Body Fluid - Pleural Fluid, Pleural Cavity  STAT         Signed and Held    Signed and Held  AFB Culture - Body Fluid, Pleural Cavity  STAT         Signed and Held    Signed and Held  Body Fluid Culture - Body Fluid, Pleural Cavity  STAT         Signed and Held    Signed and Held  Anaerobic Culture - Pleural Fluid, Pleural Cavity  STAT         Signed and Held    Signed and Held   Fungus Culture - Body Fluid, Pleural Cavity  STAT         Signed and Held    Signed and Held  KOH Prep - Body Fluid, Pleural Cavity  STAT         Signed and Held    Signed and Held  Fungus Smear - Body Fluid, Pleural Cavity  STAT         Signed and Held    Signed and Held  sodium chloride 0.9 % flush 10 mL  Every 12 Hours Scheduled         Signed and Held    Signed and Held  sodium chloride 0.9 % flush 10 mL  As Needed         Signed and Held    Signed and Held  sodium chloride 0.9 % flush 20 mL  As Needed         Signed and Held    Signed and Held  Change Transparent Dressing With CHG Disk & Securement Device Every 7 Days  Weekly        Comments: Per [facility] Policy    Signed and Held    Signed and Held  Change Dressing to IV Site As Needed When Damp, Loose or Soiled  As Needed         Signed and Held    Signed and Held  Connectors / Hubs Must Be Scrubbed 15 Seconds Using 70% Alcohol & Allowed to Dry Before Accessing Line  Continuous         Signed and Held    Signed and Held  Change Needleless Connectors  Per Order Details        Comments: Change Needleless Connectors When:   - Administration Set Changed   - Dressing Changed   - Removed For Any Reason   - Residual Blood or Debris Within Connector   - Prior to Drawing Blood Cultures   - Contamination of Connector   - After Administration of Blood or Blood Components    Signed and Held    Signed and Held  Daily CHG Bath While Central Line in Place  Daily       Signed and Held    Signed and Held  Connectors / Hubs Must Be Scrubbed 15 Seconds Using 70% Alcohol Before Access - Allow to Dry Before Accessing Line  Continuous         Signed and Held    Signed and Held  Change Dressing to IV Site As Needed When Damp, Loose or Soiled  As Needed         Signed and Held    Signed and Held  Change Needleless Connectors  As Needed        Comments: Change Needleless Connectors When:  - Administration Set Changed  - Dressing Changed  - Removed For Any Reason  - Residual Blood or  Debris Within Connector  - Prior to Drawing Blood Cultures  - Contamination of Connector  - After Administration of Blood or Blood Components    Signed and Held    Signed and Held  Catheter Care PICC  Per Order Details        Comments: 1) Follow PICC Protocol For Care  2) No Blood Pressure in Arm With PICC  3) Warm Packs to PICC Arm As Needed For Discomfort  4) Measure & Record Arm Circumference if Patient Experiences Pain, Swelling, Redness or Warmth in PICC Arm; Compare Measurement to Initial Measure, Report to Provider if Greater Than 3cm Difference  5) Follow Flush Protocol Per Facility    Signed and Held    Signed and Held  Ensure Needleless Connectors are In Place  Per Order Details        Comments: Change Needleless Connectors Per [facility] Policy    Signed and Held    Signed and Held  No Venipuncture or BP in PICC Arm  Continuous         Signed and Held    Signed and Held  For Sluggish / Occluded Line, Use CATHFLO Activase Per Policy (Per IV Nurse Only)  Once        Comments: Per Facility Policy    Signed and Held    Signed and Held  Use 3CG Technology For Placement Verification (PICC Nurse)  Once         Signed and Held    Signed and Held  sodium chloride 0.9 % flush 10 mL  Every 12 Hours Scheduled         Signed and Held    Signed and Held  sodium chloride 0.9 % flush 10 mL  As Needed         Signed and Held    Signed and Held  sodium chloride 0.9 % flush 20 mL  As Needed         Signed and Held    Signed and Held  sodium chloride 0.9 % infusion 40 mL  As Needed         Signed and Held    Signed and Held  Change CHG Dressing or Transparent Dressing with CHG Disk, Needleless Connectors and Securement Device Every 7 Days  Weekly        Comments: Per [facility] Policy    Signed and Held    Signed and Held  Daily CHG Bath While Central Line in Place  Daily       Signed and Held                     Physician Progress Notes (all)        Jeovanny Young MD at 06/21/24 1405          Daily progress note    Primary care  "physician      Subjective  Doing same with no new complaints and agreeable to go home on IV antibiotics for 6 weeks.    History of present illness  38-year-old white male with history of diabetes mellitus hypertension hyperlipidemia asthma and gastroesophageal reflux disease who was overweight presented to [facility] emergency room with shortness of breath since May 11 and has been treated multiple times with oral antibiotics without any improvement.  Patient continued to have fever chills cough and shortness of breath.  Patient denies any chest pain but chest hurt with cough and also denies any abdominal pain nausea vomiting diarrhea.  Patient failed outpatient treatment admitted for broad-spectrum IV antibiotics and further evaluation by infectious disease and pulmonary.     REVIEW OF SYSTEMS  Unremarkable      PHYSICAL EXAM  Blood pressure 118/80, pulse 92, temperature 99.1 °F (37.3 °C), temperature source Oral, resp. rate 16, height 182.9 cm (72\"), weight 132 kg (291 lb 14.2 oz), SpO2 96%.    GENERAL: Awake and alert no acute distress  HENT: nares patent  EYES: no scleral icterus  CV: regular rhythm, normal rate  RESPIRATORY: normal effort and moving air bilaterally  ABDOMEN: soft nontender nondistended bowel sounds positive  MUSCULOSKELETAL: no deformity  NEURO: alert, moves all extremities, follows commands  PSYCH:  calm, cooperative  SKIN: warm, dry     LAB RESULTS  Lab Results (last 24 hours)       Procedure Component Value Units Date/Time    POC Glucose Once [[id number]]  (Normal) Collected: 06/21/24 1125    Specimen: Blood Updated: 06/21/24 1127     Glucose 95 mg/dL     POC Glucose Once [[id number]]  (Normal) Collected: 06/21/24 0759    Specimen: Blood Updated: 06/21/24 0801     Glucose 101 mg/dL     Basic Metabolic Panel [[id number]]  (Abnormal) Collected: 06/21/24 0330    Specimen: Blood Updated: 06/21/24 0423     Glucose 118 mg/dL      BUN 9 mg/dL      Creatinine 0.79 mg/dL      Sodium " 134 mmol/L      Potassium 3.6 mmol/L      Chloride 100 mmol/L      CO2 26.0 mmol/L      Calcium 8.4 mg/dL      BUN/Creatinine Ratio 11.4     Anion Gap 8.0 mmol/L      eGFR 117.3 mL/min/1.73     Narrative:      GFR Normal >60  Chronic Kidney Disease <60  Kidney Failure <15      CBC & Differential [[id number]]  (Abnormal) Collected: 06/21/24 0330    Specimen: Blood Updated: 06/21/24 0404    Narrative:      The following orders were created for panel order CBC & Differential.  Procedure                               Abnormality         Status                     ---------                               -----------         ------                     CBC Auto Differential[[id number]]        Abnormal            Final result                 Please view results for these tests on the individual orders.    CBC Auto Differential [[id number]]  (Abnormal) Collected: 06/21/24 0330    Specimen: Blood Updated: 06/21/24 0404     WBC 9.65 10*3/mm3      RBC 4.38 10*6/mm3      Hemoglobin 12.3 g/dL      Hematocrit 37.2 %      MCV 84.9 fL      MCH 28.1 pg      MCHC 33.1 g/dL      RDW 13.2 %      RDW-SD 41.4 fl      MPV 9.5 fL      Platelets 255 10*3/mm3      Neutrophil % 63.3 %      Lymphocyte % 24.2 %      Monocyte % 8.0 %      Eosinophil % 3.8 %      Basophil % 0.4 %      Immature Grans % 0.3 %      Neutrophils, Absolute 6.10 10*3/mm3      Lymphocytes, Absolute 2.34 10*3/mm3      Monocytes, Absolute 0.77 10*3/mm3      Eosinophils, Absolute 0.37 10*3/mm3      Basophils, Absolute 0.04 10*3/mm3      Immature Grans, Absolute 0.03 10*3/mm3      nRBC 0.0 /100 WBC     POC Glucose Once [[id number]]  (Abnormal) Collected: 06/20/24 2028    Specimen: Blood Updated: 06/20/24 2029     Glucose 135 mg/dL     POC Glucose Once [[id number]]  (Normal) Collected: 06/20/24 1603    Specimen: Blood Updated: 06/20/24 1611     Glucose 104 mg/dL     Blood Culture - Blood, Arm, Left [[id number]]  (Normal) Collected: 06/18/24 1507    Specimen: Blood from Arm, Left  Updated: 06/20/24 1515     Blood Culture No growth at 2 days    Blood Culture - Blood, Arm, Left [[id number]]  (Normal) Collected: 06/18/24 1507    Specimen: Blood from Arm, Left Updated: 06/20/24 1515     Blood Culture No growth at 2 days    Narrative:      Less than seven (7) mL's of blood was collected.  Insufficient quantity may yield false negative results.          Imaging Results (Last 24 Hours)       ** No results found for the last 24 hours. **          Scan on 6/18/2024 1450 by [facility], [place]: ECG 12-LEAD         Author: -- Service: -- Author Type: --   Filed: Date of Service: Creation Time:   Status: (Other)   HEART RATE= 103  bpm  RR Interval= 583  ms  MI Interval= 125  ms  P Horizontal Axis= -20  deg  P Front Axis= 43  deg  QRSD Interval= 89  ms  QT Interval= 299  ms  QTcB= 392  ms  QRS Axis= 31  deg  T Wave Axis= 35  deg  - BORDERLINE ECG -  Sinus tachycardia  Probable left atrial enlargement  No change from previous tracing          Current Facility-Administered Medications:     acetaminophen (TYLENOL) tablet 650 mg, 650 mg, Oral, Q6H PRN, Jeovanny Young MD    albuterol sulfate HFA (PROVENTIL HFA;VENTOLIN HFA;PROAIR HFA) inhaler 2 puff, 2 puff, Inhalation, Q4H PRN, Jeovanny Young MD    cetirizine (zyrTEC) tablet 10 mg, 10 mg, Oral, Daily, Jeovanny Young MD, 10 mg at 06/21/24 0939    dextrose (D50W) (25 g/50 mL) IV injection 25 g, 25 g, Intravenous, Q15 Min PRN, Jeovanny Young MD    dextrose (GLUTOSE) oral gel 15 g, 15 g, Oral, Q15 Min PRN, Jeovanny Young MD    escitalopram (LEXAPRO) tablet 10 mg, 10 mg, Oral, Daily, Jeovanny Young MD, 10 mg at 06/21/24 0938    famotidine (PEPCID) tablet 20 mg, 20 mg, Oral, BID, Jeovanny Young MD, 20 mg at 06/21/24 0939    gabapentin (NEURONTIN) capsule 300 mg, 300 mg, Oral, TID, Jeovanny Young MD, 300 mg at 06/21/24 0939    glucagon (GLUCAGEN) injection 1 mg, 1 mg, Intramuscular, Q15 Min PRN, Jeovanny Young MD    guaiFENesin (MUCINEX) 12 hr tablet 1,200 mg, 1,200 mg, Oral,  Q12H, Luis A Guzman APRN, 1,200 mg at 24 0938    Hold medication, 1 each, Does not apply, Continuous PRN, Luis A Guzman APRN    HYDROcodone-acetaminophen (NORCO) 5-325 MG per tablet 1 tablet, 1 tablet, Oral, Q4H PRN, Lizzy Young MD, 1 tablet at 24 0940    insulin lispro (HUMALOG/ADMELOG) injection 2-7 Units, 2-7 Units, Subcutaneous, 4x Daily AC & at Bedtime, Lizzy Young MD    piperacillin-tazobactam (ZOSYN) 4.5 g IVPB in 100 mL NS MBP (CD), 4.5 g, Intravenous, Q8H, GregJimmy MD, Last Rate: 0 mL/hr at 24 0245, 4.5 g at 24 0705    sodium chloride 0.9 % flush 10 mL, 10 mL, Intravenous, PRN, Emergency, Triage Protocol, MD     ASSESSMENT  Left lower lobe pneumonia with loculated empyema with unsuccessful thoracentesis  Diabetes mellitus  Hypertension  Hyperlipidemia  Morbidly obese  Gastroesophageal reflux disease    PLAN  Discharge home  Discharge summary dictated    LIZZY YOUNG MD    Copied text in this note has been reviewed and is accurate as of 24           Electronically signed by Lizzy Young MD at 24 1402       Lonnie Angela MD at 24 0954              [facility]  [phone]  Dr. Lonnie Angela    Subjective:  LOS: 2    Chief Complaint:  Loculated effusion    Remains on RA and on abx.    Objective   Vital Signs past 24hrs  Temp range: Temp (24hrs), Av.6 °F (37 °C), Min:98.2 °F (36.8 °C), Max:99.5 °F (37.5 °C)    BP range: BP: (121-137)/(65-81) 121/72  Pulse range: Heart Rate:  [] 89  Resp rate range: Resp:  [16] 16  Device (Oxygen Therapy): room airFlow (L/min):  [2] 2  Oxygen range:SpO2:  [85 %-97 %] 97 %   Mechanical Ventilator:     Physical Exam  Constitutional:       Appearance: He is obese.   Eyes:      Pupils: Pupils are equal, round, and reactive to light.   Cardiovascular:      Rate and Rhythm: Normal rate and regular rhythm.      Heart sounds: No murmur heard.  Pulmonary:      Effort: Pulmonary effort is normal.      Breath  sounds: Examination of the left-lower field reveals decreased breath sounds. Decreased breath sounds present.   Abdominal:      General: Bowel sounds are normal.      Palpations: Abdomen is soft. There is no mass.      Tenderness: There is no abdominal tenderness.   Musculoskeletal:         General: No swelling.   Neurological:      Mental Status: He is alert.       Results Review:    I have reviewed the laboratory and imaging data since the last note by [facility] physician.  My annotations are noted in assessment and plan.      Result Review:  I have personally reviewed the results from last note by [facility] physician to 6/21/2024 09:54 EDT and agree with these findings:  [x]  Laboratory list / accordion  [x]  Microbiology  [x]  Radiology  []  EKG/Telemetry   []  Cardiology/Vascular   []  Pathology  []  Old records  []  Other:    Medication Review:  I have reviewed the current MAR.  My annotations are noted in assessment and plan.    cetirizine, 10 mg, Oral, Daily  escitalopram, 10 mg, Oral, Daily  famotidine, 20 mg, Oral, BID  gabapentin, 300 mg, Oral, TID  guaiFENesin, 1,200 mg, Oral, Q12H  insulin lispro, 2-7 Units, Subcutaneous, 4x Daily AC & at Bedtime  piperacillin-tazobactam, 4.5 g, Intravenous, Q8H        hold, 1 each      Lines, Drains & Airways       Active LDAs       Name Placement date Placement time Site Days    Peripheral IV 06/18/24 1445 Anterior;Right Forearm 06/18/24  1445  Forearm  1                  Isolation status: No active isolations    Dietary Orders (From admission, onward)       Start     Ordered    06/19/24 1558  Diet: Regular/House, Diabetic; Consistent Carbohydrate; Texture: Regular (IDDSI 7); Fluid Consistency: Thin (IDDSI 0)  Diet Effective Now        References:    Diet Order [place]   Question Answer Comment   Diets: Regular/House    Diets: Diabetic    Diabetic Diet: Consistent Carbohydrate    Texture: Regular (IDDSI 7)    Fluid Consistency: Thin (IDDSI 0)        06/19/24 1558                     PCCM Problems  Loculated left pleural effusion  Recent treatment for pneumonia outpatient  Obesity  Type 2 diabetes mellitus  Ex-smoker        Plan of Treatment    Loculated pleural effusion and multiple discrete pockets with possible infection.  Discussed with Dr. Rodriguez .  He feels patient should be treated with antibiotics for appropriate duration.  Concern remains for development of pulmonary abscess.  He recommends review in his office with chest x-ray or CT chest in 3 to 4 weeks.  Patient remains on antibiotics per ID.    No objections discharge   Per us.  Must follow-up with Dr. Rodriguez in 2-4 weeks.    Lonnie Angela MD  24  09:54 EDT      Part of this note may be an electronic transcription/translation of spoken language to printed text using the Dragon Dictation System.      Electronically signed by Lonnie Angela MD at 24 1509       Jimmy Garza MD at 24 0727            Infectious Diseases Progress Note    Jimmy Garza MD     [facility]  [place]: 2 days  Patient Identification:  Name: Ravinder Robles  Age: 38 y.o.  Sex: male  :  1985  MRN: [id number]         Primary Care Physician: Ruiz Escamilla MD (Tony)    Seen later in the day.  Was gone for attempted thoracentesis.    Subjective: feeling better than how he felt when he came to the hospital  Interval History:   2024 had attempted thoracentesis which was unsuccessful  2024 Thoracic surgery recommend iv antibiotics with repeat CT scan of chest in 4 weeks to decide whether intervention is needed  Objective:    Scheduled Meds:cetirizine, 10 mg, Oral, Daily  escitalopram, 10 mg, Oral, Daily  famotidine, 20 mg, Oral, BID  gabapentin, 300 mg, Oral, TID  guaiFENesin, 1,200 mg, Oral, Q12H  insulin lispro, 2-7 Units, Subcutaneous, 4x Daily AC & at Bedtime  piperacillin-tazobactam, 4.5 g, Intravenous, Q8H      Continuous Infusions:hold, 1 each        Vital signs in last 24 hours:  Temp:  [98.1 °F (36.7 °C)-99.5  "°F (37.5 °C)] 98.2 °F (36.8 °C)  Heart Rate:  [] 99  Resp:  [16-18] 16  BP: (124-138)/(65-81) 124/65    Intake/Output:  No intake or output data in the 24 hours ending 06/21/24 0727      Exam:  /65 (BP Location: Left arm, Patient Position: Lying)   Pulse 99   Temp 98.2 °F (36.8 °C) (Oral)   Resp 16   Ht 182.9 cm (72\")   Wt 132 kg (291 lb 14.2 oz)   SpO2 95%   BMI 39.59 kg/m²   Patient is examined using the personal protective equipment as per guidelines from infection control for this particular patient as enacted.  Hand washing was performed before and after patient interaction.  General Appearance:    Alert, cooperative, no distress, AAOx3                          Head:    Normocephalic, without obvious abnormality, atraumatic                           Eyes:    PERRL, conjunctivae/corneas clear, EOM's intact, both eyes                         Throat:   Lips, tongue, gums normal; oral mucosa pink and moist                           Neck:   Supple, symmetrical, trachea midline, no JVD                         Lungs:    Decreased breath sounds at the left lung base with no obvious use of accessory muscles of breathing                 Chest Wall:    No tenderness or deformity                          Heart:  S1-S2 regular                  Abdomen:   Soft nontender                 Extremities:   Extremities normal, atraumatic, no cyanosis or edema                        Pulses:   Pulses palpable in all extremities                            Skin:   Skin is warm and dry,  no rashes or palpable lesions                  Neurologic: Alert and oriented x 3       Data Review:    I reviewed the patient's new clinical results.  Results from last 7 days   Lab Units 06/21/24  0330 06/20/24  0424 06/19/24  0501 06/18/24  1440 06/14/24  1052   WBC 10*3/mm3 9.65 11.46* 13.54* 17.33* 16.33*   HEMOGLOBIN g/dL 12.3* 12.2* 12.3* 14.5 14.3   PLATELETS 10*3/mm3 255 239 262 309 297     Results from last 7 days   Lab " Units 06/21/24  0330 06/20/24  0424 06/19/24  0501 06/18/24  1440   SODIUM mmol/L 134* 135* 135* 135*   POTASSIUM mmol/L 3.6 3.7 3.5 3.7   CHLORIDE mmol/L 100 104 102 98   CO2 mmol/L 26.0 25.2 24.5 25.0   BUN mg/dL 9 11 9 12   CREATININE mg/dL 0.79 0.84 0.75* 0.90   CALCIUM mg/dL 8.4* 8.4* 8.5* 9.2   GLUCOSE mg/dL 118* 131* 114* 110*     Microbiology Results (last 10 days)       Procedure Component Value - Date/Time    MRSA Screen, PCR (Inpatient) - Swab, Nares [[id number]]  (Normal) Collected: 06/19/24 0512    Lab Status: Final result Specimen: Swab from Nares Updated: 06/19/24 0717     MRSA PCR No MRSA Detected    Narrative:      The negative predictive value of this diagnostic test is high and should only be used to consider de-escalating anti-MRSA therapy. A positive result may indicate colonization with MRSA and must be correlated clinically.    Blood Culture - Blood, Arm, Left [[id number]]  (Normal) Collected: 06/18/24 1507    Lab Status: Preliminary result Specimen: Blood from Arm, Left Updated: 06/20/24 1515     Blood Culture No growth at 2 days    Blood Culture - Blood, Arm, Left [[id number]]  (Normal) Collected: 06/18/24 1507    Lab Status: Preliminary result Specimen: Blood from Arm, Left Updated: 06/20/24 1515     Blood Culture No growth at 2 days    Narrative:      Less than seven (7) mL's of blood was collected.  Insufficient quantity may yield false negative results.          US Thoracentesis    Result Date: 6/19/2024  Unsuccessful left ultrasound-guided thoracentesis. Real-time ultrasound guidance identifies the needle within the fluid collection with inability to obtain any aspirate.  Procedure performed by KATHRYN Singleton. By electronically signing this report, I, the supervising radiologist, attest that I was not present for the procedure(s) . I agree with the finalized report.  This report was finalized on 6/19/2024 3:46 PM by Dr. Jay Mckeon M.D on Workstation: [id number]      CT Chest Without  Contrast Diagnostic    Result Date: 6/18/2024   1. Similar-appearing complex likely partially loculated left pleural effusion, probable empyema or possible lung abscesses, with atelectasis and consolidation of the left left lower lobe and base of the left upper lobe and lingula that could reflect pneumonia. 2. Partially visualized enlarged left hilar lymph nodes, likely reactive.  This report was finalized on 6/18/2024 8:45 PM by Deven Schmitz MD on Workstation: [id number]      XR Chest 1 View    Result Date: 6/18/2024  As described.  This report was finalized on 6/18/2024 3:09 PM by Dr. Hank Alexander M.D on Workstation: [id number]      CT Chest With Contrast Diagnostic    Result Date: 6/12/2024  Impression: Loculated pleural fluid within the inferior aspect of the left lower lung with associated pleural wall thickening and enhancement. Findings are suspicious for empyema. There is no evidence of adjacent parenchymal consolidation. Otherwise unremarkable chest CT. Electronically Signed: Nick Bender MD  6/12/2024 12:02 PM EDT  Workstation ID: [id number]    XR Chest PA & Lateral    Result Date: 6/12/2024  Impression: Persistent left lung base consolidation with small possibly loculated pleural effusion. Electronically Signed: Nick Bender MD  6/12/2024 8:59 AM EDT  Workstation ID: [id number]    XR Chest 2 View    Result Date: 6/3/2024  Impression: Mild improvement in left lung consolidation with pleural effusion. Recommend additional follow-up to ensure further improvement/resolution. Electronically Signed: Usman Oleary MD  6/3/2024 2:46 PM EDT  Workstation ID: [id number]       Assessment:    Pneumonia    Left lower lobe pneumonia  5-btsmhenhi-gwwhvcga pneumonia with progression to parapneumonic effusion and probable empyema partially treated with systemic antibiotic therapy with persistent symptoms and progressive worsening on imaging studies of the loculated effusion.  2-diabetes mellitus  3-asthma  4-morbid  obesity  5-hypertension  6-other diagnosis per primary team.     Recommendations/Discussions:  Clinically improved.  All the cultures are negative and the Zosyn could very well be too much for this entity but given his failure on out of hospital antibiotic regimens that he is taking and he has improved on current regimen would recommend continuation of Zosyn.  Side effects of antibiotic therapy that can potentially occur discussed with the patient and family members including potential for nausea vomiting diarrhea rash decreased appetite loss of or lack of taste hepatic and renal dysfunction cytopenias fever rash interaction with other medications and selection of resistant pathogens.  Risk of secondary infection due to yeast and C. difficile also discussed.  Signs and symptoms of potential antibiotic toxicities are discussed with instructions to the patient to reach out to either primary care provider or to myself at [phone] with eventual plan could be readmission or visit to the emergency room.  Recommendation from thoracic surgery service noted.  Given his improvement on IV Zosyn and sense of wellbeing with resolution of leukocytosis will recommend 4-6 weeks of IV Zosyn to be administered in out of the hospital setting with close monitoring of his clinical course, side effects and repeat imaging studies as per thoracic surgery service.  Following orders were written for case management:  Please arrange for 6 weeks IV Zosyn at 4.5 g every 8 hours starting 6/18/2024.  Check weekly CBC CMP and CRP and call abnormal results to Dr. Garza at [phone] while patient is receiving IV antibiotic therapy  Please educate patient to call Dr. Garza [phone] on a weekly basis to go review systems to monitor antibiotic toxicity and side effects.  Thoracic surgery follow-up as per thoracic surgery services recommendations including arrangement of out of hospital CT scan of the chest to monitor progression of parapneumonic  "effusion/empyema and loculated effusion on antibiotic therapy with assessment for need for intervention based on his clinical course and response to treatment.  Diagnosis: Complicated pneumonia with parapneumonic effusion/empyema with recommendation for medical management.  Jimmy Garza MD  6/21/2024  07:27 EDT    Parts of this note may be an electronic transcription/translation of spoken language to printed text using the Dragon dictation system.      Electronically signed by Jimmy Garza MD at 06/21/24 1727       Luis A Guzman APRN at 06/20/24 1748       Attestation signed by Davis Rodriguez MD at 06/20/24 1809    I have reviewed this documentation and agree.                      Chief Complaint: Left lower lobe PNA, Effusion.   S/P: Attempted thoracentesis, Left     Subjective:  Symptoms:  Improved.  He reports chest pain.  No shortness of breath or cough.    Diet:  Adequate intake.  No nausea or vomiting.    Activity level: Normal.    Pain:  He complains of pain that is mild.  (Left pleuritic pain with coughing, deep inspiration).       Vital Signs:  Temp:  [98.1 °F (36.7 °C)-98.6 °F (37 °C)] 98.3 °F (36.8 °C)  Heart Rate:  [] 95  Resp:  [16-18] 16  BP: (130-147)/(79-87) 130/81    Intake & Output (last day)         06/19 0701  06/20 0700 06/20 0701  06/21 0700    I.V. (mL/kg)      IV Piggyback 250     Total Intake(mL/kg) 250 (1.9)     Net +250           Urine Unmeasured Occurrence  2 x            Objective:  General Appearance:  In no acute distress.    Vital signs: (most recent): Blood pressure 130/81, pulse 95, temperature 98.3 °F (36.8 °C), temperature source Oral, resp. rate 16, height 182.9 cm (72\"), weight 132 kg (291 lb 14.2 oz), SpO2 95%.    Lungs:  Normal effort and normal respiratory rate.  He is not in respiratory distress.    Heart: Normal rate.  Regular rhythm.    Chest: Symmetric chest wall expansion.   Abdomen: Abdomen is soft and non-distended.    Neurological: Patient is alert and " oriented to person, place and time.    Skin:  Warm and dry.              Results Review:     I reviewed the patient's new clinical results.  I reviewed the patient's new imaging results and agree with the interpretation.  Discussed with patient, nurse, and Libertad Man & Jennifer     Imaging Results (Last 24 Hours)       Procedure Component Value Units Date/Time    XR Chest 1 View [[id number]] Collected: 06/20/24 0854     Updated: 06/20/24 0902    Narrative:      ONE-VIEW PORTABLE CHEST AT 5:17 A.M.     HISTORY: Follow-up of left pleural effusion.     FINDINGS: The patient had attempted left thoracentesis yesterday which  was unsuccessful and review of the chest CT scan performed 2 days ago on  6/18/2024 shows considerable dense pleural thickening and adjacent  atelectasis and consolidation rather than pleural effusion at the left  base. There is persistent increased density in the lower left chest on  today's chest x-ray that is unchanged from 2 days ago. There is no  pneumothorax following attempted thoracentesis. The right lung remains  clear.     This report was finalized on 6/20/2024 8:59 AM by Dr. Adryan Villarreal M.D  on Workstation: [id number]               Lab Results:     Lab Results (last 24 hours)       Procedure Component Value Units Date/Time    POC Glucose Once [[id number]]  (Normal) Collected: 06/20/24 1603    Specimen: Blood Updated: 06/20/24 1611     Glucose 104 mg/dL     Blood Culture - Blood, Arm, Left [[id number]]  (Normal) Collected: 06/18/24 1507    Specimen: Blood from Arm, Left Updated: 06/20/24 1515     Blood Culture No growth at 2 days    Blood Culture - Blood, Arm, Left [[id number]]  (Normal) Collected: 06/18/24 1507    Specimen: Blood from Arm, Left Updated: 06/20/24 1515     Blood Culture No growth at 2 days    Narrative:      Less than seven (7) mL's of blood was collected.  Insufficient quantity may yield false negative results.    POC Glucose Once [[id number]]  (Normal) Collected: 06/20/24 1148     Specimen: Blood Updated: 06/20/24 1150     Glucose 121 mg/dL     POC Glucose Once [[id number]]  (Normal) Collected: 06/20/24 0749    Specimen: Blood Updated: 06/20/24 0750     Glucose 122 mg/dL     Basic Metabolic Panel [[id number]]  (Abnormal) Collected: 06/20/24 0424    Specimen: Blood Updated: 06/20/24 0506     Glucose 131 mg/dL      BUN 11 mg/dL      Creatinine 0.84 mg/dL      Sodium 135 mmol/L      Potassium 3.7 mmol/L      Chloride 104 mmol/L      CO2 25.2 mmol/L      Calcium 8.4 mg/dL      BUN/Creatinine Ratio 13.1     Anion Gap 5.8 mmol/L      eGFR 115.2 mL/min/1.73     Narrative:      GFR Normal >60  Chronic Kidney Disease <60  Kidney Failure <15      CBC & Differential [[id number]]  (Abnormal) Collected: 06/20/24 0424    Specimen: Blood Updated: 06/20/24 0452    Narrative:      The following orders were created for panel order CBC & Differential.  Procedure                               Abnormality         Status                     ---------                               -----------         ------                     CBC Auto Differential[[id number]]        Abnormal            Final result                 Please view results for these tests on the individual orders.    CBC Auto Differential [[id number]]  (Abnormal) Collected: 06/20/24 0424    Specimen: Blood Updated: 06/20/24 0452     WBC 11.46 10*3/mm3      RBC 4.41 10*6/mm3      Hemoglobin 12.2 g/dL      Hematocrit 37.5 %      MCV 85.0 fL      MCH 27.7 pg      MCHC 32.5 g/dL      RDW 13.1 %      RDW-SD 40.6 fl      MPV 9.6 fL      Platelets 239 10*3/mm3      Neutrophil % 65.7 %      Lymphocyte % 24.1 %      Monocyte % 7.3 %      Eosinophil % 2.3 %      Basophil % 0.3 %      Immature Grans % 0.3 %      Neutrophils, Absolute 7.53 10*3/mm3      Lymphocytes, Absolute 2.76 10*3/mm3      Monocytes, Absolute 0.84 10*3/mm3      Eosinophils, Absolute 0.26 10*3/mm3      Basophils, Absolute 0.03 10*3/mm3      Immature Grans, Absolute 0.04 10*3/mm3      nRBC 0.0 /100  WBC     POC Glucose Once [[id number]]  (Normal) Collected: 24    Specimen: Blood Updated: 24     Glucose 103 mg/dL              Assessment & Plan       Pneumonia    Left lower lobe pneumonia       Assessment & Plan  Denies any dyspnea or productive cough.  Primary complaint today is left-sided pleuritic pain worsened by deep inspiration or cough.  Urine analgesics for pleurisy.  Attempted thoracentesis yesterday with no fluid drained.  Leukocytosis nearly resolved.  Follow-up chest x-ray performed today demonstrates persistent increased density in the left lower chest likely due to significant intraparenchymal consolidation.  Do not suspect any significant volume effusion.      Discussed case with Libertad Rodriguez and Magda.  We do not recommend further instrumentation to his chest at this time.  We recommend prolonged course of IV antibiotics x 4 to 6 weeks, defer to infectious disease.  Will arrange for outpatient follow-up CT chest lung with subsequent appointment in 4 weeks to monitor his progress.  Recommend continuation of good pulmonary hygiene.  Increase activity as able.  Not much more to add from thoracic surgery standpoint.  Will sign off at this time.  Please call with any questions.    KATHRYN Pena  [facility]  24  17:48 EDT            Electronically signed by Davis Rodriguez MD at 24 2438       Lonnie Angela MD at 24 1681              [facility]  [phone]  Dr. Lonnie Angela    Subjective:  LOS: 1    Chief Complaint:  Loculated effusion    Attempted pleural tap yesterday but unsuccessful.  Patient feels about the same and remains on room air.    Objective   Vital Signs past 24hrs  Temp range: Temp (24hrs), Av.3 °F (36.8 °C), Min:98.1 °F (36.7 °C), Max:98.6 °F (37 °C)    BP range: BP: (125-147)/(71-87) 138/79  Pulse range: Heart Rate:  [] 100  Resp rate range: Resp:  [16-18] 18  Device (Oxygen Therapy): room airFlow (L/min):   [2] 2  Oxygen range:SpO2:  [90 %-99 %] 99 %   Mechanical Ventilator:     Physical Exam  Constitutional:       Appearance: He is obese.   Eyes:      Pupils: Pupils are equal, round, and reactive to light.   Cardiovascular:      Rate and Rhythm: Normal rate and regular rhythm.      Heart sounds: No murmur heard.  Pulmonary:      Effort: Pulmonary effort is normal.      Breath sounds: Examination of the left-lower field reveals decreased breath sounds. Decreased breath sounds present.   Abdominal:      General: Bowel sounds are normal.      Palpations: Abdomen is soft. There is no mass.      Tenderness: There is no abdominal tenderness.   Musculoskeletal:         General: No swelling.   Neurological:      Mental Status: He is alert.       Results Review:    I have reviewed the laboratory and imaging data since the last note by [facility] physician.  My annotations are noted in assessment and plan.      Result Review:  I have personally reviewed the results from last note by [facility] physician to 6/20/2024 09:55 EDT and agree with these findings:  [x]  Laboratory list / accordion  [x]  Microbiology  [x]  Radiology  []  EKG/Telemetry   []  Cardiology/Vascular   []  Pathology  []  Old records  []  Other:    Medication Review:  I have reviewed the current MAR.  My annotations are noted in assessment and plan.    acetylcysteine, 3 mL, Nebulization, Q8H - RT  cetirizine, 10 mg, Oral, Daily  escitalopram, 10 mg, Oral, Daily  famotidine, 20 mg, Oral, BID  gabapentin, 300 mg, Oral, TID  guaiFENesin, 1,200 mg, Oral, Q12H  insulin lispro, 2-7 Units, Subcutaneous, 4x Daily AC & at Bedtime  ipratropium-albuterol, 3 mL, Nebulization, Q8H  piperacillin-tazobactam, 4.5 g, Intravenous, Q8H        hold, 1 each  sodium chloride, 50 mL/hr, Last Rate: 50 mL/hr (06/19/24 1613)      Lines, Drains & Airways       Active LDAs       Name Placement date Placement time Site Days    Peripheral IV 06/18/24 1445 Anterior;Right Forearm 06/18/24  1445  Forearm   1                  Isolation status: No active isolations    Dietary Orders (From admission, onward)       Start     Ordered    06/19/24 1558  Diet: Regular/House, Diabetic; Consistent Carbohydrate; Texture: Regular (IDDSI 7); Fluid Consistency: Thin (IDDSI 0)  Diet Effective Now        References:    Diet Order [place]   Question Answer Comment   Diets: Regular/House    Diets: Diabetic    Diabetic Diet: Consistent Carbohydrate    Texture: Regular (IDDSI 7)    Fluid Consistency: Thin (IDDSI 0)        06/19/24 1558                    PCCM Problems  Loculated left pleural effusion  Recent treatment for pneumonia outpatient  Obesity  Type 2 diabetes mellitus  Ex-smoker        Plan of Treatment    Loculated pleural effusion and multiple discrete pockets with possible infection.  Discussed with Dr. Rodriguez today.  He feels patient should be treated with antibiotics for appropriate duration.  Concern remains for development of pulmonary abscess.  He recommends review in his office with chest x-ray or CT chest in 3 to 4 weeks.  Patient remains on antibiotics per ID.        Lonnie Angela MD  06/20/24  09:55 EDT      Part of this note may be an electronic transcription/translation of spoken language to printed text using the Dragon Dictation System.      Electronically signed by Lonnie Angela MD at 06/20/24 9649       Jeovanny Young MD at 06/20/24 0912          Daily progress note    Primary care physician      Subjective  Doing same with no new complaints.  Patient still have pleuritic pain with shortness of breath with minimal exertion and left-sided thoracentesis was unsuccessful yesterday.    History of present illness  38-year-old white male with history of diabetes mellitus hypertension hyperlipidemia asthma and gastroesophageal reflux disease who was overweight presented to [facility] [place] emergency room with shortness of breath since May 11 and has been treated multiple times with oral antibiotics without any  "improvement.  Patient continued to have fever chills cough and shortness of breath.  Patient denies any chest pain but chest hurt with cough and also denies any abdominal pain nausea vomiting diarrhea.  Patient failed outpatient treatment admitted for broad-spectrum IV antibiotics and further evaluation by infectious disease and pulmonary.     REVIEW OF SYSTEMS  Unremarkable except pleuritic pain and shortness of breath     PHYSICAL EXAM  Blood pressure 130/81, pulse 98, temperature 98.3 °F (36.8 °C), temperature source Oral, resp. rate 16, height 182.9 cm (72\"), weight 132 kg (291 lb 14.2 oz), SpO2 95%.    GENERAL: Awake and alert no acute distress  HENT: nares patent  EYES: no scleral icterus  CV: regular rhythm, normal rate  RESPIRATORY: normal effort and moving air bilaterally  ABDOMEN: soft nontender nondistended bowel sounds positive  MUSCULOSKELETAL: no deformity  NEURO: alert, moves all extremities, follows commands  PSYCH:  calm, cooperative  SKIN: warm, dry     LAB RESULTS  Lab Results (last 24 hours)       Procedure Component Value Units Date/Time    POC Glucose Once [[id number]]  (Normal) Collected: 06/20/24 1148    Specimen: Blood Updated: 06/20/24 1150     Glucose 121 mg/dL     POC Glucose Once [[id number]]  (Normal) Collected: 06/20/24 0749    Specimen: Blood Updated: 06/20/24 0750     Glucose 122 mg/dL     Basic Metabolic Panel [[id number]]  (Abnormal) Collected: 06/20/24 0424    Specimen: Blood Updated: 06/20/24 0506     Glucose 131 mg/dL      BUN 11 mg/dL      Creatinine 0.84 mg/dL      Sodium 135 mmol/L      Potassium 3.7 mmol/L      Chloride 104 mmol/L      CO2 25.2 mmol/L      Calcium 8.4 mg/dL      BUN/Creatinine Ratio 13.1     Anion Gap 5.8 mmol/L      eGFR 115.2 mL/min/1.73     Narrative:      GFR Normal >60  Chronic Kidney Disease <60  Kidney Failure <15      CBC & Differential [[id number]]  (Abnormal) Collected: 06/20/24 0424    Specimen: Blood Updated: 06/20/24 0452    Narrative:      The " following orders were created for panel order CBC & Differential.  Procedure                               Abnormality         Status                     ---------                               -----------         ------                     CBC Auto Differential[[id number]]        Abnormal            Final result                 Please view results for these tests on the individual orders.    CBC Auto Differential [[id number]]  (Abnormal) Collected: 06/20/24 0424    Specimen: Blood Updated: 06/20/24 0452     WBC 11.46 10*3/mm3      RBC 4.41 10*6/mm3      Hemoglobin 12.2 g/dL      Hematocrit 37.5 %      MCV 85.0 fL      MCH 27.7 pg      MCHC 32.5 g/dL      RDW 13.1 %      RDW-SD 40.6 fl      MPV 9.6 fL      Platelets 239 10*3/mm3      Neutrophil % 65.7 %      Lymphocyte % 24.1 %      Monocyte % 7.3 %      Eosinophil % 2.3 %      Basophil % 0.3 %      Immature Grans % 0.3 %      Neutrophils, Absolute 7.53 10*3/mm3      Lymphocytes, Absolute 2.76 10*3/mm3      Monocytes, Absolute 0.84 10*3/mm3      Eosinophils, Absolute 0.26 10*3/mm3      Basophils, Absolute 0.03 10*3/mm3      Immature Grans, Absolute 0.04 10*3/mm3      nRBC 0.0 /100 WBC     POC Glucose Once [[id number]]  (Normal) Collected: 06/19/24 2005    Specimen: Blood Updated: 06/19/24 2006     Glucose 103 mg/dL     Blood Culture - Blood, Arm, Left [[id number]]  (Normal) Collected: 06/18/24 1507    Specimen: Blood from Arm, Left Updated: 06/19/24 1515     Blood Culture No growth at 24 hours    Blood Culture - Blood, Arm, Left [[id number]]  (Normal) Collected: 06/18/24 1507    Specimen: Blood from Arm, Left Updated: 06/19/24 1515     Blood Culture No growth at 24 hours    Narrative:      Less than seven (7) mL's of blood was collected.  Insufficient quantity may yield false negative results.          Imaging Results (Last 24 Hours)       Procedure Component Value Units Date/Time    XR Chest 1 View [[id number]] Collected: 06/20/24 0854     Updated: 06/20/24 0902     Narrative:      ONE-VIEW PORTABLE CHEST AT 5:17 A.M.     HISTORY: Follow-up of left pleural effusion.     FINDINGS: The patient had attempted left thoracentesis yesterday which  was unsuccessful and review of the chest CT scan performed 2 days ago on  6/18/2024 shows considerable dense pleural thickening and adjacent  atelectasis and consolidation rather than pleural effusion at the left  base. There is persistent increased density in the lower left chest on  today's chest x-ray that is unchanged from 2 days ago. There is no  pneumothorax following attempted thoracentesis. The right lung remains  clear.     This report was finalized on 6/20/2024 8:59 AM by Dr. Adryan Villarreal M.D  on Workstation: [id number]        Thoracentesis [[id number]] Collected: 06/19/24 1537    Specimen: Body Fluid Updated: 06/19/24 1549    Narrative:      ULTRASOUND-GUIDED LEFT THORACENTESIS, 6/19/2024     HISTORY:   38-year-old male with loculated left pleural effusion.     CONSENT:   The procedure, risks and alternatives were discussed in detail with the  patient, emphasizing risks of pneumothorax, chest tube placement and  bleeding. Questions were entertained, and written informed consent was  obtained. Timeout was observed in the procedure room for patient  identification and procedure site verification, and the procedure site  was marked by me. Permanent images were recorded.     PROCEDURE:   Using sterile technique, 1% lidocaine local anesthetic and real-time  ultrasound guidance, a 5 Uzbek thoracentesis catheter was placed into  the largest portion of the pleural effusion. No fluid was able to be  aspirated. The patient remained stable in the department during and  after the procedure.           Impression:      Unsuccessful left ultrasound-guided thoracentesis. Real-time ultrasound  guidance identifies the needle within the fluid collection with  inability to obtain any aspirate.     Procedure performed by KATHRYN Singleton.  By  electronically signing this report, I, the supervising radiologist,  attest that I was not present for the procedure(s) . I agree with the  finalized report.     This report was finalized on 6/19/2024 3:46 PM by Dr. Jay Mckeon M.D on Workstation: [id number]             Scan on 6/18/2024 1450 by [facility], [place]: ECG 12-LEAD         Author: -- Service: -- Author Type: --   Filed: Date of Service: Creation Time:   Status: (Other)   HEART RATE= 103  bpm  RR Interval= 583  ms  AK Interval= 125  ms  P Horizontal Axis= -20  deg  P Front Axis= 43  deg  QRSD Interval= 89  ms  QT Interval= 299  ms  QTcB= 392  ms  QRS Axis= 31  deg  T Wave Axis= 35  deg  - BORDERLINE ECG -  Sinus tachycardia  Probable left atrial enlargement  No change from previous tracing          Current Facility-Administered Medications:     acetaminophen (TYLENOL) tablet 650 mg, 650 mg, Oral, Q6H PRN, Jeovanny Young MD    acetylcysteine (MUCOMYST) 20 % nebulizer solution 3 mL, 3 mL, Nebulization, Q8H - RT, Luis A Guzman, APRN, 3 mL at 06/20/24 0745    albuterol sulfate HFA (PROVENTIL HFA;VENTOLIN HFA;PROAIR HFA) inhaler 2 puff, 2 puff, Inhalation, Q4H PRN, Jeovanny Young MD    cetirizine (zyrTEC) tablet 10 mg, 10 mg, Oral, Daily, Jeovanny Young MD, 10 mg at 06/20/24 0822    dextrose (D50W) (25 g/50 mL) IV injection 25 g, 25 g, Intravenous, Q15 Min PRN, Jeovanny Young MD    dextrose (GLUTOSE) oral gel 15 g, 15 g, Oral, Q15 Min PRN, Jeovanny Young MD    escitalopram (LEXAPRO) tablet 10 mg, 10 mg, Oral, Daily, Jeovanny Young MD, 10 mg at 06/20/24 0822    famotidine (PEPCID) tablet 20 mg, 20 mg, Oral, BID, Jeovanny Young MD, 20 mg at 06/20/24 0822    gabapentin (NEURONTIN) capsule 300 mg, 300 mg, Oral, TID, Jeovanny Young MD, 300 mg at 06/20/24 0822    glucagon (GLUCAGEN) injection 1 mg, 1 mg, Intramuscular, Q15 Min PRN, Jeovanny Young MD    guaiFENesin (MUCINEX) 12 hr tablet 1,200 mg, 1,200 mg, Oral, Q12H, Luis A Guzman, KATHRYN, 1,200 mg at 06/20/24  0822    Hold medication, 1 each, Does not apply, Continuous PRN, Luis A Guzman, APRN    HYDROcodone-acetaminophen (NORCO) 5-325 MG per tablet 1 tablet, 1 tablet, Oral, Q4H PRN, Lizzy Young MD, 1 tablet at 24 0822    insulin lispro (HUMALOG/ADMELOG) injection 2-7 Units, 2-7 Units, Subcutaneous, 4x Daily AC & at Bedtime, Lizzy Young MD    ipratropium-albuterol (DUO-NEB) nebulizer solution 3 mL, 3 mL, Nebulization, Q8H, Luis A Guzman APRN, 3 mL at 24 0744    piperacillin-tazobactam (ZOSYN) 4.5 g IVPB in 100 mL NS MBP (CD), 4.5 g, Intravenous, Q8H, Lizzy Young MD, Last Rate: 0 mL/hr at 24 0245, 4.5 g at 24 0605    sodium chloride 0.9 % flush 10 mL, 10 mL, Intravenous, PRN, Emergency, Triage Protocol, MD    sodium chloride 0.9 % infusion, 50 mL/hr, Intravenous, Continuous, Lizzy Young MD, Last Rate: 50 mL/hr at 24 1613, 50 mL/hr at 24 1613     ASSESSMENT  Left lower lobe pneumonia with loculated pleural fluid suspicious for empyema with unsuccessful thoracentesis  Diabetes mellitus  Hypertension  Hyperlipidemia  Morbidly obese  Gastroesophageal reflux disease    PLAN  CPM  Continue IV antibiotics   Infectious disease consult appreciated  Pulmonary and thoracic surgery to follow patient  Adjust home medications  Stress ulcer DVT prophylaxis  Supportive care  Discussed with family nursing staff and thoracic surgery  Follow closely further recommendation current hospital course    LIZZY YOUNG MD    Copied text in this note has been reviewed and is accurate as of 24           Electronically signed by Lizzy Young MD at 24 1514       Jimmy Garza MD at 24 0815            Infectious Diseases Progress Note    Jimmy Garza MD     [facility]  [place]: 1 day  Patient Identification:  Name: Ravinder Robles  Age: 38 y.o.  Sex: male  :  1985  MRN: [id number]         Primary Care Physician: Ruiz Escamilla MD (Tony)    Seen later in the  "day.  Was gone for attempted thoracentesis.    Subjective: Feeling better denies any fever and chills.  Slight left-sided chest discomfort.  Interval History: Earlier today had attempted thoracentesis which was unsuccessful    Objective:    Scheduled Meds:acetylcysteine, 3 mL, Nebulization, Q8H - RT  cetirizine, 10 mg, Oral, Daily  escitalopram, 10 mg, Oral, Daily  famotidine, 20 mg, Oral, BID  gabapentin, 300 mg, Oral, TID  guaiFENesin, 1,200 mg, Oral, Q12H  insulin lispro, 2-7 Units, Subcutaneous, 4x Daily AC & at Bedtime  ipratropium-albuterol, 3 mL, Nebulization, Q8H  piperacillin-tazobactam, 4.5 g, Intravenous, Q8H      Continuous Infusions:hold, 1 each  sodium chloride, 50 mL/hr, Last Rate: 50 mL/hr (06/19/24 1613)        Vital signs in last 24 hours:  Temp:  [98.1 °F (36.7 °C)-98.6 °F (37 °C)] 98.1 °F (36.7 °C)  Heart Rate:  [] 100  Resp:  [16-18] 18  BP: (125-147)/(71-87) 138/79    Intake/Output:    Intake/Output Summary (Last 24 hours) at 6/20/2024 0815  Last data filed at 6/19/2024 1612  Gross per 24 hour   Intake 250 ml   Output --   Net 250 ml       Exam:  /79   Pulse 100   Temp 98.1 °F (36.7 °C)   Resp 18   Ht 182.9 cm (72\")   Wt 132 kg (291 lb 14.2 oz)   SpO2 99%   BMI 39.59 kg/m²   Patient is examined using the personal protective equipment as per guidelines from infection control for this particular patient as enacted.  Hand washing was performed before and after patient interaction.  General Appearance:    Alert, cooperative, no distress, AAOx3                          Head:    Normocephalic, without obvious abnormality, atraumatic                           Eyes:    PERRL, conjunctivae/corneas clear, EOM's intact, both eyes                         Throat:   Lips, tongue, gums normal; oral mucosa pink and moist                           Neck:   Supple, symmetrical, trachea midline, no JVD                         Lungs:    Decreased breath sounds at the left lung base with no " obvious use of accessory muscles of breathing                 Chest Wall:    No tenderness or deformity                          Heart:  S1-S2 regular                  Abdomen:   Soft nontender                 Extremities:   Extremities normal, atraumatic, no cyanosis or edema                        Pulses:   Pulses palpable in all extremities                            Skin:   Skin is warm and dry,  no rashes or palpable lesions                  Neurologic: Alert and oriented x 3       Data Review:    I reviewed the patient's new clinical results.  Results from last 7 days   Lab Units 06/20/24  0424 06/19/24  0501 06/18/24  1440 06/14/24  1052   WBC 10*3/mm3 11.46* 13.54* 17.33* 16.33*   HEMOGLOBIN g/dL 12.2* 12.3* 14.5 14.3   PLATELETS 10*3/mm3 239 262 309 297     Results from last 7 days   Lab Units 06/20/24  0424 06/19/24  0501 06/18/24  1440   SODIUM mmol/L 135* 135* 135*   POTASSIUM mmol/L 3.7 3.5 3.7   CHLORIDE mmol/L 104 102 98   CO2 mmol/L 25.2 24.5 25.0   BUN mg/dL 11 9 12   CREATININE mg/dL 0.84 0.75* 0.90   CALCIUM mg/dL 8.4* 8.5* 9.2   GLUCOSE mg/dL 131* 114* 110*     Microbiology Results (last 10 days)       Procedure Component Value - Date/Time    MRSA Screen, PCR (Inpatient) - Swab, Nares [[id number]]  (Normal) Collected: 06/19/24 0512    Lab Status: Final result Specimen: Swab from Nares Updated: 06/19/24 0717     MRSA PCR No MRSA Detected    Narrative:      The negative predictive value of this diagnostic test is high and should only be used to consider de-escalating anti-MRSA therapy. A positive result may indicate colonization with MRSA and must be correlated clinically.    Blood Culture - Blood, Arm, Left [[id number]]  (Normal) Collected: 06/18/24 1507    Lab Status: Preliminary result Specimen: Blood from Arm, Left Updated: 06/19/24 1515     Blood Culture No growth at 24 hours    Blood Culture - Blood, Arm, Left [[id number]]  (Normal) Collected: 06/18/24 1507    Lab Status: Preliminary result  Specimen: Blood from Arm, Left Updated: 06/19/24 1515     Blood Culture No growth at 24 hours    Narrative:      Less than seven (7) mL's of blood was collected.  Insufficient quantity may yield false negative results.          US Thoracentesis    Result Date: 6/19/2024  Unsuccessful left ultrasound-guided thoracentesis. Real-time ultrasound guidance identifies the needle within the fluid collection with inability to obtain any aspirate.  Procedure performed by KATHRYN Singleton. By electronically signing this report, I, the supervising radiologist, attest that I was not present for the procedure(s) . I agree with the finalized report.  This report was finalized on 6/19/2024 3:46 PM by Dr. Jay Mckeon M.D on Workstation: [id number]      CT Chest Without Contrast Diagnostic    Result Date: 6/18/2024   1. Similar-appearing complex likely partially loculated left pleural effusion, probable empyema or possible lung abscesses, with atelectasis and consolidation of the left left lower lobe and base of the left upper lobe and lingula that could reflect pneumonia. 2. Partially visualized enlarged left hilar lymph nodes, likely reactive.  This report was finalized on 6/18/2024 8:45 PM by Deven Schmitz MD on Workstation: [id number]      XR Chest 1 View    Result Date: 6/18/2024  As described.  This report was finalized on 6/18/2024 3:09 PM by Dr. Hank Alexander M.D on Workstation: [id number]      CT Chest With Contrast Diagnostic    Result Date: 6/12/2024  Impression: Loculated pleural fluid within the inferior aspect of the left lower lung with associated pleural wall thickening and enhancement. Findings are suspicious for empyema. There is no evidence of adjacent parenchymal consolidation. Otherwise unremarkable chest CT. Electronically Signed: Nick Bender MD  6/12/2024 12:02 PM EDT  Workstation ID: [id number]    XR Chest PA & Lateral    Result Date: 6/12/2024  Impression: Persistent left lung base consolidation with  small possibly loculated pleural effusion. Electronically Signed: Nick Bender MD  6/12/2024 8:59 AM EDT  Workstation ID: [id number]    XR Chest 2 View    Result Date: 6/3/2024  Impression: Mild improvement in left lung consolidation with pleural effusion. Recommend additional follow-up to ensure further improvement/resolution. Electronically Signed: Usman Oleary MD  6/3/2024 2:46 PM EDT  Workstation ID: [id number]       Assessment:    Pneumonia    Left lower lobe pneumonia  4-jwhtxctti-gheofxec pneumonia with progression to parapneumonic effusion and probable empyema partially treated with systemic antibiotic therapy with persistent symptoms and progressive worsening on imaging studies of the loculated effusion.  2-diabetes mellitus  3-asthma  4-morbid obesity  5-hypertension  6-other diagnosis per primary team.     Recommendations/Discussions:  See my discussion and recommendation on 6/18 and 6/19/2024.  Continue with Zosyn with plans for further de-escalation based on the operative culture results.  Eventual intervention and choices between intervention ranging from chest tube management of loculated pleural effusion/empyema versus VATS and decortication and possible wedge resection of the inflamed infected lung with specimen sent to pathology and special stains will be deferred to our thoracic surgery colleagues.  Final antibiotic therapy after above is finalized.  Overall concept of care discussed with patient and family member at the bedside.  Jimmy Garza MD  6/20/2024  08:15 EDT    Parts of this note may be an electronic transcription/translation of spoken language to printed text using the Dragon dictation system.      Electronically signed by Jimmy Garza MD at 06/20/24 0947       Lonnie Angela MD at 06/19/24 2345              [facility]  [phone]  Dr. Lonnie Angela    Subjective:  LOS: 0    Chief Complaint:  Loculated effusion    States he had pneumonia in May and treated with antibiotics.   He improved and then developed fever cough again.  He seen thoracic surgery as outpatient 1 week ago.  Now here with fever and pleuritic chest pain and cough.    Objective   Vital Signs past 24hrs  Temp range: Temp (24hrs), Av.4 °F (36.9 °C), Min:98.1 °F (36.7 °C), Max:98.6 °F (37 °C)    BP range: BP: (126-137)/(72-80) 134/80  Pulse range: Heart Rate:  [] 93  Resp rate range: Resp:  [16-18] 16  Device (Oxygen Therapy): room airFlow (L/min):  [2] 2  Oxygen range:SpO2:  [94 %-96 %] 95 %   Mechanical Ventilator:     Physical Exam  Constitutional:       Appearance: He is obese.   Eyes:      Pupils: Pupils are equal, round, and reactive to light.   Cardiovascular:      Rate and Rhythm: Normal rate and regular rhythm.      Heart sounds: No murmur heard.  Pulmonary:      Effort: Pulmonary effort is normal.      Breath sounds: Examination of the left-lower field reveals decreased breath sounds. Decreased breath sounds present.   Abdominal:      General: Bowel sounds are normal.      Palpations: Abdomen is soft. There is no mass.      Tenderness: There is no abdominal tenderness.   Musculoskeletal:         General: No swelling.   Neurological:      Mental Status: He is alert.       Results Review:    I have reviewed the laboratory and imaging data since the last note by [facility] physician.  My annotations are noted in assessment and plan.      Result Review:  I have personally reviewed the results from last note by [facility] physician to 2024 16:34 EDT and agree with these findings:  [x]  Laboratory list / accordion  [x]  Microbiology  [x]  Radiology  []  EKG/Telemetry   []  Cardiology/Vascular   []  Pathology  []  Old records  []  Other:    Medication Review:  I have reviewed the current MAR.  My annotations are noted in assessment and plan.    cetirizine, 10 mg, Oral, Daily  escitalopram, 10 mg, Oral, Daily  famotidine, 20 mg, Oral, BID  gabapentin, 300 mg, Oral, TID  guaiFENesin, 600 mg, Oral, Q12H  insulin lispro,  2-7 Units, Subcutaneous, 4x Daily AC & at Bedtime  piperacillin-tazobactam, 4.5 g, Intravenous, Q8H  vancomycin, 1,250 mg, Intravenous, Q12H        hold, 1 each  Pharmacy to dose vancomycin,   sodium chloride, 50 mL/hr, Last Rate: 50 mL/hr (06/19/24 1613)      Lines, Drains & Airways       Active LDAs       Name Placement date Placement time Site Days    Peripheral IV 06/18/24 1445 Anterior;Right Forearm 06/18/24  1445  Forearm  1                  Isolation status: No active isolations    Dietary Orders (From admission, onward)       Start     Ordered    06/19/24 1558  Diet: Regular/House, Diabetic; Consistent Carbohydrate; Texture: Regular (IDDSI 7); Fluid Consistency: Thin (IDDSI 0)  Diet Effective Now        References:    Diet Order [place]   Question Answer Comment   Diets: Regular/House    Diets: Diabetic    Diabetic Diet: Consistent Carbohydrate    Texture: Regular (IDDSI 7)    Fluid Consistency: Thin (IDDSI 0)        06/19/24 1558                    PCCM Problems  Loculated left pleural effusion  Recent treatment for pneumonia outpatient  Obesity  Type 2 diabetes mellitus  Ex-smoker        THESE ARE NEW MEDICAL PROBLEMS TO ME.    Plan of Treatment    Loculated pleural effusion and given the multiple discrete pockets, unlikely to benefit from chest tube placement.  May need needle sampling for possible infection.  I have recommended to see what thoracic surgery wants before sending patient for procedure.  I suspect this pleural spaces infected with empyema and that he will need a VATS procedure.    Otherwise respiratory status looks stable at this time    Lonnie Angela MD  06/19/24  16:34 EDT      Part of this note may be an electronic transcription/translation of spoken language to printed text using the Dragon Dictation System.      Electronically signed by Lonnie Angela MD at 06/19/24 7405       Jimmy Garza MD at 06/19/24 0909            Infectious Diseases Progress Note    Jimmy Garza MD     Hindu  "[facility]  Los: 0 days  Patient Identification:  Name: Ravinder Robles  Age: 38 y.o.  Sex: male  :  1985  MRN: [id number]         Primary Care Physician: Ruiz Escamilla MD (Tony)    Seen later in the day.  Was gone for attempted thoracentesis.    Subjective: Feeling somewhat better but still have discomfort in the left side of the chest.  Interval History: Earlier today had attempted thoracentesis which was unsuccessful    Objective:    Scheduled Meds:cetirizine, 10 mg, Oral, Daily  escitalopram, 10 mg, Oral, Daily  famotidine, 20 mg, Oral, BID  gabapentin, 300 mg, Oral, TID  guaiFENesin, 600 mg, Oral, Q12H  insulin lispro, 2-7 Units, Subcutaneous, 4x Daily AC & at Bedtime  piperacillin-tazobactam, 4.5 g, Intravenous, Q8H  vancomycin, 1,250 mg, Intravenous, Q12H      Continuous Infusions:Pharmacy to dose vancomycin,   sodium chloride, 75 mL/hr, Last Rate: 75 mL/hr (24 0653)        Vital signs in last 24 hours:  Temp:  [98.1 °F (36.7 °C)-100.6 °F (38.1 °C)] 98.1 °F (36.7 °C)  Heart Rate:  [] 88  Resp:  [16-18] 18  BP: (117-137)/(63-77) 134/72    Intake/Output:    Intake/Output Summary (Last 24 hours) at 2024 0909  Last data filed at 2024 0653  Gross per 24 hour   Intake 972.5 ml   Output --   Net 972.5 ml       Exam:  /72 (BP Location: Left arm, Patient Position: Lying)   Pulse 88   Temp 98.1 °F (36.7 °C) (Oral)   Resp 18   Ht 182.9 cm (72\")   Wt 132 kg (291 lb 14.2 oz)   SpO2 94%   BMI 39.59 kg/m²   Patient is examined using the personal protective equipment as per guidelines from infection control for this particular patient as enacted.  Hand washing was performed before and after patient interaction.  General Appearance:    Alert, cooperative, no distress, AAOx3                          Head:    Normocephalic, without obvious abnormality, atraumatic                           Eyes:    PERRL, conjunctivae/corneas clear, EOM's intact, both eyes                "          Throat:   Lips, tongue, gums normal; oral mucosa pink and moist                           Neck:   Supple, symmetrical, trachea midline, no JVD                         Lungs:    Decreased breath sounds at the left lung base with no obvious use of accessory muscles of breathing                 Chest Wall:    No tenderness or deformity                          Heart:  S1-S2 regular                  Abdomen:   Soft nontender                 Extremities:   Extremities normal, atraumatic, no cyanosis or edema                        Pulses:   Pulses palpable in all extremities                            Skin:   Skin is warm and dry,  no rashes or palpable lesions                  Neurologic: Alert and oriented x 3       Data Review:    I reviewed the patient's new clinical results.  Results from last 7 days   Lab Units 06/19/24  0501 06/18/24  1440 06/14/24  1052   WBC 10*3/mm3 13.54* 17.33* 16.33*   HEMOGLOBIN g/dL 12.3* 14.5 14.3   PLATELETS 10*3/mm3 262 309 297     Results from last 7 days   Lab Units 06/19/24  0501 06/18/24  1440   SODIUM mmol/L 135* 135*   POTASSIUM mmol/L 3.5 3.7   CHLORIDE mmol/L 102 98   CO2 mmol/L 24.5 25.0   BUN mg/dL 9 12   CREATININE mg/dL 0.75* 0.90   CALCIUM mg/dL 8.5* 9.2   GLUCOSE mg/dL 114* 110*     Microbiology Results (last 10 days)       Procedure Component Value - Date/Time    MRSA Screen, PCR (Inpatient) - Swab, Nares [[id number]]  (Normal) Collected: 06/19/24 0512    Lab Status: Final result Specimen: Swab from Nares Updated: 06/19/24 0717     MRSA PCR No MRSA Detected    Narrative:      The negative predictive value of this diagnostic test is high and should only be used to consider de-escalating anti-MRSA therapy. A positive result may indicate colonization with MRSA and must be correlated clinically.          CT Chest Without Contrast Diagnostic    Result Date: 6/18/2024   1. Similar-appearing complex likely partially loculated left pleural effusion, probable empyema or  possible lung abscesses, with atelectasis and consolidation of the left left lower lobe and base of the left upper lobe and lingula that could reflect pneumonia. 2. Partially visualized enlarged left hilar lymph nodes, likely reactive.  This report was finalized on 6/18/2024 8:45 PM by Deven Schmitz MD on Workstation: [id number]      XR Chest 1 View    Result Date: 6/18/2024  As described.  This report was finalized on 6/18/2024 3:09 PM by Dr. Hank Alexander M.D on Workstation: [id number]      CT Chest With Contrast Diagnostic    Result Date: 6/12/2024  Impression: Loculated pleural fluid within the inferior aspect of the left lower lung with associated pleural wall thickening and enhancement. Findings are suspicious for empyema. There is no evidence of adjacent parenchymal consolidation. Otherwise unremarkable chest CT. Electronically Signed: Nick Bender MD  6/12/2024 12:02 PM EDT  Workstation ID: [id number]    XR Chest PA & Lateral    Result Date: 6/12/2024  Impression: Persistent left lung base consolidation with small possibly loculated pleural effusion. Electronically Signed: Nick Bender MD  6/12/2024 8:59 AM EDT  Workstation ID: [id number]    XR Chest 2 View    Result Date: 6/3/2024  Impression: Mild improvement in left lung consolidation with pleural effusion. Recommend additional follow-up to ensure further improvement/resolution. Electronically Signed: Usman Oleary MD  6/3/2024 2:46 PM EDT  Workstation ID: [id number]       Assessment:    Pneumonia  4-zuybshmla-eaxsbwso pneumonia with progression to parapneumonic effusion and probable empyema partially treated with systemic antibiotic therapy with persistent symptoms and progressive worsening on imaging studies of the loculated effusion.  2-diabetes mellitus  3-asthma  4-morbid obesity  5-hypertension  6-other diagnosis per primary team.     Recommendations/Discussions:  See my discussion and recommendation on 6/18/2024.  Agree with pulmonary services  assessment that eventually patient would require surgical intervention to address his loculated parapneumonic effusion with persistent symptoms and leukocytosis.  There is a chance that we may not be able to grow any pathogens given the amount of antibiotic therapy that he has received since beginning of his symptoms in the last month or so.  Monitor closely his renal function and other side effects while on combination of vancomycin and Zosyn and consider de-escalation.  Since his MRSA screen is negative will recommend DC vancomycin.  Jimmy Garza MD  6/19/2024  09:09 EDT    Parts of this note may be an electronic transcription/translation of spoken language to printed text using the Dragon dictation system.      Electronically signed by Jimmy Garza MD at 06/20/24 0650       Jeovanny Young MD at 06/19/24 0901          Daily progress note    Primary care physician      Subjective  Awake and alert and feeling same with shortness of breath chest discomfort with deep inspiration and not feeling well and multiple questions for the doctor and family at bedside.    History of present illness  38-year-old white male with history of diabetes mellitus hypertension hyperlipidemia asthma and gastroesophageal reflux disease who was overweight presented to [facility] emergency room with shortness of breath since May 11 and has been treated multiple times with oral antibiotics without any improvement.  Patient continued to have fever chills cough and shortness of breath.  Patient denies any chest pain but chest hurt with cough and also denies any abdominal pain nausea vomiting diarrhea.  Patient failed outpatient treatment admitted for broad-spectrum IV antibiotics and further evaluation by infectious disease and pulmonary.     REVIEW OF SYSTEMS  Per history of present illness     PHYSICAL EXAM  Blood pressure 134/72, pulse 88, temperature 98.1 °F (36.7 °C), temperature source Oral, resp. rate 18, height 182.9  "cm (72\"), weight 132 kg (291 lb 14.2 oz), SpO2 94%.    GENERAL: Awake and alert no acute distress  HENT: nares patent  EYES: no scleral icterus  CV: regular rhythm, normal rate  RESPIRATORY: normal effort and moving air bilaterally  ABDOMEN: soft nontender nondistended bowel sounds positive  MUSCULOSKELETAL: no deformity  NEURO: alert, moves all extremities, follows commands  PSYCH:  calm, cooperative  SKIN: warm, dry     LAB RESULTS  Lab Results (last 24 hours)       Procedure Component Value Units Date/Time    POC Glucose Once [[id number]]  (Normal) Collected: 06/19/24 1133    Specimen: Blood Updated: 06/19/24 1136     Glucose 93 mg/dL     POC Glucose Once [[id number]]  (Normal) Collected: 06/19/24 0815    Specimen: Blood Updated: 06/19/24 0817     Glucose 97 mg/dL     MRSA Screen, PCR (Inpatient) - Swab, Nares [[id number]]  (Normal) Collected: 06/19/24 0512    Specimen: Swab from Nares Updated: 06/19/24 0717     MRSA PCR No MRSA Detected    Narrative:      The negative predictive value of this diagnostic test is high and should only be used to consider de-escalating anti-MRSA therapy. A positive result may indicate colonization with MRSA and must be correlated clinically.    BNP [[id number]]  (Normal) Collected: 06/19/24 0501    Specimen: Blood Updated: 06/19/24 0551     proBNP <36.0 pg/mL     Narrative:      This assay is used as an aid in the diagnosis of individuals suspected of having heart failure. It can be used as an aid in the diagnosis of acute decompensated heart failure (ADHF) in patients presenting with signs and symptoms of ADHF to the emergency department (ED). In addition, NT-proBNP of <300 pg/mL indicates ADHF is not likely.    Age Range Result Interpretation  NT-proBNP Concentration (pg/mL:      <50             Positive            >450                   Gray                 300-450                    Negative             <300    50-75           Positive            >900                  Adame           "      300-900                  Negative            <300      >75             Positive            >1800                  Gray                300-1800                  Negative            <300    TSH [[id number]]  (Normal) Collected: 06/19/24 0501    Specimen: Blood Updated: 06/19/24 0551     TSH 1.370 uIU/mL     Comprehensive Metabolic Panel [[id number]]  (Abnormal) Collected: 06/19/24 0501    Specimen: Blood Updated: 06/19/24 0546     Glucose 114 mg/dL      BUN 9 mg/dL      Creatinine 0.75 mg/dL      Sodium 135 mmol/L      Potassium 3.5 mmol/L      Chloride 102 mmol/L      CO2 24.5 mmol/L      Calcium 8.5 mg/dL      Total Protein 6.4 g/dL      Albumin 2.9 g/dL      ALT (SGPT) 17 U/L      AST (SGOT) 10 U/L      Alkaline Phosphatase 60 U/L      Total Bilirubin 0.4 mg/dL      Globulin 3.5 gm/dL      A/G Ratio 0.8 g/dL      BUN/Creatinine Ratio 12.0     Anion Gap 8.5 mmol/L      eGFR 119.2 mL/min/1.73     Narrative:      GFR Normal >60  Chronic Kidney Disease <60  Kidney Failure <15      Lipid Panel [[id number]] Collected: 06/19/24 0501    Specimen: Blood Updated: 06/19/24 0546     Total Cholesterol 126 mg/dL      Triglycerides 56 mg/dL      HDL Cholesterol 40 mg/dL      LDL Cholesterol  74 mg/dL      VLDL Cholesterol 12 mg/dL      LDL/HDL Ratio 1.87    Narrative:      Cholesterol Reference Ranges  (U.S. Department of Health and Human Services ATP III Classifications)    Desirable          <200 mg/dL  Borderline High    200-239 mg/dL  High Risk          >240 mg/dL      Triglyceride Reference Ranges  (U.S. Department of Health and Human Services ATP III Classifications)    Normal           <150 mg/dL  Borderline High  150-199 mg/dL  High             200-499 mg/dL  Very High        >500 mg/dL    HDL Reference Ranges  (U.S. Department of Health and Human Services ATP III Classifications)    Low     <40 mg/dl (major risk factor for CHD)  High    >60 mg/dl ('negative' risk factor for CHD)        LDL Reference Ranges  (U.S.  Department of Health and Human Services ATP III Classifications)    Optimal          <100 mg/dL  Near Optimal     100-129 mg/dL  Borderline High  130-159 mg/dL  High             160-189 mg/dL  Very High        >189 mg/dL    Hemoglobin A1c [[id number]]  (Abnormal) Collected: 06/19/24 0501    Specimen: Blood Updated: 06/19/24 0539     Hemoglobin A1C 6.00 %     Narrative:      Hemoglobin A1C Ranges:    Increased Risk for Diabetes  5.7% to 6.4%  Diabetes                     >= 6.5%  Diabetic Goal                < 7.0%    CBC & Differential [[id number]]  (Abnormal) Collected: 06/19/24 0501    Specimen: Blood Updated: 06/19/24 0530    Narrative:      The following orders were created for panel order CBC & Differential.  Procedure                               Abnormality         Status                     ---------                               -----------         ------                     CBC Auto Differential[[id number]]        Abnormal            Final result                 Please view results for these tests on the individual orders.    CBC Auto Differential [[id number]]  (Abnormal) Collected: 06/19/24 0501    Specimen: Blood Updated: 06/19/24 0530     WBC 13.54 10*3/mm3      RBC 4.47 10*6/mm3      Hemoglobin 12.3 g/dL      Hematocrit 37.9 %      MCV 84.8 fL      MCH 27.5 pg      MCHC 32.5 g/dL      RDW 13.2 %      RDW-SD 41.1 fl      MPV 9.3 fL      Platelets 262 10*3/mm3      Neutrophil % 66.1 %      Lymphocyte % 22.8 %      Monocyte % 9.3 %      Eosinophil % 1.0 %      Basophil % 0.4 %      Immature Grans % 0.4 %      Neutrophils, Absolute 8.94 10*3/mm3      Lymphocytes, Absolute 3.09 10*3/mm3      Monocytes, Absolute 1.26 10*3/mm3      Eosinophils, Absolute 0.14 10*3/mm3      Basophils, Absolute 0.06 10*3/mm3      Immature Grans, Absolute 0.05 10*3/mm3      nRBC 0.0 /100 WBC     POC Glucose Once [[id number]]  (Normal) Collected: 06/18/24 2032    Specimen: Blood Updated: 06/18/24 2034     Glucose 107 mg/dL      Comprehensive Metabolic Panel [[id number]]  (Abnormal) Collected: 06/18/24 1440    Specimen: Blood Updated: 06/18/24 1725     Glucose 110 mg/dL      BUN 12 mg/dL      Creatinine 0.90 mg/dL      Sodium 135 mmol/L      Potassium 3.7 mmol/L      Chloride 98 mmol/L      CO2 25.0 mmol/L      Calcium 9.2 mg/dL      Total Protein 7.3 g/dL      Albumin 3.3 g/dL      ALT (SGPT) 19 U/L      AST (SGOT) 8 U/L      Alkaline Phosphatase 68 U/L      Total Bilirubin 0.5 mg/dL      Globulin 4.0 gm/dL      A/G Ratio 0.8 g/dL      BUN/Creatinine Ratio 13.3     Anion Gap 12.0 mmol/L      eGFR 112.8 mL/min/1.73     Narrative:      GFR Normal >60  Chronic Kidney Disease <60  Kidney Failure <15      BNP [[id number]]  (Normal) Collected: 06/18/24 1440    Specimen: Blood Updated: 06/18/24 1643     proBNP 119.0 pg/mL     Narrative:      This assay is used as an aid in the diagnosis of individuals suspected of having heart failure. It can be used as an aid in the diagnosis of acute decompensated heart failure (ADHF) in patients presenting with signs and symptoms of ADHF to the emergency department (ED). In addition, NT-proBNP of <300 pg/mL indicates ADHF is not likely.    Age Range Result Interpretation  NT-proBNP Concentration (pg/mL:      <50             Positive            >450                   Gray                 300-450                    Negative             <300    50-75           Positive            >900                  Gray                300-900                  Negative            <300      >75             Positive            >1800                  Gray                300-1800                  Negative            <300    Single High Sensitivity Troponin T [[id number]]  (Normal) Collected: 06/18/24 1440    Specimen: Blood Updated: 06/18/24 1643     HS Troponin T 9 ng/L     Narrative:      High Sensitive Troponin T Reference Range:  <14.0 ng/L- Negative Female for AMI  <22.0 ng/L- Negative Male for AMI  >=14 - Abnormal Female  "indicating possible myocardial injury.  >=22 - Abnormal Male indicating possible myocardial injury.   Clinicians would have to utilize clinical acumen, EKG, Troponin, and serial changes to determine if it is an Acute Myocardial Infarction or myocardial injury due to an underlying chronic condition.         Procalcitonin [[id number]]  (Normal) Collected: 06/18/24 1440    Specimen: Blood Updated: 06/18/24 1523     Procalcitonin 0.15 ng/mL     Narrative:      As a Marker for Sepsis (Non-Neonates):    1. <0.5 ng/mL represents a low risk of severe sepsis and/or septic shock.  2. >2 ng/mL represents a high risk of severe sepsis and/or septic shock.    As a Marker for Lower Respiratory Tract Infections that require antibiotic therapy:    PCT on Admission    Antibiotic Therapy       6-12 Hrs later    >0.5                Strongly Recommended  >0.25 - <0.5        Recommended   0.1 - 0.25          Discouraged              Remeasure/reassess PCT  <0.1                Strongly Discouraged     Remeasure/reassess PCT    As 28 day mortality risk marker: \"Change in Procalcitonin Result\" (>80% or <=80%) if Day 0 (or Day 1) and Day 4 values are available. Refer to http://www.[facility]-pct-calculator.com    Change in PCT <=80%  A decrease of PCT levels below or equal to 80% defines a positive change in PCT test result representing a higher risk for 28-day all-cause mortality of patients diagnosed with severe sepsis for septic shock.    Change in PCT >80%  A decrease of PCT levels of more than 80% defines a negative change in PCT result representing a lower risk for 28-day all-cause mortality of patients diagnosed with severe sepsis or septic shock.       Lactic Acid, Plasma [[id number]]  (Normal) Collected: 06/18/24 1440    Specimen: Blood Updated: 06/18/24 1516     Lactate 1.6 mmol/L     Blood Culture - Blood, Arm, Left [[id number]] Collected: 06/18/24 1507    Specimen: Blood from Arm, Left Updated: 06/18/24 1513    Blood Culture - Blood, " Arm, Left [[id number]] Collected: 06/18/24 1507    Specimen: Blood from Arm, Left Updated: 06/18/24 1513    [place] Draw [[id number]] Collected: 06/18/24 1440    Specimen: Blood Updated: 06/18/24 1500    Narrative:      The following orders were created for panel order [place] Draw.  Procedure                               Abnormality         Status                     ---------                               -----------         ------                     Green Top (Gel)[[id number]]                                  Final result               Lavender Top[[id number]]                                     Final result               Gold Top - SST[[id number]]                                   Final result               Light Blue Top[[id number]]                                   Final result                 Please view results for these tests on the individual orders.    Green Top (Gel) [[id number]] Collected: 06/18/24 1440    Specimen: Blood Updated: 06/18/24 1500     Extra Tube Hold for add-ons.     Comment: Auto resulted.       Lavender Top [[id number]] Collected: 06/18/24 1440    Specimen: Blood Updated: 06/18/24 1500     Extra Tube hold for add-on     Comment: Auto resulted       Gold Top - SST [[id number]] Collected: 06/18/24 1440    Specimen: Blood Updated: 06/18/24 1500     Extra Tube Hold for add-ons.     Comment: Auto resulted.       Light Blue Top [[id number]] Collected: 06/18/24 1440    Specimen: Blood Updated: 06/18/24 1500     Extra Tube Hold for add-ons.     Comment: Auto resulted       CBC & Differential [[id number]]  (Abnormal) Collected: 06/18/24 1440    Specimen: Blood Updated: 06/18/24 1457    Narrative:      The following orders were created for panel order CBC & Differential.  Procedure                               Abnormality         Status                     ---------                               -----------         ------                     CBC Auto Differential[[id number]]        Abnormal             Final result                 Please view results for these tests on the individual orders.    CBC Auto Differential [[id number]]  (Abnormal) Collected: 06/18/24 1440    Specimen: Blood Updated: 06/18/24 1457     WBC 17.33 10*3/mm3      RBC 5.25 10*6/mm3      Hemoglobin 14.5 g/dL      Hematocrit 44.5 %      MCV 84.8 fL      MCH 27.6 pg      MCHC 32.6 g/dL      RDW 13.1 %      RDW-SD 40.7 fl      MPV 9.6 fL      Platelets 309 10*3/mm3      Neutrophil % 66.4 %      Lymphocyte % 24.3 %      Monocyte % 8.0 %      Eosinophil % 0.7 %      Basophil % 0.3 %      Immature Grans % 0.3 %      Neutrophils, Absolute 11.49 10*3/mm3      Lymphocytes, Absolute 4.21 10*3/mm3      Monocytes, Absolute 1.39 10*3/mm3      Eosinophils, Absolute 0.12 10*3/mm3      Basophils, Absolute 0.06 10*3/mm3      Immature Grans, Absolute 0.06 10*3/mm3      nRBC 0.0 /100 WBC           Imaging Results (Last 24 Hours)       Procedure Component Value Units Date/Time    CT Chest Without Contrast Diagnostic [[id number]] Collected: 06/18/24 2034     Updated: 06/18/24 2048    Narrative:      CT CHEST WO CONTRAST DIAGNOSTIC-     DATE OF EXAM: 6/18/2024 7:38 PM     INDICATION: Order states: Thoracic abscess. Pneumonia, fatigue, and  fever.     COMPARISON: Chest radiographs 6/18/2024, 6/11/2024, and 6/3/2024. CT  chest 6/12/2024 and 11/8/2018.     TECHNIQUE: Multiple contiguous axial images were acquired through the  chest without the intravenous administration of contrast. Reformatted  coronal and sagittal sequences were also reviewed. Radiation dose  reduction techniques were utilized, including automated exposure control  and exposure modulation based on body size.     FINDINGS:  Evaluation is mildly limited by the lack of intravenous contrast.     Similar-appearing complex likely partially loculated left pleural  effusion with atelectasis and consolidation of the left lower lobe and  base of the left upper lobe and lingula. Bronchial plugging in the base  of  the left lower lobe. Right lung clear. No pneumothorax.     The heart and great vessels of the chest are normal in size. No  calcified coronary artery disease. Trace pericardial fluid. Partially  visualized enlarged left perihilar lymph nodes with an index posterior  left perihilar lymph node measuring 1.5 cm x 1 cm (axial series 2 image  48), likely reactive.     Limited noncontrast CT imaging of the upper abdomen is unremarkable.     Mild bilateral gynecomastia. Mild multilevel mid and lower thoracic  spondylosis. Partially imaged mild chronic degenerative changes in both  shoulders. No acute osseous abnormality or concerning osseous lesion.       Impression:         1. Similar-appearing complex likely partially loculated left pleural  effusion, probable empyema or possible lung abscesses, with atelectasis  and consolidation of the left left lower lobe and base of the left upper  lobe and lingula that could reflect pneumonia.  2. Partially visualized enlarged left hilar lymph nodes, likely  reactive.     This report was finalized on 6/18/2024 8:45 PM by Deven Schmitz MD on  Workstation: [id number]       XR Chest 1 View [[id number]] Collected: 06/18/24 1504     Updated: 06/18/24 1512    Narrative:      XR CHEST 1 VW-     HISTORY: Male who is 38 years-old, short of breath     TECHNIQUE: Frontal view of the chest     COMPARISON: 6/11/2024     FINDINGS: The heart size is borderline. Pulmonary vasculature is  unremarkable. Persistent opacity at the left mid to lower hemithorax,  corresponding to loculated fluid and atelectasis on the CT from  6/12/2024, appears mildly increased, continued follow-up advised. No  pneumothorax. No acute osseous process.       Impression:      As described.     This report was finalized on 6/18/2024 3:09 PM by Dr. Hank Alexander M.D on Workstation: [id number]             Scan on 6/18/2024 1450 by [facility] [place]: ECG 12-LEAD         Author: -- Service: -- Author Type: --   Filed:  Date of Service: Creation Time:   Status: (Other)   HEART RATE= 103  bpm  RR Interval= 583  ms  [place] Interval= 125  ms  P Horizontal Axis= -20  deg  P Front Axis= 43  deg  QRSD Interval= 89  ms  QT Interval= 299  ms  QTcB= 392  ms  QRS Axis= 31  deg  T Wave Axis= 35  deg  - BORDERLINE ECG -  Sinus tachycardia  Probable left atrial enlargement  No change from previous tracing          Current Facility-Administered Medications:     acetaminophen (TYLENOL) tablet 650 mg, 650 mg, Oral, Q6H PRN, Jeovanny Young MD    albuterol sulfate HFA (PROVENTIL HFA;VENTOLIN HFA;PROAIR HFA) inhaler 2 puff, 2 puff, Inhalation, Q4H PRN, Jeovanny Young MD    cetirizine (zyrTEC) tablet 10 mg, 10 mg, Oral, Daily, Jeovanny Young MD, 10 mg at 06/18/24 2236    dextrose (D50W) (25 g/50 mL) IV injection 25 g, 25 g, Intravenous, Q15 Min PRN, Jeovanny Young MD    dextrose (GLUTOSE) oral gel 15 g, 15 g, Oral, Q15 Min PRN, Jeovanny Young MD    escitalopram (LEXAPRO) tablet 10 mg, 10 mg, Oral, Daily, Jeovanny Young MD, 10 mg at 06/18/24 2236    famotidine (PEPCID) tablet 20 mg, 20 mg, Oral, BID, Jeovanny Young MD, 20 mg at 06/18/24 2238    gabapentin (NEURONTIN) capsule 300 mg, 300 mg, Oral, TID, Jeovanny Young MD, 300 mg at 06/18/24 2237    glucagon (GLUCAGEN) injection 1 mg, 1 mg, Intramuscular, Q15 Min PRN, Jeovanny Young MD    guaiFENesin (MUCINEX) 12 hr tablet 600 mg, 600 mg, Oral, Q12H, Jeovanny Young MD, 600 mg at 06/18/24 2237    Hold medication, 1 each, Does not apply, Continuous PRN, Luis A Guzman APRN    HYDROcodone-acetaminophen (NORCO) 5-325 MG per tablet 1 tablet, 1 tablet, Oral, Q4H PRN, Jeovanny Young MD, 1 tablet at 06/19/24 0521    insulin lispro (HUMALOG/ADMELOG) injection 2-7 Units, 2-7 Units, Subcutaneous, 4x Daily AC & at Bedtime, Jeovanny Young MD    Pharmacy to dose vancomycin, , Does not apply, Continuous PRN, Jeovanny Young MD    piperacillin-tazobactam (ZOSYN) 4.5 g IVPB in 100 mL NS MBP (CD), 4.5 g, Intravenous, Q8H, Jeovanny Young,  MD, Last Rate: 0 mL/hr at 06/19/24 0245, 4.5 g at 06/19/24 0659    sodium chloride 0.9 % flush 10 mL, 10 mL, Intravenous, PRN, Emergency, Triage Protocol, MD    sodium chloride 0.9 % infusion, 75 mL/hr, Intravenous, Continuous, Lizzy Young MD, Last Rate: 75 mL/hr at 06/19/24 1058, 75 mL/hr at 06/19/24 1058    Vancomycin HCl 1,250 mg in sodium chloride 0.9 % 250 mL VTB, 1,250 mg, Intravenous, Q12H, Jimmy Garza MD     ASSESSMENT  Left lower lobe pneumonia with loculated pleural fluid suspicious for empyema  Diabetes mellitus  Hypertension  Hyperlipidemia  Morbidly obese  Gastroesophageal reflux disease    PLAN  CPM  Continue IVF  Continue IV antibiotics   2D echo pending  Left thoracentesis under radiology today  Infectious disease consult appreciated  Pulmonary and thoracic surgery to follow patient  Adjust home medications  Stress ulcer DVT prophylaxis  Supportive care  Discussed with family nursing staff  Follow closely further recommendation current hospital course    LIZZY YOUNG MD    Copied text in this note has been reviewed and is accurate as of 06/19/24           Electronically signed by Lizzy Young MD at 06/19/24 1405            Discharge Summary        Lizzy Young MD at 06/21/24 1409          Discharge summary    Date of admission 6/18/2024  Date of discharge 6/21/2024    Final diagnosis  Left lower lobe pneumonia with loculated empyema with unsuccessful thoracentesis  Diabetes mellitus  Hypertension  Hyperlipidemia  Morbidly obese  Gastroesophageal reflux disease    Discharge medications    Current Facility-Administered Medications:     cetirizine (zyrTEC) tablet 10 mg, 10 mg, Oral, Daily, Lizzy Young MD, 10 mg at 06/21/24 0939    escitalopram (LEXAPRO) tablet 10 mg, 10 mg, Oral, Daily, Lizzy Young MD, 10 mg at 06/21/24 0938    famotidine (PEPCID) tablet 20 mg, 20 mg, Oral, BID, Lizzy Young MD, 20 mg at 06/21/24 0939    gabapentin (NEURONTIN) capsule 300 mg, 300 mg, Oral, TID, Lizzy Young  MD, 300 mg at 06/21/24 0939    guaiFENesin (MUCINEX) 12 hr tablet 1,200 mg, 1,200 mg, Oral, Q12H, Luis A Guzman APRN, 1,200 mg at 06/21/24 0938    insulin lispro (HUMALOG/ADMELOG) injection 2-7 Units, 2-7 Units, Subcutaneous, 4x Daily AC & at Bedtime, Jeovanny Young MD    piperacillin-tazobactam (ZOSYN) 4.5 g IVPB in 100 mL NS MBP (CD), 4.5 g, Intravenous, Q8H, Jimmy Garza MD, Last Rate: 0 mL/hr at 06/19/24 0245, 4.5 g at 06/21/24 0705     Consults obtained  Pulmonary  Thoracic surgery  Infectious disease    Procedures  Attempted thoracentesis under radiology    Hospital course  38-year-old white male with history of diabetes hypertension hyperlipidemia asthma who is morbidly obese admitted to emergency room with shortness of breath for several weeks and was diagnosed with pneumonia and treated with multiple rounds of oral antibiotics without any improvement and continued to have shortness of breath fever chills nonproductive cough and left-sided pleuritic pain.  Patient workup in ER revealed left lower lobe pneumonia with loculated pleural effusion suspicious for empyema admitted for management.  Patient admitted treated with broad-spectrum IV antibiotics and further evaluated by pulmonary and thoracic surgery and infectious disease.  Patient has had attempted thoracentesis without any luck and after multiple discussion with all the consultant it is decided to treat him with broad spectrum IV antibiotics for 6 weeks and no surgical intervention needed at this time.  Patient and family agrees and we will placed PICC line and discharge him on Zosyn for total of 6 weeks with outpatient follow-up.  Patient pain improved shortness of breath improved and his white count remained within normal limit and all other medical problems stable and cleared for discharge.  Patient PICC line can be discontinued after completion of IV antibiotics.    Discharge diet regular    Activity as tolerated    Medication as  above    Follow-up with prime doctor in 1 week and follow-up with thoracic surgery pulmonary and infectious disease per the instructions and take medication as directed.    LIZZY KNOX MD    Electronically signed by Lizzy Knox MD at 06/21/24 3685

## 2024-06-25 ENCOUNTER — TRANSITIONAL CARE MANAGEMENT TELEPHONE ENCOUNTER (OUTPATIENT)
Dept: CALL CENTER | Facility: HOSPITAL | Age: 39
End: 2024-06-25
Payer: COMMERCIAL

## 2024-06-25 NOTE — OUTREACH NOTE
Call Center TCM Note      Flowsheet Row Responses   [facility] patient discharged from? [place]   Does the patient have one of the following disease processes/diagnoses(primary or secondary)? Pneumonia   TCM attempt successful? Yes   Call start time 1420   Call end time 1425   Meds reviewed with patient/caregiver? Yes   Is the patient having any side effects they believe may be caused by any medication additions or changes? No   Does the patient have all medications ordered at discharge? Yes   Is the patient taking all medications as directed (includes completed medication regime)? Yes   Comments Hospital d/c follow up 7/1/24@1530.   Does the patient have an appointment with their PCP within 7-14 days of discharge? Yes   What is the Home health agency?  IV abx from [facility] [place] home infusion   Has home health visited the patient within 72 hours of discharge? Yes   Pulse Ox monitoring None   Psychosocial issues? No   Did the patient receive a copy of their discharge instructions? Yes   Nursing interventions Reviewed instructions with patient   What is the patient's perception of their health status since discharge? Improving   Nursing Interventions Nurse provided patient education   Are the patient's immunizations up to date?  Yes   Nursing interventions Educated on importance of maintaining up to date immunizations as advised by provider   If the patient is a current smoker, are they able to teach back resources for cessation? Not a smoker   Is the patient/caregiver able to teach back the hierarchy of who to call/visit for symptoms/problems? PCP, Specialist, Home health nurse, Urgent Care, ED, 911 Yes   Is the patient/caregiver able to teach back signs and symptoms of worsening condition: Shortness of breath, Chest pain, Fever/chills   Is the patient/caregiver able to teach back importance of completing antibiotic course of treatment? Yes   TCM call completed? Yes   Wrap up additional comments Patent doing well,  weekly central line dressing changes.   Call end time 0124   Would this patient benefit from a Referral to [facility] Social Work? No   Is the patient interested in additional calls from an ambulatory ? No            Renée Liz RN    6/25/2024, 14:25 EDT

## 2024-06-26 DIAGNOSIS — J45.40 MODERATE PERSISTENT ASTHMA WITHOUT COMPLICATION: ICD-10-CM

## 2024-06-26 RX ORDER — ALBUTEROL SULFATE 90 UG/1
AEROSOL, METERED RESPIRATORY (INHALATION)
Qty: 36 G | Refills: 0 | Status: SHIPPED | OUTPATIENT
Start: 2024-06-26

## 2024-06-28 ENCOUNTER — TELEPHONE (OUTPATIENT)
Dept: INTERNAL MEDICINE | Facility: CLINIC | Age: 39
End: 2024-06-28

## 2024-06-28 ENCOUNTER — HOSPITAL ENCOUNTER (OUTPATIENT)
Dept: INFUSION THERAPY | Facility: HOSPITAL | Age: 39
Discharge: HOME | End: 2024-06-28
Payer: COMMERCIAL

## 2024-06-28 VITALS
RESPIRATION RATE: 18 BRPM | OXYGEN SATURATION: 96 % | TEMPERATURE: 97 F | SYSTOLIC BLOOD PRESSURE: 104 MMHG | HEART RATE: 101 BPM | DIASTOLIC BLOOD PRESSURE: 67 MMHG

## 2024-06-28 DIAGNOSIS — J18.9 PNEUMONIA OF LEFT LOWER LOBE DUE TO INFECTIOUS ORGANISM: Primary | ICD-10-CM

## 2024-06-28 LAB
ALBUMIN SERPL-MCNC: 3.5 G/DL (ref 3.5–5.2)
ALBUMIN/GLOB SERPL: 0.8 G/DL
ALP SERPL-CCNC: 61 U/L (ref 39–117)
ALT SERPL W P-5'-P-CCNC: 17 U/L (ref 1–41)
ANION GAP SERPL CALCULATED.3IONS-SCNC: 11.8 MMOL/L (ref 5–15)
AST SERPL-CCNC: 16 U/L (ref 1–40)
BASOPHILS # BLD AUTO: 0.05 10*3/MM3 (ref 0–0.2)
BASOPHILS NFR BLD AUTO: 0.6 % (ref 0–1.5)
BILIRUB SERPL-MCNC: 0.2 MG/DL (ref 0–1.2)
BUN SERPL-MCNC: 9 MG/DL (ref 6–20)
BUN/CREAT SERPL: 9.4 (ref 7–25)
CALCIUM SPEC-SCNC: 9.2 MG/DL (ref 8.6–10.5)
CHLORIDE SERPL-SCNC: 103 MMOL/L (ref 98–107)
CO2 SERPL-SCNC: 23.2 MMOL/L (ref 22–29)
CREAT SERPL-MCNC: 0.96 MG/DL (ref 0.76–1.27)
CRP SERPL-MCNC: 3.08 MG/DL (ref 0–0.5)
DEPRECATED RDW RBC AUTO: 42.3 FL (ref 37–54)
EGFRCR SERPLBLD CKD-EPI 2021: 104.4 ML/MIN/1.73
EOSINOPHIL # BLD AUTO: 0.43 10*3/MM3 (ref 0–0.4)
EOSINOPHIL NFR BLD AUTO: 5 % (ref 0.3–6.2)
ERYTHROCYTE [DISTWIDTH] IN BLOOD BY AUTOMATED COUNT: 13.5 % (ref 12.3–15.4)
GLOBULIN UR ELPH-MCNC: 4.5 GM/DL
GLUCOSE SERPL-MCNC: 79 MG/DL (ref 65–99)
HCT VFR BLD AUTO: 39 % (ref 37.5–51)
HGB BLD-MCNC: 12.2 G/DL (ref 13–17.7)
IMM GRANULOCYTES # BLD AUTO: 0.02 10*3/MM3 (ref 0–0.05)
IMM GRANULOCYTES NFR BLD AUTO: 0.2 % (ref 0–0.5)
LYMPHOCYTES # BLD AUTO: 3.56 10*3/MM3 (ref 0.7–3.1)
LYMPHOCYTES NFR BLD AUTO: 41.1 % (ref 19.6–45.3)
MCH RBC QN AUTO: 27.2 PG (ref 26.6–33)
MCHC RBC AUTO-ENTMCNC: 31.3 G/DL (ref 31.5–35.7)
MCV RBC AUTO: 87.1 FL (ref 79–97)
MONOCYTES # BLD AUTO: 0.54 10*3/MM3 (ref 0.1–0.9)
MONOCYTES NFR BLD AUTO: 6.2 % (ref 5–12)
NEUTROPHILS NFR BLD AUTO: 4.07 10*3/MM3 (ref 1.7–7)
NEUTROPHILS NFR BLD AUTO: 46.9 % (ref 42.7–76)
NRBC BLD AUTO-RTO: 0 /100 WBC (ref 0–0.2)
PLATELET # BLD AUTO: 383 10*3/MM3 (ref 140–450)
PMV BLD AUTO: 9.6 FL (ref 6–12)
POTASSIUM SERPL-SCNC: 3.9 MMOL/L (ref 3.5–5.2)
PROT SERPL-MCNC: 8 G/DL (ref 6–8.5)
RBC # BLD AUTO: 4.48 10*6/MM3 (ref 4.14–5.8)
SODIUM SERPL-SCNC: 138 MMOL/L (ref 136–145)
WBC NRBC COR # BLD AUTO: 8.67 10*3/MM3 (ref 3.4–10.8)

## 2024-06-28 PROCEDURE — 85025 COMPLETE CBC W/AUTO DIFF WBC: CPT | Performed by: INTERNAL MEDICINE

## 2024-06-28 PROCEDURE — 86140 C-REACTIVE PROTEIN: CPT | Performed by: INTERNAL MEDICINE

## 2024-06-28 PROCEDURE — 36592 COLLECT BLOOD FROM PICC: CPT

## 2024-06-28 PROCEDURE — 80053 COMPREHEN METABOLIC PANEL: CPT | Performed by: INTERNAL MEDICINE

## 2024-06-28 NOTE — NURSING NOTE
Pt arrived to [facility] for appt. VSS, no complaints at this time. Labs drawn via PICC line. Dressing changed using sterile technique. Verified pt had next appt. Pt discharged from [facility] at 13:48 PM in stable condition, without complaints.

## 2024-06-28 NOTE — TELEPHONE ENCOUNTER
Caller: Ravinder Robles    Relationship: Self    Best call back number:     What is the best time to reach you:     Who are you requesting to speak with (clinical staff, provider,  specific staff member): DR ARZOLA OR STAFF    Do you know the name of the person who called:     What was the call regarding: PATIENT IS CALLING IN STATING THAT HE FAXED IN FMLA DOCUMENTS TO DR ARZOLA ON 06/27/24 AND IS CALLING TO MAKE SURE THE OFFICE RECEIVED THEM.  HE SAYS THEY ARE TO BE SIGNED AND FAXED TO THE NUMBER ON THE COVER SHEET.  HE WANTS TO BE CALLED ONCE THIS HAS BEEN COMPLETED.    Is it okay if the provider responds through [facility]:

## 2024-06-28 NOTE — PATIENT INSTRUCTIONS
"  Call  Internal Medicine and Pediatrics @ [phone]  if you have any problems or concerns.    We know you have a Choice in healthcare and appreciate you using [facility].  Our purpose is to provide you \"Excellent Care\".  We hope that you will always choose us in the future and continue to recommend us to your family and friends.                  "

## 2024-07-01 ENCOUNTER — OFFICE VISIT (OUTPATIENT)
Dept: INTERNAL MEDICINE | Facility: CLINIC | Age: 39
End: 2024-07-01
Payer: COMMERCIAL

## 2024-07-01 VITALS
HEIGHT: 72 IN | SYSTOLIC BLOOD PRESSURE: 110 MMHG | TEMPERATURE: 96.9 F | WEIGHT: 297 LBS | OXYGEN SATURATION: 97 % | BODY MASS INDEX: 40.23 KG/M2 | HEART RATE: 84 BPM | DIASTOLIC BLOOD PRESSURE: 80 MMHG

## 2024-07-01 DIAGNOSIS — E11.65 TYPE 2 DIABETES MELLITUS WITH HYPERGLYCEMIA, WITHOUT LONG-TERM CURRENT USE OF INSULIN: ICD-10-CM

## 2024-07-01 DIAGNOSIS — J90 PLEURAL EFFUSION ON LEFT: Primary | ICD-10-CM

## 2024-07-01 DIAGNOSIS — J86.9 EMPYEMA OF LEFT PLEURAL SPACE: ICD-10-CM

## 2024-07-01 RX ORDER — EPINEPHRINE 0.3 MG/.3ML
INJECTION SUBCUTANEOUS
COMMUNITY
Start: 2024-06-21

## 2024-07-02 ENCOUNTER — READMISSION MANAGEMENT (OUTPATIENT)
Dept: CALL CENTER | Facility: HOSPITAL | Age: 39
End: 2024-07-02
Payer: COMMERCIAL

## 2024-07-02 NOTE — OUTREACH NOTE
COPD/PN Week 2 Survey      Flowsheet Row Responses   [facility] patient discharged from? [place]   Does the patient have one of the following disease processes/diagnoses(primary or secondary)? Pneumonia   Week 2 attempt successful? Yes   Call start time 1426   Call end time 1428   Discharge diagnosis Pneumonia   Meds reviewed with patient/caregiver? Yes   Is the patient having any side effects they believe may be caused by any medication additions or changes? No   Does the patient have all medications ordered at discharge? Yes   Is the patient taking all medications as directed (includes completed medication regime)? Yes   Does the patient have a primary care provider?  Yes   Has the patient kept scheduled appointments due by today? Yes   What is the Home health agency?  IV abx from [facility] home infusion   Has home health visited the patient within 72 hours of discharge? Yes   Pulse Ox monitoring None   Psychosocial issues? No   Did the patient receive a copy of their discharge instructions? Yes   Nursing interventions Reviewed instructions with patient   What is the patient's perception of their health status since discharge? Improving   If the patient is a current smoker, are they able to teach back resources for cessation? Not a smoker   Is the patient/caregiver able to teach back the hierarchy of who to call/visit for symptoms/problems? PCP, Specialist, Home health nurse, Urgent Care, ED, 911 Yes   Is the patient/caregiver able to teach back signs and symptoms of worsening condition: Fever/chills, Shortness of breath, Chest pain   Is the patient/caregiver able to teach back importance of completing antibiotic course of treatment? Yes   Week 2 call completed? Yes   Call end time 1428            Courtney LU - [place] Nurse

## 2024-07-02 NOTE — PROGRESS NOTES
"Chief Complaint  Hospital Follow Up Visit    Subjective        Ravinder Robles presents to [facility]  History of Present Illness  Patient was admitted for left empyema and worsening fever despite outpatient treatment.  IV antibiotic therapy was started.  Unsuccessful thoracentesis performed and eventually decided not to do VATS procedure and treat with long-term IV antibiotics at home.  The pleuritic pain has improved since discharge.  No further fevers and seems to be tolerating the therapy appropriately.  CRP was mildly elevated at 3.0 and has follow-up to be reevaluated.  Last white blood count was normal.  Objective   Vital Signs:  /80 (BP Location: Right arm, Patient Position: Sitting, Cuff Size: Large Adult)   Pulse 84   Temp 96.9 °F (36.1 °C) (Infrared)   Ht 182.9 cm (72\")   Wt 135 kg (297 lb)   SpO2 97%   BMI 40.28 kg/m²   Estimated body mass index is 40.28 kg/m² as calculated from the following:    Height as of this encounter: 182.9 cm (72\").    Weight as of this encounter: 135 kg (297 lb).             Physical Exam  Vitals and nursing note reviewed.   Constitutional:       Appearance: Normal appearance.   HENT:      Head: Normocephalic and atraumatic.   Cardiovascular:      Rate and Rhythm: Normal rate and regular rhythm.   Pulmonary:      Effort: Pulmonary effort is normal.      Breath sounds: Rales present.      Comments: Diminished breath sounds in the left lower lung  Abdominal:      General: Abdomen is flat.      Palpations: Abdomen is soft.   Musculoskeletal:         General: No swelling or deformity.      Cervical back: Neck supple.      Right lower leg: No edema.      Left lower leg: No edema.   Skin:     General: Skin is warm.      Capillary Refill: Capillary refill takes less than 2 seconds.      Findings: No rash.   Neurological:      General: No focal deficit present.      Mental Status: He is alert and oriented to person, place, and " time.        Result Review :          Previous notes and hospital records reviewed           Assessment and Plan     Diagnoses and all orders for this visit:    1. Pleural effusion on left (Primary)    2. Type 2 diabetes mellitus with hyperglycemia, without long-term current use of insulin    3. Empyema of left pleural space    Currently on IV antibiotics at home for this effusion/empyema left lower lobe.  Feels like he is doing better.  No fevers and the pleuritic pain is improved.  Has follow-up with thoracic in about 2 weeks.  CT scan prior to follow-up.  Notify us if any fever develops in the meantime or problems         Follow Up     Return in about 3 months (around 10/1/2024) for Recheck.  Patient was given instructions and counseling regarding his condition or for health maintenance advice. Please see specific information pulled into the AVS if appropriate.

## 2024-07-04 PROBLEM — J86.9 EMPYEMA: Status: ACTIVE | Noted: 2024-07-04

## 2024-07-04 RX ORDER — SODIUM CHLORIDE 0.9 % (FLUSH) 0.9 %
10 SYRINGE (ML) INJECTION AS NEEDED
OUTPATIENT
Start: 2024-07-05

## 2024-07-04 RX ORDER — HEPARIN SODIUM (PORCINE) LOCK FLUSH IV SOLN 100 UNIT/ML 100 UNIT/ML
500 SOLUTION INTRAVENOUS AS NEEDED
OUTPATIENT
Start: 2024-07-05

## 2024-07-04 RX ORDER — SODIUM CHLORIDE 0.9 % (FLUSH) 0.9 %
20 SYRINGE (ML) INJECTION AS NEEDED
OUTPATIENT
Start: 2024-07-05

## 2024-07-04 RX ORDER — HEPARIN SODIUM (PORCINE) LOCK FLUSH IV SOLN 100 UNIT/ML 100 UNIT/ML
300 SOLUTION INTRAVENOUS ONCE
OUTPATIENT
Start: 2024-07-05

## 2024-07-05 ENCOUNTER — HOSPITAL ENCOUNTER (OUTPATIENT)
Dept: INFUSION THERAPY | Facility: HOSPITAL | Age: 39
Discharge: HOME | End: 2024-07-05
Payer: COMMERCIAL

## 2024-07-05 VITALS
OXYGEN SATURATION: 96 % | DIASTOLIC BLOOD PRESSURE: 84 MMHG | TEMPERATURE: 97.7 F | SYSTOLIC BLOOD PRESSURE: 128 MMHG | HEART RATE: 108 BPM | RESPIRATION RATE: 16 BRPM

## 2024-07-05 DIAGNOSIS — J18.9 PNEUMONIA OF LEFT LOWER LOBE DUE TO INFECTIOUS ORGANISM: Primary | ICD-10-CM

## 2024-07-05 LAB
ALBUMIN SERPL-MCNC: 3.8 G/DL (ref 3.5–5.2)
ALBUMIN/GLOB SERPL: 0.9 G/DL
ALP SERPL-CCNC: 58 U/L (ref 39–117)
ALT SERPL W P-5'-P-CCNC: 15 U/L (ref 1–41)
ANION GAP SERPL CALCULATED.3IONS-SCNC: 12.8 MMOL/L (ref 5–15)
AST SERPL-CCNC: 11 U/L (ref 1–40)
BASOPHILS # BLD AUTO: 0.07 10*3/MM3 (ref 0–0.2)
BASOPHILS NFR BLD AUTO: 0.7 % (ref 0–1.5)
BILIRUB SERPL-MCNC: 0.3 MG/DL (ref 0–1.2)
BUN SERPL-MCNC: 8 MG/DL (ref 6–20)
BUN/CREAT SERPL: 8.8 (ref 7–25)
CALCIUM SPEC-SCNC: 9.4 MG/DL (ref 8.6–10.5)
CHLORIDE SERPL-SCNC: 102 MMOL/L (ref 98–107)
CO2 SERPL-SCNC: 22.2 MMOL/L (ref 22–29)
CREAT SERPL-MCNC: 0.91 MG/DL (ref 0.76–1.27)
CRP SERPL-MCNC: 2.28 MG/DL (ref 0–0.5)
DEPRECATED RDW RBC AUTO: 43.5 FL (ref 37–54)
EGFRCR SERPLBLD CKD-EPI 2021: 111.3 ML/MIN/1.73
EOSINOPHIL # BLD AUTO: 0.15 10*3/MM3 (ref 0–0.4)
EOSINOPHIL NFR BLD AUTO: 1.5 % (ref 0.3–6.2)
ERYTHROCYTE [DISTWIDTH] IN BLOOD BY AUTOMATED COUNT: 13.8 % (ref 12.3–15.4)
GLOBULIN UR ELPH-MCNC: 4.4 GM/DL
GLUCOSE SERPL-MCNC: 86 MG/DL (ref 65–99)
HCT VFR BLD AUTO: 39.9 % (ref 37.5–51)
HGB BLD-MCNC: 12.4 G/DL (ref 13–17.7)
IMM GRANULOCYTES # BLD AUTO: 0.02 10*3/MM3 (ref 0–0.05)
IMM GRANULOCYTES NFR BLD AUTO: 0.2 % (ref 0–0.5)
LYMPHOCYTES # BLD AUTO: 3.74 10*3/MM3 (ref 0.7–3.1)
LYMPHOCYTES NFR BLD AUTO: 38.5 % (ref 19.6–45.3)
MCH RBC QN AUTO: 27 PG (ref 26.6–33)
MCHC RBC AUTO-ENTMCNC: 31.1 G/DL (ref 31.5–35.7)
MCV RBC AUTO: 86.7 FL (ref 79–97)
MONOCYTES # BLD AUTO: 0.68 10*3/MM3 (ref 0.1–0.9)
MONOCYTES NFR BLD AUTO: 7 % (ref 5–12)
NEUTROPHILS NFR BLD AUTO: 5.05 10*3/MM3 (ref 1.7–7)
NEUTROPHILS NFR BLD AUTO: 52.1 % (ref 42.7–76)
NRBC BLD AUTO-RTO: 0 /100 WBC (ref 0–0.2)
PLATELET # BLD AUTO: 467 10*3/MM3 (ref 140–450)
PMV BLD AUTO: 10 FL (ref 6–12)
POTASSIUM SERPL-SCNC: 3.8 MMOL/L (ref 3.5–5.2)
PROT SERPL-MCNC: 8.2 G/DL (ref 6–8.5)
RBC # BLD AUTO: 4.6 10*6/MM3 (ref 4.14–5.8)
SODIUM SERPL-SCNC: 137 MMOL/L (ref 136–145)
WBC NRBC COR # BLD AUTO: 9.71 10*3/MM3 (ref 3.4–10.8)

## 2024-07-05 PROCEDURE — 36592 COLLECT BLOOD FROM PICC: CPT

## 2024-07-05 PROCEDURE — 85025 COMPLETE CBC W/AUTO DIFF WBC: CPT | Performed by: INTERNAL MEDICINE

## 2024-07-05 PROCEDURE — 80053 COMPREHEN METABOLIC PANEL: CPT | Performed by: INTERNAL MEDICINE

## 2024-07-05 PROCEDURE — 86140 C-REACTIVE PROTEIN: CPT | Performed by: INTERNAL MEDICINE

## 2024-07-05 NOTE — NURSING NOTE
1300 AMBULATORY TO [facility] FOR SCHEDULED PICC DRESSING CHANGE, LABS DRAWN FROM PICC, DRESSING CHANGED WITHOUT PROBLEM. 1400 DISCHARGED AMBULATORY WITHOUT C/O

## 2024-07-11 ENCOUNTER — TELEPHONE (OUTPATIENT)
Dept: SURGERY | Facility: CLINIC | Age: 39
End: 2024-07-11
Payer: COMMERCIAL

## 2024-07-11 ENCOUNTER — TELEPHONE (OUTPATIENT)
Dept: INTERNAL MEDICINE | Facility: CLINIC | Age: 39
End: 2024-07-11
Payer: COMMERCIAL

## 2024-07-11 ENCOUNTER — TELEPHONE (OUTPATIENT)
Dept: SURGERY | Facility: CLINIC | Age: 39
End: 2024-07-11

## 2024-07-11 ENCOUNTER — READMISSION MANAGEMENT (OUTPATIENT)
Dept: CALL CENTER | Facility: HOSPITAL | Age: 39
End: 2024-07-11
Payer: COMMERCIAL

## 2024-07-11 NOTE — OUTREACH NOTE
COPD/PN Week 3 Survey      Flowsheet Row Responses   [facility] facility patient discharged from? [place]   Does the patient have one of the following disease processes/diagnoses(primary or secondary)? Pneumonia   Week 3 attempt successful? No   Unsuccessful attempts Attempt 1  [attempted all numbers]            Pura GOOD - Registered Nurse

## 2024-07-11 NOTE — TELEPHONE ENCOUNTER
I was able to speak with pt wife and relay 7/25/2024 ct appt and 8/1/2024 provider appt    DB 3:14  7/11/2024

## 2024-07-11 NOTE — TELEPHONE ENCOUNTER
Caller: Ravinder Robles    Relationship: Self    Best call back number: [phone]    What orders are you requesting (i.e. lab or imaging): CT SCAN.    In what timeframe would the patient need to come in: NA    Where will you receive your lab/imaging services: [facility]    Additional notes:

## 2024-07-12 ENCOUNTER — TELEPHONE (OUTPATIENT)
Dept: SURGERY | Facility: CLINIC | Age: 39
End: 2024-07-12
Payer: COMMERCIAL

## 2024-07-12 ENCOUNTER — HOSPITAL ENCOUNTER (OUTPATIENT)
Dept: INFUSION THERAPY | Facility: HOSPITAL | Age: 39
Discharge: HOME | End: 2024-07-12
Payer: COMMERCIAL

## 2024-07-12 VITALS
TEMPERATURE: 96.6 F | RESPIRATION RATE: 16 BRPM | DIASTOLIC BLOOD PRESSURE: 73 MMHG | OXYGEN SATURATION: 96 % | SYSTOLIC BLOOD PRESSURE: 109 MMHG | HEART RATE: 91 BPM

## 2024-07-12 DIAGNOSIS — J18.9 PNEUMONIA OF LEFT LOWER LOBE DUE TO INFECTIOUS ORGANISM: Primary | ICD-10-CM

## 2024-07-12 LAB
ALBUMIN SERPL-MCNC: 3.7 G/DL (ref 3.5–5.2)
ALBUMIN/GLOB SERPL: 0.9 G/DL
ALP SERPL-CCNC: 61 U/L (ref 39–117)
ALT SERPL W P-5'-P-CCNC: <5 U/L (ref 1–41)
ANION GAP SERPL CALCULATED.3IONS-SCNC: 12.1 MMOL/L (ref 5–15)
AST SERPL-CCNC: 15 U/L (ref 1–40)
BILIRUB SERPL-MCNC: 0.3 MG/DL (ref 0–1.2)
BUN SERPL-MCNC: 11 MG/DL (ref 6–20)
BUN/CREAT SERPL: 13.6 (ref 7–25)
CALCIUM SPEC-SCNC: 9.4 MG/DL (ref 8.6–10.5)
CHLORIDE SERPL-SCNC: 102 MMOL/L (ref 98–107)
CO2 SERPL-SCNC: 20.9 MMOL/L (ref 22–29)
CREAT SERPL-MCNC: 0.81 MG/DL (ref 0.76–1.27)
EGFRCR SERPLBLD CKD-EPI 2021: 116.5 ML/MIN/1.73
GLOBULIN UR ELPH-MCNC: 4 GM/DL
GLUCOSE SERPL-MCNC: 86 MG/DL (ref 65–99)
POTASSIUM SERPL-SCNC: 4 MMOL/L (ref 3.5–5.2)
PROT SERPL-MCNC: 7.7 G/DL (ref 6–8.5)
SODIUM SERPL-SCNC: 135 MMOL/L (ref 136–145)

## 2024-07-12 PROCEDURE — 80053 COMPREHEN METABOLIC PANEL: CPT | Performed by: INTERNAL MEDICINE

## 2024-07-12 PROCEDURE — 85025 COMPLETE CBC W/AUTO DIFF WBC: CPT | Performed by: INTERNAL MEDICINE

## 2024-07-12 PROCEDURE — 86140 C-REACTIVE PROTEIN: CPT | Performed by: INTERNAL MEDICINE

## 2024-07-12 PROCEDURE — 36592 COLLECT BLOOD FROM PICC: CPT

## 2024-07-12 NOTE — NURSING NOTE
PT ARRIVED TO [facility] FOR APPT. VSS, NO COMPLAINTS AT THIS TIME. DRESSING CHANGED PER MD ORDER. LABS DRAWN VIA PICC LINE. PT TOLERATED WELL. PT DISCHARGED FROM [facility] AT 12:16 PM IN STABLE CONDITION, WITHOUT COMPLAINTS.

## 2024-07-16 ENCOUNTER — READMISSION MANAGEMENT (OUTPATIENT)
Dept: CALL CENTER | Facility: HOSPITAL | Age: 39
End: 2024-07-16
Payer: COMMERCIAL

## 2024-07-16 NOTE — OUTREACH NOTE
COPD/PN Week 3 Survey      Flowsheet Row Responses   [facility] facility patient discharged from? [facility]   Does the patient have one of the following disease processes/diagnoses(primary or secondary)? Pneumonia   Week 3 attempt successful? No   Unsuccessful attempts Attempt 2            DEIRDRE BARRAZA - Registered Nurse

## 2024-07-19 ENCOUNTER — HOSPITAL ENCOUNTER (OUTPATIENT)
Dept: INFUSION THERAPY | Facility: HOSPITAL | Age: 39
Discharge: HOME | End: 2024-07-19
Admitting: INTERNAL MEDICINE
Payer: COMMERCIAL

## 2024-07-19 DIAGNOSIS — J18.9 PNEUMONIA OF LEFT LOWER LOBE DUE TO INFECTIOUS ORGANISM: Primary | ICD-10-CM

## 2024-07-19 LAB
ALBUMIN SERPL-MCNC: 3.7 G/DL (ref 3.5–5.2)
ALBUMIN/GLOB SERPL: 1 G/DL
ALP SERPL-CCNC: 55 U/L (ref 39–117)
ALT SERPL W P-5'-P-CCNC: 13 U/L (ref 1–41)
ANION GAP SERPL CALCULATED.3IONS-SCNC: 11.8 MMOL/L (ref 5–15)
AST SERPL-CCNC: 17 U/L (ref 1–40)
BASOPHILS # BLD AUTO: 0.01 10*3/MM3 (ref 0–0.2)
BASOPHILS NFR BLD AUTO: 0.1 % (ref 0–1.5)
BILIRUB SERPL-MCNC: 0.2 MG/DL (ref 0–1.2)
BUN SERPL-MCNC: 11 MG/DL (ref 6–20)
BUN/CREAT SERPL: 11.7 (ref 7–25)
CALCIUM SPEC-SCNC: 9.1 MG/DL (ref 8.6–10.5)
CHLORIDE SERPL-SCNC: 103 MMOL/L (ref 98–107)
CO2 SERPL-SCNC: 24.2 MMOL/L (ref 22–29)
CREAT SERPL-MCNC: 0.94 MG/DL (ref 0.76–1.27)
DEPRECATED RDW RBC AUTO: 44.1 FL (ref 37–54)
EGFRCR SERPLBLD CKD-EPI 2021: 107.1 ML/MIN/1.73
EOSINOPHIL # BLD AUTO: 0.31 10*3/MM3 (ref 0–0.4)
EOSINOPHIL NFR BLD AUTO: 3.9 % (ref 0.3–6.2)
ERYTHROCYTE [DISTWIDTH] IN BLOOD BY AUTOMATED COUNT: 14.1 % (ref 12.3–15.4)
GLOBULIN UR ELPH-MCNC: 3.6 GM/DL
GLUCOSE SERPL-MCNC: 82 MG/DL (ref 65–99)
HCT VFR BLD AUTO: 42.3 % (ref 37.5–51)
HGB BLD-MCNC: 13.3 G/DL (ref 13–17.7)
IMM GRANULOCYTES # BLD AUTO: 0.01 10*3/MM3 (ref 0–0.05)
IMM GRANULOCYTES NFR BLD AUTO: 0.1 % (ref 0–0.5)
LYMPHOCYTES # BLD AUTO: 3.66 10*3/MM3 (ref 0.7–3.1)
LYMPHOCYTES NFR BLD AUTO: 46.3 % (ref 19.6–45.3)
MCH RBC QN AUTO: 27.4 PG (ref 26.6–33)
MCHC RBC AUTO-ENTMCNC: 31.4 G/DL (ref 31.5–35.7)
MCV RBC AUTO: 87 FL (ref 79–97)
MONOCYTES # BLD AUTO: 0.54 10*3/MM3 (ref 0.1–0.9)
MONOCYTES NFR BLD AUTO: 6.8 % (ref 5–12)
NEUTROPHILS NFR BLD AUTO: 3.38 10*3/MM3 (ref 1.7–7)
NEUTROPHILS NFR BLD AUTO: 42.8 % (ref 42.7–76)
NRBC BLD AUTO-RTO: 0 /100 WBC (ref 0–0.2)
PLATELET # BLD AUTO: 329 10*3/MM3 (ref 140–450)
PMV BLD AUTO: 10 FL (ref 6–12)
POTASSIUM SERPL-SCNC: 4.2 MMOL/L (ref 3.5–5.2)
PROT SERPL-MCNC: 7.3 G/DL (ref 6–8.5)
RBC # BLD AUTO: 4.86 10*6/MM3 (ref 4.14–5.8)
SODIUM SERPL-SCNC: 139 MMOL/L (ref 136–145)
WBC NRBC COR # BLD AUTO: 7.91 10*3/MM3 (ref 3.4–10.8)

## 2024-07-19 PROCEDURE — 80053 COMPREHEN METABOLIC PANEL: CPT | Performed by: INTERNAL MEDICINE

## 2024-07-19 PROCEDURE — 86140 C-REACTIVE PROTEIN: CPT | Performed by: INTERNAL MEDICINE

## 2024-07-19 PROCEDURE — 85025 COMPLETE CBC W/AUTO DIFF WBC: CPT | Performed by: INTERNAL MEDICINE

## 2024-07-20 LAB — CRP SERPL-MCNC: 0.32 MG/DL (ref 0–0.5)

## 2024-07-22 ENCOUNTER — HOSPITAL ENCOUNTER (OUTPATIENT)
Dept: CT IMAGING | Facility: HOSPITAL | Age: 39
Discharge: HOME | End: 2024-07-22
Admitting: NURSE PRACTITIONER
Payer: COMMERCIAL

## 2024-07-22 DIAGNOSIS — J90 PLEURAL EFFUSION: ICD-10-CM

## 2024-07-22 PROCEDURE — 71250 CT THORAX DX C-: CPT

## 2024-07-25 ENCOUNTER — OFFICE VISIT (OUTPATIENT)
Dept: SURGERY | Facility: CLINIC | Age: 39
End: 2024-07-25
Payer: COMMERCIAL

## 2024-07-25 VITALS
OXYGEN SATURATION: 96 % | HEART RATE: 83 BPM | WEIGHT: 305.8 LBS | BODY MASS INDEX: 41.47 KG/M2 | SYSTOLIC BLOOD PRESSURE: 110 MMHG | DIASTOLIC BLOOD PRESSURE: 72 MMHG

## 2024-07-25 DIAGNOSIS — J90 PLEURAL EFFUSION: Primary | ICD-10-CM

## 2024-07-25 PROCEDURE — 99214 OFFICE O/P EST MOD 30 MIN: CPT | Performed by: NURSE PRACTITIONER

## 2024-07-25 NOTE — PROGRESS NOTES
"Chief Complaint  Follow-up, left pleural effusion    Subjective        Ravinder Robles presents to [facility] THORACIC SURGERY  Follow-up      Mr. De La Fuente is a very pleasant 38-year-old gentleman who is seen today in follow-up for a left-sided pleural effusion.  I had initially seen him in the office for left lung consolidation/effusion seen on recent CT chest on 6/13/2024 and he was admitted to the hospital shortly thereafter for IV antibiotics.  He was recommended a 6-week course of Zosyn via PICC line.  He is scheduled to complete this next week.  He is significantly improved.  He denies shortness of breath and his fatigue is slowly resolving.  He is eager for PICC line to be removed and returned to his baseline state of health.  He has no new complaints.  He had a CT done recently and is eager to review the results.        Objective   Vital Signs:  /72 (BP Location: Right arm, Patient Position: Sitting)   Pulse 83   Wt (!) 139 kg (305 lb 12.8 oz)   SpO2 96%   BMI 41.47 kg/m²   Estimated body mass index is 41.47 kg/m² as calculated from the following:    Height as of 7/1/24: 182.9 cm (72\").    Weight as of this encounter: 139 kg (305 lb 12.8 oz).             Physical Exam  Constitutional:       General: He is not in acute distress.     Appearance: Normal appearance. He is not ill-appearing.   HENT:      Head: Normocephalic and atraumatic.      Nose: Nose normal.   Cardiovascular:      Rate and Rhythm: Normal rate.   Pulmonary:      Effort: Pulmonary effort is normal. No respiratory distress.   Musculoskeletal:         General: Normal range of motion.      Cervical back: Normal range of motion.   Skin:     General: Skin is warm and dry.      Comments: PICC line intact to left upper extremity   Neurological:      General: No focal deficit present.      Mental Status: He is alert.      Motor: No weakness.   Psychiatric:         Mood and Affect: Mood normal.         Thought Content: " Thought content normal.        Result Review :            CT chest 7/22/2024: Significant improvement to left pleural effusion compared to previous imaging.  Residual pleural thickening and residual consolidation in the left lower lobe, decreased from previous.             Assessment and Plan     Diagnoses and all orders for this visit:    1. Pleural effusion (Primary)  -     CT Chest Without Contrast; Future    Patient's most recent CT demonstrates significant improvement to left pleural effusion with some residual consolidation/pleural thickening to the left lung.  He has about a week and a half left of IV antibiotics.  He is improved overall.  I recommend we continue to follow this with CT chest in 3 months.  Imaging was reviewed with the patient and his wife today and they are in agreement with this plan.       I spent 32 minutes caring for Ravinder on this date of service. This time includes time spent by me in the following activities:preparing for the visit, reviewing tests, obtaining and/or reviewing a separately obtained history, performing a medically appropriate examination and/or evaluation , counseling and educating the patient/family/caregiver, ordering medications, tests, or procedures, referring and communicating with other health care professionals , documenting information in the medical record, and independently interpreting results and communicating that information with the patient/family/caregiver  Follow Up     Return in about 3 months (around 10/25/2024) for Next scheduled follow up.  Patient was given instructions and counseling regarding his condition or for health maintenance advice. Please see specific information pulled into the AVS if appropriate.

## 2024-07-26 ENCOUNTER — HOSPITAL ENCOUNTER (OUTPATIENT)
Dept: INFUSION THERAPY | Facility: HOSPITAL | Age: 39
Discharge: HOME | End: 2024-07-26
Admitting: INTERNAL MEDICINE
Payer: COMMERCIAL

## 2024-07-26 VITALS
HEART RATE: 89 BPM | TEMPERATURE: 97.4 F | OXYGEN SATURATION: 100 % | RESPIRATION RATE: 20 BRPM | DIASTOLIC BLOOD PRESSURE: 87 MMHG | SYSTOLIC BLOOD PRESSURE: 120 MMHG

## 2024-07-26 DIAGNOSIS — J18.9 PNEUMONIA OF LEFT LOWER LOBE DUE TO INFECTIOUS ORGANISM: Primary | ICD-10-CM

## 2024-07-26 LAB
ALBUMIN SERPL-MCNC: 3.9 G/DL (ref 3.5–5.2)
ALBUMIN/GLOB SERPL: 1.2 G/DL
ALP SERPL-CCNC: 59 U/L (ref 39–117)
ALT SERPL W P-5'-P-CCNC: 12 U/L (ref 1–41)
ANION GAP SERPL CALCULATED.3IONS-SCNC: 7.9 MMOL/L (ref 5–15)
AST SERPL-CCNC: 19 U/L (ref 1–40)
BASOPHILS # BLD AUTO: 0.02 10*3/MM3 (ref 0–0.2)
BASOPHILS NFR BLD AUTO: 0.3 % (ref 0–1.5)
BILIRUB SERPL-MCNC: 0.2 MG/DL (ref 0–1.2)
BUN SERPL-MCNC: 13 MG/DL (ref 6–20)
BUN/CREAT SERPL: 17.8 (ref 7–25)
CALCIUM SPEC-SCNC: 9.2 MG/DL (ref 8.6–10.5)
CHLORIDE SERPL-SCNC: 103 MMOL/L (ref 98–107)
CO2 SERPL-SCNC: 23.1 MMOL/L (ref 22–29)
CREAT SERPL-MCNC: 0.73 MG/DL (ref 0.76–1.27)
CRP SERPL-MCNC: 0.37 MG/DL (ref 0–0.5)
DEPRECATED RDW RBC AUTO: 45.1 FL (ref 37–54)
EGFRCR SERPLBLD CKD-EPI 2021: 120.2 ML/MIN/1.73
EOSINOPHIL # BLD AUTO: 0.3 10*3/MM3 (ref 0–0.4)
EOSINOPHIL NFR BLD AUTO: 4.1 % (ref 0.3–6.2)
ERYTHROCYTE [DISTWIDTH] IN BLOOD BY AUTOMATED COUNT: 14.3 % (ref 12.3–15.4)
GLOBULIN UR ELPH-MCNC: 3.3 GM/DL
GLUCOSE SERPL-MCNC: 113 MG/DL (ref 65–99)
HCT VFR BLD AUTO: 41.8 % (ref 37.5–51)
HGB BLD-MCNC: 13.5 G/DL (ref 13–17.7)
IMM GRANULOCYTES # BLD AUTO: 0.02 10*3/MM3 (ref 0–0.05)
IMM GRANULOCYTES NFR BLD AUTO: 0.3 % (ref 0–0.5)
LYMPHOCYTES # BLD AUTO: 3.4 10*3/MM3 (ref 0.7–3.1)
LYMPHOCYTES NFR BLD AUTO: 46.6 % (ref 19.6–45.3)
MCH RBC QN AUTO: 27.7 PG (ref 26.6–33)
MCHC RBC AUTO-ENTMCNC: 32.3 G/DL (ref 31.5–35.7)
MCV RBC AUTO: 85.7 FL (ref 79–97)
MONOCYTES # BLD AUTO: 0.48 10*3/MM3 (ref 0.1–0.9)
MONOCYTES NFR BLD AUTO: 6.6 % (ref 5–12)
NEUTROPHILS NFR BLD AUTO: 3.08 10*3/MM3 (ref 1.7–7)
NEUTROPHILS NFR BLD AUTO: 42.1 % (ref 42.7–76)
NRBC BLD AUTO-RTO: 0 /100 WBC (ref 0–0.2)
PLATELET # BLD AUTO: 264 10*3/MM3 (ref 140–450)
PMV BLD AUTO: 10.5 FL (ref 6–12)
POTASSIUM SERPL-SCNC: 3.7 MMOL/L (ref 3.5–5.2)
PROT SERPL-MCNC: 7.2 G/DL (ref 6–8.5)
RBC # BLD AUTO: 4.88 10*6/MM3 (ref 4.14–5.8)
SODIUM SERPL-SCNC: 134 MMOL/L (ref 136–145)
WBC NRBC COR # BLD AUTO: 7.3 10*3/MM3 (ref 3.4–10.8)

## 2024-07-26 PROCEDURE — 86140 C-REACTIVE PROTEIN: CPT | Performed by: INTERNAL MEDICINE

## 2024-07-26 PROCEDURE — 36415 COLL VENOUS BLD VENIPUNCTURE: CPT

## 2024-07-26 PROCEDURE — 85025 COMPLETE CBC W/AUTO DIFF WBC: CPT | Performed by: INTERNAL MEDICINE

## 2024-07-26 PROCEDURE — 80053 COMPREHEN METABOLIC PANEL: CPT | Performed by: INTERNAL MEDICINE

## 2024-07-26 NOTE — NURSING NOTE
Patient arrived at [facility] 1254.  Labs drawn.  History and medications reviewed with patient.  PICC line dressing changed.  Patient discharged at 1355 in stable condition without complaint.

## 2024-08-01 ENCOUNTER — HOSPITAL ENCOUNTER (OUTPATIENT)
Dept: INFUSION THERAPY | Facility: HOSPITAL | Age: 39
Discharge: HOME | End: 2024-08-01
Admitting: INTERNAL MEDICINE
Payer: COMMERCIAL

## 2024-08-01 VITALS
SYSTOLIC BLOOD PRESSURE: 121 MMHG | RESPIRATION RATE: 16 BRPM | DIASTOLIC BLOOD PRESSURE: 81 MMHG | OXYGEN SATURATION: 97 % | TEMPERATURE: 97.1 F | HEART RATE: 88 BPM

## 2024-08-01 DIAGNOSIS — J18.9 PNEUMONIA OF LEFT LOWER LOBE DUE TO INFECTIOUS ORGANISM: Primary | ICD-10-CM

## 2024-08-01 PROCEDURE — G0463 HOSPITAL OUTPT CLINIC VISIT: HCPCS

## 2024-08-01 NOTE — NURSING NOTE
Patient arrived to [facility] at 1400.  History and medications reviewed with patient.  Peripherally inserted central catheter removed, Vaseline gauze and Tegaderm applied.  AVS refused by patient.  Discussed removal of dressing after 24 hours.  No water submersion for 10 days. Call with any signes of infection.  Patient discharged at 1450 in stable condition without complaint.

## 2024-08-02 ENCOUNTER — HOSPITAL ENCOUNTER (OUTPATIENT)
Dept: INFUSION THERAPY | Facility: HOSPITAL | Age: 39
Discharge: HOME | End: 2024-08-02
Payer: COMMERCIAL

## 2024-08-19 DIAGNOSIS — J45.40 MODERATE PERSISTENT ASTHMA WITHOUT COMPLICATION: ICD-10-CM

## 2024-08-20 RX ORDER — ALBUTEROL SULFATE 90 UG/1
AEROSOL, METERED RESPIRATORY (INHALATION)
Qty: 36 G | Refills: 0 | Status: SHIPPED | OUTPATIENT
Start: 2024-08-20

## 2024-08-23 ENCOUNTER — TELEPHONE (OUTPATIENT)
Dept: SURGERY | Facility: CLINIC | Age: 39
End: 2024-08-23
Payer: COMMERCIAL

## 2024-08-23 NOTE — TELEPHONE ENCOUNTER
Spoke with pt stating everything that he is experiencing is somewhat expected. Let him know that it is like a scrap on the knee throughout the healing process it stings and burns a little & he did stat that he was rotating heat and ice. I also told the patient that if the gabapentin was helping to continue & could also rotating the gap and Advil or Ibuprofen. He was happy and thankful for the call back and said it has gave a little ease. I told pt if he had any other problems and or concerns to call us back and wed be happy to help.    CK   08/23/24 8:33    ----- Message from Chioma Mcgraw sent at 8/6/2024 11:53 AM EDT -----  Campbell Mora,     Can you let him know this pleuritic type pain he describes is somewhat expected as his infection improves/resolves.  Recommend NSAIDs (i.e. Advil/ibuprofen) to help with inflammation and he can continue the gabapentin if he finds that is helpful.  Also, supportive care with heat or ice to the area.  Will defer lab testing to infectious disease.    Thanks for your help,  Chioma

## 2024-08-23 NOTE — TELEPHONE ENCOUNTER
----- Message from Chioma Mcgraw sent at 8/6/2024 11:53 AM EDT -----  Campbell Mora,     Can you let him know this pleuritic type pain he describes is somewhat expected as his infection improves/resolves.  Recommend NSAIDs (i.e. Advil/ibuprofen) to help with inflammation and he can continue the gabapentin if he finds that is helpful.  Also, supportive care with heat or ice to the area.  Will defer lab testing to infectious disease.    Thanks for your help,  Chioma

## 2024-08-28 ENCOUNTER — TELEPHONE (OUTPATIENT)
Dept: INTERNAL MEDICINE | Facility: CLINIC | Age: 39
End: 2024-08-28
Payer: COMMERCIAL

## 2024-08-28 NOTE — TELEPHONE ENCOUNTER
Okay for hub to read. Called and left voicemail for patient letting him know that we need him to fill out his portion of the  disability paperwork

## 2024-09-17 RX ORDER — METFORMIN HCL 500 MG
500 TABLET, EXTENDED RELEASE 24 HR ORAL 2 TIMES DAILY WITH MEALS
Qty: 180 TABLET | Refills: 0 | Status: SHIPPED | OUTPATIENT
Start: 2024-09-17

## 2024-10-01 ENCOUNTER — OFFICE VISIT (OUTPATIENT)
Dept: INTERNAL MEDICINE | Facility: CLINIC | Age: 39
End: 2024-10-01
Payer: COMMERCIAL

## 2024-10-01 VITALS
SYSTOLIC BLOOD PRESSURE: 124 MMHG | WEIGHT: 307 LBS | OXYGEN SATURATION: 98 % | BODY MASS INDEX: 41.58 KG/M2 | DIASTOLIC BLOOD PRESSURE: 72 MMHG | HEART RATE: 90 BPM | TEMPERATURE: 97.2 F | HEIGHT: 72 IN | RESPIRATION RATE: 16 BRPM

## 2024-10-01 DIAGNOSIS — E66.01 CLASS 3 SEVERE OBESITY DUE TO EXCESS CALORIES WITHOUT SERIOUS COMORBIDITY WITH BODY MASS INDEX (BMI) OF 45.0 TO 49.9 IN ADULT: ICD-10-CM

## 2024-10-01 DIAGNOSIS — E66.813 CLASS 3 SEVERE OBESITY DUE TO EXCESS CALORIES WITHOUT SERIOUS COMORBIDITY WITH BODY MASS INDEX (BMI) OF 45.0 TO 49.9 IN ADULT: ICD-10-CM

## 2024-10-01 DIAGNOSIS — E11.65 TYPE 2 DIABETES MELLITUS WITH HYPERGLYCEMIA, WITHOUT LONG-TERM CURRENT USE OF INSULIN: Primary | ICD-10-CM

## 2024-10-01 DIAGNOSIS — I10 ESSENTIAL HYPERTENSION: ICD-10-CM

## 2024-10-01 PROCEDURE — 99214 OFFICE O/P EST MOD 30 MIN: CPT | Performed by: INTERNAL MEDICINE

## 2024-10-01 RX ORDER — TIRZEPATIDE 10 MG/.5ML
10 INJECTION, SOLUTION SUBCUTANEOUS WEEKLY
COMMUNITY

## 2024-10-01 NOTE — PROGRESS NOTES
"Chief Complaint  Follow-up (pneumonia) and Diabetes    Subjective        Ravinder Robles presents to [facility]  Follow-up  Diabetes      Blood sugar has reportedly been good.  No polyuria or polydipsia or visual changes.  No chest pains or shortness of breath.  Taking medication as prescribed.  No side effects reported.  Tolerating the Mounjaro well.  He did restart the metformin back about 3 weeks ago.  He is doing well after his empyema on the left.  Apparently a CT scan done 2 months ago showed some residual atelectasis probably in the left lower lung.  He does not really complain of any significant pain in that area now  Objective   Vital Signs:  /72 (BP Location: Left arm, Patient Position: Sitting, Cuff Size: Large Adult)   Pulse 90   Temp 97.2 °F (36.2 °C) (Temporal)   Resp 16   Ht 182.9 cm (72\")   Wt (!) 139 kg (307 lb)   SpO2 98%   BMI 41.64 kg/m²   Estimated body mass index is 41.64 kg/m² as calculated from the following:    Height as of this encounter: 182.9 cm (72\").    Weight as of this encounter: 139 kg (307 lb).          Physical Exam  Vitals and nursing note reviewed.   Constitutional:       Appearance: Normal appearance.   HENT:      Head: Normocephalic and atraumatic.   Cardiovascular:      Rate and Rhythm: Normal rate and regular rhythm.   Pulmonary:      Effort: Pulmonary effort is normal. No respiratory distress.      Breath sounds: No wheezing or rhonchi.      Comments: Diminished breath sounds in that left lower lung field  Abdominal:      General: Abdomen is flat.      Palpations: Abdomen is soft.   Musculoskeletal:         General: No swelling or deformity.      Cervical back: Neck supple.      Right lower leg: No edema.      Left lower leg: No edema.   Skin:     General: Skin is warm.      Capillary Refill: Capillary refill takes less than 2 seconds.      Findings: No rash.   Neurological:      General: No focal deficit present. "      Mental Status: He is alert and oriented to person, place, and time.        Result Review :          Previous notes reviewed     Assessment and Plan   Diagnoses and all orders for this visit:    1. Type 2 diabetes mellitus with hyperglycemia, without long-term current use of insulin (Primary)  -     Comprehensive Metabolic Panel  -     Hemoglobin A1c  -     Lipid Panel With / Chol / HDL Ratio  -     TSH  -     CBC & Differential  -     C-reactive protein    2. Essential hypertension  -     Comprehensive Metabolic Panel  -     Hemoglobin A1c  -     Lipid Panel With / Chol / HDL Ratio  -     TSH  -     CBC & Differential  -     C-reactive protein    He seems to be doing great.  Tolerating mounjaro 10 mg weekly.  Repeat an A1c.  He restarted the metformin about 3 weeks ago as he thought the blood sugar was a little bit higher.  See what the A1c looks like.  He may have an interest in going to the 12.5 mg dose.  Empyema is doing much better.  No symptoms or discomfort.  Does have still some diminished breath sounds in that lower region on the left.  Looks like has follow-up with thoracic surgery later this month       Follow Up   Return in about 3 months (around 1/1/2025).  Patient was given instructions and counseling regarding his condition or for health maintenance advice. Please see specific information pulled into the AVS if appropriate.

## 2024-10-02 LAB
ALBUMIN SERPL-MCNC: 4.3 G/DL (ref 3.5–5.2)
ALBUMIN/GLOB SERPL: 1.6 G/DL
ALP SERPL-CCNC: 77 U/L (ref 39–117)
ALT SERPL-CCNC: 27 U/L (ref 1–41)
AST SERPL-CCNC: 19 U/L (ref 1–40)
BASOPHILS # BLD AUTO: 0.08 10*3/MM3 (ref 0–0.2)
BASOPHILS NFR BLD AUTO: 0.8 % (ref 0–1.5)
BILIRUB SERPL-MCNC: <0.2 MG/DL (ref 0–1.2)
BUN SERPL-MCNC: 14 MG/DL (ref 6–20)
BUN/CREAT SERPL: 12.7 (ref 7–25)
CALCIUM SERPL-MCNC: 9.2 MG/DL (ref 8.6–10.5)
CHLORIDE SERPL-SCNC: 101 MMOL/L (ref 98–107)
CHOLEST SERPL-MCNC: 179 MG/DL (ref 0–200)
CHOLEST/HDLC SERPL: 5.11 {RATIO}
CO2 SERPL-SCNC: 26 MMOL/L (ref 22–29)
CREAT SERPL-MCNC: 1.1 MG/DL (ref 0.76–1.27)
CRP SERPL-MCNC: 0.43 MG/DL (ref 0–0.5)
EGFRCR SERPLBLD CKD-EPI 2021: 88.1 ML/MIN/1.73
EOSINOPHIL # BLD AUTO: 0.29 10*3/MM3 (ref 0–0.4)
EOSINOPHIL NFR BLD AUTO: 3 % (ref 0.3–6.2)
ERYTHROCYTE [DISTWIDTH] IN BLOOD BY AUTOMATED COUNT: 13.1 % (ref 12.3–15.4)
GLOBULIN SER CALC-MCNC: 2.7 GM/DL
GLUCOSE SERPL-MCNC: 71 MG/DL (ref 65–99)
HBA1C MFR BLD: 5.3 % (ref 4.8–5.6)
HCT VFR BLD AUTO: 47.5 % (ref 37.5–51)
HDLC SERPL-MCNC: 35 MG/DL (ref 40–60)
HGB BLD-MCNC: 15.4 G/DL (ref 13–17.7)
IMM GRANULOCYTES # BLD AUTO: 0.02 10*3/MM3 (ref 0–0.05)
IMM GRANULOCYTES NFR BLD AUTO: 0.2 % (ref 0–0.5)
LDLC SERPL CALC-MCNC: 117 MG/DL (ref 0–100)
LYMPHOCYTES # BLD AUTO: 4.33 10*3/MM3 (ref 0.7–3.1)
LYMPHOCYTES NFR BLD AUTO: 44.5 % (ref 19.6–45.3)
MCH RBC QN AUTO: 28.1 PG (ref 26.6–33)
MCHC RBC AUTO-ENTMCNC: 32.4 G/DL (ref 31.5–35.7)
MCV RBC AUTO: 86.7 FL (ref 79–97)
MONOCYTES # BLD AUTO: 0.66 10*3/MM3 (ref 0.1–0.9)
MONOCYTES NFR BLD AUTO: 6.8 % (ref 5–12)
NEUTROPHILS # BLD AUTO: 4.35 10*3/MM3 (ref 1.7–7)
NEUTROPHILS NFR BLD AUTO: 44.7 % (ref 42.7–76)
NRBC BLD AUTO-RTO: 0 /100 WBC (ref 0–0.2)
PLATELET # BLD AUTO: 295 10*3/MM3 (ref 140–450)
POTASSIUM SERPL-SCNC: 4.4 MMOL/L (ref 3.5–5.2)
PROT SERPL-MCNC: 7 G/DL (ref 6–8.5)
RBC # BLD AUTO: 5.48 10*6/MM3 (ref 4.14–5.8)
SODIUM SERPL-SCNC: 139 MMOL/L (ref 136–145)
TRIGL SERPL-MCNC: 153 MG/DL (ref 0–150)
TSH SERPL DL<=0.005 MIU/L-ACNC: 2.26 UIU/ML (ref 0.27–4.2)
VLDLC SERPL CALC-MCNC: 27 MG/DL (ref 5–40)
WBC # BLD AUTO: 9.73 10*3/MM3 (ref 3.4–10.8)

## 2024-10-11 DIAGNOSIS — J45.40 MODERATE PERSISTENT ASTHMA WITHOUT COMPLICATION: ICD-10-CM

## 2024-10-11 RX ORDER — ALBUTEROL SULFATE 90 UG/1
INHALANT RESPIRATORY (INHALATION)
Qty: 36 G | Refills: 0 | Status: SHIPPED | OUTPATIENT
Start: 2024-10-11

## 2024-10-23 ENCOUNTER — HOSPITAL ENCOUNTER (OUTPATIENT)
Dept: CT IMAGING | Facility: HOSPITAL | Age: 39
Discharge: HOME | End: 2024-10-23
Admitting: NURSE PRACTITIONER
Payer: COMMERCIAL

## 2024-10-23 DIAGNOSIS — J90 PLEURAL EFFUSION: ICD-10-CM

## 2024-10-23 PROCEDURE — 71250 CT THORAX DX C-: CPT

## 2024-10-24 RX ORDER — ESCITALOPRAM OXALATE 10 MG/1
TABLET ORAL
Qty: 90 TABLET | Refills: 0 | Status: SHIPPED | OUTPATIENT
Start: 2024-10-24

## 2024-12-02 DIAGNOSIS — J45.40 MODERATE PERSISTENT ASTHMA WITHOUT COMPLICATION: ICD-10-CM

## 2024-12-02 RX ORDER — ALBUTEROL SULFATE 90 UG/1
INHALANT RESPIRATORY (INHALATION)
Qty: 36 G | Refills: 0 | Status: SHIPPED | OUTPATIENT
Start: 2024-12-02

## 2024-12-02 RX ORDER — METFORMIN HYDROCHLORIDE 500 MG/1
500 TABLET, EXTENDED RELEASE ORAL 2 TIMES DAILY WITH MEALS
Qty: 180 TABLET | Refills: 0 | Status: SHIPPED | OUTPATIENT
Start: 2024-12-02

## 2024-12-02 RX ORDER — NICOTINE POLACRILEX 4 MG/1
GUM, CHEWING ORAL SEE ADMIN INSTRUCTIONS
Qty: 200 EACH | Refills: 0 | Status: SHIPPED | OUTPATIENT
Start: 2024-12-02

## 2024-12-02 NOTE — TELEPHONE ENCOUNTER
Rx Refill Note  Requested Prescriptions     Pending Prescriptions Disp Refills    albuterol sulfate  (90 Base) MCG/ACT inhaler [Pharmacy Med Name: Albuterol Sulfate  (90 Base) MCG/ACT Inhalation Aerosol Solution] 36 g 0     Sig: INHALE 2 PUFFS BY MOUTH EVERY 4 HOURS AS NEEDED FOR WHEEZING    EQ Nicotine Polacrilex 4 MG gum [Pharmacy Med Name: EQ Nicotine Polacrilex 4 MG Mouth/Throat Gum] 200 each 0     Sig: USE AS DIRECTED AS NEEDED      Last office visit with prescribing clinician: 10/1/2024   Last telemedicine visit with prescribing clinician: Visit date not found   Next office visit with prescribing clinician: 1/3/2025                         Would you like a call back once the refill request has been completed: [] Yes [] No    If the office needs to give you a call back, can they leave a voicemail: [] Yes [] No    Sandrine Gallegos MA  12/02/24, 11:58 EST

## 2024-12-23 RX ORDER — NICOTINE POLACRILEX 4 MG/1
GUM, CHEWING ORAL
Qty: 400 EACH | Refills: 0 | Status: SHIPPED | OUTPATIENT
Start: 2024-12-23

## 2024-12-23 RX ORDER — CYCLOBENZAPRINE HCL 10 MG
10 TABLET ORAL 3 TIMES DAILY PRN
Qty: 30 TABLET | Refills: 0 | Status: SHIPPED | OUTPATIENT
Start: 2024-12-23

## 2025-01-06 RX ORDER — ESCITALOPRAM OXALATE 10 MG/1
TABLET ORAL
Qty: 90 TABLET | Refills: 0 | Status: SHIPPED | OUTPATIENT
Start: 2025-01-06

## 2025-01-14 NOTE — TELEPHONE ENCOUNTER
Rx Refill Note  Requested Prescriptions     Pending Prescriptions Disp Refills    Mounjaro 12.5 MG/0.5ML solution auto-injector [Pharmacy Med Name: Mounjaro 12.5 MG/0.5ML Subcutaneous Solution Pen-injector] 4 mL 0     Sig: INJECT 1 PEN UNDER THE SKIN ONCE WEEKLY      Last office visit with prescribing clinician: 10/1/2024   Last telemedicine visit with prescribing clinician: Visit date not found   Next office visit with prescribing clinician: Visit date not found                         Would you like a call back once the refill request has been completed: [] Yes [] No    If the office needs to give you a call back, can they leave a voicemail: [] Yes [] No    Sandrine Gallegos MA  01/14/25, 10:37 EST

## 2025-01-15 ENCOUNTER — TELEPHONE (OUTPATIENT)
Dept: INTERNAL MEDICINE | Facility: CLINIC | Age: 40
End: 2025-01-15

## 2025-01-15 RX ORDER — TIRZEPATIDE 12.5 MG/.5ML
INJECTION, SOLUTION SUBCUTANEOUS
Qty: 4 ML | Refills: 0 | OUTPATIENT
Start: 2025-01-15

## 2025-01-15 NOTE — TELEPHONE ENCOUNTER
Caller: Ravinder Robles    Relationship: Self    Best call back number: [phone]     Which medication are you concerned about: Tirzepatide (Mounjaro) 12.5 MG/0.5ML solution pen-injector pen     Who prescribed you this medication: DR ARZOLA      What are your concerns: PATIENT IS NEEDING A REFILL ON THIS MEDICATION.  HE HAS A UP COMING APPT ON 1/28/2025 AT 2:45 PM.    HE IS OUT OF THE MEDICATION    PLEASE ADVISE

## 2025-01-15 NOTE — TELEPHONE ENCOUNTER
Okay for hub to read. I called and left voicemail for patient letting him know that he needs  to be seen for an appointment before we can refill his monjauro

## 2025-01-17 RX ORDER — TIRZEPATIDE 12.5 MG/.5ML
INJECTION, SOLUTION SUBCUTANEOUS
Qty: 4 ML | Refills: 0 | Status: SHIPPED | OUTPATIENT
Start: 2025-01-17

## 2025-02-03 DIAGNOSIS — J45.40 MODERATE PERSISTENT ASTHMA WITHOUT COMPLICATION: ICD-10-CM

## 2025-02-03 RX ORDER — ALBUTEROL SULFATE 90 UG/1
INHALANT RESPIRATORY (INHALATION)
Qty: 36 G | Refills: 0 | Status: SHIPPED | OUTPATIENT
Start: 2025-02-03

## 2025-02-04 ENCOUNTER — OFFICE VISIT (OUTPATIENT)
Dept: INTERNAL MEDICINE | Facility: CLINIC | Age: 40
End: 2025-02-04
Payer: COMMERCIAL

## 2025-02-04 VITALS
DIASTOLIC BLOOD PRESSURE: 80 MMHG | WEIGHT: 286 LBS | HEART RATE: 91 BPM | RESPIRATION RATE: 16 BRPM | TEMPERATURE: 97.5 F | SYSTOLIC BLOOD PRESSURE: 130 MMHG | HEIGHT: 72 IN | OXYGEN SATURATION: 97 % | BODY MASS INDEX: 38.74 KG/M2

## 2025-02-04 DIAGNOSIS — E11.65 TYPE 2 DIABETES MELLITUS WITH HYPERGLYCEMIA, WITHOUT LONG-TERM CURRENT USE OF INSULIN: ICD-10-CM

## 2025-02-04 DIAGNOSIS — R07.9 LEFT-SIDED CHEST PAIN: Primary | ICD-10-CM

## 2025-02-04 PROCEDURE — 99214 OFFICE O/P EST MOD 30 MIN: CPT | Performed by: INTERNAL MEDICINE

## 2025-02-04 NOTE — PROGRESS NOTES
"Chief Complaint  Diabetes, Hyperlipidemia, and Hypertension    Subjective        Ravinder Robles presents to [facility]  Diabetes    Hyperlipidemia    Hypertension      Tolerating the Mounjaro very well.  No polyuria or polydipsia or visual changes.  No chest pains or shortness of breath.  Taking medication as prescribed.  No side effects reported.  Continues to have an excellent weight loss.  He is having some left-sided pleuritic type pain laterally where he had his previous chest tube and empyema.  Objective   Vital Signs:  /80 (BP Location: Left arm, Patient Position: Sitting, Cuff Size: Large Adult)   Pulse 91   Temp 97.5 °F (36.4 °C) (Temporal)   Resp 16   Ht 182.9 cm (72\")   Wt 130 kg (286 lb)   SpO2 97%   BMI 38.79 kg/m²   Estimated body mass index is 38.79 kg/m² as calculated from the following:    Height as of this encounter: 182.9 cm (72\").    Weight as of this encounter: 130 kg (286 lb).          Physical Exam  Vitals and nursing note reviewed.   Constitutional:       Appearance: Normal appearance.   HENT:      Head: Normocephalic and atraumatic.   Cardiovascular:      Rate and Rhythm: Normal rate and regular rhythm.   Pulmonary:      Effort: Pulmonary effort is normal.      Breath sounds: Normal breath sounds.   Abdominal:      General: Abdomen is flat.      Palpations: Abdomen is soft.   Musculoskeletal:         General: No swelling or deformity.      Cervical back: Neck supple.      Right lower leg: No edema.      Left lower leg: No edema.   Skin:     General: Skin is warm.      Capillary Refill: Capillary refill takes less than 2 seconds.      Findings: No rash.   Neurological:      General: No focal deficit present.      Mental Status: He is alert and oriented to person, place, and time.        Result Review :      Data reviewed : Radiologic studies CT of the chest           Assessment and Plan   Diagnoses and all orders for this " visit:    1. Left-sided chest pain (Primary)  -     XR Chest PA & Lateral; Future    2. Type 2 diabetes mellitus with hyperglycemia, without long-term current use of insulin  -     Comprehensive Metabolic Panel  -     Hemoglobin A1c  -     Lipid Panel With / Chol / HDL Ratio  -     Microalbumin / Creatinine Urine Ratio - Urine, Clean Catch  -     TSH  -     CBC & Differential    Other orders  -     Tirzepatide 15 MG/0.5ML solution auto-injector; Inject 15 mg under the skin into the appropriate area as directed Every 7 (Seven) Days.  Dispense: 2 mL; Refill: 2    Pleuritic type pain on the left chest tube region previously and where he had his empyema.  Lungs sound clear but definitely should go and get a chest x-ray.  Reviewed his previous CT scan which is showing essentially near complete resolution.    Type 2 diabetes and obesity with tremendous weight loss ongoing.  He is interested in titrating the Mounjaro to 15 mg weekly.  New prescription sent in and recheck in 3 months.  Sooner if any problems         Follow Up   No follow-ups on file.  Patient was given instructions and counseling regarding his condition or for health maintenance advice. Please see specific information pulled into the AVS if appropriate.

## 2025-02-05 LAB
ALBUMIN SERPL-MCNC: 4.2 G/DL (ref 4.1–5.1)
ALBUMIN/CREAT UR: 4 MG/G CREAT (ref 0–29)
ALP SERPL-CCNC: 73 IU/L (ref 44–121)
ALT SERPL-CCNC: 24 IU/L (ref 0–44)
AST SERPL-CCNC: 21 IU/L (ref 0–40)
BASOPHILS # BLD AUTO: 0.1 X10E3/UL (ref 0–0.2)
BASOPHILS NFR BLD AUTO: 1 %
BILIRUB SERPL-MCNC: 0.4 MG/DL (ref 0–1.2)
BUN SERPL-MCNC: 17 MG/DL (ref 6–20)
BUN/CREAT SERPL: 19 (ref 9–20)
CALCIUM SERPL-MCNC: 9.2 MG/DL (ref 8.7–10.2)
CHLORIDE SERPL-SCNC: 103 MMOL/L (ref 96–106)
CHOLEST SERPL-MCNC: 161 MG/DL (ref 100–199)
CHOLEST/HDLC SERPL: 5 RATIO (ref 0–5)
CO2 SERPL-SCNC: 23 MMOL/L (ref 20–29)
CREAT SERPL-MCNC: 0.91 MG/DL (ref 0.76–1.27)
CREAT UR-MCNC: 177.3 MG/DL
EGFRCR SERPLBLD CKD-EPI 2021: 111 ML/MIN/1.73
EOSINOPHIL # BLD AUTO: 0.3 X10E3/UL (ref 0–0.4)
EOSINOPHIL NFR BLD AUTO: 4 %
ERYTHROCYTE [DISTWIDTH] IN BLOOD BY AUTOMATED COUNT: 13.1 % (ref 11.6–15.4)
GLOBULIN SER CALC-MCNC: 2.7 G/DL (ref 1.5–4.5)
GLUCOSE SERPL-MCNC: 79 MG/DL (ref 70–99)
HBA1C MFR BLD: 5.4 % (ref 4.8–5.6)
HCT VFR BLD AUTO: 46.3 % (ref 37.5–51)
HDLC SERPL-MCNC: 32 MG/DL
HGB BLD-MCNC: 15 G/DL (ref 13–17.7)
IMM GRANULOCYTES # BLD AUTO: 0 X10E3/UL (ref 0–0.1)
IMM GRANULOCYTES NFR BLD AUTO: 0 %
LDLC SERPL CALC-MCNC: 110 MG/DL (ref 0–99)
LYMPHOCYTES # BLD AUTO: 3.2 X10E3/UL (ref 0.7–3.1)
LYMPHOCYTES NFR BLD AUTO: 42 %
MCH RBC QN AUTO: 29 PG (ref 26.6–33)
MCHC RBC AUTO-ENTMCNC: 32.4 G/DL (ref 31.5–35.7)
MCV RBC AUTO: 90 FL (ref 79–97)
MICROALBUMIN UR-MCNC: 6.8 UG/ML
MONOCYTES # BLD AUTO: 0.5 X10E3/UL (ref 0.1–0.9)
MONOCYTES NFR BLD AUTO: 7 %
NEUTROPHILS # BLD AUTO: 3.5 X10E3/UL (ref 1.4–7)
NEUTROPHILS NFR BLD AUTO: 46 %
PLATELET # BLD AUTO: 268 X10E3/UL (ref 150–450)
POTASSIUM SERPL-SCNC: 4.5 MMOL/L (ref 3.5–5.2)
PROT SERPL-MCNC: 6.9 G/DL (ref 6–8.5)
RBC # BLD AUTO: 5.17 X10E6/UL (ref 4.14–5.8)
SODIUM SERPL-SCNC: 138 MMOL/L (ref 134–144)
TRIGL SERPL-MCNC: 100 MG/DL (ref 0–149)
TSH SERPL DL<=0.005 MIU/L-ACNC: 1.9 UIU/ML (ref 0.45–4.5)
VLDLC SERPL CALC-MCNC: 19 MG/DL (ref 5–40)
WBC # BLD AUTO: 7.5 X10E3/UL (ref 3.4–10.8)

## 2025-02-11 RX ORDER — ONDANSETRON 8 MG/1
8 TABLET, ORALLY DISINTEGRATING ORAL EVERY 8 HOURS PRN
Qty: 15 TABLET | Refills: 1 | Status: SHIPPED | OUTPATIENT
Start: 2025-02-11

## 2025-02-24 RX ORDER — METFORMIN HYDROCHLORIDE 500 MG/1
500 TABLET, EXTENDED RELEASE ORAL 2 TIMES DAILY WITH MEALS
Qty: 180 TABLET | Refills: 0 | Status: SHIPPED | OUTPATIENT
Start: 2025-02-24

## 2025-03-07 DIAGNOSIS — J45.40 MODERATE PERSISTENT ASTHMA WITHOUT COMPLICATION: ICD-10-CM

## 2025-03-07 RX ORDER — ALBUTEROL SULFATE 90 UG/1
INHALANT RESPIRATORY (INHALATION)
Qty: 36 G | Refills: 0 | Status: SHIPPED | OUTPATIENT
Start: 2025-03-07

## 2025-03-10 RX ORDER — OMEPRAZOLE 20 MG/1
20 CAPSULE, DELAYED RELEASE ORAL DAILY
Qty: 90 CAPSULE | Refills: 0 | Status: SHIPPED | OUTPATIENT
Start: 2025-03-10

## 2025-03-12 ENCOUNTER — HOSPITAL ENCOUNTER (OUTPATIENT)
Dept: GENERAL RADIOLOGY | Facility: HOSPITAL | Age: 40
Discharge: HOME | End: 2025-03-12
Admitting: INTERNAL MEDICINE
Payer: COMMERCIAL

## 2025-03-12 DIAGNOSIS — R07.9 LEFT-SIDED CHEST PAIN: ICD-10-CM

## 2025-03-12 PROCEDURE — 71046 X-RAY EXAM CHEST 2 VIEWS: CPT

## 2025-03-24 RX ORDER — CYCLOBENZAPRINE HCL 10 MG
10 TABLET ORAL 3 TIMES DAILY PRN
Qty: 30 TABLET | Refills: 0 | Status: SHIPPED | OUTPATIENT
Start: 2025-03-24

## 2025-04-15 RX ORDER — TIRZEPATIDE 12.5 MG/.5ML
INJECTION, SOLUTION SUBCUTANEOUS
Qty: 4 ML | Refills: 0 | OUTPATIENT
Start: 2025-04-15

## 2025-05-06 RX ORDER — ESCITALOPRAM OXALATE 10 MG/1
10 TABLET ORAL DAILY
Qty: 90 TABLET | Refills: 0 | Status: SHIPPED | OUTPATIENT
Start: 2025-05-06

## 2025-05-19 DIAGNOSIS — J45.40 MODERATE PERSISTENT ASTHMA WITHOUT COMPLICATION: ICD-10-CM

## 2025-05-19 RX ORDER — CYCLOBENZAPRINE HCL 10 MG
10 TABLET ORAL 3 TIMES DAILY PRN
Qty: 30 TABLET | Refills: 0 | Status: SHIPPED | OUTPATIENT
Start: 2025-05-19 | End: 2025-05-23

## 2025-05-19 RX ORDER — ALBUTEROL SULFATE 90 UG/1
2 INHALANT RESPIRATORY (INHALATION) EVERY 4 HOURS PRN
Qty: 36 G | Refills: 0 | Status: SHIPPED | OUTPATIENT
Start: 2025-05-19

## 2025-05-23 RX ORDER — CYCLOBENZAPRINE HCL 10 MG
10 TABLET ORAL 3 TIMES DAILY PRN
Qty: 30 TABLET | Refills: 0 | Status: SHIPPED | OUTPATIENT
Start: 2025-05-23

## 2025-06-11 RX ORDER — TIRZEPATIDE 15 MG/.5ML
INJECTION, SOLUTION SUBCUTANEOUS
Qty: 4 ML | Refills: 0 | Status: SHIPPED | OUTPATIENT
Start: 2025-06-11

## 2025-07-01 RX ORDER — TIRZEPATIDE 15 MG/.5ML
INJECTION, SOLUTION SUBCUTANEOUS
Qty: 4 ML | Refills: 0 | OUTPATIENT
Start: 2025-07-01

## 2025-07-07 DIAGNOSIS — J45.40 MODERATE PERSISTENT ASTHMA WITHOUT COMPLICATION: ICD-10-CM

## 2025-07-07 RX ORDER — ALBUTEROL SULFATE 90 UG/1
2 INHALANT RESPIRATORY (INHALATION) EVERY 4 HOURS PRN
Qty: 36 G | Refills: 0 | Status: SHIPPED | OUTPATIENT
Start: 2025-07-07

## 2025-07-09 ENCOUNTER — OFFICE VISIT (OUTPATIENT)
Dept: INTERNAL MEDICINE | Facility: CLINIC | Age: 40
End: 2025-07-09
Payer: COMMERCIAL

## 2025-07-09 VITALS
DIASTOLIC BLOOD PRESSURE: 70 MMHG | WEIGHT: 257 LBS | HEART RATE: 76 BPM | RESPIRATION RATE: 16 BRPM | TEMPERATURE: 96.9 F | BODY MASS INDEX: 34.81 KG/M2 | OXYGEN SATURATION: 97 % | HEIGHT: 72 IN | SYSTOLIC BLOOD PRESSURE: 122 MMHG

## 2025-07-09 DIAGNOSIS — E11.65 TYPE 2 DIABETES MELLITUS WITH HYPERGLYCEMIA, WITHOUT LONG-TERM CURRENT USE OF INSULIN: Primary | ICD-10-CM

## 2025-07-09 DIAGNOSIS — L71.9 ROSACEA: ICD-10-CM

## 2025-07-09 LAB
ALBUMIN SERPL-MCNC: 4.3 G/DL (ref 3.5–5.2)
ALBUMIN/GLOB SERPL: 1.7 G/DL
ALP SERPL-CCNC: 69 U/L (ref 39–117)
ALT SERPL-CCNC: 16 U/L (ref 1–41)
AST SERPL-CCNC: 18 U/L (ref 1–40)
BILIRUB SERPL-MCNC: 0.4 MG/DL (ref 0–1.2)
BUN SERPL-MCNC: 9 MG/DL (ref 6–20)
BUN/CREAT SERPL: 10.3 (ref 7–25)
CALCIUM SERPL-MCNC: 9.4 MG/DL (ref 8.6–10.5)
CHLORIDE SERPL-SCNC: 104 MMOL/L (ref 98–107)
CHOLEST SERPL-MCNC: 148 MG/DL (ref 0–200)
CHOLEST/HDLC SERPL: 3.7 {RATIO}
CO2 SERPL-SCNC: 26.5 MMOL/L (ref 22–29)
CREAT SERPL-MCNC: 0.87 MG/DL (ref 0.76–1.27)
EGFRCR SERPLBLD CKD-EPI 2021: 113.3 ML/MIN/1.73
GLOBULIN SER CALC-MCNC: 2.6 GM/DL
GLUCOSE SERPL-MCNC: 73 MG/DL (ref 65–99)
HBA1C MFR BLD: 5.1 % (ref 4.8–5.6)
HDLC SERPL-MCNC: 40 MG/DL (ref 40–60)
LDLC SERPL CALC-MCNC: 99 MG/DL (ref 0–100)
POTASSIUM SERPL-SCNC: 4.2 MMOL/L (ref 3.5–5.2)
PROT SERPL-MCNC: 6.9 G/DL (ref 6–8.5)
SODIUM SERPL-SCNC: 140 MMOL/L (ref 136–145)
TRIGL SERPL-MCNC: 42 MG/DL (ref 0–150)
VLDLC SERPL CALC-MCNC: 9 MG/DL (ref 5–40)

## 2025-07-09 PROCEDURE — 99214 OFFICE O/P EST MOD 30 MIN: CPT | Performed by: INTERNAL MEDICINE

## 2025-07-09 RX ORDER — METRONIDAZOLE 10 MG/G
GEL TOPICAL DAILY
Qty: 45 G | Refills: 3 | Status: SHIPPED | OUTPATIENT
Start: 2025-07-09

## 2025-07-09 RX ORDER — TIRZEPATIDE 15 MG/.5ML
INJECTION, SOLUTION SUBCUTANEOUS
Qty: 4 ML | Refills: 0 | Status: SHIPPED | OUTPATIENT
Start: 2025-07-09

## 2025-07-09 NOTE — PROGRESS NOTES
"Chief Complaint  Follow-up (Weight loss, doing well on mounjaro ) and Rash (On face)    Subjective        Ravinder Robles presents to [facility]  Rash    Blood sugar has reportedly been good.  No polyuria or polydipsia or visual changes.  No chest pains or shortness of breath.  Taking medication as prescribed.  No side effects reported.  He is doing amazing on the Mounjaro.  Significant weight loss continues.  He is interested in continuing the medication.  No significant side effects although some mild nausea the day of the shot.  Objective   Vital Signs:  /70 (BP Location: Right arm, Patient Position: Sitting, Cuff Size: Large Adult)   Pulse 76   Temp 96.9 °F (36.1 °C) (Temporal)   Resp 16   Ht 182.9 cm (72\")   Wt 117 kg (257 lb)   SpO2 97%   BMI 34.86 kg/m²   Estimated body mass index is 34.86 kg/m² as calculated from the following:    Height as of this encounter: 182.9 cm (72\").    Weight as of this encounter: 117 kg (257 lb).          Physical Exam  Vitals and nursing note reviewed.   Constitutional:       Appearance: Normal appearance.   HENT:      Head: Normocephalic and atraumatic.   Cardiovascular:      Rate and Rhythm: Normal rate and regular rhythm.   Pulmonary:      Effort: Pulmonary effort is normal.      Breath sounds: Normal breath sounds.   Abdominal:      General: Abdomen is flat.      Palpations: Abdomen is soft.   Musculoskeletal:         General: No swelling or deformity.      Cervical back: Neck supple.      Right lower leg: No edema.      Left lower leg: No edema.   Skin:     General: Skin is warm.      Capillary Refill: Capillary refill takes less than 2 seconds.      Findings: No rash.   Neurological:      General: No focal deficit present.      Mental Status: He is alert and oriented to person, place, and time.        Result Review :    [facility]          7/26/2024    13:03 10/1/2024    15:04 2/4/2025    10:14   [facility]   Glucose 113  71  " 79    BUN 13  14  17    Creatinine 0.73  1.10  0.91    EGFR 120.2  88.1  111    Sodium 134  139  138    Potassium 3.7  4.4  4.5    Chloride 103  101  103    Calcium 9.2  9.2  9.2    Total Protein 7.2  7.0  6.9    Albumin 3.9  4.3  4.2    Globulin 3.3  2.7  2.7    Total Bilirubin 0.2  <0.2  0.4    Alkaline Phosphatase 59  77  73    AST (SGOT) 19  19  21    ALT (SGPT) 12  27  24    Albumin/Globulin Ratio 1.2  1.6     BUN/Creatinine Ratio 17.8  12.7  19    Anion Gap 7.9        A1C Last 3 Results          10/1/2024    15:04 2/4/2025    10:14   HGBA1C Last 3 Results   Hemoglobin A1C 5.30  5.4                Assessment and Plan   Diagnoses and all orders for this visit:    1. Type 2 diabetes mellitus with hyperglycemia, without long-term current use of insulin (Primary)  -     Comprehensive Metabolic Panel  -     Hemoglobin A1c  -     Lipid Panel With / Chol / HDL Ratio    2. Rosacea    Other orders  -     metroNIDAZOLE (Metrogel) 1 % gel; Apply  topically to the appropriate area as directed Daily.  Dispense: 45 g; Refill: 3    Type 2 diabetes mounjaro with fantastic weight loss continuing.  Recheck lab work.  If his A1c is still in the low 5 range could probably discontinue the metformin.  Blood pressure looks great.  Feels good.  Rosacea probably unrelated to the mounjaro.  Apparently has a family history of that as well.  MetroGel prescription given and recheck on follow-up         Follow Up   Return in about 3 months (around 10/9/2025).  Patient was given instructions and counseling regarding his condition or for health maintenance advice. Please see specific information pulled into the AVS if appropriate.

## 2025-07-30 RX ORDER — ESCITALOPRAM OXALATE 10 MG/1
10 TABLET ORAL DAILY
Qty: 90 TABLET | Refills: 0 | Status: SHIPPED | OUTPATIENT
Start: 2025-07-30

## 2025-07-31 RX ORDER — TIRZEPATIDE 15 MG/.5ML
INJECTION, SOLUTION SUBCUTANEOUS
Qty: 4 ML | Refills: 0 | Status: SHIPPED | OUTPATIENT
Start: 2025-07-31

## 2025-08-25 DIAGNOSIS — J45.40 MODERATE PERSISTENT ASTHMA WITHOUT COMPLICATION: ICD-10-CM

## 2025-08-25 RX ORDER — ALBUTEROL SULFATE 90 UG/1
2 INHALANT RESPIRATORY (INHALATION) EVERY 4 HOURS PRN
Qty: 36 G | Refills: 0 | Status: SHIPPED | OUTPATIENT
Start: 2025-08-25